# Patient Record
Sex: MALE | Race: WHITE | NOT HISPANIC OR LATINO | Employment: OTHER | URBAN - METROPOLITAN AREA
[De-identification: names, ages, dates, MRNs, and addresses within clinical notes are randomized per-mention and may not be internally consistent; named-entity substitution may affect disease eponyms.]

---

## 2017-03-07 ENCOUNTER — HOSPITAL ENCOUNTER (OUTPATIENT)
Facility: MEDICAL CENTER | Age: 62
End: 2017-03-07
Attending: INTERNAL MEDICINE
Payer: MEDICARE

## 2017-03-07 LAB
ALBUMIN SERPL BCP-MCNC: 4.3 G/DL (ref 3.2–4.9)
ALBUMIN/GLOB SERPL: 1.3 G/DL
ALP SERPL-CCNC: 96 U/L (ref 30–99)
ALT SERPL-CCNC: 63 U/L (ref 2–50)
ANION GAP SERPL CALC-SCNC: 10 MMOL/L (ref 0–11.9)
AST SERPL-CCNC: 61 U/L (ref 12–45)
BILIRUB SERPL-MCNC: 0.6 MG/DL (ref 0.1–1.5)
BNP SERPL-MCNC: 6 PG/ML (ref 0–100)
BUN SERPL-MCNC: 7 MG/DL (ref 8–22)
CALCIUM SERPL-MCNC: 9.8 MG/DL (ref 8.5–10.5)
CHLORIDE SERPL-SCNC: 100 MMOL/L (ref 96–112)
CO2 SERPL-SCNC: 25 MMOL/L (ref 20–33)
CREAT SERPL-MCNC: 1.02 MG/DL (ref 0.5–1.4)
DEPRECATED D DIMER PPP IA-ACNC: 227 NG/ML(D-DU)
GLOBULIN SER CALC-MCNC: 3.2 G/DL (ref 1.9–3.5)
GLUCOSE SERPL-MCNC: 112 MG/DL (ref 65–99)
POTASSIUM SERPL-SCNC: 4.2 MMOL/L (ref 3.6–5.5)
PROT SERPL-MCNC: 7.5 G/DL (ref 6–8.2)
PSA SERPL DL<=0.01 NG/ML-MCNC: 0.86 NG/ML (ref 0–4)
SODIUM SERPL-SCNC: 135 MMOL/L (ref 135–145)
TSH SERPL DL<=0.005 MIU/L-ACNC: 0.68 UIU/ML (ref 0.3–3.7)

## 2017-03-07 PROCEDURE — 80053 COMPREHEN METABOLIC PANEL: CPT

## 2017-03-07 PROCEDURE — 83880 ASSAY OF NATRIURETIC PEPTIDE: CPT

## 2017-03-07 PROCEDURE — 88184 FLOWCYTOMETRY/ TC 1 MARKER: CPT

## 2017-03-07 PROCEDURE — 84443 ASSAY THYROID STIM HORMONE: CPT

## 2017-03-07 PROCEDURE — 84153 ASSAY OF PSA TOTAL: CPT

## 2017-03-07 PROCEDURE — 87536 HIV-1 QUANT&REVRSE TRNSCRPJ: CPT

## 2017-03-07 PROCEDURE — 85379 FIBRIN DEGRADATION QUANT: CPT

## 2017-03-09 LAB
CD3+CD4+ CELLS # BLD: 107 CELLS/UL (ref 430–1800)
CD3+CD4+ CELLS NFR BLD: 30 % (ref 32–64)
IMMUNODEFICIENCY MARKERS SPEC-IMP: ABNORMAL

## 2017-03-10 LAB
HIV1 RNA # SERPL NAA+PROBE: <20 CPY/ML
HIV1 RNA SER-IMP: NOT DETECTED
HIV1 RNA SERPL NAA+PROBE-LOG#: <1.3 LOG

## 2017-04-06 ENCOUNTER — HOSPITAL ENCOUNTER (OUTPATIENT)
Facility: MEDICAL CENTER | Age: 62
End: 2017-04-06
Attending: PHYSICIAN ASSISTANT
Payer: MEDICARE

## 2017-04-06 LAB — GFR SERPL CREATININE-BSD FRML MDRD: >60 ML/MIN/1.73 M 2

## 2017-04-06 PROCEDURE — 85379 FIBRIN DEGRADATION QUANT: CPT

## 2017-04-06 PROCEDURE — 86780 TREPONEMA PALLIDUM: CPT

## 2017-04-06 PROCEDURE — 80307 DRUG TEST PRSMV CHEM ANLYZR: CPT

## 2017-04-06 PROCEDURE — G0480 DRUG TEST DEF 1-7 CLASSES: HCPCS

## 2017-04-06 PROCEDURE — 80053 COMPREHEN METABOLIC PANEL: CPT

## 2017-04-06 PROCEDURE — 84443 ASSAY THYROID STIM HORMONE: CPT

## 2017-04-06 PROCEDURE — 84153 ASSAY OF PSA TOTAL: CPT

## 2017-04-06 PROCEDURE — 84154 ASSAY OF PSA FREE: CPT

## 2017-04-06 PROCEDURE — 87536 HIV-1 QUANT&REVRSE TRNSCRPJ: CPT

## 2017-04-06 PROCEDURE — 83880 ASSAY OF NATRIURETIC PEPTIDE: CPT

## 2017-04-06 PROCEDURE — G0480 DRUG TEST DEF 1-7 CLASSES: HCPCS | Mod: 91

## 2017-04-06 PROCEDURE — 88184 FLOWCYTOMETRY/ TC 1 MARKER: CPT

## 2017-04-07 LAB
ALBUMIN SERPL BCP-MCNC: 4.9 G/DL (ref 3.2–4.9)
ALBUMIN/GLOB SERPL: 1.5 G/DL
ALP SERPL-CCNC: 151 U/L (ref 30–99)
ALT SERPL-CCNC: 173 U/L (ref 2–50)
ANION GAP SERPL CALC-SCNC: 19 MMOL/L (ref 0–11.9)
AST SERPL-CCNC: 210 U/L (ref 12–45)
BILIRUB SERPL-MCNC: 1.5 MG/DL (ref 0.1–1.5)
BNP SERPL-MCNC: 22 PG/ML (ref 0–100)
BUN SERPL-MCNC: 8 MG/DL (ref 8–22)
CALCIUM SERPL-MCNC: 10.7 MG/DL (ref 8.5–10.5)
CHLORIDE SERPL-SCNC: 94 MMOL/L (ref 96–112)
CO2 SERPL-SCNC: 24 MMOL/L (ref 20–33)
CREAT SERPL-MCNC: 1.15 MG/DL (ref 0.5–1.4)
DEPRECATED D DIMER PPP IA-ACNC: 1171 NG/ML(D-DU)
GLOBULIN SER CALC-MCNC: 3.2 G/DL (ref 1.9–3.5)
GLUCOSE SERPL-MCNC: 96 MG/DL (ref 65–99)
POTASSIUM SERPL-SCNC: 3.6 MMOL/L (ref 3.6–5.5)
PROT SERPL-MCNC: 8.1 G/DL (ref 6–8.2)
SODIUM SERPL-SCNC: 137 MMOL/L (ref 135–145)
TREPONEMA PALLIDUM IGG+IGM AB [PRESENCE] IN SERUM OR PLASMA BY IMMUNOASSAY: NON REACTIVE
TSH SERPL DL<=0.005 MIU/L-ACNC: 1.07 UIU/ML (ref 0.3–3.7)

## 2017-04-08 LAB
AMPHET CTO UR CFM-MCNC: NEGATIVE NG/ML
BARBITURATES CTO UR CFM-MCNC: NEGATIVE NG/ML
BENZODIAZ CTO UR CFM-MCNC: POSITIVE NG/ML
BUPRENORPHINE UR-MCNC: NEGATIVE NG/ML
CANNABINOIDS CTO UR CFM-MCNC: NEGATIVE NG/ML
CARISOPRODOL UR-MCNC: NEGATIVE NG/ML
CD3+CD4+ CELLS # BLD: 432 CELLS/UL (ref 430–1800)
CD3+CD4+ CELLS NFR BLD: 28 % (ref 32–64)
COCAINE CTO UR CFM-MCNC: NEGATIVE NG/ML
DRUG SCREEN COMMENT UR-IMP: NORMAL
ETHYL GLUCURONIDE UR QL SCN: POSITIVE NG/ML
FENTANYL UR-MCNC: NEGATIVE NG/ML
IMMUNODEFICIENCY MARKERS SPEC-IMP: ABNORMAL
MEPERIDINE CTO UR SCN-MCNC: NEGATIVE NG/ML
METHADONE CTO UR CFM-MCNC: NEGATIVE NG/ML
OPIATES UR QL SCN: NEGATIVE NG/ML
OXYCDOXYM URSCRN 2005102: NEGATIVE NG/ML
PCP CTO UR CFM-MCNC: NEGATIVE NG/ML
PROPOXYPH CTO UR CFM-MCNC: NEGATIVE NG/ML
PSA FREE MFR SERPL: 10 %
PSA FREE SERPL-MCNC: 0.2 NG/ML
PSA SERPL-MCNC: 2 NG/ML (ref 0–4)
TAPENTADOL UR-MCNC: NEGATIVE NG/ML
TRAMADOL CTO UR SCN-MCNC: NEGATIVE NG/ML
ZOLPIDEM UR-MCNC: NEGATIVE NG/ML

## 2017-04-09 LAB
ETHYL GLUCURONIDE UR CFM-MCNC: NORMAL NG/ML
ETHYL SULFATE UR CFM-MCNC: NORMAL NG/ML
HIV1 RNA # SERPL NAA+PROBE: <20 CPY/ML
HIV1 RNA SER-IMP: NOT DETECTED
HIV1 RNA SERPL NAA+PROBE-LOG#: <1.3 LOG

## 2017-04-10 LAB
1OH-MIDAZOLAM UR CFM-MCNC: <20 NG/ML
7AMINOCLONAZEPAM UR CFM-MCNC: <5 NG/ML
A-OH ALPRAZ UR CFM-MCNC: >1000 NG/ML
ALPRAZ UR CFM-MCNC: 746 NG/ML
CHLORDIAZEP UR CFM-MCNC: <20 NG/ML
CLONAZEPAM UR CFM-MCNC: <5 NG/ML
DIAZEPAM UR CFM-MCNC: <20 NG/ML
LORAZEPAM UR CFM-MCNC: <20 NG/ML
MIDAZOLAM UR CFM-MCNC: <20 NG/ML
NORDIAZEPAM UR CFM-MCNC: 348 NG/ML
OXAZEPAM UR CFM-MCNC: 1668 NG/ML
TEMAZEPAM UR CFM-MCNC: 508 NG/ML

## 2017-04-13 ENCOUNTER — HOSPITAL ENCOUNTER (OUTPATIENT)
Dept: RADIOLOGY | Facility: MEDICAL CENTER | Age: 62
End: 2017-04-13
Attending: FAMILY MEDICINE
Payer: MEDICARE

## 2017-04-13 DIAGNOSIS — E43 EDEMA DUE TO MALNUTRITION, DUE TO UNSPECIFIED MALNUTRITION TYPE (HCC): ICD-10-CM

## 2017-04-13 DIAGNOSIS — R79.89 ELEVATED TROPONIN LEVEL: ICD-10-CM

## 2017-04-13 PROCEDURE — 93971 EXTREMITY STUDY: CPT | Mod: RT

## 2017-08-07 ENCOUNTER — HOSPITAL ENCOUNTER (OUTPATIENT)
Facility: MEDICAL CENTER | Age: 62
End: 2017-08-07
Attending: PHYSICIAN ASSISTANT
Payer: MEDICARE

## 2017-08-07 LAB
ALBUMIN SERPL BCP-MCNC: 4.3 G/DL (ref 3.2–4.9)
ALBUMIN/GLOB SERPL: 1.5 G/DL
ALP SERPL-CCNC: 53 U/L (ref 30–99)
ALT SERPL-CCNC: 14 U/L (ref 2–50)
ANION GAP SERPL CALC-SCNC: 11 MMOL/L (ref 0–11.9)
AST SERPL-CCNC: 22 U/L (ref 12–45)
BILIRUB SERPL-MCNC: 0.5 MG/DL (ref 0.1–1.5)
BUN SERPL-MCNC: 11 MG/DL (ref 8–22)
CALCIUM SERPL-MCNC: 10 MG/DL (ref 8.5–10.5)
CHLORIDE SERPL-SCNC: 103 MMOL/L (ref 96–112)
CO2 SERPL-SCNC: 26 MMOL/L (ref 20–33)
CREAT SERPL-MCNC: 1.07 MG/DL (ref 0.5–1.4)
GFR SERPL CREATININE-BSD FRML MDRD: >60 ML/MIN/1.73 M 2
GLOBULIN SER CALC-MCNC: 2.9 G/DL (ref 1.9–3.5)
GLUCOSE SERPL-MCNC: 74 MG/DL (ref 65–99)
POTASSIUM SERPL-SCNC: 4.2 MMOL/L (ref 3.6–5.5)
PROT SERPL-MCNC: 7.2 G/DL (ref 6–8.2)
SODIUM SERPL-SCNC: 140 MMOL/L (ref 135–145)

## 2017-08-07 PROCEDURE — 80053 COMPREHEN METABOLIC PANEL: CPT

## 2017-09-05 ENCOUNTER — HOSPITAL ENCOUNTER (OUTPATIENT)
Facility: MEDICAL CENTER | Age: 62
End: 2017-09-05
Attending: PHYSICIAN ASSISTANT
Payer: MEDICARE

## 2017-09-05 LAB
ALBUMIN SERPL BCP-MCNC: 4.1 G/DL (ref 3.2–4.9)
ALBUMIN/GLOB SERPL: 1.4 G/DL
ALP SERPL-CCNC: 60 U/L (ref 30–99)
ALT SERPL-CCNC: 79 U/L (ref 2–50)
ANION GAP SERPL CALC-SCNC: 11 MMOL/L (ref 0–11.9)
AST SERPL-CCNC: 83 U/L (ref 12–45)
BASOPHILS # BLD AUTO: 0.6 % (ref 0–1.8)
BASOPHILS # BLD: 0.04 K/UL (ref 0–0.12)
BILIRUB SERPL-MCNC: 0.7 MG/DL (ref 0.1–1.5)
BUN SERPL-MCNC: 11 MG/DL (ref 8–22)
CALCIUM SERPL-MCNC: 9.4 MG/DL (ref 8.5–10.5)
CHLORIDE SERPL-SCNC: 104 MMOL/L (ref 96–112)
CO2 SERPL-SCNC: 22 MMOL/L (ref 20–33)
CREAT SERPL-MCNC: 1 MG/DL (ref 0.5–1.4)
EOSINOPHIL # BLD AUTO: 0 K/UL (ref 0–0.51)
EOSINOPHIL NFR BLD: 0 % (ref 0–6.9)
ERYTHROCYTE [DISTWIDTH] IN BLOOD BY AUTOMATED COUNT: 51.9 FL (ref 35.9–50)
GFR SERPL CREATININE-BSD FRML MDRD: >60 ML/MIN/1.73 M 2
GLOBULIN SER CALC-MCNC: 2.9 G/DL (ref 1.9–3.5)
GLUCOSE SERPL-MCNC: 148 MG/DL (ref 65–99)
HCT VFR BLD AUTO: 41.1 % (ref 42–52)
HGB BLD-MCNC: 13.8 G/DL (ref 14–18)
IMM GRANULOCYTES # BLD AUTO: 0.02 K/UL (ref 0–0.11)
IMM GRANULOCYTES NFR BLD AUTO: 0.3 % (ref 0–0.9)
LYMPHOCYTES # BLD AUTO: 0.65 K/UL (ref 1–4.8)
LYMPHOCYTES NFR BLD: 9.8 % (ref 22–41)
MCH RBC QN AUTO: 35.8 PG (ref 27–33)
MCHC RBC AUTO-ENTMCNC: 33.6 G/DL (ref 33.7–35.3)
MCV RBC AUTO: 106.8 FL (ref 81.4–97.8)
MONOCYTES # BLD AUTO: 0.57 K/UL (ref 0–0.85)
MONOCYTES NFR BLD AUTO: 8.6 % (ref 0–13.4)
NEUTROPHILS # BLD AUTO: 5.34 K/UL (ref 1.82–7.42)
NEUTROPHILS NFR BLD: 80.7 % (ref 44–72)
NRBC # BLD AUTO: 0 K/UL
NRBC BLD AUTO-RTO: 0 /100 WBC
PLATELET # BLD AUTO: 256 K/UL (ref 164–446)
PMV BLD AUTO: 9 FL (ref 9–12.9)
POTASSIUM SERPL-SCNC: 4.1 MMOL/L (ref 3.6–5.5)
PROT SERPL-MCNC: 7 G/DL (ref 6–8.2)
RBC # BLD AUTO: 3.85 M/UL (ref 4.7–6.1)
SODIUM SERPL-SCNC: 137 MMOL/L (ref 135–145)
WBC # BLD AUTO: 6.6 K/UL (ref 4.8–10.8)

## 2017-09-05 PROCEDURE — 86480 TB TEST CELL IMMUN MEASURE: CPT

## 2017-09-05 PROCEDURE — 87536 HIV-1 QUANT&REVRSE TRNSCRPJ: CPT

## 2017-09-05 PROCEDURE — 88184 FLOWCYTOMETRY/ TC 1 MARKER: CPT

## 2017-09-05 PROCEDURE — 84153 ASSAY OF PSA TOTAL: CPT

## 2017-09-05 PROCEDURE — 85025 COMPLETE CBC W/AUTO DIFF WBC: CPT

## 2017-09-05 PROCEDURE — 84154 ASSAY OF PSA FREE: CPT

## 2017-09-05 PROCEDURE — 80053 COMPREHEN METABOLIC PANEL: CPT

## 2017-09-07 LAB
CD3+CD4+ CELLS # BLD: 152 CELLS/UL (ref 430–1800)
CD3+CD4+ CELLS NFR BLD: 32 % (ref 32–64)
HIV1 RNA # SERPL NAA+PROBE: <20 CPY/ML
HIV1 RNA SER-IMP: NOT DETECTED
HIV1 RNA SERPL NAA+PROBE-LOG#: <1.3 LOG
IMMUNODEFICIENCY MARKERS SPEC-IMP: ABNORMAL
PSA FREE MFR SERPL: 20 %
PSA FREE SERPL-MCNC: 0.2 NG/ML
PSA SERPL-MCNC: 1 NG/ML (ref 0–4)

## 2017-09-13 LAB
M TB TUBERC IFN-G BLD QL: NEGATIVE
M TB TUBERC IFN-G/MITOGEN IGNF BLD: -0.01
M TB TUBERC IGNF/MITOGEN IGNF CONTROL: 2.26 [IU]/ML
MITOGEN IGNF BCKGRD COR BLD-ACNC: 0.02 [IU]/ML

## 2017-09-21 ENCOUNTER — HOSPITAL ENCOUNTER (OUTPATIENT)
Facility: MEDICAL CENTER | Age: 62
End: 2017-09-21
Attending: FAMILY MEDICINE
Payer: MEDICARE

## 2017-09-21 LAB — TREPONEMA PALLIDUM IGG+IGM AB [PRESENCE] IN SERUM OR PLASMA BY IMMUNOASSAY: NON REACTIVE

## 2017-09-21 PROCEDURE — 87536 HIV-1 QUANT&REVRSE TRNSCRPJ: CPT

## 2017-09-21 PROCEDURE — 86780 TREPONEMA PALLIDUM: CPT

## 2017-09-21 PROCEDURE — 88184 FLOWCYTOMETRY/ TC 1 MARKER: CPT

## 2017-09-24 LAB
CD3+CD4+ CELLS # BLD: 791 CELLS/UL (ref 430–1800)
CD3+CD4+ CELLS NFR BLD: 36 % (ref 32–64)
IMMUNODEFICIENCY MARKERS SPEC-IMP: NORMAL

## 2017-10-11 ENCOUNTER — HOSPITAL ENCOUNTER (OUTPATIENT)
Facility: MEDICAL CENTER | Age: 62
End: 2017-10-11
Attending: FAMILY MEDICINE
Payer: MEDICARE

## 2017-10-11 LAB — TESTOST SERPL-MCNC: 333 NG/DL (ref 175–781)

## 2017-10-11 PROCEDURE — 84403 ASSAY OF TOTAL TESTOSTERONE: CPT

## 2017-11-09 ENCOUNTER — APPOINTMENT (OUTPATIENT)
Dept: RADIOLOGY | Facility: MEDICAL CENTER | Age: 62
End: 2017-11-09
Attending: EMERGENCY MEDICINE
Payer: MEDICARE

## 2017-11-09 ENCOUNTER — OFFICE VISIT (OUTPATIENT)
Dept: URGENT CARE | Facility: PHYSICIAN GROUP | Age: 62
End: 2017-11-09
Payer: MEDICARE

## 2017-11-09 ENCOUNTER — HOSPITAL ENCOUNTER (EMERGENCY)
Facility: MEDICAL CENTER | Age: 62
End: 2017-11-09
Attending: EMERGENCY MEDICINE
Payer: MEDICARE

## 2017-11-09 VITALS
OXYGEN SATURATION: 96 % | HEART RATE: 98 BPM | DIASTOLIC BLOOD PRESSURE: 64 MMHG | SYSTOLIC BLOOD PRESSURE: 159 MMHG | HEIGHT: 72 IN | RESPIRATION RATE: 20 BRPM | BODY MASS INDEX: 18.28 KG/M2 | TEMPERATURE: 99.9 F | WEIGHT: 135 LBS

## 2017-11-09 VITALS
SYSTOLIC BLOOD PRESSURE: 140 MMHG | WEIGHT: 135 LBS | OXYGEN SATURATION: 96 % | DIASTOLIC BLOOD PRESSURE: 96 MMHG | HEART RATE: 124 BPM | BODY MASS INDEX: 18.28 KG/M2 | TEMPERATURE: 98.4 F | HEIGHT: 72 IN | RESPIRATION RATE: 16 BRPM

## 2017-11-09 DIAGNOSIS — R51.9 ACUTE NONINTRACTABLE HEADACHE, UNSPECIFIED HEADACHE TYPE: ICD-10-CM

## 2017-11-09 DIAGNOSIS — B20 HIV (HUMAN IMMUNODEFICIENCY VIRUS INFECTION) (HCC): ICD-10-CM

## 2017-11-09 DIAGNOSIS — G44.209 ACUTE NON INTRACTABLE TENSION-TYPE HEADACHE: ICD-10-CM

## 2017-11-09 DIAGNOSIS — R41.82 ALTERED MENTAL STATUS, UNSPECIFIED ALTERED MENTAL STATUS TYPE: ICD-10-CM

## 2017-11-09 DIAGNOSIS — R11.0 NAUSEA: ICD-10-CM

## 2017-11-09 LAB
ALBUMIN SERPL BCP-MCNC: 4.2 G/DL (ref 3.2–4.9)
ALBUMIN/GLOB SERPL: 1.4 G/DL
ALP SERPL-CCNC: 53 U/L (ref 30–99)
ALT SERPL-CCNC: 15 U/L (ref 2–50)
ANION GAP SERPL CALC-SCNC: 19 MMOL/L (ref 0–11.9)
APPEARANCE UR: CLEAR
APPEARANCE UR: CLEAR
AST SERPL-CCNC: 39 U/L (ref 12–45)
BACTERIA #/AREA URNS HPF: NEGATIVE /HPF
BASOPHILS # BLD AUTO: 0.6 % (ref 0–1.8)
BASOPHILS # BLD: 0.04 K/UL (ref 0–0.12)
BILIRUB SERPL-MCNC: 1 MG/DL (ref 0.1–1.5)
BILIRUB UR QL STRIP.AUTO: NEGATIVE
BUN SERPL-MCNC: 13 MG/DL (ref 8–22)
CALCIUM SERPL-MCNC: 9.5 MG/DL (ref 8.5–10.5)
CHLORIDE SERPL-SCNC: 99 MMOL/L (ref 96–112)
CO2 SERPL-SCNC: 20 MMOL/L (ref 20–33)
COLOR UR AUTO: YELLOW
COLOR UR: YELLOW
CREAT SERPL-MCNC: 0.97 MG/DL (ref 0.5–1.4)
CULTURE IF INDICATED INDCX: NO UA CULTURE
EOSINOPHIL # BLD AUTO: 0 K/UL (ref 0–0.51)
EOSINOPHIL NFR BLD: 0 % (ref 0–6.9)
EPI CELLS #/AREA URNS HPF: NEGATIVE /HPF
ERYTHROCYTE [DISTWIDTH] IN BLOOD BY AUTOMATED COUNT: 44.8 FL (ref 35.9–50)
GFR SERPL CREATININE-BSD FRML MDRD: >60 ML/MIN/1.73 M 2
GLOBULIN SER CALC-MCNC: 3.1 G/DL (ref 1.9–3.5)
GLUCOSE SERPL-MCNC: 73 MG/DL (ref 65–99)
GLUCOSE UR QL STRIP.AUTO: NEGATIVE MG/DL
GLUCOSE UR STRIP.AUTO-MCNC: NEGATIVE MG/DL
HCT VFR BLD AUTO: 42.9 % (ref 42–52)
HGB BLD-MCNC: 15.4 G/DL (ref 14–18)
HYALINE CASTS #/AREA URNS LPF: ABNORMAL /LPF
IMM GRANULOCYTES # BLD AUTO: 0.03 K/UL (ref 0–0.11)
IMM GRANULOCYTES NFR BLD AUTO: 0.5 % (ref 0–0.9)
KETONES UR QL STRIP.AUTO: 40 MG/DL
KETONES UR STRIP.AUTO-MCNC: 40 MG/DL
LEUKOCYTE ESTERASE UR QL STRIP.AUTO: NEGATIVE
LEUKOCYTE ESTERASE UR QL STRIP.AUTO: NEGATIVE
LIPASE SERPL-CCNC: 13 U/L (ref 11–82)
LYMPHOCYTES # BLD AUTO: 0.99 K/UL (ref 1–4.8)
LYMPHOCYTES NFR BLD: 16 % (ref 22–41)
MCH RBC QN AUTO: 36.4 PG (ref 27–33)
MCHC RBC AUTO-ENTMCNC: 35.9 G/DL (ref 33.7–35.3)
MCV RBC AUTO: 101.4 FL (ref 81.4–97.8)
MICRO URNS: ABNORMAL
MONOCYTES # BLD AUTO: 0.7 K/UL (ref 0–0.85)
MONOCYTES NFR BLD AUTO: 11.3 % (ref 0–13.4)
NEUTROPHILS # BLD AUTO: 4.42 K/UL (ref 1.82–7.42)
NEUTROPHILS NFR BLD: 71.6 % (ref 44–72)
NITRITE UR QL STRIP.AUTO: NEGATIVE
NITRITE UR QL STRIP.AUTO: NEGATIVE
NRBC # BLD AUTO: 0 K/UL
NRBC BLD AUTO-RTO: 0 /100 WBC
PH UR STRIP.AUTO: 5 [PH]
PH UR STRIP.AUTO: 5.5 [PH]
PLATELET # BLD AUTO: 188 K/UL (ref 164–446)
PMV BLD AUTO: 8.8 FL (ref 9–12.9)
POTASSIUM SERPL-SCNC: 4.4 MMOL/L (ref 3.6–5.5)
PROT SERPL-MCNC: 7.3 G/DL (ref 6–8.2)
PROT UR QL STRIP: 100 MG/DL
PROT UR QL STRIP: 30 MG/DL
RBC # BLD AUTO: 4.23 M/UL (ref 4.7–6.1)
RBC # URNS HPF: ABNORMAL /HPF
RBC UR QL AUTO: ABNORMAL
RBC UR QL AUTO: ABNORMAL
SODIUM SERPL-SCNC: 138 MMOL/L (ref 135–145)
SP GR UR STRIP.AUTO: 1.02
SP GR UR: 1.02
UROBILINOGEN UR STRIP.AUTO-MCNC: 0.2 MG/DL
WBC # BLD AUTO: 6.2 K/UL (ref 4.8–10.8)
WBC #/AREA URNS HPF: ABNORMAL /HPF

## 2017-11-09 PROCEDURE — 99203 OFFICE O/P NEW LOW 30 MIN: CPT | Performed by: NURSE PRACTITIONER

## 2017-11-09 PROCEDURE — 80053 COMPREHEN METABOLIC PANEL: CPT

## 2017-11-09 PROCEDURE — 70450 CT HEAD/BRAIN W/O DYE: CPT

## 2017-11-09 PROCEDURE — 85025 COMPLETE CBC W/AUTO DIFF WBC: CPT

## 2017-11-09 PROCEDURE — 99284 EMERGENCY DEPT VISIT MOD MDM: CPT

## 2017-11-09 PROCEDURE — 71010 DX-CHEST-LIMITED (1 VIEW): CPT

## 2017-11-09 PROCEDURE — 83690 ASSAY OF LIPASE: CPT

## 2017-11-09 PROCEDURE — 96374 THER/PROPH/DIAG INJ IV PUSH: CPT

## 2017-11-09 PROCEDURE — 700111 HCHG RX REV CODE 636 W/ 250 OVERRIDE (IP): Performed by: EMERGENCY MEDICINE

## 2017-11-09 PROCEDURE — 81002 URINALYSIS NONAUTO W/O SCOPE: CPT

## 2017-11-09 PROCEDURE — 81001 URINALYSIS AUTO W/SCOPE: CPT

## 2017-11-09 PROCEDURE — 36415 COLL VENOUS BLD VENIPUNCTURE: CPT

## 2017-11-09 RX ORDER — PROCHLORPERAZINE MALEATE 10 MG
10 TABLET ORAL EVERY 6 HOURS PRN
Qty: 20 TAB | Refills: 3 | Status: SHIPPED | OUTPATIENT
Start: 2017-11-09 | End: 2018-01-18

## 2017-11-09 RX ORDER — ONDANSETRON 2 MG/ML
4 INJECTION INTRAMUSCULAR; INTRAVENOUS ONCE
Status: COMPLETED | OUTPATIENT
Start: 2017-11-09 | End: 2017-11-09

## 2017-11-09 RX ORDER — ONDANSETRON 4 MG/1
4 TABLET, ORALLY DISINTEGRATING ORAL EVERY 8 HOURS PRN
Qty: 10 TAB | Refills: 0 | Status: SHIPPED | OUTPATIENT
Start: 2017-11-09 | End: 2018-01-18

## 2017-11-09 RX ADMIN — ONDANSETRON 4 MG: 2 INJECTION INTRAMUSCULAR; INTRAVENOUS at 14:01

## 2017-11-09 ASSESSMENT — LIFESTYLE VARIABLES
HEADACHE, FULLNESS IN HEAD: VERY MILD
TREMOR: TREMOR NOT VISIBLE BUT CAN BE FELT, FINGERTIP TO FINGERTIP
VISUAL DISTURBANCES: NOT PRESENT
AUDITORY DISTURBANCES: NOT PRESENT
TOTAL SCORE: 3
ORIENTATION AND CLOUDING OF SENSORIUM: ORIENTED AND CAN DO SERIAL ADDITIONS
AGITATION: NORMAL ACTIVITY
NAUSEA AND VOMITING: MILD NAUSEA WITH NO VOMITING
ANXIETY: NO ANXIETY (AT EASE)
PAROXYSMAL SWEATS: NO SWEAT VISIBLE

## 2017-11-09 ASSESSMENT — ENCOUNTER SYMPTOMS
COUGH: 0
VOMITING: 1
LOSS OF CONSCIOUSNESS: 0
SPEECH CHANGE: 1
TREMORS: 1
SHORTNESS OF BREATH: 0
MYALGIAS: 0
ABDOMINAL PAIN: 0
HEADACHES: 1
FEVER: 1
CHILLS: 1
NAUSEA: 0

## 2017-11-09 ASSESSMENT — PAIN SCALES - GENERAL
PAINLEVEL_OUTOF10: 0
PAINLEVEL_OUTOF10: 3

## 2017-11-09 NOTE — PROGRESS NOTES
Subjective:      Sukumar Bolton is a 62 y.o. male who presents with Nausea (headache, chills, shaking X 2 days )            Pt c/o headache as 6/10, onset 2 days ago along with fevers and chills and nausea. Patient states he is also shaky and having trouble talking and seeing things that are not there.         Review of Systems   Constitutional: Positive for chills, fever and malaise/fatigue.   HENT: Negative for congestion.    Respiratory: Negative for cough and shortness of breath.    Gastrointestinal: Positive for vomiting. Negative for abdominal pain and nausea.   Musculoskeletal: Negative for myalgias.   Skin: Negative for rash.   Neurological: Positive for tremors, speech change and headaches. Negative for loss of consciousness.          Objective:     /96   Pulse (!) 124   Temp 36.9 °C (98.4 °F)   Resp 16   Ht 1.829 m (6')   Wt 61.2 kg (135 lb)   SpO2 96%   BMI 18.31 kg/m²      Physical Exam   Constitutional: He appears well-developed and well-nourished. No distress.   HENT:   Head: Normocephalic and atraumatic.   Right Ear: Tympanic membrane and ear canal normal.   Left Ear: Tympanic membrane and ear canal normal.   Mouth/Throat: Posterior oropharyngeal erythema present. No oropharyngeal exudate or posterior oropharyngeal edema.   Eyes: Conjunctivae are normal. Pupils are equal, round, and reactive to light.   Neck: Neck supple.   Cardiovascular: Normal rate, regular rhythm and normal heart sounds.    Pulmonary/Chest: Effort normal and breath sounds normal. No respiratory distress.   Neurological: He is alert. He displays tremor. Gait abnormal.   Awake, alert, answering questions appropriately, moving all extremeties   Skin: Skin is warm and dry. Capillary refill takes less than 2 seconds. No rash noted.   Psychiatric: He has a normal mood and affect. His behavior is normal.               Assessment/Plan:       1. Acute nonintractable headache, unspecified headache type     2. Altered  mental status, unspecified altered mental status type           D/w pt/family recommendation to transfer to ED for evaluation and treatment not available at this facility, they verbalize agreement and request [facility]  D/w Dr Gary Gansert  who accepted patient   Recommended transport by ambulance pt/family agreed. Report to [facility] paramedics.

## 2017-11-09 NOTE — ED PROVIDER NOTES
ED Provider Note    Scribed for John Van M.D. by Sara Casillas. 11/9/2017  1:02 PM    Primary care provider: Butch Liao M.D.  Means of arrival: ambulance   History obtained from: patient   History limited by: none     CHIEF COMPLAINT  Chief Complaint   Patient presents with   • Chills   • Nausea       HPI  Sukumar Bolton is a 62 y.o. male who presents to the Emergency Department as a transfer from urgent care where the patient was initially seen for evaluation of nausea and a headache onset three days ago. He had one episode of vomiting this morning. Additionally, the patient complained of having generalized shaking, loss of appetite and speech change (this occurred today and has since resolved). He states his headache is a 3/10 in severity. He describes his speech change as difficulty thinking of certain words but reports this has since improved. He has no history of stroke or TIA. He confirms mild cough that is productive of yellow sputum and subjective fever. He denies diarrhea, constipation, abdominal pain, abnormal urination, sore throat and runny nose.     Patient has a history of HIV and is compliant with his antiviral medications. He denies history of Diabetes or AIDS. Patient smokes cigarettes and marijuana. He also admits to drinking two alcoholic beverages a day. He has no history of gastritis of ulcers.       REVIEW OF SYSTEMS  Pertinent positives include: nausea, vomiting, headache, shaking, loss of appetite, speech change, mild productive cough, subjective fever  Pertinent negatives include: diarrhea, constipation, abdominal pain, abnormal urination, sore throat and runny nose.   10+ systems reviewed and negative.  C.       PAST MEDICAL HISTORY  Past Medical History:   Diagnosis Date   • HIV (human immunodeficiency virus infection) (CMS-[facility])    • Hypertension    • Kidney disease        SOCIAL HISTORY  Social History   Substance Use Topics   • Smoking status: Current Every Day  Smoker     Types: Cigarettes   • Smokeless tobacco: Current User   • Alcohol use 0.0 oz/week     History   Drug Use   • Types: Marijuana         CURRENT MEDICATIONS  Home Medications     Reviewed by Germain Schrader R.N. (Registered Nurse) on 11/09/17 at 1308  Med List Status: Not Addressed   Medication Last Dose Status   Abacavir-Dolutegravir-Lamivud 600- MG Tab 11/9/2017 Active   finasteride (PROSCAR) 5 MG Tab 9/22/2016 Active   losartan (COZAAR) 25 MG Tab 11/9/2017 Active   MIRTAZAPINE PO 9/22/2016 Active   omeprazole (PRILOSEC) 20 MG delayed-release capsule 11/9/2017 Active   quetiapine (SEROQUEL) 25 MG Tab not taking Active                ALLERGIES  Allergies   Allergen Reactions   • Sulfa Drugs Itching       PHYSICAL EXAM  VITAL SIGNS: /64   Pulse (!) 106   Temp 37.7 °C (99.9 °F)   Resp 20   Ht 1.829 m (6')   Wt 61.2 kg (135 lb)   BMI 18.31 kg/m²   Reviewed and hypertensive.  Constitutional: Well developed, Well nourished, Disheveled.  HENT: Normocephalic, atraumatic, bilateral external ears normal, oropharynx moist, No exudates or erythema.   Eyes: PERRLA, conjunctiva pink, no scleral icterus.   Cardiovascular: Regular rate and rhythm. No murmurs, rubs or gallops.   Respiratory: Lungs clear to auscultation bilaterally. No wheezes, rales, or rhonchi.   Abdominal:  Abdomen soft, non-tender, non distended. No rebound, or guarding.    Skin: No erythema, no rash.   Genitourinary: No costovertebral angle tenderness.   Musculoskeletal: No edema.   Neurologic: Alert & oriented x 3, cranial nerves 2-12 intact by passive exam.  No focal deficit noted.  Psychiatric: Affect normal, Judgment normal, Mood normal.       RADIOLOGY/PROCEDURES  DX-CHEST-LIMITED (1 VIEW)   Final Result      1.  There is bibasilar linear scarring or atelectasis. There is no airspace process suspicious for pneumonia.      CT-HEAD W/O   Final Result      1.  Cerebral atrophy.      2.  Otherwise, Head CT without contrast within  normal limits. No evidence of acute cerebral infarction, hemorrhage or mass lesion.      Results and radiologist interpretation have been reviewed by me.         LABORATORY:  Results for orders placed or performed during the hospital encounter of 11/09/17   CBC WITH DIFFERENTIAL   Result Value Ref Range    WBC 6.2 4.8 - 10.8 K/uL    RBC 4.23 (L) 4.70 - 6.10 M/uL    Hemoglobin 15.4 14.0 - 18.0 g/dL    Hematocrit 42.9 42.0 - 52.0 %    .4 (H) 81.4 - 97.8 fL    MCH 36.4 (H) 27.0 - 33.0 pg    MCHC 35.9 (H) 33.7 - 35.3 g/dL    RDW 44.8 35.9 - 50.0 fL    Platelet Count 188 164 - 446 K/uL    MPV 8.8 (L) 9.0 - 12.9 fL    Neutrophils-Polys 71.60 44.00 - 72.00 %    Lymphocytes 16.00 (L) 22.00 - 41.00 %    Monocytes 11.30 0.00 - 13.40 %    Eosinophils 0.00 0.00 - 6.90 %    Basophils 0.60 0.00 - 1.80 %    Immature Granulocytes 0.50 0.00 - 0.90 %    Nucleated RBC 0.00 /100 WBC    Neutrophils (Absolute) 4.42 1.82 - 7.42 K/uL    Lymphs (Absolute) 0.99 (L) 1.00 - 4.80 K/uL    Monos (Absolute) 0.70 0.00 - 0.85 K/uL    Eos (Absolute) 0.00 0.00 - 0.51 K/uL    Baso (Absolute) 0.04 0.00 - 0.12 K/uL    Immature Granulocytes (abs) 0.03 0.00 - 0.11 K/uL    NRBC (Absolute) 0.00 K/uL   COMP METABOLIC PANEL   Result Value Ref Range    Sodium 138 135 - 145 mmol/L    Potassium 4.4 3.6 - 5.5 mmol/L    Chloride 99 96 - 112 mmol/L    Co2 20 20 - 33 mmol/L    Anion Gap 19.0 (H) 0.0 - 11.9    Glucose 73 65 - 99 mg/dL    Bun 13 8 - 22 mg/dL    Creatinine 0.97 0.50 - 1.40 mg/dL    Calcium 9.5 8.5 - 10.5 mg/dL    AST(SGOT) 39 12 - 45 U/L    ALT(SGPT) 15 2 - 50 U/L    Alkaline Phosphatase 53 30 - 99 U/L    Total Bilirubin 1.0 0.1 - 1.5 mg/dL    Albumin 4.2 3.2 - 4.9 g/dL    Total Protein 7.3 6.0 - 8.2 g/dL    Globulin 3.1 1.9 - 3.5 g/dL    A-G Ratio 1.4 g/dL   LIPASE   Result Value Ref Range    Lipase 13 11 - 82 U/L   POC UA   Result Value Ref Range    POC Color Yellow     POC Appearance Clear     POC Glucose Negative Negative mg/dL    POC Ketones  40 (A) Negative mg/dL    POC Specific Gravity 1.025 1.005 - 1.030    POC Blood Moderate (A) Negative    POC Urine PH 5.5 5.0 - 8.0    POC Protein 100 (A) Negative mg/dL    POC Nitrites Negative Negative    POC Leukocyte Esterase Negative Negative   Lab results reviewed by me.         INTERVENTIONS:  Medications   ondansetron (ZOFRAN) syringe/vial injection 4 mg (4 mg Intravenous Given 11/9/17 1401)   Response:Improved nausea.      COURSE & MEDICAL DECISION MAKING    1:02 PM - Patient seen and examined at bedside. Patient will be treated with Zofran 4 mg for his symptoms. Ordered DX chest, CT head, urinalysis, POC UA, CBC, CMP, lipase, POC urinalysis, estimated GFR to evaluate.     3:01 PM Patient reevaluated at bedside.     Well appearing patient with HIV presents with headache, nausea, vomiting and cough.  He has no neurologic deficits.  There is no evidence of brain mass or meningitis.  He does not meet criteria for AIDS based on CD4 count and viral load review from chart.  There is no evidence of stroke.  It is doubtful his speech issue today was a TIA.  Patient probably has a viral syndrome such as influenza.    PLAN:  Discharge Medication List as of 11/9/2017  4:00 PM      START taking these medications    Details   ondansetron (ZOFRAN ODT) 4 MG TABLET DISPERSIBLE Take 1 Tab by mouth every 8 hours as needed., Disp-10 Tab, R-0, Print Rx Paper      prochlorperazine (COMPAZINE) 10 MG Tab Take 1 Tab by mouth every 6 hours as needed., Disp-20 Tab, R-3, Print Rx Paper           Return for severe sudden headache, weakness, stroke symptoms, uncontrolled vomiting or dizziness.    F/u PMD if no improvement Monday.      CONDITION: stable, improved     FINAL IMPRESSION  1. Acute non intractable tension-type headache    2. Nausea    3. HIV (human immunodeficiency virus infection) (CMS-[facility])           ISara (Scribe), am scribing for, and in the presence of, John Van M.D..  Electronically signed by: Sara LOGAN  Vinny (Scribmeenakshi), 11/9/2017  IJohn M.D. personally performed the services described in this documentation, as scribed by Sara Casillas in my presence, and it is both accurate and complete.    The note accurately reflects work and decisions made by me.  John Van  11/10/2017  9:06 AM

## 2017-11-09 NOTE — DISCHARGE INSTRUCTIONS
Headache, General, Without Cause    Take ibuprofen 800 mg immediately on the onset of headache. Add Tylenol for persistent pain and take Zofran and/or Compazine for nausea. You may have a viral syndrome. Followup if not better in 4 days.  Return for sudden severe headache, headache and fever, uncontrolled vomiting, dizziness or headache and weakness.    You had a borderline or high normal blood pressure reading today.  This does not necessarily mean you have hypertension.  Please followup with your/a primary physician for comprehensive blood pressure evaluation and yearly fasting cholesterol assessment.  BP Readings from Last 3 Encounters:   11/09/17 159/64   11/09/17 140/96   09/22/16 150/90         You are having a headache. No specific cause was found today for your headache. Stress and depression are common triggers for these headaches. These headaches are not life threatening. It will not lead to other types of headaches. Headaches can be diagnosed by a patient history and a physical exam. Sometimes, further lab and x-ray studies are used to rule out more serious problems. Your caregiver can help you solve personal problems causing stress. Medication may be prescribed if depression is a problem.    HOME CARE INSTRUCTIONS  Ø If testing was done, call for your results. Remember, it is your responsibility to get the results of all testing. Do not assume everything is fine because you do not hear from your caregiver. It is also your responsibility to keep any follow-up appointments with your caregiver, or any specialist referral.  Ø You may use acetaminophen (Tylenol®), ibuprofen (Advil® or Motrin®), or aspirin as needed for pain and inflammation. Use these if your caregiver has not given medications which would interfere with this.  Ø Biofeedback, massage, or other relaxation techniques may be helpful.  Ø Ice packs or heat applied to the head and neck can be used. Do this three to four times per day, or as  needed.  Ø Physical therapy may be a useful addition to treatment.  Ø If headaches continue, even with therapy, you may need to think about lifestyle changes.  1   2 seek medical attention IF:  Ø You develop problems with medications prescribed.  Ø You do not respond to or obtain relief from medications.  Ø You have a change from the usual headache.  Ø You develop nausea or vomiting.    SEEK IMMEDIATE MEDICAL ATTENTION IF:   Ø If your headache becomes severe.  Ø You have an unexplained oral temperature above 102° F (38.9° C), or as your caregiver suggests.  Ø You have a stiff neck.  Ø You have loss of vision.  Ø You have muscular weakness.   Ø You have loss of muscular control.  Ø You develop severe symptoms different from your first symptoms.  Ø You start losing your balance or have trouble walking.  Ø You feel faint or pass out.    AGREEMENT BETWEEN PATIENT AND HEALTHCARE TEAM:  Your signature on this document represents an understanding between you and the healthcare team that took care of you today.  That means that you:  Ø Understand these discharge instructions.   Ø Will monitor your condition.  Ø Will seek immediate medical attention as instructed.    Document Released: 12/18/2006  Document Re-Released: 06/30/2008  [employer]® Patient Information ©2008 [employer].

## 2017-11-21 ENCOUNTER — HOSPITAL ENCOUNTER (OUTPATIENT)
Facility: MEDICAL CENTER | Age: 62
End: 2017-11-21
Attending: FAMILY MEDICINE
Payer: MEDICARE

## 2017-11-21 LAB
G LAMBLIA+C PARVUM AG STL QL RAPID: NORMAL
SIGNIFICANT IND 70042: NORMAL
SOURCE SOURCE: NORMAL

## 2017-11-21 PROCEDURE — 87329 GIARDIA AG IA: CPT

## 2017-11-21 PROCEDURE — 87045 FECES CULTURE AEROBIC BACT: CPT

## 2017-11-21 PROCEDURE — 87899 AGENT NOS ASSAY W/OPTIC: CPT

## 2017-11-21 PROCEDURE — 87046 STOOL CULTR AEROBIC BACT EA: CPT

## 2017-11-21 PROCEDURE — 87328 CRYPTOSPORIDIUM AG IA: CPT

## 2017-11-22 LAB
E COLI SXT1+2 STL IA: NORMAL
SIGNIFICANT IND 70042: NORMAL
SOURCE SOURCE: NORMAL

## 2017-11-24 LAB
BACTERIA STL CULT: NORMAL
E COLI SXT1+2 STL IA: NORMAL
SIGNIFICANT IND 70042: NORMAL
SOURCE SOURCE: NORMAL

## 2017-12-28 ENCOUNTER — APPOINTMENT (OUTPATIENT)
Dept: RADIOLOGY | Facility: MEDICAL CENTER | Age: 62
End: 2017-12-28
Attending: EMERGENCY MEDICINE
Payer: MEDICARE

## 2017-12-28 ENCOUNTER — HOSPITAL ENCOUNTER (EMERGENCY)
Facility: MEDICAL CENTER | Age: 62
End: 2017-12-28
Attending: EMERGENCY MEDICINE
Payer: MEDICARE

## 2017-12-28 VITALS
WEIGHT: 133.49 LBS | TEMPERATURE: 98.1 F | HEIGHT: 72 IN | DIASTOLIC BLOOD PRESSURE: 74 MMHG | BODY MASS INDEX: 18.08 KG/M2 | SYSTOLIC BLOOD PRESSURE: 118 MMHG | HEART RATE: 89 BPM | RESPIRATION RATE: 16 BRPM | OXYGEN SATURATION: 98 %

## 2017-12-28 DIAGNOSIS — R63.4 WEIGHT LOSS: ICD-10-CM

## 2017-12-28 DIAGNOSIS — B20 HIV (HUMAN IMMUNODEFICIENCY VIRUS INFECTION) (HCC): ICD-10-CM

## 2017-12-28 DIAGNOSIS — R53.1 WEAKNESS: ICD-10-CM

## 2017-12-28 LAB
ALBUMIN SERPL BCP-MCNC: 4.1 G/DL (ref 3.2–4.9)
ALBUMIN/GLOB SERPL: 1.5 G/DL
ALP SERPL-CCNC: 41 U/L (ref 30–99)
ALT SERPL-CCNC: 38 U/L (ref 2–50)
ANION GAP SERPL CALC-SCNC: 12 MMOL/L (ref 0–11.9)
AST SERPL-CCNC: 75 U/L (ref 12–45)
BASOPHILS # BLD AUTO: 0.9 % (ref 0–1.8)
BASOPHILS # BLD: 0.03 K/UL (ref 0–0.12)
BILIRUB SERPL-MCNC: 1.2 MG/DL (ref 0.1–1.5)
BUN SERPL-MCNC: 12 MG/DL (ref 8–22)
CALCIUM SERPL-MCNC: 9.5 MG/DL (ref 8.5–10.5)
CHLORIDE SERPL-SCNC: 103 MMOL/L (ref 96–112)
CO2 SERPL-SCNC: 26 MMOL/L (ref 20–33)
CREAT SERPL-MCNC: 0.87 MG/DL (ref 0.5–1.4)
EOSINOPHIL # BLD AUTO: 0.02 K/UL (ref 0–0.51)
EOSINOPHIL NFR BLD: 0.6 % (ref 0–6.9)
ERYTHROCYTE [DISTWIDTH] IN BLOOD BY AUTOMATED COUNT: 48.7 FL (ref 35.9–50)
GFR SERPL CREATININE-BSD FRML MDRD: >60 ML/MIN/1.73 M 2
GLOBULIN SER CALC-MCNC: 2.7 G/DL (ref 1.9–3.5)
GLUCOSE SERPL-MCNC: 83 MG/DL (ref 65–99)
HCT VFR BLD AUTO: 39.7 % (ref 42–52)
HGB BLD-MCNC: 14 G/DL (ref 14–18)
IMM GRANULOCYTES # BLD AUTO: 0 K/UL (ref 0–0.11)
IMM GRANULOCYTES NFR BLD AUTO: 0 % (ref 0–0.9)
LIPASE SERPL-CCNC: 22 U/L (ref 11–82)
LYMPHOCYTES # BLD AUTO: 1.03 K/UL (ref 1–4.8)
LYMPHOCYTES NFR BLD: 30.2 % (ref 22–41)
MCH RBC QN AUTO: 36.9 PG (ref 27–33)
MCHC RBC AUTO-ENTMCNC: 35.3 G/DL (ref 33.7–35.3)
MCV RBC AUTO: 104.7 FL (ref 81.4–97.8)
MONOCYTES # BLD AUTO: 0.62 K/UL (ref 0–0.85)
MONOCYTES NFR BLD AUTO: 18.2 % (ref 0–13.4)
NEUTROPHILS # BLD AUTO: 1.71 K/UL (ref 1.82–7.42)
NEUTROPHILS NFR BLD: 50.1 % (ref 44–72)
NRBC # BLD AUTO: 0 K/UL
NRBC BLD-RTO: 0 /100 WBC
PLATELET # BLD AUTO: 137 K/UL (ref 164–446)
PMV BLD AUTO: 8.5 FL (ref 9–12.9)
POTASSIUM SERPL-SCNC: 3.7 MMOL/L (ref 3.6–5.5)
PROT SERPL-MCNC: 6.8 G/DL (ref 6–8.2)
RBC # BLD AUTO: 3.79 M/UL (ref 4.7–6.1)
SODIUM SERPL-SCNC: 141 MMOL/L (ref 135–145)
WBC # BLD AUTO: 3.4 K/UL (ref 4.8–10.8)

## 2017-12-28 PROCEDURE — 80053 COMPREHEN METABOLIC PANEL: CPT

## 2017-12-28 PROCEDURE — 83690 ASSAY OF LIPASE: CPT

## 2017-12-28 PROCEDURE — 85025 COMPLETE CBC W/AUTO DIFF WBC: CPT

## 2017-12-28 PROCEDURE — 99284 EMERGENCY DEPT VISIT MOD MDM: CPT

## 2017-12-28 RX ORDER — SODIUM CHLORIDE 9 MG/ML
INJECTION, SOLUTION INTRAVENOUS CONTINUOUS
Status: DISCONTINUED | OUTPATIENT
Start: 2017-12-28 | End: 2017-12-28 | Stop reason: HOSPADM

## 2017-12-28 NOTE — DISCHARGE INSTRUCTIONS
Weakness  Weakness is a lack of strength. You may feel weak all over your body or just in one part of your body. Weakness can be serious. In some cases, you may need more medical tests.  HOME CARE  · Rest.  · Eat a well-balanced diet.  · Try to exercise every day.  · Only take medicines as told by your doctor.  GET HELP RIGHT AWAY IF:   · You cannot do your normal daily activities.  · You cannot walk up and down stairs, or you feel very tired when you do so.  · You have shortness of breath or chest pain.  · You have trouble moving parts of your body.  · You have weakness in only one body part or on only one side of the body.  · You have a fever.  · You have trouble speaking or swallowing.  · You cannot control when you pee (urinate) or poop (bowel movement).  · You have black or bloody throw up (vomit) or poop.  · Your weakness gets worse or spreads to other body parts.  · You have new aches or pains.  MAKE SURE YOU:   · Understand these instructions.  · Will watch your condition.  · Will get help right away if you are not doing well or get worse.     This information is not intended to replace advice given to you by your health care provider. Make sure you discuss any questions you have with your health care provider.     Document Released: 11/30/2009 Document Revised: 06/18/2013 Document Reviewed: 02/15/2013  [employer] Interactive Patient Education ©2016 [employer]

## 2017-12-28 NOTE — ED PROVIDER NOTES
ED Provider Note    Scribed for Steve Camacho M.D. by Lizeth Crouch. 12/28/2017  1:39 PM    Primary Care Provider: Butch Liao M.D.  Means of arrival: EMS    History limited by: None     CHIEF COMPLAINT  Chief Complaint   Patient presents with   • Weakness     HPI  Sukumar Bolton is a 62 y.o. male who presents to the ED complaining of weakness. Patient reports he has been chronically weak and shaky. He states associated ground level fall last night secondary to weakness. Patient reports chronicBlack stool for that past 7 weeks ago. Denies taking Pepto bismol. He states one episode of vomiting and loss 10 pounds recently. Patient was seen in the ED 2 weeks ago for these same symptoms with no acute abnormalities. He states stool culture was performed two weeks ago by primary care with no acute abnormalities. Denies blurred vision, headache, sore throat, chest pain, shortness of breath, abdominal pain, focal numbness, or new skin rashes. Patient reports history of HIV.     REVIEW OF SYSTEMS    C  CONSTITUTIONAL: Positive loss and weakness. Denies fever, chills,   EYES:  Denies blurred vision.   ENT:  Denies sore throat, nose, or ear pain.  CARDIOVASCULAR:  Denies chest pain, palpitations, or swelling.  RESPIRATORY:  Denies cough, shortness of breath, difficulty breathing.  GI:  Vomiting, melena, Denies abdominal pain, nausea  MUSCULOSKELETAL:  Weakness, Denies joint swelling, or back pain.  SKIN:  No rash or bruising.  NEUROLOGIC: Denies headache, focal weakness, or numbness.  PSYCHIATRIC:  Denies depression.    PAST MEDICAL HISTORY  Past Medical History:   Diagnosis Date   • HIV (human immunodeficiency virus infection) (CMS-[facility])    • Hypertension    • Kidney disease      FAMILY HISTORY  None     SOCIAL HISTORY   reports that he has been smoking Cigarettes.  He uses smokeless tobacco. He reports that he drinks alcohol. He reports that he uses drugs, including Marijuana.    SURGICAL HISTORY  None      CURRENT MEDICATIONS  No current facility-administered medications on file prior to encounter.      Current Outpatient Prescriptions on File Prior to Encounter   Medication Sig Dispense Refill   • ondansetron (ZOFRAN ODT) 4 MG TABLET DISPERSIBLE Take 1 Tab by mouth every 8 hours as needed. 10 Tab 0   • prochlorperazine (COMPAZINE) 10 MG Tab Take 1 Tab by mouth every 6 hours as needed. 20 Tab 3   • MIRTAZAPINE PO Take  by mouth.     • Abacavir-Dolutegravir-Lamivud 600- MG Tab Take  by mouth.     • losartan (COZAAR) 25 MG Tab Take 25 mg by mouth every day.     • quetiapine (SEROQUEL) 25 MG Tab Take 25 mg by mouth 2 times a day.     • finasteride (PROSCAR) 5 MG Tab Take 5 mg by mouth every day.     • omeprazole (PRILOSEC) 20 MG delayed-release capsule Take 20 mg by mouth every day.       ALLERGIES  Allergies   Allergen Reactions   • Sulfa Drugs Itching     PHYSICAL EXAM  VITAL SIGNS: /73   Pulse 90   Temp 36.7 °C (98.1 °F)   Resp 16   Ht 1.829 m (6')   Wt 60.6 kg (133 lb 7.8 oz)   SpO2 98%   BMI 18.10 kg/m²      Constitutional: Patient is awake and alert. No acute respiratory distress. Well developed, Well nourished, Tired appearing   HENT: Normocephalic, Atraumatic, Bilateral external ears normal, Oropharynx pink dry with no exudates, Nose patent.  Eyes: PERRLA, EOMI, Sclera and conjunctiva injected with discharge.   Neck:  Supple no nuchal rigidity, no thyromegaly or mass. Non-tender  Lymphatic: Submandibular and supraclavicular lymph nodes.   Cardiovascular: Heart is tachycardic and regular rhythm no murmur, rub or thrill.   Thorax & Lungs: Chest is symmetrical, with good breath sounds. No wheezing or crackles. No respiratory distress, No chest tenderness.   Abdomen: Soft, No tenderness no hepatosplenomegaly there is no guarding or rebound, No masses, No pulsatile masses.  Rectal: dark stool, heme trace positive  Skin: Warm, Dry, no petechia, purpura, or rash.   Back:  No CVA tenderness.    Extremities: No edema. Non tender.   Musculoskeletal: Good range of motion to wrists, elbows, shoulders, hips, knees, and ankles.  No gross deformities noted.   Neurologic: Alert & oriented to person, time, and place.  Strength is symmetric in bilateral upper and lower extremities.  Sensory is intact to light touch to face, arms, and legs.     Psychiatric: Normal affect    LABS  Results for orders placed or performed during the hospital encounter of 12/28/17   CBC WITH DIFFERENTIAL   Result Value Ref Range    WBC 3.4 (L) 4.8 - 10.8 K/uL    RBC 3.79 (L) 4.70 - 6.10 M/uL    Hemoglobin 14.0 14.0 - 18.0 g/dL    Hematocrit 39.7 (L) 42.0 - 52.0 %    .7 (H) 81.4 - 97.8 fL    MCH 36.9 (H) 27.0 - 33.0 pg    MCHC 35.3 33.7 - 35.3 g/dL    RDW 48.7 35.9 - 50.0 fL    Platelet Count 137 (L) 164 - 446 K/uL    MPV 8.5 (L) 9.0 - 12.9 fL    Neutrophils-Polys 50.10 44.00 - 72.00 %    Lymphocytes 30.20 22.00 - 41.00 %    Monocytes 18.20 (H) 0.00 - 13.40 %    Eosinophils 0.60 0.00 - 6.90 %    Basophils 0.90 0.00 - 1.80 %    Immature Granulocytes 0.00 0.00 - 0.90 %    Nucleated RBC 0.00 /100 WBC    Neutrophils (Absolute) 1.71 (L) 1.82 - 7.42 K/uL    Lymphs (Absolute) 1.03 1.00 - 4.80 K/uL    Monos (Absolute) 0.62 0.00 - 0.85 K/uL    Eos (Absolute) 0.02 0.00 - 0.51 K/uL    Baso (Absolute) 0.03 0.00 - 0.12 K/uL    Immature Granulocytes (abs) 0.00 0.00 - 0.11 K/uL    NRBC (Absolute) 0.00 K/uL   COMP METABOLIC PANEL   Result Value Ref Range    Sodium 141 135 - 145 mmol/L    Potassium 3.7 3.6 - 5.5 mmol/L    Chloride 103 96 - 112 mmol/L    Co2 26 20 - 33 mmol/L    Anion Gap 12.0 (H) 0.0 - 11.9    Glucose 83 65 - 99 mg/dL    Bun 12 8 - 22 mg/dL    Creatinine 0.87 0.50 - 1.40 mg/dL    Calcium 9.5 8.5 - 10.5 mg/dL    AST(SGOT) 75 (H) 12 - 45 U/L    ALT(SGPT) 38 2 - 50 U/L    Alkaline Phosphatase 41 30 - 99 U/L    Total Bilirubin 1.2 0.1 - 1.5 mg/dL    Albumin 4.1 3.2 - 4.9 g/dL    Total Protein 6.8 6.0 - 8.2 g/dL    Globulin 2.7 1.9 -  3.5 g/dL    A-G Ratio 1.5 g/dL   LIPASE   Result Value Ref Range    Lipase 22 11 - 82 U/L   ESTIMATED GFR   Result Value Ref Range    GFR If African American >60 >60 mL/min/1.73 m 2    GFR If Non African American >60 >60 mL/min/1.73 m 2     All labs reviewed by me.    RADIOLOGY/PROCEDURES  DX-CHEST-PORTABLE (1 VIEW)    (Results Pending)     The radiologist's interpretations of all radiological studies have been reviewed by me.     COURSE & MEDICAL DECISION MAKING  Pertinent Labs & Imaging studies reviewed. (See chart for details)    Differential diagnoses include but are not limited toGI bleed, sepsis, urinary tract infections, pneumonias, likely imbalances, medication side effects, HIV related illnesses    1:39 PM - Patient seen and examined at bedside. Ordered DX-Chest, Estimated GFR, CBC with differential, CMP, Lipase, POC Urinalysis.  Patient will be medicated with NS infusion fr dehydration.     2:48 PM- Patient is requesting to go home and refuses chest x-ray. I discussed with the patient the risks of their decision to leave without receiving the appropriate medical care. I discussed with the patient the risks of their decision to refuse or withhold consent to receive appropriate medical care. The patient has the capacity to understand the risks and benefits described above. The patient is not intoxicated clinically, the patient's is alert and oriented and able to make a good decision in my opinion. I discussed alternative treatments with the patient. The patient was given discharge instructions and a followup plan as documented in the medical record. I have asked the patient to return at any time to the emergency department for any reason.    Decision Making  The patient came in the emergency department today stating he is feeling weaker in general. He's had some dark stools. There is trace heme positive. He stated he was dehydrated if I ordered IV fluids on him because he looked clinically dehydrated. We  obtained blood work on him as well. However is time went on he did not want to stay here any longer. He refuses a chest x-ray and stated he wanted to leave and see his doctors tomorrow. This time I believe he can be discharged home. He understands as a risk of death or more complications he could have a GI bleed sepsis or other problems but at this time he still wants to leave. He seems to be competent show capacitance.  The patient was actually seen in the hallway doing pushups showing as how strong he was at this time.    The patient will return for new or worsening symptoms and is stable at the time of discharge.    The patient is referred to a primary physician for blood pressure management, diabetic screening, and for all other preventative health concerns.    DISPOSITION:  Patient will be discharged home in stable condition.    FOLLOW UP:  Butch Liao M.D.  [address] [place] [address]   [place] [id number]  [phone]    In 1 day      OUTPATIENT MEDICATIONS:  New Prescriptions    No medications on file     FINAL IMPRESSION  1. Weakness    2. Weight loss    3. HIV (human immunodeficiency virus infection) (CMS-[facility])    3. GI bleed    PLAN  1. Follow-up primary care doctor tomorrow  2. Return to the emergency department for change in mind about admission and further workup and evaluation  3 Return to the emergency department for increased pains, fevers, vomiting or change in condition.     ILizeth (Scribe), am scribing for, and in the presence of, Steve Camacho M.D..    Electronically signed by: Lizeth Crouch (Racquelibe), 12/28/2017    Steve VELEZ M.D. personally performed the services described in this documentation, as scribed by Lizeth Crouch in my presence, and it is both accurate and complete.    The note accurately reflects work and decisions made by me.  Steve Camacho  12/28/2017  7:20 PM

## 2017-12-28 NOTE — ED NOTES
PT on floor in the hallway doing pushups.  PT asked to get off the floor and return to his gurney

## 2018-01-05 ENCOUNTER — HOSPITAL ENCOUNTER (OUTPATIENT)
Dept: LAB | Facility: MEDICAL CENTER | Age: 63
End: 2018-01-05
Attending: INTERNAL MEDICINE
Payer: MEDICARE

## 2018-01-05 PROCEDURE — 87536 HIV-1 QUANT&REVRSE TRNSCRPJ: CPT

## 2018-01-05 PROCEDURE — 88184 FLOWCYTOMETRY/ TC 1 MARKER: CPT

## 2018-01-05 PROCEDURE — 86780 TREPONEMA PALLIDUM: CPT

## 2018-01-06 LAB — TREPONEMA PALLIDUM IGG+IGM AB [PRESENCE] IN SERUM OR PLASMA BY IMMUNOASSAY: NON REACTIVE

## 2018-01-07 LAB
CD3+CD4+ CELLS # BLD: 500 CELLS/UL (ref 490–1600)
CD3+CD4+ CELLS NFR BLD: 33 % (ref 35–68)
IMMUNODEFICIENCY MARKERS SPEC-IMP: ABNORMAL

## 2018-01-18 ENCOUNTER — HOSPITAL ENCOUNTER (EMERGENCY)
Facility: MEDICAL CENTER | Age: 63
End: 2018-01-18
Attending: EMERGENCY MEDICINE
Payer: MEDICARE

## 2018-01-18 ENCOUNTER — APPOINTMENT (OUTPATIENT)
Dept: RADIOLOGY | Facility: MEDICAL CENTER | Age: 63
End: 2018-01-18
Attending: EMERGENCY MEDICINE
Payer: MEDICARE

## 2018-01-18 VITALS
RESPIRATION RATE: 18 BRPM | OXYGEN SATURATION: 98 % | TEMPERATURE: 98 F | HEART RATE: 91 BPM | SYSTOLIC BLOOD PRESSURE: 141 MMHG | BODY MASS INDEX: 17.65 KG/M2 | WEIGHT: 130.29 LBS | DIASTOLIC BLOOD PRESSURE: 82 MMHG | HEIGHT: 72 IN

## 2018-01-18 DIAGNOSIS — H10.023 OTHER MUCOPURULENT CONJUNCTIVITIS OF BOTH EYES: ICD-10-CM

## 2018-01-18 DIAGNOSIS — R11.0 NAUSEA: ICD-10-CM

## 2018-01-18 DIAGNOSIS — R11.10 NON-INTRACTABLE VOMITING, PRESENCE OF NAUSEA NOT SPECIFIED, UNSPECIFIED VOMITING TYPE: ICD-10-CM

## 2018-01-18 LAB
ALBUMIN SERPL BCP-MCNC: 3.9 G/DL (ref 3.2–4.9)
ALBUMIN/GLOB SERPL: 1.6 G/DL
ALP SERPL-CCNC: 52 U/L (ref 30–99)
ALT SERPL-CCNC: 119 U/L (ref 2–50)
ANION GAP SERPL CALC-SCNC: 23 MMOL/L (ref 0–11.9)
APPEARANCE UR: CLEAR
AST SERPL-CCNC: 194 U/L (ref 12–45)
BACTERIA #/AREA URNS HPF: ABNORMAL /HPF
BASOPHILS # BLD AUTO: 1.2 % (ref 0–1.8)
BASOPHILS # BLD: 0.05 K/UL (ref 0–0.12)
BILIRUB SERPL-MCNC: 1.3 MG/DL (ref 0.1–1.5)
BILIRUB UR QL STRIP.AUTO: ABNORMAL
BUN SERPL-MCNC: 20 MG/DL (ref 8–22)
CALCIUM SERPL-MCNC: 8.3 MG/DL (ref 8.4–10.2)
CASTS 1761B: ABNORMAL /LPF
CASTS 1761C: ABNORMAL /LPF
CASTS URNS QL MICRO: ABNORMAL /LPF
CHLORIDE SERPL-SCNC: 100 MMOL/L (ref 96–112)
CO2 SERPL-SCNC: 17 MMOL/L (ref 20–33)
COLOR UR: YELLOW
CREAT SERPL-MCNC: 1.32 MG/DL (ref 0.5–1.4)
CULTURE IF INDICATED INDCX: YES UA CULTURE
EOSINOPHIL # BLD AUTO: 0 K/UL (ref 0–0.51)
EOSINOPHIL NFR BLD: 0 % (ref 0–6.9)
EPI CELLS #/AREA URNS HPF: ABNORMAL /HPF
ERYTHROCYTE [DISTWIDTH] IN BLOOD BY AUTOMATED COUNT: 49.5 FL (ref 35.9–50)
GLOBULIN SER CALC-MCNC: 2.5 G/DL (ref 1.9–3.5)
GLUCOSE SERPL-MCNC: 74 MG/DL (ref 65–99)
GLUCOSE UR STRIP.AUTO-MCNC: NEGATIVE MG/DL
HCT VFR BLD AUTO: 43.7 % (ref 42–52)
HGB BLD-MCNC: 15.1 G/DL (ref 14–18)
IMM GRANULOCYTES # BLD AUTO: 0.02 K/UL (ref 0–0.11)
IMM GRANULOCYTES NFR BLD AUTO: 0.5 % (ref 0–0.9)
KETONES UR STRIP.AUTO-MCNC: >=80 MG/DL
LEUKOCYTE ESTERASE UR QL STRIP.AUTO: NEGATIVE
LYMPHOCYTES # BLD AUTO: 0.63 K/UL (ref 1–4.8)
LYMPHOCYTES NFR BLD: 14.7 % (ref 22–41)
MCH RBC QN AUTO: 37.7 PG (ref 27–33)
MCHC RBC AUTO-ENTMCNC: 34.6 G/DL (ref 33.7–35.3)
MCV RBC AUTO: 109 FL (ref 81.4–97.8)
MICRO URNS: ABNORMAL
MONOCYTES # BLD AUTO: 0.67 K/UL (ref 0–0.85)
MONOCYTES NFR BLD AUTO: 15.6 % (ref 0–13.4)
MUCOUS THREADS #/AREA URNS HPF: ABNORMAL /HPF
NEUTROPHILS # BLD AUTO: 2.92 K/UL (ref 1.82–7.42)
NEUTROPHILS NFR BLD: 68 % (ref 44–72)
NITRITE UR QL STRIP.AUTO: NEGATIVE
NRBC # BLD AUTO: 0 K/UL
NRBC BLD-RTO: 0 /100 WBC
PH UR STRIP.AUTO: 5.5 [PH]
PLATELET # BLD AUTO: 117 K/UL (ref 164–446)
PMV BLD AUTO: 8.9 FL (ref 9–12.9)
POTASSIUM SERPL-SCNC: 4.4 MMOL/L (ref 3.6–5.5)
PROT SERPL-MCNC: 6.4 G/DL (ref 6–8.2)
PROT UR QL STRIP: 30 MG/DL
RBC # BLD AUTO: 4.01 M/UL (ref 4.7–6.1)
RBC # URNS HPF: ABNORMAL /HPF
RBC UR QL AUTO: ABNORMAL
SODIUM SERPL-SCNC: 140 MMOL/L (ref 135–145)
SP GR UR REFRACTOMETRY: 1.04
SP GR UR STRIP.AUTO: >=1.03
TROPONIN I SERPL-MCNC: <0.02 NG/ML (ref 0–0.04)
WBC # BLD AUTO: 4.3 K/UL (ref 4.8–10.8)
WBC #/AREA URNS HPF: ABNORMAL /HPF

## 2018-01-18 PROCEDURE — 85025 COMPLETE CBC W/AUTO DIFF WBC: CPT

## 2018-01-18 PROCEDURE — 84484 ASSAY OF TROPONIN QUANT: CPT

## 2018-01-18 PROCEDURE — 99284 EMERGENCY DEPT VISIT MOD MDM: CPT

## 2018-01-18 PROCEDURE — 87086 URINE CULTURE/COLONY COUNT: CPT

## 2018-01-18 PROCEDURE — 80053 COMPREHEN METABOLIC PANEL: CPT

## 2018-01-18 PROCEDURE — 71045 X-RAY EXAM CHEST 1 VIEW: CPT

## 2018-01-18 PROCEDURE — 700102 HCHG RX REV CODE 250 W/ 637 OVERRIDE(OP): Performed by: EMERGENCY MEDICINE

## 2018-01-18 PROCEDURE — 81001 URINALYSIS AUTO W/SCOPE: CPT

## 2018-01-18 PROCEDURE — A9270 NON-COVERED ITEM OR SERVICE: HCPCS | Performed by: EMERGENCY MEDICINE

## 2018-01-18 RX ORDER — TETRACAINE HYDROCHLORIDE 5 MG/ML
SOLUTION OPHTHALMIC
Status: DISCONTINUED
Start: 2018-01-18 | End: 2018-01-18 | Stop reason: HOSPADM

## 2018-01-18 RX ORDER — ONDANSETRON 4 MG/1
4 TABLET, ORALLY DISINTEGRATING ORAL EVERY 8 HOURS PRN
Qty: 10 TAB | Refills: 0 | Status: ON HOLD | OUTPATIENT
Start: 2018-01-18 | End: 2018-06-04

## 2018-01-18 RX ORDER — POLYMYXIN B SULFATE AND TRIMETHOPRIM 1; 10000 MG/ML; [USP'U]/ML
1 SOLUTION OPHTHALMIC
Qty: 1 BOTTLE | Refills: 1 | Status: SHIPPED | OUTPATIENT
Start: 2018-01-18 | End: 2018-01-28

## 2018-01-18 RX ORDER — ALPRAZOLAM 0.5 MG/1
1 TABLET ORAL ONCE
Status: COMPLETED | OUTPATIENT
Start: 2018-01-18 | End: 2018-01-18

## 2018-01-18 RX ORDER — GABAPENTIN 300 MG/1
300 CAPSULE ORAL 3 TIMES DAILY
Status: ON HOLD | COMMUNITY
End: 2018-10-28

## 2018-01-18 RX ORDER — DIPHENOXYLATE HYDROCHLORIDE AND ATROPINE SULFATE 2.5; .025 MG/1; MG/1
1 TABLET ORAL 4 TIMES DAILY PRN
Status: SHIPPED | COMMUNITY
End: 2018-04-03

## 2018-01-18 RX ORDER — TAMSULOSIN HYDROCHLORIDE 0.4 MG/1
0.4 CAPSULE ORAL
Status: SHIPPED | COMMUNITY
End: 2018-10-08

## 2018-01-18 RX ORDER — LOPERAMIDE HYDROCHLORIDE 2 MG/1
2 CAPSULE ORAL 4 TIMES DAILY PRN
Status: SHIPPED | COMMUNITY
End: 2018-04-03

## 2018-01-18 RX ADMIN — ALPRAZOLAM 1 MG: 0.5 TABLET ORAL at 03:00

## 2018-01-18 ASSESSMENT — PAIN SCALES - GENERAL: PAINLEVEL_OUTOF10: 0

## 2018-01-18 NOTE — ED NOTES
Chief Complaint   Patient presents with   • Fatigue     For past month   • Diarrhea     Intermittently for past month    • Eye Drainage     Bilateral, redness and drainage for past month      /87   Pulse (!) 110   Temp 36.8 °C (98.3 °F)   Resp 18   Ht 1.829 m (6')   Wt 59.1 kg (130 lb 4.7 oz)   SpO2 93%   BMI 17.67 kg/m²

## 2018-01-18 NOTE — ED PROVIDER NOTES
CHIEF COMPLAINT  Chief Complaint   Patient presents with   • Fatigue     For past month   • Diarrhea     Intermittently for past month    • Eye Drainage     Bilateral, redness and drainage for past month        HPI  Sukumar Bolton is a 63 y.o. male who presents for evaluation of bilateral eye drainage and redness as well as fatigue and diarrhea for the past month. Patient states all symptoms started same time and notes that he has been seen by at least one other provider for the same issues. Patient was seen earlier this month and noted to have a CD4 count of around 500. He has no shortness of breath, chest pain, headache, altered level of consciousness, or significant visual changes.    REVIEW OF SYSTEMS  Constitutional: No fevers, chronic generalized weakness, chronically underweight   Skin: No rashes  HEENT: No diplopia, mild intermittent blurred vision, mild itchiness to her eyes, consistent discharge bilaterally. No ear pain, ringing in ears, or decreased hearing. No sore throat, runny nose, sores, trouble swallowing, trouble speaking.  Neck: No neck pain, stiffness, or masses.  Chest: No pain or rashes  Pulm: No shortness of breath, cough, wheezing, stridor, or pain with inspiration/expiration  Gastrointestinal: No nausea, vomiting, diarrhea, constipation, bloating, melena, hematochezia or pain.  Genitourinary: No pain, urgency, frequency, dysuria, hematuria, or polyuria.   Musculoskeletal: No recent trauma, pain, swelling, weakness  Neurologic: No sensory or motor changes. No confusion or disorientation.  Heme: No bleeding or bruising problems.  Immuno: HIV      PAST MEDICAL HISTORY   has a past medical history of HIV (human immunodeficiency virus infection) (CMS-[facility]); Hypertension; and Kidney disease.    SOCIAL HISTORY  Social History     Social History Main Topics   • Smoking status: Current Every Day Smoker     Types: Cigarettes   • Smokeless tobacco: Current User   • Alcohol use 0.0 oz/week       "Comment: daily   • Drug use: No   • Sexual activity: Not on file       SURGICAL HISTORY  patient denies any surgical history    CURRENT MEDICATIONS  Home Medications     Reviewed by Wen Last R.N. (Registered Nurse) on 01/18/18 at 0106  Med List Status: Complete   Medication Last Dose Status   Abacavir-Dolutegravir-Lamivud 600- MG Tab 1/17/2018 Active   diphenoxylate-atropine (LOMOTIL) 2.5-0.025 MG Tab 1/17/2018 Active   gabapentin (NEURONTIN) 300 MG Cap 1/17/2018 Active   loperamide (IMODIUM) 2 MG Cap 1/17/2018 Active   losartan (COZAAR) 25 MG Tab  Active   tamsulosin (FLOMAX) 0.4 MG capsule 1/17/2018 Active                ALLERGIES  Allergies   Allergen Reactions   • Sulfa Drugs Itching       PHYSICAL EXAM  VITAL SIGNS: /63   Pulse (!) 102   Temp 36.8 °C (98.3 °F)   Resp 19   Ht 1.829 m (6')   Wt 59.1 kg (130 lb 4.7 oz)   SpO2 98%   BMI 17.67 kg/m²    Pulse ox interpretation: I interpret this pulse ox as normal.  Gen: Alert in no apparent distress. Appears underweight  HEENT: Moderate mucopurulent discharge bilateral eyes. Eyelid mattering bilaterally No signs of trauma, Bilateral external ears normal, Nose normal. Conjunctiva normal, Non-icteric.   OS    Visual acuity: 20/50 - patient notes his vision is \"normal\" and wears glasses normally  Periorbital: No trauma, ptosis, lagopthalmos, lesions, proptosis, or enopthalmos. No temporal tenderness  Extraocular eye movement:intact  Intraocular pressure:   Visual fields: Intact  Slit lamp examination: No lid swelling or erythema, conjunctiva/sclera mildly injected, anterior chamber clear without cell or flare, Iris round and without iritis, lens clear, no hyphema or hypopyon. No fluorescein uptake.  Funduscopic: Red reflex intact, no apparent hemorrhages or lesions to retina, optic nerve symmetric.     OD    Visual acuity: 20/50  Periorbital: No trauma, ptosis, lagopthalmos, lesions, proptosis, or enopthalmos. No temporal " tenderness  Extraocular eye movement:intact  Intraocular pressure:   Visual fields: Intact  Slit lamp examination: No lid swelling or erythema, conjunctiva/sclera mildly injected, anterior chamber clear without cell or flare, Iris round and without iritis, lens clear, no hyphema or hypopyon. No fluorescein uptake.  Funduscopic: Red reflex intact, no apparent hemorrhages or lesions to retina, optic nerve symmetric.     OU 20/40    Neck:  No tenderness, Supple, No masses  Lymphatic: No cervical lymphadenopathy noted.   Cardiovascular: Regular rate and rhythm, no murmurs.   Thorax & Lungs: Normal breath sounds, No respiratory distress, No wheezing bilateral chest rise  Abdomen: Bowel sounds normal, Soft, No tenderness, No masses, No pulsatile masses. No Guarding or rebound  Skin: Warm, Dry, No erythema, No rash.   Extremities: Intact distal pulses, No edema, No tenderness, range of motion grossly normal all ext. No tenderness to palpation or major deformities noted.   Neurologic: Alert , no facial droop, grossly normal coordination and strength  Psychiatric: Affect normal, Judgment normal, Mood normal.       DIAGNOSTIC STUDIES / PROCEDURES    EKG      LABS  Results for orders placed or performed during the hospital encounter of 01/18/18   CBC WITH DIFFERENTIAL   Result Value Ref Range    WBC 4.3 (L) 4.8 - 10.8 K/uL    RBC 4.01 (L) 4.70 - 6.10 M/uL    Hemoglobin 15.1 14.0 - 18.0 g/dL    Hematocrit 43.7 42.0 - 52.0 %    .0 (H) 81.4 - 97.8 fL    MCH 37.7 (H) 27.0 - 33.0 pg    MCHC 34.6 33.7 - 35.3 g/dL    RDW 49.5 35.9 - 50.0 fL    Platelet Count 117 (L) 164 - 446 K/uL    MPV 8.9 (L) 9.0 - 12.9 fL    Neutrophils-Polys 68.00 44.00 - 72.00 %    Lymphocytes 14.70 (L) 22.00 - 41.00 %    Monocytes 15.60 (H) 0.00 - 13.40 %    Eosinophils 0.00 0.00 - 6.90 %    Basophils 1.20 0.00 - 1.80 %    Immature Granulocytes 0.50 0.00 - 0.90 %    Nucleated RBC 0.00 /100 WBC    Neutrophils (Absolute) 2.92 1.82 - 7.42 K/uL    Lymphs  (Absolute) 0.63 (L) 1.00 - 4.80 K/uL    Monos (Absolute) 0.67 0.00 - 0.85 K/uL    Eos (Absolute) 0.00 0.00 - 0.51 K/uL    Baso (Absolute) 0.05 0.00 - 0.12 K/uL    Immature Granulocytes (abs) 0.02 0.00 - 0.11 K/uL    NRBC (Absolute) 0.00 K/uL   COMP METABOLIC PANEL   Result Value Ref Range    Sodium 140 135 - 145 mmol/L    Potassium 4.4 3.6 - 5.5 mmol/L    Chloride 100 96 - 112 mmol/L    Co2 17 (L) 20 - 33 mmol/L    Anion Gap 23.0 (H) 0.0 - 11.9    Glucose 74 65 - 99 mg/dL    Bun 20 8 - 22 mg/dL    Creatinine 1.32 0.50 - 1.40 mg/dL    Calcium 8.3 (L) 8.4 - 10.2 mg/dL    AST(SGOT) 194 (H) 12 - 45 U/L    ALT(SGPT) 119 (H) 2 - 50 U/L    Alkaline Phosphatase 52 30 - 99 U/L    Total Bilirubin 1.3 0.1 - 1.5 mg/dL    Albumin 3.9 3.2 - 4.9 g/dL    Total Protein 6.4 6.0 - 8.2 g/dL    Globulin 2.5 1.9 - 3.5 g/dL    A-G Ratio 1.6 g/dL   TROPONIN   Result Value Ref Range    Troponin I <0.02 0.00 - 0.04 ng/mL   ESTIMATED GFR   Result Value Ref Range    GFR If African American >60 >60 mL/min/1.73 m 2    GFR If Non African American 54 (A) >60 mL/min/1.73 m 2       RADIOLOGY  DX-CHEST-PORTABLE (1 VIEW)   Final Result      Negative single view of the chest.          COURSE & MEDICAL DECISION MAKING  Pertinent Labs & Imaging studies reviewed. (See chart for details)  Patient arrives for evaluation of vomiting and eye drainage for one month. Patient appears to have mucopurulent conjunctivitis bilaterally which is likely either bacterial or possibly fungal. Patient is noted to have HIV but had a CD4 count of around 500 earlier this month. He does not appear septic or toxic. He does not have any abdominal pain. He had no episodes of vomiting and tolerating by mouth fluids without difficulty. It was notable the patient had a slight elevation in his liver enzymes but no evidence for biliary obstruction or cholecystitis. He also had an elevated anion gap and a mildly decreased serum bicarb. This is likely mild dehydration and it is notable  that the patient's BUNs also mildly elevated. The patient was able to tolerate oral fluids and did not require IV fluids. I suspect his symptoms are related to a self resolving, benign process however he was encouraged to follow-up with both his primary care physician as well as his ophthalmologist next week to ensure resolution. The patient had no corneal findings to suggest HCV or corneal ulceration and had grossly normal visual acuity. I do not feel emergent ophthalmology consultation was necessary and I did not suspect a gonorrhea infection.     FINAL IMPRESSION  1. Bilateral mucopurulent conjunctivitis  2. Dehydration  3.         Electronically signed by: Santiago Gomez, 1/18/2018 1:23 AM

## 2018-01-18 NOTE — DISCHARGE INSTRUCTIONS
Bacterial/Fungal Conjunctivitis  Bacterial conjunctivitis, commonly called pink eye, is an inflammation of the clear membrane that covers the white part of the eye (conjunctiva). The inflammation can also happen on the underside of the eyelids. The blood vessels in the conjunctiva become inflamed, causing the eye to become red or pink. Bacterial conjunctivitis may spread easily from one eye to another and from person to person (contagious).   CAUSES   Bacterial conjunctivitis is caused by bacteria. The bacteria may come from your own skin, your upper respiratory tract, or from someone else with bacterial conjunctivitis.  SYMPTOMS   The normally white color of the eye or the underside of the eyelid is usually pink or red. The pink eye is usually associated with irritation, tearing, and some sensitivity to light. Bacterial conjunctivitis is often associated with a thick, yellowish discharge from the eye. The discharge may turn into a crust on the eyelids overnight, which causes your eyelids to stick together. If a discharge is present, there may also be some blurred vision in the affected eye.  DIAGNOSIS   Bacterial conjunctivitis is diagnosed by your caregiver through an eye exam and the symptoms that you report. Your caregiver looks for changes in the surface tissues of your eyes, which may point to the specific type of conjunctivitis. A sample of any discharge may be collected on a cotton-tip swab if you have a severe case of conjunctivitis, if your cornea is affected, or if you keep getting repeat infections that do not respond to treatment. The sample will be sent to a lab to see if the inflammation is caused by a bacterial infection and to see if the infection will respond to antibiotic medicines.  TREATMENT   · Bacterial conjunctivitis is treated with antibiotics. Antibiotic eyedrops are most often used. However, antibiotic ointments are also available. Antibiotics pills are sometimes used. Artificial tears or  eye washes may ease discomfort.  HOME CARE INSTRUCTIONS   · To ease discomfort, apply a cool, clean washcloth to your eye for 10-20 minutes, 3-4 times a day.  · Gently wipe away any drainage from your eye with a warm, wet washcloth or a cotton ball.  · Wash your hands often with soap and water. Use paper towels to dry your hands.  · Do not share towels or washcloths. This may spread the infection.  · Change or wash your pillowcase every day.  · You should not use eye makeup until the infection is gone.  · Do not operate machinery or drive if your vision is blurred.  · Stop using contact lenses. Ask your caregiver how to sterilize or replace your contacts before using them again. This depends on the type of contact lenses that you use.  · When applying medicine to the infected eye, do not touch the edge of your eyelid with the eyedrop bottle or ointment tube.  SEEK IMMEDIATE MEDICAL CARE IF:   · Your infection has not improved within 3 days after beginning treatment.  · You had yellow discharge from your eye and it returns.  · You have increased eye pain.  · Your eye redness is spreading.  · Your vision becomes blurred.  · You have a fever or persistent symptoms for more than 2-3 days.  · You have a fever and your symptoms suddenly get worse.  · You have facial pain, redness, or swelling.  MAKE SURE YOU:   · Understand these instructions.  · Will watch your condition.  · Will get help right away if you are not doing well or get worse.     This information is not intended to replace advice given to you by your health care provider. Make sure you discuss any questions you have with your health care provider.     Document Released: 12/18/2006 Document Revised: 01/08/2016 Document Reviewed: 05/20/2013  [employer] Interactive Patient Education ©2016 [employer]

## 2018-01-20 LAB
BACTERIA UR CULT: NORMAL
SIGNIFICANT IND 70042: NORMAL
SITE SITE: NORMAL
SOURCE SOURCE: NORMAL

## 2018-01-24 ENCOUNTER — HOSPITAL ENCOUNTER (OUTPATIENT)
Dept: LAB | Facility: MEDICAL CENTER | Age: 63
End: 2018-01-24
Attending: INTERNAL MEDICINE
Payer: MEDICARE

## 2018-01-24 ENCOUNTER — HOSPITAL ENCOUNTER (OUTPATIENT)
Dept: RADIOLOGY | Facility: MEDICAL CENTER | Age: 63
End: 2018-01-24
Attending: INTERNAL MEDICINE
Payer: MEDICARE

## 2018-01-24 DIAGNOSIS — B20 AIDS (HCC): ICD-10-CM

## 2018-01-24 DIAGNOSIS — F10.10 ALCOHOL ABUSE, CONTINUOUS: ICD-10-CM

## 2018-01-24 DIAGNOSIS — F41.9 ANXIETY HYPERVENTILATION: ICD-10-CM

## 2018-01-24 DIAGNOSIS — F45.8 ANXIETY HYPERVENTILATION: ICD-10-CM

## 2018-01-24 DIAGNOSIS — R94.5 NONSPECIFIC ABNORMAL RESULTS OF LIVER FUNCTION STUDY: ICD-10-CM

## 2018-01-24 DIAGNOSIS — R11.0 NAUSEA: ICD-10-CM

## 2018-01-24 DIAGNOSIS — K52.9 INFLAMMATORY BOWEL DISEASE: ICD-10-CM

## 2018-01-24 DIAGNOSIS — R63.4 LOSS OF WEIGHT: ICD-10-CM

## 2018-01-24 LAB
INR PPP: 0.99 (ref 0.87–1.13)
PROTHROMBIN TIME: 12.8 SEC (ref 12–14.6)

## 2018-01-24 PROCEDURE — 85610 PROTHROMBIN TIME: CPT

## 2018-01-24 PROCEDURE — 36415 COLL VENOUS BLD VENIPUNCTURE: CPT

## 2018-01-24 PROCEDURE — 74018 RADEX ABDOMEN 1 VIEW: CPT

## 2018-01-25 ENCOUNTER — HOSPITAL ENCOUNTER (OUTPATIENT)
Facility: MEDICAL CENTER | Age: 63
End: 2018-01-25
Attending: INTERNAL MEDICINE
Payer: MEDICARE

## 2018-01-25 PROCEDURE — 83520 IMMUNOASSAY QUANT NOS NONAB: CPT

## 2018-01-28 LAB — MISCELLANEOUS LAB RESULT MISCLAB: NORMAL

## 2018-03-20 ENCOUNTER — HOSPITAL ENCOUNTER (OUTPATIENT)
Facility: MEDICAL CENTER | Age: 63
End: 2018-03-20
Attending: INTERNAL MEDICINE
Payer: MEDICARE

## 2018-03-20 LAB
ALBUMIN SERPL BCP-MCNC: 4.1 G/DL (ref 3.2–4.9)
ALBUMIN/GLOB SERPL: 1.6 G/DL
ALP SERPL-CCNC: 60 U/L (ref 30–99)
ALT SERPL-CCNC: 13 U/L (ref 2–50)
ANION GAP SERPL CALC-SCNC: 7 MMOL/L (ref 0–11.9)
AST SERPL-CCNC: 39 U/L (ref 12–45)
BASOPHILS # BLD AUTO: 0.2 % (ref 0–1.8)
BASOPHILS # BLD: 0.01 K/UL (ref 0–0.12)
BILIRUB SERPL-MCNC: 0.8 MG/DL (ref 0.1–1.5)
BUN SERPL-MCNC: 20 MG/DL (ref 8–22)
CALCIUM SERPL-MCNC: 9.9 MG/DL (ref 8.5–10.5)
CHLORIDE SERPL-SCNC: 100 MMOL/L (ref 96–112)
CO2 SERPL-SCNC: 29 MMOL/L (ref 20–33)
CREAT SERPL-MCNC: 1.63 MG/DL (ref 0.5–1.4)
EOSINOPHIL # BLD AUTO: 0.07 K/UL (ref 0–0.51)
EOSINOPHIL NFR BLD: 1.1 % (ref 0–6.9)
ERYTHROCYTE [DISTWIDTH] IN BLOOD BY AUTOMATED COUNT: 47.1 FL (ref 35.9–50)
GLOBULIN SER CALC-MCNC: 2.6 G/DL (ref 1.9–3.5)
GLUCOSE SERPL-MCNC: 90 MG/DL (ref 65–99)
HCT VFR BLD AUTO: 39.7 % (ref 42–52)
HGB BLD-MCNC: 13.2 G/DL (ref 14–18)
IMM GRANULOCYTES # BLD AUTO: 0.01 K/UL (ref 0–0.11)
IMM GRANULOCYTES NFR BLD AUTO: 0.2 % (ref 0–0.9)
LYMPHOCYTES # BLD AUTO: 1.87 K/UL (ref 1–4.8)
LYMPHOCYTES NFR BLD: 29.1 % (ref 22–41)
MCH RBC QN AUTO: 35.4 PG (ref 27–33)
MCHC RBC AUTO-ENTMCNC: 33.2 G/DL (ref 33.7–35.3)
MCV RBC AUTO: 106.4 FL (ref 81.4–97.8)
MONOCYTES # BLD AUTO: 1.28 K/UL (ref 0–0.85)
MONOCYTES NFR BLD AUTO: 19.9 % (ref 0–13.4)
NEUTROPHILS # BLD AUTO: 3.19 K/UL (ref 1.82–7.42)
NEUTROPHILS NFR BLD: 49.5 % (ref 44–72)
NRBC # BLD AUTO: 0 K/UL
NRBC BLD-RTO: 0 /100 WBC
PLATELET # BLD AUTO: 217 K/UL (ref 164–446)
PMV BLD AUTO: 9.4 FL (ref 9–12.9)
POTASSIUM SERPL-SCNC: 4.4 MMOL/L (ref 3.6–5.5)
PROT SERPL-MCNC: 6.7 G/DL (ref 6–8.2)
RBC # BLD AUTO: 3.73 M/UL (ref 4.7–6.1)
SODIUM SERPL-SCNC: 136 MMOL/L (ref 135–145)
WBC # BLD AUTO: 6.4 K/UL (ref 4.8–10.8)

## 2018-03-20 PROCEDURE — 80053 COMPREHEN METABOLIC PANEL: CPT

## 2018-03-20 PROCEDURE — 85025 COMPLETE CBC W/AUTO DIFF WBC: CPT

## 2018-04-03 ENCOUNTER — HOSPITAL ENCOUNTER (INPATIENT)
Facility: MEDICAL CENTER | Age: 63
LOS: 3 days | DRG: 308 | End: 2018-04-06
Attending: EMERGENCY MEDICINE | Admitting: INTERNAL MEDICINE
Payer: MEDICARE

## 2018-04-03 ENCOUNTER — RESOLUTE PROFESSIONAL BILLING HOSPITAL PROF FEE (OUTPATIENT)
Dept: HOSPITALIST | Facility: MEDICAL CENTER | Age: 63
End: 2018-04-03
Payer: MEDICARE

## 2018-04-03 ENCOUNTER — APPOINTMENT (OUTPATIENT)
Dept: RADIOLOGY | Facility: MEDICAL CENTER | Age: 63
DRG: 308 | End: 2018-04-03
Attending: EMERGENCY MEDICINE
Payer: MEDICARE

## 2018-04-03 DIAGNOSIS — I48.91 ATRIAL FIBRILLATION WITH RAPID VENTRICULAR RESPONSE (HCC): ICD-10-CM

## 2018-04-03 DIAGNOSIS — I95.89 OTHER SPECIFIED HYPOTENSION: ICD-10-CM

## 2018-04-03 PROBLEM — B20 HIV (HUMAN IMMUNODEFICIENCY VIRUS INFECTION) (HCC): Status: ACTIVE | Noted: 2018-04-03

## 2018-04-03 PROBLEM — I95.9 HYPOTENSION: Status: ACTIVE | Noted: 2018-04-03

## 2018-04-03 PROBLEM — N17.9 AKI (ACUTE KIDNEY INJURY) (HCC): Status: ACTIVE | Noted: 2018-04-03

## 2018-04-03 LAB
ALBUMIN SERPL BCP-MCNC: 3.7 G/DL (ref 3.2–4.9)
ALBUMIN/GLOB SERPL: 1.2 G/DL
ALP SERPL-CCNC: 65 U/L (ref 30–99)
ALT SERPL-CCNC: 8 U/L (ref 2–50)
ANION GAP SERPL CALC-SCNC: 10 MMOL/L (ref 0–11.9)
APTT PPP: 29 SEC (ref 24.7–36)
AST SERPL-CCNC: 23 U/L (ref 12–45)
BASOPHILS # BLD AUTO: 0.3 % (ref 0–1.8)
BASOPHILS # BLD: 0.02 K/UL (ref 0–0.12)
BILIRUB SERPL-MCNC: 0.5 MG/DL (ref 0.1–1.5)
BNP SERPL-MCNC: 27 PG/ML (ref 0–100)
BUN SERPL-MCNC: 14 MG/DL (ref 8–22)
CALCIUM SERPL-MCNC: 9.2 MG/DL (ref 8.5–10.5)
CHLORIDE SERPL-SCNC: 102 MMOL/L (ref 96–112)
CO2 SERPL-SCNC: 25 MMOL/L (ref 20–33)
CREAT SERPL-MCNC: 1.31 MG/DL (ref 0.5–1.4)
DEPRECATED D DIMER PPP IA-ACNC: 913 NG/ML(D-DU)
EKG IMPRESSION: NORMAL
EOSINOPHIL # BLD AUTO: 0.01 K/UL (ref 0–0.51)
EOSINOPHIL NFR BLD: 0.2 % (ref 0–6.9)
ERYTHROCYTE [DISTWIDTH] IN BLOOD BY AUTOMATED COUNT: 49.1 FL (ref 35.9–50)
GLOBULIN SER CALC-MCNC: 3.2 G/DL (ref 1.9–3.5)
GLUCOSE SERPL-MCNC: 114 MG/DL (ref 65–99)
HCT VFR BLD AUTO: 37.7 % (ref 42–52)
HGB BLD-MCNC: 13.1 G/DL (ref 14–18)
IMM GRANULOCYTES # BLD AUTO: 0.01 K/UL (ref 0–0.11)
IMM GRANULOCYTES NFR BLD AUTO: 0.2 % (ref 0–0.9)
INR PPP: 0.99 (ref 0.87–1.13)
LYMPHOCYTES # BLD AUTO: 1.13 K/UL (ref 1–4.8)
LYMPHOCYTES NFR BLD: 19.4 % (ref 22–41)
MCH RBC QN AUTO: 36.2 PG (ref 27–33)
MCHC RBC AUTO-ENTMCNC: 34.7 G/DL (ref 33.7–35.3)
MCV RBC AUTO: 104.1 FL (ref 81.4–97.8)
MONOCYTES # BLD AUTO: 0.89 K/UL (ref 0–0.85)
MONOCYTES NFR BLD AUTO: 15.3 % (ref 0–13.4)
NEUTROPHILS # BLD AUTO: 3.75 K/UL (ref 1.82–7.42)
NEUTROPHILS NFR BLD: 64.6 % (ref 44–72)
NRBC # BLD AUTO: 0 K/UL
NRBC BLD-RTO: 0 /100 WBC
PLATELET # BLD AUTO: 282 K/UL (ref 164–446)
PMV BLD AUTO: 8.7 FL (ref 9–12.9)
POTASSIUM SERPL-SCNC: 3.9 MMOL/L (ref 3.6–5.5)
PROT SERPL-MCNC: 6.9 G/DL (ref 6–8.2)
PROTHROMBIN TIME: 12.8 SEC (ref 12–14.6)
RBC # BLD AUTO: 3.62 M/UL (ref 4.7–6.1)
SODIUM SERPL-SCNC: 137 MMOL/L (ref 135–145)
TROPONIN I SERPL-MCNC: <0.01 NG/ML (ref 0–0.04)
TSH SERPL DL<=0.005 MIU/L-ACNC: 2.49 UIU/ML (ref 0.38–5.33)
WBC # BLD AUTO: 5.8 K/UL (ref 4.8–10.8)

## 2018-04-03 PROCEDURE — 82607 VITAMIN B-12: CPT

## 2018-04-03 PROCEDURE — 96366 THER/PROPH/DIAG IV INF ADDON: CPT

## 2018-04-03 PROCEDURE — 700111 HCHG RX REV CODE 636 W/ 250 OVERRIDE (IP): Performed by: INTERNAL MEDICINE

## 2018-04-03 PROCEDURE — A9270 NON-COVERED ITEM OR SERVICE: HCPCS | Performed by: INTERNAL MEDICINE

## 2018-04-03 PROCEDURE — 99291 CRITICAL CARE FIRST HOUR: CPT

## 2018-04-03 PROCEDURE — 85379 FIBRIN DEGRADATION QUANT: CPT

## 2018-04-03 PROCEDURE — 700101 HCHG RX REV CODE 250: Performed by: INTERNAL MEDICINE

## 2018-04-03 PROCEDURE — 85610 PROTHROMBIN TIME: CPT

## 2018-04-03 PROCEDURE — 93005 ELECTROCARDIOGRAM TRACING: CPT

## 2018-04-03 PROCEDURE — 770022 HCHG ROOM/CARE - ICU (200)

## 2018-04-03 PROCEDURE — 83880 ASSAY OF NATRIURETIC PEPTIDE: CPT

## 2018-04-03 PROCEDURE — 700105 HCHG RX REV CODE 258: Performed by: EMERGENCY MEDICINE

## 2018-04-03 PROCEDURE — 700105 HCHG RX REV CODE 258: Performed by: INTERNAL MEDICINE

## 2018-04-03 PROCEDURE — 84443 ASSAY THYROID STIM HORMONE: CPT

## 2018-04-03 PROCEDURE — 700102 HCHG RX REV CODE 250 W/ 637 OVERRIDE(OP): Performed by: INTERNAL MEDICINE

## 2018-04-03 PROCEDURE — 700111 HCHG RX REV CODE 636 W/ 250 OVERRIDE (IP): Performed by: EMERGENCY MEDICINE

## 2018-04-03 PROCEDURE — 82746 ASSAY OF FOLIC ACID SERUM: CPT

## 2018-04-03 PROCEDURE — 99291 CRITICAL CARE FIRST HOUR: CPT | Performed by: INTERNAL MEDICINE

## 2018-04-03 PROCEDURE — 85730 THROMBOPLASTIN TIME PARTIAL: CPT

## 2018-04-03 PROCEDURE — 96375 TX/PRO/DX INJ NEW DRUG ADDON: CPT

## 2018-04-03 PROCEDURE — 80053 COMPREHEN METABOLIC PANEL: CPT

## 2018-04-03 PROCEDURE — 84484 ASSAY OF TROPONIN QUANT: CPT

## 2018-04-03 PROCEDURE — 96365 THER/PROPH/DIAG IV INF INIT: CPT

## 2018-04-03 PROCEDURE — 85025 COMPLETE CBC W/AUTO DIFF WBC: CPT

## 2018-04-03 PROCEDURE — 71045 X-RAY EXAM CHEST 1 VIEW: CPT

## 2018-04-03 PROCEDURE — 83735 ASSAY OF MAGNESIUM: CPT

## 2018-04-03 PROCEDURE — 93005 ELECTROCARDIOGRAM TRACING: CPT | Performed by: EMERGENCY MEDICINE

## 2018-04-03 PROCEDURE — 96368 THER/DIAG CONCURRENT INF: CPT

## 2018-04-03 RX ORDER — VITAMIN E 268 MG
400 CAPSULE ORAL DAILY
COMMUNITY
End: 2018-07-31

## 2018-04-03 RX ORDER — QUETIAPINE FUMARATE 200 MG/1
400 TABLET, FILM COATED ORAL NIGHTLY
Status: DISCONTINUED | OUTPATIENT
Start: 2018-04-03 | End: 2018-04-06 | Stop reason: HOSPADM

## 2018-04-03 RX ORDER — SODIUM CHLORIDE 9 MG/ML
INJECTION, SOLUTION INTRAVENOUS CONTINUOUS
Status: DISCONTINUED | OUTPATIENT
Start: 2018-04-03 | End: 2018-04-05

## 2018-04-03 RX ORDER — QUETIAPINE FUMARATE 400 MG/1
400 TABLET, FILM COATED ORAL
COMMUNITY
End: 2018-09-22 | Stop reason: CLARIF

## 2018-04-03 RX ORDER — TAMSULOSIN HYDROCHLORIDE 0.4 MG/1
0.4 CAPSULE ORAL
Status: DISCONTINUED | OUTPATIENT
Start: 2018-04-04 | End: 2018-04-06 | Stop reason: HOSPADM

## 2018-04-03 RX ORDER — ONDANSETRON 4 MG/1
4 TABLET, ORALLY DISINTEGRATING ORAL EVERY 4 HOURS PRN
Status: DISCONTINUED | OUTPATIENT
Start: 2018-04-03 | End: 2018-04-06 | Stop reason: HOSPADM

## 2018-04-03 RX ORDER — HEPARIN SODIUM 1000 [USP'U]/ML
2600 INJECTION, SOLUTION INTRAVENOUS; SUBCUTANEOUS PRN
Status: DISCONTINUED | OUTPATIENT
Start: 2018-04-03 | End: 2018-04-03

## 2018-04-03 RX ORDER — FERROUS SULFATE 325(65) MG
325 TABLET ORAL DAILY
COMMUNITY
End: 2018-09-22 | Stop reason: CLARIF

## 2018-04-03 RX ORDER — ESMOLOL HYDROCHLORIDE 10 MG/ML
0-200 INJECTION, SOLUTION INTRAVENOUS CONTINUOUS
Status: DISCONTINUED | OUTPATIENT
Start: 2018-04-03 | End: 2018-04-04

## 2018-04-03 RX ORDER — LOPERAMIDE HYDROCHLORIDE 2 MG/1
2 CAPSULE ORAL 4 TIMES DAILY PRN
Status: DISCONTINUED | OUTPATIENT
Start: 2018-04-03 | End: 2018-04-03

## 2018-04-03 RX ORDER — HEPARIN SODIUM 1000 [USP'U]/ML
5000 INJECTION, SOLUTION INTRAVENOUS; SUBCUTANEOUS ONCE
Status: COMPLETED | OUTPATIENT
Start: 2018-04-03 | End: 2018-04-03

## 2018-04-03 RX ORDER — ABACAVIR 300 MG/1
600 TABLET ORAL DAILY
Status: DISCONTINUED | OUTPATIENT
Start: 2018-04-04 | End: 2018-04-06 | Stop reason: HOSPADM

## 2018-04-03 RX ORDER — SODIUM CHLORIDE 9 MG/ML
1000 INJECTION, SOLUTION INTRAVENOUS ONCE
Status: COMPLETED | OUTPATIENT
Start: 2018-04-03 | End: 2018-04-03

## 2018-04-03 RX ORDER — POLYETHYLENE GLYCOL 3350 17 G/17G
1 POWDER, FOR SOLUTION ORAL
Status: DISCONTINUED | OUTPATIENT
Start: 2018-04-03 | End: 2018-04-06 | Stop reason: HOSPADM

## 2018-04-03 RX ORDER — LAMIVUDINE 100 MG/1
300 TABLET, FILM COATED ORAL DAILY
Status: DISCONTINUED | OUTPATIENT
Start: 2018-04-04 | End: 2018-04-06 | Stop reason: HOSPADM

## 2018-04-03 RX ORDER — HEPARIN SODIUM 1000 [USP'U]/ML
2600 INJECTION, SOLUTION INTRAVENOUS; SUBCUTANEOUS PRN
Status: DISCONTINUED | OUTPATIENT
Start: 2018-04-03 | End: 2018-04-05

## 2018-04-03 RX ORDER — OMEPRAZOLE 20 MG/1
20 CAPSULE, DELAYED RELEASE ORAL DAILY
COMMUNITY

## 2018-04-03 RX ORDER — LOSARTAN POTASSIUM 100 MG/1
50 TABLET ORAL DAILY
Status: ON HOLD | COMMUNITY
End: 2018-04-06

## 2018-04-03 RX ORDER — ONDANSETRON 2 MG/ML
4 INJECTION INTRAMUSCULAR; INTRAVENOUS EVERY 4 HOURS PRN
Status: DISCONTINUED | OUTPATIENT
Start: 2018-04-03 | End: 2018-04-06 | Stop reason: HOSPADM

## 2018-04-03 RX ORDER — AMOXICILLIN 250 MG
2 CAPSULE ORAL 2 TIMES DAILY
Status: DISCONTINUED | OUTPATIENT
Start: 2018-04-03 | End: 2018-04-06 | Stop reason: HOSPADM

## 2018-04-03 RX ORDER — IPRATROPIUM BROMIDE 42 UG/1
2 SPRAY, METERED NASAL EVERY 4 HOURS PRN
Status: DISCONTINUED | OUTPATIENT
Start: 2018-04-03 | End: 2018-04-06 | Stop reason: HOSPADM

## 2018-04-03 RX ORDER — BISACODYL 10 MG
10 SUPPOSITORY, RECTAL RECTAL
Status: DISCONTINUED | OUTPATIENT
Start: 2018-04-03 | End: 2018-04-06 | Stop reason: HOSPADM

## 2018-04-03 RX ORDER — GABAPENTIN 300 MG/1
300 CAPSULE ORAL 3 TIMES DAILY
Status: DISCONTINUED | OUTPATIENT
Start: 2018-04-03 | End: 2018-04-06 | Stop reason: HOSPADM

## 2018-04-03 RX ORDER — DIGOXIN 0.25 MG/ML
250 INJECTION INTRAMUSCULAR; INTRAVENOUS ONCE
Status: COMPLETED | OUTPATIENT
Start: 2018-04-03 | End: 2018-04-03

## 2018-04-03 RX ADMIN — HEPARIN SODIUM 1050 UNITS/HR: 5000 INJECTION, SOLUTION INTRAVENOUS at 16:00

## 2018-04-03 RX ADMIN — ESMOLOL HYDROCHLORIDE 150 MCG/KG/MIN: 10 INJECTION INTRAVENOUS at 22:23

## 2018-04-03 RX ADMIN — QUETIAPINE FUMARATE 400 MG: 100 TABLET ORAL at 23:43

## 2018-04-03 RX ADMIN — ESMOLOL HYDROCHLORIDE 150 MCG/KG/MIN: 10 INJECTION INTRAVENOUS at 17:58

## 2018-04-03 RX ADMIN — METOPROLOL TARTRATE 25 MG: 25 TABLET, FILM COATED ORAL at 21:46

## 2018-04-03 RX ADMIN — ESMOLOL HYDROCHLORIDE 50 MCG/KG/MIN: 10 INJECTION INTRAVENOUS at 13:48

## 2018-04-03 RX ADMIN — GABAPENTIN 300 MG: 300 CAPSULE ORAL at 21:46

## 2018-04-03 RX ADMIN — SODIUM CHLORIDE: 9 INJECTION, SOLUTION INTRAVENOUS at 21:37

## 2018-04-03 RX ADMIN — SODIUM CHLORIDE 1000 ML: 9 INJECTION, SOLUTION INTRAVENOUS at 13:28

## 2018-04-03 RX ADMIN — HEPARIN SODIUM 5000 UNITS: 1000 INJECTION, SOLUTION INTRAVENOUS; SUBCUTANEOUS at 16:00

## 2018-04-03 RX ADMIN — HEPARIN SODIUM 1050 UNITS: 5000 INJECTION, SOLUTION INTRAVENOUS at 15:48

## 2018-04-03 RX ADMIN — IPRATROPIUM BROMIDE 2 SPRAY: 42 SPRAY NASAL at 23:43

## 2018-04-03 RX ADMIN — DIGOXIN 250 MCG: 0.25 INJECTION INTRAMUSCULAR; INTRAVENOUS at 13:45

## 2018-04-03 ASSESSMENT — ENCOUNTER SYMPTOMS
BACK PAIN: 0
DIZZINESS: 1
WEAKNESS: 0
VOMITING: 0
NAUSEA: 0
HEARTBURN: 0
FOCAL WEAKNESS: 0
HEADACHES: 0
MYALGIAS: 0
HALLUCINATIONS: 0
ABDOMINAL PAIN: 0
FEVER: 0
CHILLS: 0
PALPITATIONS: 0
SHORTNESS OF BREATH: 1
COUGH: 0
BLOOD IN STOOL: 0
DEPRESSION: 0
SORE THROAT: 0
DIARRHEA: 0

## 2018-04-03 ASSESSMENT — LIFESTYLE VARIABLES
ON A TYPICAL DAY WHEN YOU DRINK ALCOHOL HOW MANY DRINKS DO YOU HAVE: 1
ALCOHOL_USE: YES
AVERAGE NUMBER OF DAYS PER WEEK YOU HAVE A DRINK CONTAINING ALCOHOL: 7
EVER FELT BAD OR GUILTY ABOUT YOUR DRINKING: NO
HAVE YOU EVER FELT YOU SHOULD CUT DOWN ON YOUR DRINKING: YES
EVER HAD A DRINK FIRST THING IN THE MORNING TO STEADY YOUR NERVES TO GET RID OF A HANGOVER: YES
HOW MANY TIMES IN THE PAST YEAR HAVE YOU HAD 5 OR MORE DRINKS IN A DAY: 20
TOTAL SCORE: 2
EVER_SMOKED: YES
TOTAL SCORE: 2
DOES PATIENT WANT TO STOP DRINKING: NO
PACK_YEARS: 1
CONSUMPTION TOTAL: POSITIVE
HAVE PEOPLE ANNOYED YOU BY CRITICIZING YOUR DRINKING: NO
TOTAL SCORE: 2

## 2018-04-03 ASSESSMENT — PATIENT HEALTH QUESTIONNAIRE - PHQ9
2. FEELING DOWN, DEPRESSED, IRRITABLE, OR HOPELESS: NOT AT ALL
SUM OF ALL RESPONSES TO PHQ9 QUESTIONS 1 AND 2: 0
1. LITTLE INTEREST OR PLEASURE IN DOING THINGS: NOT AT ALL

## 2018-04-03 ASSESSMENT — PAIN SCALES - GENERAL: PAINLEVEL_OUTOF10: 0

## 2018-04-03 NOTE — ED NOTES
Per MD Merlos titrate esmolol infusion down of bp continues to decrease.     BP 96/75, decreased drip per MAR.

## 2018-04-03 NOTE — ED NOTES
Pt brought back from triage. States he was at [facility] for c/o lightheadedness when standing x several weeks. Pt was sent here for new dx of afib with RVR (HR in the 160's) and low bp. On arrival to room pt denies any cp, sob.     MD Merlos at bedside. A/o x4, speaking in full sentences.

## 2018-04-03 NOTE — ED PROVIDER NOTES
ED Provider Note    CHIEF COMPLAINT  Chief Complaint   Patient presents with   • Dizziness     for weeks. Worse when standing or with exertion   • Sent by MD     seen at [facility] and sent for hypotension.       HPI  Sukumar Bolton is a 63 y.o. male who is sent from the primary care physician's office with new onset of atrial fibrillation with a rapid ventricular response and hypotension. For a couple weeks, he's been reporting malaise and fatigue with generalized weakness and multiple episodes of syncope. He has not had any chest pain. He does have some leg swelling he reports however. No coughing, no back pain. No abdominal pain. At his primary care's office he was found to have a blood pressure of 70/40 but refused EMS transport and came by private vehicle. She has hypertension but has been cutting back on his losartan tablets secondary to his low blood pressure.    REVIEW OF SYSTEMS  Negative for fever, rash, chest pain, abdominal pain, nausea, vomiting, diarrhea, headache, focal weakness, focal numbness, focal tingling, back pain. All other systems are negative.     PAST MEDICAL HISTORY  Past Medical History:   Diagnosis Date   • HIV (human immunodeficiency virus infection) (CMS-[facility])    • Hypertension    • Kidney disease        FAMILY HISTORY  No family history on file.    SOCIAL HISTORY  Social History   Substance Use Topics   • Smoking status: Current Every Day Smoker     Types: Cigarettes   • Smokeless tobacco: Current User   • Alcohol use 0.0 oz/week      Comment: daily       SURGICAL HISTORY  No past surgical history on file.    CURRENT MEDICATIONS  I personally reviewed the medication list in the charting documentation.     ALLERGIES  Allergies   Allergen Reactions   • Sulfa Drugs Itching       MEDICAL RECORD  I have reviewed patient's medical record and pertinent results are listed above.      PHYSICAL EXAM  VITAL SIGNS: BP (!) 96/71   Pulse 90 Comment: irregular  Temp 36.5 °C (97.7 °F) (Temporal)    "Resp 16   Ht 1.816 m (5' 11.5\")   Wt 64 kg (141 lb)   SpO2 97%   BMI 19.39 kg/m²    Constitutional: Ill-appearing but in no acute distress  HENT: Mucus membranes moist.    Eyes: No scleral icterus. Normal conjunctiva   Neck: Supple, comfortable, nonpainful range of motion.   Cardiovascular: Irregularly irregular and very tachycardic  Thorax & Lungs: Chest is nontender.  Lungs are clear to auscultation with good air movement bilaterally.  No wheeze, rhonchi, nor rales.   Abdomen: Soft, with no tenderness, rebound nor guarding.  No mass or pulsatile mass appreciated.  Skin: Warm, dry. No rash appreciated  Extremities/Musculoskeletal: No asymmetric calf tenderness, erythema or edema. 1+ symmetric lower extremity pitting edema.  Neurologic: Alert & oriented. No focal deficits observed.   Psychiatric: Normal affect appropriate for the clinical situation.    DIAGNOSTIC STUDIES / PROCEDURES    EKG  12 Lead EKG interpreted by me to show:    Rate 159  Rhythm: Atrial fibrillation  Axis: Normal  QRS Intervals: Normal  T waves: No acute changes  ST segments: No acute changes  Ectopy: None.    My impression of this EKG: Rapid atrial fibrillation without obvious evidence of ischemia      LABS  Results for orders placed or performed during the hospital encounter of 04/03/18   CBC w/ Differential   Result Value Ref Range    WBC 5.8 4.8 - 10.8 K/uL    RBC 3.62 (L) 4.70 - 6.10 M/uL    Hemoglobin 13.1 (L) 14.0 - 18.0 g/dL    Hematocrit 37.7 (L) 42.0 - 52.0 %    .1 (H) 81.4 - 97.8 fL    MCH 36.2 (H) 27.0 - 33.0 pg    MCHC 34.7 33.7 - 35.3 g/dL    RDW 49.1 35.9 - 50.0 fL    Platelet Count 282 164 - 446 K/uL    MPV 8.7 (L) 9.0 - 12.9 fL    Neutrophils-Polys 64.60 44.00 - 72.00 %    Lymphocytes 19.40 (L) 22.00 - 41.00 %    Monocytes 15.30 (H) 0.00 - 13.40 %    Eosinophils 0.20 0.00 - 6.90 %    Basophils 0.30 0.00 - 1.80 %    Immature Granulocytes 0.20 0.00 - 0.90 %    Nucleated RBC 0.00 /100 WBC    Neutrophils (Absolute) 3.75 " 1.82 - 7.42 K/uL    Lymphs (Absolute) 1.13 1.00 - 4.80 K/uL    Monos (Absolute) 0.89 (H) 0.00 - 0.85 K/uL    Eos (Absolute) 0.01 0.00 - 0.51 K/uL    Baso (Absolute) 0.02 0.00 - 0.12 K/uL    Immature Granulocytes (abs) 0.01 0.00 - 0.11 K/uL    NRBC (Absolute) 0.00 K/uL   Complete Metabolic Panel (CMP)   Result Value Ref Range    Sodium 137 135 - 145 mmol/L    Potassium 3.9 3.6 - 5.5 mmol/L    Chloride 102 96 - 112 mmol/L    Co2 25 20 - 33 mmol/L    Anion Gap 10.0 0.0 - 11.9    Glucose 114 (H) 65 - 99 mg/dL    Bun 14 8 - 22 mg/dL    Creatinine 1.31 0.50 - 1.40 mg/dL    Calcium 9.2 8.5 - 10.5 mg/dL    AST(SGOT) 23 12 - 45 U/L    ALT(SGPT) 8 2 - 50 U/L    Alkaline Phosphatase 65 30 - 99 U/L    Total Bilirubin 0.5 0.1 - 1.5 mg/dL    Albumin 3.7 3.2 - 4.9 g/dL    Total Protein 6.9 6.0 - 8.2 g/dL    Globulin 3.2 1.9 - 3.5 g/dL    A-G Ratio 1.2 g/dL   Btype Natriuretic Peptide   Result Value Ref Range    B Natriuretic Peptide 27 0 - 100 pg/mL   Troponin STAT   Result Value Ref Range    Troponin I <0.01 0.00 - 0.04 ng/mL   PT/INR   Result Value Ref Range    PT 12.8 12.0 - 14.6 sec    INR 0.99 0.87 - 1.13   APTT   Result Value Ref Range    APTT 29.0 24.7 - 36.0 sec   TSH (for screening thyroid dysfunction)   Result Value Ref Range    TSH 2.490 0.380 - 5.330 uIU/mL   D-Dimer (only helpful in low pre-test probability wells critieria. Do not order if patient ruled out by PERC criteria. See Weblinks at top of Labs section)   Result Value Ref Range    D-Dimer Screen 913 (H) <250 ng/mL(D-DU)   ESTIMATED GFR   Result Value Ref Range    GFR If African American >60 >60 mL/min/1.73 m 2    GFR If Non African American 55 (A) >60 mL/min/1.73 m 2   EKG (NOW)   Result Value Ref Range    Report       [facility]    Test Date:  2018  Pt Name:    ETTA LUZ                Department: ER  MRN:        [id number]                      Room:  Gender:     Male                         Technician: [id number]  :         1955                   Requested By:ER TRIAGE PROTOCOL  Order #:    [id number]                    Reading MD:    Measurements  Intervals                                Axis  Rate:       159                          P:          0  IL:         98                           QRS:        61  QRSD:       82                           T:          45  QT:         304  QTc:        495    Interpretive Statements  SUPRAVENTRICULAR TACHYCARDIA  LOW VOLTAGE IN FRONTAL LEADS  RSR' IN V1 OR V2, PROBABLY NORMAL VARIANT  BASELINE WANDER IN LEAD(S) II,III,aVF  No previous ECG available for comparison           RADIOLOGY  DX-CHEST-PORTABLE (1 VIEW)   Final Result      No acute cardiopulmonary findings.      Echocardiogram Comp W/O Cont    (Results Pending)         COURSE & MEDICAL DECISION MAKING  I have reviewed any medical record information, laboratory studies and radiographic results as noted above.  Differential diagnoses includes: New-onset A. fib, RVR, ACS, dehydration, electrolyte abnormalities    Encounter Summary: This is a 63 y.o. male with new onset A. fib with RVR and hypotension, he is sent here by the PCPs office, he states some associated syncope but her onset of his symptoms over the past few weeks. His initial blood pressure here is 90/70, he is able to sit up without passing out. Unfortunately have a diltiazem shortness, I don't think as mall or other beta blockade is a good choice given his hypotension and lateral cardioversion I think should be saved as a last resort in the case of worsening condition and acute instability. For now I think digoxin will be a good choice, I will consult the cardiologist on call, administer some fluids and ultimately reevaluate him. Given his hypotension and his tachycardia I do think this patient is critically ill at a rate of acute decompensation and required the highest level of prepardeness for emergent interventions ------ some improvement in the heart rate noted with  the digoxin and esmolol combination, patient's blood pressure remained soft but improved to around 100 systolic. He evaluated the bedside by the cardiologist and admitted to the hospital in guarded condition     CRITICAL CARE  The very real possibilty of a deterioration of this patient's condition required the highest level of my preparedness for sudden, emergent intervention.  I provided critical care services, which included medication orders, frequent reevaluations of the patient's condition and response to treatment, ordering and reviewing test results, and discussing the case with various consultants.  The critical care time associated with the care of the patient was 41 minutes. Review chart for interventions. This time is exclusive of any other billable procedures.       DISPOSITION: Admitted in guarded condition      FINAL IMPRESSION  1. Atrial fibrillation with rapid ventricular response (CMS-HCC)    2. Other specified hypotension           This dictation was created using voice recognition software. The accuracy of the dictation is limited to the abilities of the software. I expect there may be some errors of grammar and possibly content. The nursing notes were reviewed and certain aspects of this information were incorporated into this note.    Electronically signed by: Jimy Merlos, 4/3/2018 1:24 PM

## 2018-04-03 NOTE — CONSULTS
Reason of Consult: AFR    Consulting Physician: Dr. Andres    HPI:  63M with HIV and HTN presents for fatigue and orthostasis. Noted his HR has been elevated to 160bpm for at least 3 weeks. Did not seek medical attention. Dizzy with standing for 2 weeks. No chest pain or LE edema but ESPINOZA present. ER noted to be in AFRVR 160bpm with marginal BP. No smoking, but notes vodka drinking every morning at 6AM.    Past Medical History:   Diagnosis Date   • HIV (human immunodeficiency virus infection) (CMS-[facility])    • Hypertension    • Kidney disease        Social History     Social History   • Marital status:      Spouse name: N/A   • Number of children: N/A   • Years of education: N/A     Occupational History   • Not on file.     Social History Main Topics   • Smoking status: Current Every Day Smoker     Types: Cigarettes   • Smokeless tobacco: Current User   • Alcohol use 0.0 oz/week      Comment: daily   • Drug use: No   • Sexual activity: Not on file     Other Topics Concern   • Not on file     Social History Narrative   • No narrative on file       No current facility-administered medications on file prior to encounter.      Current Outpatient Prescriptions on File Prior to Encounter   Medication Sig Dispense Refill   • tamsulosin (FLOMAX) 0.4 MG capsule Take 0.4 mg by mouth ONE-HALF HOUR AFTER BREAKFAST.     • gabapentin (NEURONTIN) 300 MG Cap Take 300 mg by mouth 3 times a day.     • ondansetron (ZOFRAN ODT) 4 MG TABLET DISPERSIBLE Take 1 Tab by mouth every 8 hours as needed for Nausea. 10 Tab 0   • losartan (COZAAR) 25 MG Tab Take 12.5 mg by mouth every day.         Current Facility-Administered Medications   Medication Dose Frequency Provider Last Rate Last Dose   • esmolol (BREVIBLOC) 2.5 g/250 mL NS infusion (PREMIX)  0-200 mcg/kg/min Continuous Jimy Merlos M.D. 58 mL/hr at 04/03/18 1446 150 mcg/kg/min at 04/03/18 1446   • Abacavir-Dolutegravir-Lamivud 600- MG TABS 1 Tab  1 Tab DAILY Jacquelyn HITCHCOCK  "YASIR Kang.       • gabapentin (NEURONTIN) capsule 300 mg  300 mg TID Jacquelyn Kang D.O.       • loperamide (IMODIUM) capsule 2 mg  2 mg 4X/DAY PRN Jacquelyn Kang D.O.       • tamsulosin (FLOMAX) capsule 0.4 mg  0.4 mg AFTER BREAKFAST Jacquelyn Kang D.O.       • senna-docusate (PERICOLACE or SENOKOT S) 8.6-50 MG per tablet 2 Tab  2 Tab BID Jacquelyn Kang D.O.        And   • polyethylene glycol/lytes (MIRALAX) PACKET 1 Packet  1 Packet QDAY PRN Jacquelyn Kang D.O.        And   • magnesium hydroxide (MILK OF MAGNESIA) suspension 30 mL  30 mL QDAY PRN Jacquelyn Kang D.O.        And   • bisacodyl (DULCOLAX) suppository 10 mg  10 mg QDAY PRN Jacquelyn Kang D.O.       • NS infusion   Continuous Jacquelyn Kang D.O.       • ondansetron (ZOFRAN) syringe/vial injection 4 mg  4 mg Q4HRS PRN Jacquelyn Kang D.O.       • ondansetron (ZOFRAN ODT) dispertab 4 mg  4 mg Q4HRS PRN Jacquelyn Kang D.O.       • heparin injection 2,600 Units  2,600 Units PRN Jacquelyn Kang D.O.        And   • heparin infusion 25,000 units in 500 ml 0.45% nacl   Continuous Jacquelyn Kang D.O.         Last reviewed on 4/3/2018  3:18 PM by Sharron Quevedo    Sulfa drugs    History reviewed. No pertinent family history.    ROS: As per HPI all other systems reviewed and negative     Physical Exam   Blood pressure (!) 96/71, pulse 72, temperature 36.5 °C (97.7 °F), temperature source Temporal, resp. rate (!) 21, height 1.816 m (5' 11.5\"), weight 64 kg (141 lb), SpO2 96 %.    Constitutional: Appears well-developed.   HENT: Normocephalic and atraumatic. No scleral icterus.   Neck: No JVD present.   Cardiovascular: IRRR and tachy. Exam reveals no gallop and no friction rub. No murmur heard.   Pulmonary/Chest: CTAB    Abdominal: S/NT/ND BS+   Musculoskeletal:  Pulses present. No atrophy. Strength normal.  Extremities: Exhibits no edema. No clubbing or cyanosis.   Skin: Skin is warm and dry.   Neuro: Non-focal, CN 2-12 " intact grossly    No intake or output data in the 24 hours ending 04/03/18 1527    Recent Labs      04/03/18   1324   WBC  5.8   RBC  3.62*   HEMOGLOBIN  13.1*   HEMATOCRIT  37.7*   MCV  104.1*   MCH  36.2*   MCHC  34.7   RDW  49.1   PLATELETCT  282   MPV  8.7*     Recent Labs      04/03/18   1324   SODIUM  137   POTASSIUM  3.9   CHLORIDE  102   CO2  25   GLUCOSE  114*   BUN  14   CREATININE  1.31   CALCIUM  9.2     Recent Labs      04/03/18   1324   APTT  29.0   INR  0.99     Recent Labs      04/03/18   1324   BNPBTYPENAT  27     Recent Labs      04/03/18   1324   TROPONINI  <0.01   BNPBTYPENAT  27           EKG (4/3/2018):  I have personally reviewed the EKG this visit and discussed with the patient. It shows AFRVR 160bpm.    Imaging reviewed    Impressions:  1. Atrial fibrillation with RVR, new diagnosis  2. Hypertension  3. HIV positive  4. Alcohol abuse    Recommendations:  Agree with esmolol as there is a shortage of diltiazem. Agree with IV digoxin loading.  Lopressor 25mg po BID and up-titrate as tolerated. May repeat Dgoxin IV 250mcg x1 if HR remains above 110 despite above.  Weight based Heparin or Lovenox  Echo  If not controlled by tomorrow will plan for ELKE guided DCCV  DC lovenox for now and optimize rate controlling agents.    Thank you for this interesting consultation. It was my pleasure to see Sukumar Bolton today.    Leonel Whiting MD, FACC, [facility]  Division of Interventional Cardiology  [facility]

## 2018-04-03 NOTE — ED NOTES
Xray at bedside.     MD Merlos notified that pt is at the max rate allowed for esmolol with HR maintaining above 110. No new orders received.

## 2018-04-03 NOTE — ED TRIAGE NOTES
Chief Complaint   Patient presents with   • Dizziness     for weeks. Worse when standing or with exertion   • Sent by MD     seen at [facility] and sent for hypotension.     Triaged in EKG room. A-fib with RVR at 159/min with mild hypotension. Denies Hx of A-fib, denies CP. Admits to subjective BLE swelling. To room by marti.

## 2018-04-03 NOTE — ED NOTES
The Medication Reconciliation process has been completed by interviewing the patient who can have his Triumeq brought in if necessary. I also called [facility] for his losartan dose.     Allergies have been reviewed  Antibiotic use in 30 days - none    Home Pharmacy:  [facility]

## 2018-04-04 LAB
ALBUMIN SERPL BCP-MCNC: 2.9 G/DL (ref 3.2–4.9)
ALBUMIN/GLOB SERPL: 1.2 G/DL
ALP SERPL-CCNC: 45 U/L (ref 30–99)
ALT SERPL-CCNC: 6 U/L (ref 2–50)
ANION GAP SERPL CALC-SCNC: 7 MMOL/L (ref 0–11.9)
APTT PPP: 103.2 SEC (ref 24.7–36)
APTT PPP: 57.7 SEC (ref 24.7–36)
APTT PPP: 73 SEC (ref 24.7–36)
AST SERPL-CCNC: 11 U/L (ref 12–45)
BASOPHILS # BLD AUTO: 0.4 % (ref 0–1.8)
BASOPHILS # BLD: 0.02 K/UL (ref 0–0.12)
BILIRUB SERPL-MCNC: 0.4 MG/DL (ref 0.1–1.5)
BUN SERPL-MCNC: 14 MG/DL (ref 8–22)
CALCIUM SERPL-MCNC: 8.1 MG/DL (ref 8.5–10.5)
CHLORIDE SERPL-SCNC: 110 MMOL/L (ref 96–112)
CHOLEST SERPL-MCNC: 136 MG/DL (ref 100–199)
CO2 SERPL-SCNC: 23 MMOL/L (ref 20–33)
CREAT SERPL-MCNC: 1.26 MG/DL (ref 0.5–1.4)
EOSINOPHIL # BLD AUTO: 0.04 K/UL (ref 0–0.51)
EOSINOPHIL NFR BLD: 0.8 % (ref 0–6.9)
ERYTHROCYTE [DISTWIDTH] IN BLOOD BY AUTOMATED COUNT: 49.1 FL (ref 35.9–50)
FOLATE SERPL-MCNC: >23.5 NG/ML
GLOBULIN SER CALC-MCNC: 2.4 G/DL (ref 1.9–3.5)
GLUCOSE SERPL-MCNC: 91 MG/DL (ref 65–99)
HCT VFR BLD AUTO: 32.8 % (ref 42–52)
HDLC SERPL-MCNC: 40 MG/DL
HGB BLD-MCNC: 11.3 G/DL (ref 14–18)
IMM GRANULOCYTES # BLD AUTO: 0.03 K/UL (ref 0–0.11)
IMM GRANULOCYTES NFR BLD AUTO: 0.6 % (ref 0–0.9)
LDLC SERPL CALC-MCNC: 75 MG/DL
LV EJECT FRACT  99904: 55
LYMPHOCYTES # BLD AUTO: 2.2 K/UL (ref 1–4.8)
LYMPHOCYTES NFR BLD: 43.9 % (ref 22–41)
MAGNESIUM SERPL-MCNC: 1.9 MG/DL (ref 1.5–2.5)
MAGNESIUM SERPL-MCNC: 2 MG/DL (ref 1.5–2.5)
MCH RBC QN AUTO: 35.9 PG (ref 27–33)
MCHC RBC AUTO-ENTMCNC: 34.5 G/DL (ref 33.7–35.3)
MCV RBC AUTO: 104.1 FL (ref 81.4–97.8)
MONOCYTES # BLD AUTO: 0.8 K/UL (ref 0–0.85)
MONOCYTES NFR BLD AUTO: 16 % (ref 0–13.4)
NEUTROPHILS # BLD AUTO: 1.92 K/UL (ref 1.82–7.42)
NEUTROPHILS NFR BLD: 38.3 % (ref 44–72)
NRBC # BLD AUTO: 0 K/UL
NRBC BLD-RTO: 0 /100 WBC
PLATELET # BLD AUTO: 239 K/UL (ref 164–446)
PMV BLD AUTO: 8.7 FL (ref 9–12.9)
POTASSIUM SERPL-SCNC: 3.9 MMOL/L (ref 3.6–5.5)
PROT SERPL-MCNC: 5.3 G/DL (ref 6–8.2)
RBC # BLD AUTO: 3.15 M/UL (ref 4.7–6.1)
SODIUM SERPL-SCNC: 140 MMOL/L (ref 135–145)
TRIGL SERPL-MCNC: 104 MG/DL (ref 0–149)
VIT B12 SERPL-MCNC: 651 PG/ML (ref 211–911)
WBC # BLD AUTO: 5 K/UL (ref 4.8–10.8)

## 2018-04-04 PROCEDURE — 770022 HCHG ROOM/CARE - ICU (200)

## 2018-04-04 PROCEDURE — 700105 HCHG RX REV CODE 258: Performed by: INTERNAL MEDICINE

## 2018-04-04 PROCEDURE — 92960 CARDIOVERSION ELECTRIC EXT: CPT

## 2018-04-04 PROCEDURE — 80061 LIPID PANEL: CPT

## 2018-04-04 PROCEDURE — 99233 SBSQ HOSP IP/OBS HIGH 50: CPT | Performed by: INTERNAL MEDICINE

## 2018-04-04 PROCEDURE — B24BZZ4 ULTRASONOGRAPHY OF HEART WITH AORTA, TRANSESOPHAGEAL: ICD-10-PCS | Performed by: INTERNAL MEDICINE

## 2018-04-04 PROCEDURE — A9270 NON-COVERED ITEM OR SERVICE: HCPCS | Performed by: INTERNAL MEDICINE

## 2018-04-04 PROCEDURE — 85025 COMPLETE CBC W/AUTO DIFF WBC: CPT

## 2018-04-04 PROCEDURE — 93320 DOPPLER ECHO COMPLETE: CPT

## 2018-04-04 PROCEDURE — 80053 COMPREHEN METABOLIC PANEL: CPT

## 2018-04-04 PROCEDURE — 5A2204Z RESTORATION OF CARDIAC RHYTHM, SINGLE: ICD-10-PCS | Performed by: INTERNAL MEDICINE

## 2018-04-04 PROCEDURE — 700102 HCHG RX REV CODE 250 W/ 637 OVERRIDE(OP): Performed by: INTERNAL MEDICINE

## 2018-04-04 PROCEDURE — 700111 HCHG RX REV CODE 636 W/ 250 OVERRIDE (IP): Performed by: INTERNAL MEDICINE

## 2018-04-04 PROCEDURE — 93325 DOPPLER ECHO COLOR FLOW MAPG: CPT

## 2018-04-04 PROCEDURE — 93312 ECHO TRANSESOPHAGEAL: CPT

## 2018-04-04 PROCEDURE — 85730 THROMBOPLASTIN TIME PARTIAL: CPT | Mod: 91

## 2018-04-04 PROCEDURE — 83735 ASSAY OF MAGNESIUM: CPT

## 2018-04-04 RX ORDER — MIDAZOLAM HYDROCHLORIDE 1 MG/ML
4 INJECTION INTRAMUSCULAR; INTRAVENOUS ONCE
Status: COMPLETED | OUTPATIENT
Start: 2018-04-04 | End: 2018-04-04

## 2018-04-04 RX ORDER — MIDAZOLAM HYDROCHLORIDE 1 MG/ML
INJECTION INTRAMUSCULAR; INTRAVENOUS
Status: ACTIVE
Start: 2018-04-04 | End: 2018-04-05

## 2018-04-04 RX ORDER — OMEPRAZOLE 20 MG/1
20 CAPSULE, DELAYED RELEASE ORAL DAILY
Status: DISCONTINUED | OUTPATIENT
Start: 2018-04-04 | End: 2018-04-06 | Stop reason: HOSPADM

## 2018-04-04 RX ORDER — MIDAZOLAM HYDROCHLORIDE 1 MG/ML
1 INJECTION INTRAMUSCULAR; INTRAVENOUS ONCE
Status: DISCONTINUED | OUTPATIENT
Start: 2018-04-04 | End: 2018-04-04

## 2018-04-04 RX ADMIN — ABACAVIR 600 MG: 300 TABLET, FILM COATED ORAL at 08:30

## 2018-04-04 RX ADMIN — LAMIVUDINE 300 MG: 100 TABLET, FILM COATED ORAL at 08:30

## 2018-04-04 RX ADMIN — HEPARIN SODIUM 1050 UNITS/HR: 5000 INJECTION, SOLUTION INTRAVENOUS at 15:20

## 2018-04-04 RX ADMIN — FENTANYL CITRATE 100 MCG: 50 INJECTION, SOLUTION INTRAMUSCULAR; INTRAVENOUS at 16:19

## 2018-04-04 RX ADMIN — SODIUM CHLORIDE: 9 INJECTION, SOLUTION INTRAVENOUS at 21:55

## 2018-04-04 RX ADMIN — TAMSULOSIN HYDROCHLORIDE 0.4 MG: 0.4 CAPSULE ORAL at 08:30

## 2018-04-04 RX ADMIN — OMEPRAZOLE 20 MG: 20 CAPSULE, DELAYED RELEASE ORAL at 16:22

## 2018-04-04 RX ADMIN — GABAPENTIN 300 MG: 300 CAPSULE ORAL at 22:00

## 2018-04-04 RX ADMIN — IPRATROPIUM BROMIDE 2 SPRAY: 42 SPRAY NASAL at 03:59

## 2018-04-04 RX ADMIN — ESMOLOL HYDROCHLORIDE 150 MCG/KG/MIN: 10 INJECTION INTRAVENOUS at 07:37

## 2018-04-04 RX ADMIN — GABAPENTIN 300 MG: 300 CAPSULE ORAL at 16:22

## 2018-04-04 RX ADMIN — SODIUM CHLORIDE: 9 INJECTION, SOLUTION INTRAVENOUS at 12:40

## 2018-04-04 RX ADMIN — METOPROLOL TARTRATE 25 MG: 25 TABLET, FILM COATED ORAL at 21:53

## 2018-04-04 RX ADMIN — METOPROLOL TARTRATE 25 MG: 25 TABLET, FILM COATED ORAL at 08:31

## 2018-04-04 RX ADMIN — SODIUM CHLORIDE: 9 INJECTION, SOLUTION INTRAVENOUS at 05:30

## 2018-04-04 RX ADMIN — IPRATROPIUM BROMIDE 2 SPRAY: 42 SPRAY NASAL at 21:58

## 2018-04-04 RX ADMIN — GABAPENTIN 300 MG: 300 CAPSULE ORAL at 08:30

## 2018-04-04 RX ADMIN — DOLUTEGRAVIR SODIUM 50 MG: 50 TABLET, FILM COATED ORAL at 08:30

## 2018-04-04 RX ADMIN — QUETIAPINE FUMARATE 400 MG: 100 TABLET ORAL at 21:53

## 2018-04-04 RX ADMIN — MIDAZOLAM HYDROCHLORIDE 4 MG: 1 INJECTION, SOLUTION INTRAMUSCULAR; INTRAVENOUS at 16:21

## 2018-04-04 RX ADMIN — ESMOLOL HYDROCHLORIDE 150 MCG/KG/MIN: 10 INJECTION INTRAVENOUS at 02:31

## 2018-04-04 ASSESSMENT — PAIN SCALES - GENERAL
PAINLEVEL_OUTOF10: 0

## 2018-04-04 ASSESSMENT — ENCOUNTER SYMPTOMS
FALLS: 0
DEPRESSION: 0
HEADACHES: 0
NAUSEA: 0
WEAKNESS: 0
DIZZINESS: 0
ABDOMINAL PAIN: 0
CONSTIPATION: 0
CHILLS: 0
MYALGIAS: 0
COUGH: 0
FEVER: 0
SHORTNESS OF BREATH: 0
VOMITING: 0
SPUTUM PRODUCTION: 0
PALPITATIONS: 0
TINGLING: 0
LOSS OF CONSCIOUSNESS: 1
DIARRHEA: 0
STRIDOR: 0

## 2018-04-04 NOTE — CONSULTS
DATE OF SERVICE:  04/03/2018    PULMONARY AND CRITICAL CARE CONSULTATION    REFERRING PHYSICIAN:  Jacquelyn Kang DO    REASON FOR CONSULTATION:  Critical care management with esmolol drip for   atrial fibrillation with RVR.    HISTORY OF PRESENT ILLNESS:  The patient is a very pleasant 63-year-old male   with a history of HIV, compliant with medications as well as hypertension who   has been complaining of dizziness with standing along with syncopal episodes   for the last 3 weeks.  He has been seen at the [facility] for his HIV as   well as his PCP and has been noted to have a high heart rate; however, did not   mention it to his primary care doctor.  When he was seen today, they noted   that his heart rate was in the 160s and he was recommended to come to the   hospital for further evaluation.  The patient has denied any shortness of   breath or chest pain associated with it, but he has noted that he has had   syncopal episodes with passing out and injuring his head.  Otherwise, no   nausea, vomiting, headaches, visual changes, leg pain or swelling, chest pain,   abdominal pain, coughing or wheezing.  We arrived to the emergency department   and they noted that his heart rate was in the 150s and the EKG showed atrial   fibrillation with RVR.  The patient had systolic blood pressures in the 70s.    The patient was given IV digoxin and eventually started on esmolol drip.    Cardiology was consulted and agreed with the loading dose of digoxin as well   as the esmolol drip to rate control him.  He has also been started on heparin   drip as well in the meantime.  Currently, the patient is in the ICU, fully   conversant with and asymptomatic.  His only request is ChapStick as well as   his Seroquel to help him sleep tonight.  Otherwise, he is without any   symptoms.    REVIEW OF SYSTEMS:  As noted, the patient has been having dizziness and   lightheadedness with standing and has had several syncopal episodes.  He  has   noted also palpitations in the last 3 weeks.  Denies any chest pain,   orthopnea, dyspnea with exertion, dyspnea at rest, leg swelling, unilateral   numbness, tingling or weakness to his extremities.  No headaches, no visual   changes.  No recent cough, cold, or congestion.  He has had no fevers or   chills.  No weight loss or night sweats, no unusual rashes.  No dysuria,   hematuria, urgency, or frequency and no changes to his bowel habits.  No   nausea or vomiting.  No headaches.  No changes to his mood.    PAST MEDICAL HISTORY:  HIV, fully compliant with medications and seen at the   [facility], and hypertension.    PAST SURGICAL HISTORY:  No surgical interventions in the past.    FAMILY HISTORY:  No history of heart disease.    SOCIAL HISTORY:  The patient smokes daily half a pack or more for over 20   years.  He also drinks a small amount of alcohol on a daily basis.  Denies any   illegal drugs or marijuana use.  He does live here in [place].    OUTPATIENT MEDICATIONS:  Include:  1.  Triumeq 600- mg 1 tablet daily.  2.  Vitamin B complex 1 daily.  3.  Ferrous sulfate 325 mg daily.  4.  Neurontin 300 mg 3 times daily.  5.  Cozaar 50 mg daily.  6.  Omeprazole 20 mg daily.  7.  Zofran 4 mg every 8 hours as needed for nausea.  8.  Quetiapine 400 mg every night for bedtime.  9.  Flomax 0.4 mg daily.  10.  Vitamin E 400 units daily.    ALLERGIES:  INCLUDE SULFA MEDICATIONS, WHICH CAUSES ITCHING.    PHYSICAL EXAMINATION:  VITAL SIGNS:  Blood pressures ranged from 90s-110s/60s-70s, heart rate in the   110s-120s in atrial fibrillation, respiratory rate of 15-25, temperature of   99.3, and pulse ox of 93-98% on room air.  GENERAL:  The patient is quite talkative with a bit of pressured speech,   otherwise cooperative and pleasant.  He is thin, but otherwise well developed,   well nourished, no respiratory distress noted and no tachypnea.  HEENT:  He is PERRLA, EOMI.  Conjunctivae are pink.  Sclerae are  anicteric.    Oropharynx has moist mucous membranes, good dentition and clear posterior   pharynx.  Lids and lashes are normal as well as nares.  His hearing is intact.  NECK:  Supple with full range of motion.  No cervical adenopathy or   thyromegaly.  CARDIOVASCULAR:  Irregularly irregular with distant heart tones and no   murmurs.  LUNGS:  Clear to auscultation throughout with good effort and no tachypnea.  ABDOMEN:  Soft, nontender, and nondistended.  No masses appreciated.  BACK:  No CVA tenderness, no spinal tenderness.  EXTREMITIES:  Moving all extremities with full range of motion to his left   upper, left lower, right upper, and right lower.  He has no inguinal   adenopathy.  2+ radial pulses, 2+ dorsal pedal pulses.  No clubbing, cyanosis   or edema.  He has no joint effusions or erythema.  SKIN:  Warm and dry.  No rashes.  NEUROLOGIC:  He has clear speech.  A and O x4.  Cranial nerves II-XII are   grossly intact.  Motor and sensory is grossly intact as well as normal gait   and DTRs are intact.    LABORATORY DATA:  White count of 5.8, hemoglobin of 13.1, hematocrit of 37.7,   and platelets of 282.  Sodium of 137, potassium 3.9, chloride 102, serum   bicarbonate 25, glucose 114, BUN 14, creatinine 1.3, calcium 9.2, AST 23, ALT   8, alkaline phosphatase of 65, total bilirubin of 0.5, and albumin of 3.7.    His INR is 0.99.  Troponin is less than 0.01.  BNP is 27.  His TSH is 2.49.    IMAGING:  Portable chest shows no effusions or infiltrates.    IMPRESSION:  1.  New onset atrial fibrillation with rapid ventricular response, on esmolol   trip.  2.  Macrocytic anemia.  3.  Mild hyperglycemia.  4.  History of human immunodeficiency virus complaint with medications.  5.  History of hypertension.  6.  Tobacco dependence.    PLAN:  This patient is currently requiring an esmolol drip to control his   rapid ventricular response associated with atrial fibrillation.  He has become   more hemodynamically stable with  improved systolic blood pressures after his   rate became more stable.  We will continue the esmolol drip throughout the   night as we load him with digoxin.  He is also on full dose heparin therapy   for further anticoagulation.  Cardiology has been consulted and if the patient   does not convert with the use of digoxin as well as esmolol he likely will   undergo a ELKE with cardioversion.  For now, we will continue to monitor his   blood sugars as well as his electrolytes and correct them as needed.  He does   not need peptic ulcer prophylaxis and he is on full dose heparin drip.    Thank you for allowing the pulmonary critical care team to participate in this   patient's care.  We will continue to follow along.    This case was discussed at length with the ICU nursing staff as well as   cardiology and the admitting physician Dr. Kang.  The patient is critically   ill.  Critical care time is approximately 34 minutes in direct critical care   as well as extensive data review.  There was no overlap with other physicians   and this excludes any procedures.       ____________________________________     MD CHRISTY PIEDRA / TAYLOR    DD:  04/04/2018 00:03:16  DT:  04/04/2018 02:15:40    D#:  [id number]  Job#:  [id number]

## 2018-04-04 NOTE — PROGRESS NOTES
Pulmonary Critical Care Progress Note        Date of Admission:  4/3/2018    Chief Complaint: Dizziness, A. fib/RVR    History of Present Illness: 63 y.o. Male, h/o HIV, Htn; admitted 4/3 with c/o tachycardia, orthostatic dizziness and syncope x 3 weeks.  Found to have AF with RVR    ROS:  Respiratory: negative, Cardiac: negative, GI: negative.  All other systems negative.    Interval Events:  24 hour interval history reviewed    - a/o x 4   - rate 110's, AF   - room air   - esmolol 150   - heparin gtt - titrated   - on HAART   - s/p dig load   - d-dimer up, ignore unless RV strain, on room air    PFSH:  No change.    Respiratory:     Pulse Oximetry: 94 %  Chest Tube Drains:          Exam: rhonchi bibasilar and diminished breath sounds mild  ImagingAvailable data reviewed         Invalid input(s): [id number]    HemoDynamics:  Pulse: 71, Heart Rate (Monitored): (!) 114  Blood Pressure : (!) 96/71, NIBP: 112/68       Exam: atrial fibrillation, rate 110, esmolol drip  Imaging: Available data reviewed  Recent Labs      04/03/18   1324   TROPONINI  <0.01   BNPBTYPENAT  27       Neuro:  GCS Total [place] Coma Score: 15       Exam: no focal deficits noted  Imaging: Available data reviewed    Fluids:  Intake/Output       04/02/18 0700 - 04/03/18 0659 (Not Admitted) 04/03/18 0700 - 04/04/18 0659 04/04/18 0700 - 04/05/18 0659      [id number] 5614-0314 Total 0838-4433 8039-2704 Total 7912-4138 4622-0742 Total       Intake    P.O.  --  -- --  --  460 460  --  -- --    P.O. -- -- -- -- 460 460 -- -- --    I.V.  --  -- --  --  2382 2382  --  -- --    Esmolol Volume -- -- -- -- 937 937 -- -- --    Heparin Volume -- -- -- -- 320 320 -- -- --    IV Volume (IV MF) -- -- -- -- 1125 1125 -- -- --    Total Intake -- -- -- -- 4872 7172 -- -- --       Output    Urine  --  -- --  --  450 450  --  -- --    Void (ml) -- -- -- -- 450 450 -- -- --    Total Output -- -- -- -- 450 450 -- -- --       Net I/O     -- -- -- -- 5043 0162 -- -- --         Weight: 65 kg (143 lb 4.8 oz)  Recent Labs      18   1324  18   0525   SODIUM  137  140   POTASSIUM  3.9  3.9   CHLORIDE  102  110   CO2  25  23   BUN  14  14   CREATININE  1.31  1.26   MAGNESIUM   --   1.9   CALCIUM  9.2  8.1*       GI/Nutrition:  Exam: abdomen is soft and non-tender  Imaging: Available data reviewed  NPO  Liver Function  Recent Labs      18   1324  18   0525   ALTSGPT  8  6   ASTSGOT  23  11*   ALKPHOSPHAT  65  45   TBILIRUBIN  0.5  0.4   GLUCOSE  114*  91       Heme:  Recent Labs      18   1324  18   0525   RBC  3.62*   --   3.15*   HEMOGLOBIN  13.1*   --   11.3*   HEMATOCRIT  37.7*   --   32.8*   PLATELETCT  282   --   239   PROTHROMBTM  12.8   --    --    APTT  29.0  103.2*  73.0*   INR  0.99   --    --        Infectious Disease:  Temp  Av.9 °C (98.4 °F)  Min: 36.5 °C (97.7 °F)  Max: 37.4 °C (99.3 °F)  Micro: reviewed  Recent Labs      18   1324  18   0525   WBC  5.8  5.0   NEUTSPOLYS  64.60  38.30*   LYMPHOCYTES  19.40*  43.90*   MONOCYTES  15.30*  16.00*   EOSINOPHILS  0.20  0.80   BASOPHILS  0.30  0.40   ASTSGOT  23  11*   ALTSGPT  8  6   ALKPHOSPHAT  65  45   TBILIRUBIN  0.5  0.4     Current Facility-Administered Medications   Medication Dose Frequency Provider Last Rate Last Dose   • esmolol (BREVIBLOC) 2.5 g/250 mL NS infusion (PREMIX)  0-200 mcg/kg/min Continuous Leonel Whiting M.D. 58 mL/hr at 18 0737 150 mcg/kg/min at 18 07   • gabapentin (NEURONTIN) capsule 300 mg  300 mg TID Jacquelyn Kang, D.OLaila   300 mg at 18   • tamsulosin (FLOMAX) capsule 0.4 mg  0.4 mg AFTER BREAKFAST Jacquelyn Kang D.O.       • senna-docusate (PERICOLACE or SENOKOT S) 8.6-50 MG per tablet 2 Tab  2 Tab BID Jacquelyn Kang D.O.        And   • polyethylene glycol/lytes (MIRALAX) PACKET 1 Packet  1 Packet QDAY PRN Jacquelyn Kang D.O.        And   • magnesium hydroxide (MILK OF MAGNESIA) suspension 30  mL  30 mL QDAY PRN Jacquelyn Kang D.O.        And   • bisacodyl (DULCOLAX) suppository 10 mg  10 mg QDAY PRN Jacquelyn Kang D.O.       • NS infusion   Continuous Jacquelyn Kang D.O. 125 mL/hr at 04/04/18 0530     • ondansetron (ZOFRAN) syringe/vial injection 4 mg  4 mg Q4HRS PRN Jacquelyn Kang D.O.       • ondansetron (ZOFRAN ODT) dispertab 4 mg  4 mg Q4HRS PRN Jacquelyn Kang D.O.       • metoprolol (LOPRESSOR) tablet 25 mg  25 mg TWICE DAILY Leonel Whiting M.D.   25 mg at 04/03/18 2146   • heparin injection 2,600 Units  2,600 Units PRN Leonel Whiting M.D.        And   • heparin infusion 25,000 units in 500 ml 0.45% nacl   Continuous Leonel Whiting M.D. 21 mL/hr at 04/04/18 0623 1,050 Units/hr at 04/04/18 0623   • abacavir (ZIAGEN) tablet 600 mg  600 mg DAILY Jacquelyn Kang D.O.        And   • dolutegravir (TIVICAY) tablet 50 mg  50 mg DAILY Jacquelyn Kang D.O.        And   • lamivudine (EPIVIR) tablet 300 mg  300 mg DAILY Jacquelyn Kang D.O.       • QUEtiapine (SEROQUEL) tablet 400 mg  400 mg Nightly Ivonne Venegas M.D.   400 mg at 04/03/18 2343   • ipratropium (ATROVENT) 0.06 % nasal spray 2 Spray  2 [place] Q4HRS PRN Ivonne Venegas M.D.   2 Spray at 04/04/18 5019     Last reviewed on 4/3/2018  3:33 PM by Sharron Quevedo    Quality  Measures:  Medications reviewed, Labs reviewed and Radiology images reviewed                      Problems:  New onset AF with RVR   - rate control - esmolol gtt; titrate up PO BB, Dig loaded   - f/u dig level   - full dose anticoagulation, heparin gtt titrated    - Echo    - buff lytes, mg++   - Plan for ELKE and cardioversion today  HIV   - followed at [facility]   - fully compliant   - continue meds  Mild hypomagnesemia  TENZIN   - follow, avoid nephrotoxins  The patient remains critically ill.  I have titrated heparin infusion and esmolol drip. I discussed case with cardiology. He'll be monitored very closely in the intensive care  unit.  Discussed patient condition and risk of morbidity and/or mortality with RN, RT and QA team.    The patient remains critically ill.  Critical care time = 33 minutes in directly providing and coordinating critical care and extensive data review.  No time overlap and excludes procedures.

## 2018-04-04 NOTE — H&P
Hospital Medicine History and Physical    Date of Service  4/3/2018    Chief Complaint  Chief Complaint   Patient presents with   • Dizziness     for weeks. Worse when standing or with exertion   • Sent by MD     seen at \[facility]\"" and sent for hypotension.       History of Presenting Illness  63 y.o. male w History of HIV and hypertension who presented 4/3/2018 with dizziness with standing and fast heart rate noted at his primary care physician's office.    The patient has a history of HIV, he has been compliant with his medications and on the same regimen for the last 2 years. He has a history of hypertension and has previously been taking Cozaar 50 mg daily. A few months ago he noted that when he would stand up he would become extremely dizzy. He saw his primary care physician for this and they noted his blood pressure to be in the low to normal in the 120s and also his heart rate to be mildly elevated in the 1 teens. They told him to cut his Cozaar in half and therefore the patient has been taking 25 mg daily. He has continued to have dizziness with standing. He has seen his primary care physician again since then however he has been instructed to continue the Cozaar. His heart rate was again noted to be elevated in the 1 teens.     Today he went to his PCPs office for routine appointment. He has been still having dizziness however has not been having any chest pain or palpitations. He occasionally feels shortness of breath with exertion however this is minimal. He occasionally has intermittent swelling of both of his feet however this waxes and wanes and currently he has none. No changes in urinary habits, no nausea, vomiting or diarrhea. His appetite has been normal, he feels he may have lost a few pounds lately. His PCP noted his heart rate to be very elevated in the 150s. They sent her via her evaluation.     ER course: In the emergency department he is noted to have heart rate in the 150s and EKG showed atrial  fibrillation. His blood pressure was in the 70s. He was given IV digoxin and eventually started on esmolol drip. Cardiology was consulted and agreed with loading dose of digoxin. His heart rate improved with digoxin and esmolol into the 120s to 130s. His blood pressure remains in the 80s to 90s. He is asymptomatic and currently having no dizziness. He'll be admitted for further rate control, possible cardioversion if he becomes hemodynamically unstable, he will require IV heparin infusion as well. He will require ICU admission due to hemodynamic instability.     Primary Care Physician  Butch Liao M.D.    Consultants  Dr. Silva - Cardiology    Code Status  Full    Review of Systems  Review of Systems   Constitutional: Negative for chills, fever and malaise/fatigue.   HENT: Negative for sore throat.    Respiratory: Positive for shortness of breath. Negative for cough.    Cardiovascular: Negative for chest pain and palpitations.   Gastrointestinal: Negative for abdominal pain, blood in stool, diarrhea, heartburn, nausea and vomiting.   Genitourinary: Negative for dysuria and frequency.   Musculoskeletal: Negative for back pain and myalgias.   Neurological: Positive for dizziness. Negative for focal weakness, weakness and headaches.   Psychiatric/Behavioral: Negative for depression and hallucinations.   All other systems reviewed and are negative.       Past Medical History  Past Medical History:   Diagnosis Date   • HIV (human immunodeficiency virus infection) (CMS-[facility])    • Hypertension    • Kidney disease        Surgical History  No past surgical history on file.    Medications  No current facility-administered medications on file prior to encounter.      Current Outpatient Prescriptions on File Prior to Encounter   Medication Sig Dispense Refill   • tamsulosin (FLOMAX) 0.4 MG capsule Take 0.4 mg by mouth ONE-HALF HOUR AFTER BREAKFAST.     • gabapentin (NEURONTIN) 300 MG Cap Take 300 mg by mouth 3  times a day.     • ondansetron (ZOFRAN ODT) 4 MG TABLET DISPERSIBLE Take 1 Tab by mouth every 8 hours as needed for Nausea. 10 Tab 0       Family History  History reviewed. No pertinent family history.    Social History  Social History   Substance Use Topics   • Smoking status: Current Every Day Smoker     Types: Cigarettes   • Smokeless tobacco: Current User   • Alcohol use 0.0 oz/week      Comment: daily       Allergies  Allergies   Allergen Reactions   • Sulfa Drugs Itching     Rxn - 1970's          Physical Exam  Laboratory   Hemodynamics  Temp (24hrs), Av.5 °C (97.7 °F), Min:36.5 °C (97.7 °F), Max:36.5 °C (97.7 °F)   Temperature: 36.5 °C (97.7 °F)  Pulse  Av  Min: 63  Max: 115 Heart Rate (Monitored): (!) 130  Blood Pressure : (!) 96/71, NIBP: (!) 93/64      Respiratory      Respiration: (!) 21, Pulse Oximetry: 97 %             Physical Exam   Constitutional: He is oriented to person, place, and time. He appears well-developed and well-nourished. No distress.   HENT:   Head: Normocephalic.   Mouth/Throat: No oropharyngeal exudate.   Eyes: Conjunctivae are normal. Pupils are equal, round, and reactive to light.   Neck: Normal range of motion. No tracheal deviation present.   Cardiovascular: An irregularly irregular rhythm present. Tachycardia present.    No murmur heard.  Pulmonary/Chest: Effort normal. No respiratory distress. He has no wheezes. He exhibits no tenderness.   Abdominal: Soft. He exhibits no distension. There is no tenderness. There is no guarding.   Musculoskeletal: Normal range of motion. He exhibits no edema or tenderness.   Lymphadenopathy:     He has no cervical adenopathy.   Neurological: He is alert and oriented to person, place, and time. No cranial nerve deficit.   Skin: Skin is warm and dry. No rash noted.   Psychiatric: He has a normal mood and affect. His behavior is normal.   Nursing note and vitals reviewed.      Recent Labs      18   1324   WBC  5.8   RBC  3.62*    HEMOGLOBIN  13.1*   HEMATOCRIT  37.7*   MCV  104.1*   MCH  36.2*   MCHC  34.7   RDW  49.1   PLATELETCT  282   MPV  8.7*     Recent Labs      04/03/18   1324   SODIUM  137   POTASSIUM  3.9   CHLORIDE  102   CO2  25   GLUCOSE  114*   BUN  14   CREATININE  1.31   CALCIUM  9.2     Recent Labs      04/03/18   1324   ALTSGPT  8   ASTSGOT  23   ALKPHOSPHAT  65   TBILIRUBIN  0.5   GLUCOSE  114*     Recent Labs      04/03/18   1324   APTT  29.0   INR  0.99     Recent Labs      04/03/18   1324   BNPBTYPENAT  27         Lab Results   Component Value Date    TROPONINI <0.01 04/03/2018     Urinalysis:    Lab Results  Component Value Date/Time   SPECGRAVITY >=1.030 01/18/2018 0203   SPECGRAVITY 1.039 01/18/2018 0203   GLUCOSEUR Negative 01/18/2018 0203   KETONES >=80 (A) 01/18/2018 0203   NITRITE Negative 01/18/2018 0203   WBCURINE 0-2 (A) 01/18/2018 0203   RBCURINE 2-5 (A) 01/18/2018 0203   BACTERIA Rare (A) 01/18/2018 0203   EPITHELCELL Few 01/18/2018 0203        Imaging  Dx-chest-portable (1 View)  Result Date: 4/3/2018  No acute cardiopulmonary findings.     Assessment/Plan     I anticipate this patient will require at least two midnights for appropriate medical management, necessitating inpatient admission.    * Atrial fibrillation with rapid ventricular response (CMS-HCC)   Assessment & Plan    Atrial fibrillation is a new diagnosis for him however, he says that his PCP has noted him to be tachycardic on his last few appointments. He is having RVR with heart rates in the 150s on arrival which improved down to the 130s after IV digoxin was given. Cardiology was consulted and initiated as well drip in the emergency department. His heart rate is ranging now in the 1 teens to low 130s. Blood pressure is limiting some rate controlling meds as it is also running in the 80s to 90s  -Heparin drip  -Digoxin 250 IV again if heart rate rises to greater than 130 and is sustained  -Metoprolol 25 mg by mouth twice a day to be  uptitrated as blood pressure tolerates.  -Continue esmolol drip.  -Echo  -Trend troponin  -Monitor and titrate infusions in the ICU  -I discussed the case with the ERP  -Appreciate cariology Recs  -If he remains hypotensive and heart rate greater than 130 in the morning, he will clearly require cardioversion.        HIV (human immunodeficiency virus infection) (CMS-HCC)   Assessment & Plan    Continue Epivir, Tivicay, Ziagen        Hypotension   Assessment & Plan    He has been running in the low end of normal as an outpatient on Cozaar. Also suspect that RVR is lowering his blood pressure is well  -Hold Cozaar  -Fluid bolus as needed  -Monitor blood pressure while using necessary rate control medications        TENZIN (acute kidney injury) (CMS-HCC)   Assessment & Plan    Renal function is actually improved from his last hospital stay. I suspect he is slightly dry and also hypoperfusing due to blood pressure running in the 90s, even at home for the last few weeks  -Hold Cozaar  -Provide IV fluids, bolus as needed for hypotension  -Monitor Cr Daily  -Watch UOP  -Hold nephrotoxins          Macrocytic anemia- (present on admission)   Assessment & Plan    Suspect related to history of HIV. He denies any recent blood loss.  -Monitor hemoglobin with heparin drip  -Check B12/folate  -Hgb in AM        Essential hypertension- (present on admission)   Assessment & Plan    History of hypertension, blood pressures are low to normal.  He has been having dizziness with standing and blood pressures measuring in the 90s at home 1st several weeks.   -DC Cozaar  -Prioritize rate controlling meds due to A. fib with RVR            VTE prophylaxis: Heparin.     35 minutes of critical care time were spent, including examination and interview of the patient, explanation of prognosis/diagnosis/plan of care, direction of nursing staff and discussion of the patient with other physicians involved.  This time was independent of procedures  performed.    Greater than 50% of which was spent at the bedside.

## 2018-04-04 NOTE — ASSESSMENT & PLAN NOTE
- Improved, only mild elevation in his creatinine  - Good urinary output  - Avoid nephrotoxins and repeat BMP in the morning

## 2018-04-04 NOTE — ASSESSMENT & PLAN NOTE
- Significant initially, was on esmolol drip, now stopped by cardiology  -Now status post ELKE cardioversion  - Continue heparin drip  - Additional recommendations per cardiology

## 2018-04-04 NOTE — PROGRESS NOTES
"Interval History:  63M with HIV and HTN presents for fatigue and orthostasis. Noted his HR has been elevated to 160bpm for at least 3 weeks. Did not seek medical attention. Dizzy with standing for 2 weeks. No chest pain or LE edema but ESPINOZA present. ER noted to be in AFRVR 160bpm with marginal BP. No smoking, but notes vodka drinking every morning at 6AM.  4/4: HR better on esmolol gtt and PO lopressor. Echo pending.    Physical Exam   Blood pressure (!) 96/71, pulse (!) 104, temperature 37 °C (98.6 °F), resp. rate 12, height 1.816 m (5' 11.5\"), weight 65 kg (143 lb 4.8 oz), SpO2 97 %.    Constitutional: Appears well-developed.   HENT: Normocephalic and atraumatic. No scleral icterus.   Neck: No JVD present.   Cardiovascular: TIRRR. Exam reveals no gallop and no friction rub. No murmur heard.   Pulmonary/Chest: CTAB    Abdominal: S/NT/ND BS+   Musculoskeletal: Exhibits no edema. Pulses present.  Skin: Skin is warm and dry.     ROS: As HPI other reviewed and negative       Intake/Output Summary (Last 24 hours) at 04/04/18 1156  Last data filed at 04/04/18 1000   Gross per 24 hour   Intake             3958 ml   Output              700 ml   Net             3258 ml       Recent Labs      04/03/18   1324  04/04/18   0525   WBC  5.8  5.0   RBC  3.62*  3.15*   HEMOGLOBIN  13.1*  11.3*   HEMATOCRIT  37.7*  32.8*   MCV  104.1*  104.1*   MCH  36.2*  35.9*   MCHC  34.7  34.5   RDW  49.1  49.1   PLATELETCT  282  239   MPV  8.7*  8.7*     Recent Labs      04/03/18   1324  04/04/18   0525   SODIUM  137  140   POTASSIUM  3.9  3.9   CHLORIDE  102  110   CO2  25  23   GLUCOSE  114*  91   BUN  14  14   CREATININE  1.31  1.26   CALCIUM  9.2  8.1*     Recent Labs      04/03/18   1324  04/03/18   2146  04/04/18   0525   APTT  29.0  103.2*  73.0*   INR  0.99   --    --      Recent Labs      04/03/18   1324   BNPBTYPENAT  27     Recent Labs      04/03/18   1324   TROPONINI  <0.01   BNPBTYPENAT  27     Recent Labs      04/04/18   0525 "   TRIGLYCERIDE  104   HDL  40   LDL  75       No current facility-administered medications on file prior to encounter.      Current Outpatient Prescriptions on File Prior to Encounter   Medication Sig Dispense Refill   • tamsulosin (FLOMAX) 0.4 MG capsule Take 0.4 mg by mouth ONE-HALF HOUR AFTER BREAKFAST.     • gabapentin (NEURONTIN) 300 MG Cap Take 300 mg by mouth 3 times a day.     • ondansetron (ZOFRAN ODT) 4 MG TABLET DISPERSIBLE Take 1 Tab by mouth every 8 hours as needed for Nausea. 10 Tab 0       Current Facility-Administered Medications   Medication Dose Frequency Provider Last Rate Last Dose   • esmolol (BREVIBLOC) 2.5 g/250 mL NS infusion (PREMIX)  0-200 mcg/kg/min Continuous Leonel Whiting M.D.   Stopped at 04/04/18 1146   • gabapentin (NEURONTIN) capsule 300 mg  300 mg TID ADRIAN Barry.O.   300 mg at 04/04/18 0830   • tamsulosin (FLOMAX) capsule 0.4 mg  0.4 mg AFTER BREAKFAST ADRIAN Barry.O.   0.4 mg at 04/04/18 0830   • senna-docusate (PERICOLACE or SENOKOT S) 8.6-50 MG per tablet 2 Tab  2 Tab BID ADRIAN Barry.NICOLE.        And   • polyethylene glycol/lytes (MIRALAX) PACKET 1 Packet  1 Packet QDAY PRN ADRIAN Barry.O.        And   • magnesium hydroxide (MILK OF MAGNESIA) suspension 30 mL  30 mL QDAY PRN ADRIAN Barry.O.        And   • bisacodyl (DULCOLAX) suppository 10 mg  10 mg QDAY PRN ADRIAN Barry.O.       • NS infusion   Continuous JIMMEI BarryO. 125 mL/hr at 04/04/18 0530     • ondansetron (ZOFRAN) syringe/vial injection 4 mg  4 mg Q4HRS PRN ADRIAN Barry.O.       • ondansetron (ZOFRAN ODT) dispertab 4 mg  4 mg Q4HRS PRN ADRIAN Barry.O.       • metoprolol (LOPRESSOR) tablet 25 mg  25 mg TWICE DAILY Leonel Whiting M.D.   25 mg at 04/04/18 0831   • heparin injection 2,600 Units  2,600 Units PRN Leonel Whiting M.D.        And   • heparin infusion 25,000 units in 500 ml 0.45% nacl   Continuous Leonel Whiting M.D. 21 mL/hr at  04/04/18 0623 1,050 Units/hr at 04/04/18 0623   • abacavir (ZIAGEN) tablet 600 mg  600 mg DAILY ADRIAN Barry.O.   600 mg at 04/04/18 0830    And   • dolutegravir (TIVICAY) tablet 50 mg  50 mg DAILY Jacquelyn Kang D.O.   50 mg at 04/04/18 0830    And   • lamivudine (EPIVIR) tablet 300 mg  300 mg DAILY Jacquelyn Kang D.O.   300 mg at 04/04/18 0830   • QUEtiapine (SEROQUEL) tablet 400 mg  400 mg Nightly Ivonne Venegas M.D.   400 mg at 04/03/18 6292   • ipratropium (ATROVENT) 0.06 % nasal spray 2 Spray  2 [place] Q4HRS PRN Ivonne Venegas M.D.   2 Spray at 04/04/18 0359     Last reviewed on 4/3/2018  3:33 PM by Francia Vallecillo PhT  Medications reviewed    Imaging reviewed    Impressions:  1. Atrial fibrillation with RVR, new diagnosis  2. Hypertension  3. HIV positive  4. Alcohol abuse     Recommendations:  Continue therapy, transitioning from gtt to PO  ELKE DCCV today   Continue systemic anticoagulation      Thank you for this interesting consultation. It was my pleasure to see Sukumar Bolton today.    Leonel Whiting MD, FACC, [facility]  Division of Interventional Cardiology  [facility]

## 2018-04-04 NOTE — PROGRESS NOTES
[facility]    Date of Service: 2018    Chief Complaint  63 y.o. male admitted 4/3/2018 with dizziness, hypotension.    Interval Problem Update  Patient had had a syncopal episode in the past, has been having dizziness, noted hypotension as well as tachycardia at primary care's office, recommended patient come to the emergency department.  Upon arrival was noted to be in atrial fibrillation with rapid ventricular rate, started on a beta blocker drip.  Patient is to have a ELKE today.  Patient seen and examined in the ICU, IC care given.  Discussed patient condition and plan with Intensivist, RN, RT and charge nurse / hot rounds.    Moves all  afib 100-120  -130  No bm yet  NPO for ELKE  Was tolerating regular diet  Esmolol 150  Heparin gtt  Adequate uop  RA  2100 on IS  Received dig overnight     Consultants/Specialty  Cardiology  Intensivist    Disposition  Patient requires additional treatment in the hospital, should be able to hold at the time of discharge        Review of Systems   Constitutional: Negative for chills, fever and malaise/fatigue.   HENT: Negative for congestion.    Respiratory: Negative for cough, sputum production, shortness of breath and stridor.    Cardiovascular: Negative for chest pain, palpitations and leg swelling.   Gastrointestinal: Negative for abdominal pain, constipation, diarrhea, nausea and vomiting.   Genitourinary: Negative for dysuria and urgency.   Musculoskeletal: Negative for falls and myalgias.   Neurological: Positive for loss of consciousness. Negative for dizziness, tingling, weakness and headaches.   Psychiatric/Behavioral: Negative for depression and suicidal ideas.   All other systems reviewed and are negative.     Physical Exam  Laboratory/Imaging   Hemodynamics  Temp (24hrs), Av.9 °C (98.4 °F), Min:36.5 °C (97.7 °F), Max:37.4 °C (99.3 °F)   Temperature: 36.9 °C (98.4 °F)  Pulse  Av.2  Min: 63  Max: 121 Heart Rate (Monitored): (!)  114  Blood Pressure : (!) 96/71, NIBP: 104/80      Respiratory      Respiration: 15, Pulse Oximetry: 96 %             Fluids    Intake/Output Summary (Last 24 hours) at 04/04/18 0740  Last data filed at 04/04/18 0600   Gross per 24 hour   Intake             2842 ml   Output              450 ml   Net             2392 ml       Nutrition  Orders Placed This Encounter   Procedures   • DIET ORDER     Standing Status:   Standing     Number of Occurrences:   1     Order Specific Question:   Diet:     Answer:   Regular [1]     Physical Exam   Constitutional: He is oriented to person, place, and time. He appears well-developed.  Non-toxic appearance. No distress.   HENT:   Head: Normocephalic and atraumatic.   Mouth/Throat: Oropharynx is clear and moist. No oropharyngeal exudate.   Eyes: Right eye exhibits no discharge. Left eye exhibits no discharge.   Neck: Neck supple. No tracheal deviation, no edema and no erythema present.   Cardiovascular: An irregularly irregular rhythm present. Tachycardia present.  Exam reveals no gallop and no friction rub.    No murmur heard.  Pulmonary/Chest: Effort normal and breath sounds normal. No stridor. No respiratory distress. He has no wheezes. He has no rales. He exhibits no tenderness.   Abdominal: Soft. Bowel sounds are normal. He exhibits no distension. There is no tenderness.   Musculoskeletal: Normal range of motion. He exhibits no edema or tenderness.   Lymphadenopathy:     He has no cervical adenopathy.   Neurological: He is alert and oriented to person, place, and time. No cranial nerve deficit.   Skin: Skin is warm and dry. No rash noted. He is not diaphoretic. No erythema.   Psychiatric: He has a normal mood and affect. His behavior is normal. Judgment and thought content normal.   Nursing note and vitals reviewed.      Recent Labs      04/03/18   1324  04/04/18   0525   WBC  5.8  5.0   RBC  3.62*  3.15*   HEMOGLOBIN  13.1*  11.3*   HEMATOCRIT  37.7*  32.8*   MCV  104.1*   104.1*   MCH  36.2*  35.9*   MCHC  34.7  34.5   RDW  49.1  49.1   PLATELETCT  282  239   MPV  8.7*  8.7*     Recent Labs      04/03/18   1324  04/04/18   0525   SODIUM  137  140   POTASSIUM  3.9  3.9   CHLORIDE  102  110   CO2  25  23   GLUCOSE  114*  91   BUN  14  14   CREATININE  1.31  1.26   CALCIUM  9.2  8.1*     Recent Labs      04/03/18   1324  04/03/18   2146  04/04/18   0525   APTT  29.0  103.2*  73.0*   INR  0.99   --    --      Recent Labs      04/03/18   1324   BNPBTYPENAT  27     Recent Labs      04/04/18   0525   TRIGLYCERIDE  104   HDL  40   LDL  75          Assessment/Plan     * Atrial fibrillation with rapid ventricular response (CMS-HCC)   Assessment & Plan    - Significant, was on a small drip, now stopped by cardiology  - Heart rate around 122  - For ELKE today  - Continue heparin drip  - Additional recommendations per cardiology        HIV (human immunodeficiency virus infection) (CMS-HCC)   Assessment & Plan    - Continue home meds        TENZIN (acute kidney injury) (CMS-HCC)   Assessment & Plan    - Improved, only mild elevation in his creatinine  - Good urinary output  - Avoid nephrotoxins and repeat BMP in the morning          Macrocytic anemia- (present on admission)   Assessment & Plan    - No sign of gross bleeding  - Repeat CBC in the morning        Essential hypertension- (present on admission)   Assessment & Plan    - Controlled on esmolol drip, will likely transition to oral beta blocker, await cardiology recommendations          Quality-Core Measures   Reviewed items::  Labs reviewed, Medications reviewed and Radiology images reviewed

## 2018-04-04 NOTE — PROGRESS NOTES
Pt transferred to R111 on monitor with ICU RN and CCT. All orders and drips reviewed. MD notified of arrival to the unit. All belongings at bedside. Patient has no questions or concerns at this time.

## 2018-04-04 NOTE — CARE PLAN
Problem: Safety  Goal: Will remain free from falls  Outcome: PROGRESSING AS EXPECTED  Patient educated on call light and to remain in bed unless assisted by staff.     Problem: Venous Thromboembolism (VTW)/Deep Vein Thrombosis (DVT) Prevention:  Goal: Patient will participate in Venous Thrombosis (VTE)/Deep Vein Thrombosis (DVT)Prevention Measures  Outcome: PROGRESSING AS EXPECTED  Patient has continuous Heparin drip and SCDs in place.

## 2018-04-04 NOTE — ASSESSMENT & PLAN NOTE
He has been running in the low end of normal as an outpatient on Cozaar. Also suspect that RVR is lowering his blood pressure is well  -Hold Cozaar  -Fluid bolus as needed  -Monitor blood pressure while using necessary rate control medications

## 2018-04-04 NOTE — CARE PLAN
Problem: Communication  Goal: The ability to communicate needs accurately and effectively will improve  Outcome: PROGRESSING AS EXPECTED   Patient uses call light appropriately. Calls to mobilize and for questions.     Problem: Knowledge Deficit  Goal: Knowledge of disease process/condition, treatment plan, diagnostic tests, and medications will improve  Patient educated on atrial fibrillation and risks; pathophysiology dicussed and patient educated about cardioversion and new medications (esmolol, metoprolol, and heparin)

## 2018-04-04 NOTE — PROCEDURES
PROCEDURE IN DETAIL: The procedure was explained to the patient with risks and benefits including risk of stroke. The patient understands. The patient had already a transesophageal echocardiogram showing no left atrial appendage thrombus or thrombus in the left atrium. There was no spontaneous echocardiogram contrast noticed. The patient was already adequately sedated from the ELKE. The pads applied in the anterior and posterior approach. With synchronized biphasic waveform at 200J, one shock was successful in restoring sinus rhythm.  The patient had no immediate post-procedure complications. The rhythm was maintained and 12-lead EKG was requested.    IMPRESSION: Successful direct current cardioversion with restoration of sinus rhythm from atrial fibrillation with no immediate complication.

## 2018-04-04 NOTE — ED NOTES
Pt changed into gown, clothing, phone, wallet placed into bag. Pt given blankets. Pt denies any further needs at this time.

## 2018-04-05 LAB
ANION GAP SERPL CALC-SCNC: 9 MMOL/L (ref 0–11.9)
APTT PPP: 54.5 SEC (ref 24.7–36)
BUN SERPL-MCNC: 13 MG/DL (ref 8–22)
CALCIUM SERPL-MCNC: 8.2 MG/DL (ref 8.5–10.5)
CHLORIDE SERPL-SCNC: 112 MMOL/L (ref 96–112)
CO2 SERPL-SCNC: 19 MMOL/L (ref 20–33)
CREAT SERPL-MCNC: 1.25 MG/DL (ref 0.5–1.4)
GLUCOSE SERPL-MCNC: 124 MG/DL (ref 65–99)
MAGNESIUM SERPL-MCNC: 1.8 MG/DL (ref 1.5–2.5)
POTASSIUM SERPL-SCNC: 3.8 MMOL/L (ref 3.6–5.5)
SODIUM SERPL-SCNC: 140 MMOL/L (ref 135–145)
TROPONIN I SERPL-MCNC: <0.01 NG/ML (ref 0–0.04)

## 2018-04-05 PROCEDURE — 83036 HEMOGLOBIN GLYCOSYLATED A1C: CPT

## 2018-04-05 PROCEDURE — 84484 ASSAY OF TROPONIN QUANT: CPT

## 2018-04-05 PROCEDURE — 700102 HCHG RX REV CODE 250 W/ 637 OVERRIDE(OP): Performed by: INTERNAL MEDICINE

## 2018-04-05 PROCEDURE — 83735 ASSAY OF MAGNESIUM: CPT

## 2018-04-05 PROCEDURE — A9270 NON-COVERED ITEM OR SERVICE: HCPCS | Performed by: INTERNAL MEDICINE

## 2018-04-05 PROCEDURE — 700111 HCHG RX REV CODE 636 W/ 250 OVERRIDE (IP): Performed by: NURSE PRACTITIONER

## 2018-04-05 PROCEDURE — A9270 NON-COVERED ITEM OR SERVICE: HCPCS | Performed by: NURSE PRACTITIONER

## 2018-04-05 PROCEDURE — 700111 HCHG RX REV CODE 636 W/ 250 OVERRIDE (IP): Performed by: INTERNAL MEDICINE

## 2018-04-05 PROCEDURE — 80048 BASIC METABOLIC PNL TOTAL CA: CPT

## 2018-04-05 PROCEDURE — 36415 COLL VENOUS BLD VENIPUNCTURE: CPT

## 2018-04-05 PROCEDURE — 700105 HCHG RX REV CODE 258: Performed by: INTERNAL MEDICINE

## 2018-04-05 PROCEDURE — 85730 THROMBOPLASTIN TIME PARTIAL: CPT

## 2018-04-05 PROCEDURE — 99233 SBSQ HOSP IP/OBS HIGH 50: CPT | Performed by: INTERNAL MEDICINE

## 2018-04-05 PROCEDURE — 770020 HCHG ROOM/CARE - TELE (206)

## 2018-04-05 PROCEDURE — 700102 HCHG RX REV CODE 250 W/ 637 OVERRIDE(OP): Performed by: NURSE PRACTITIONER

## 2018-04-05 RX ORDER — HEPARIN SODIUM 1000 [USP'U]/ML
2600 INJECTION, SOLUTION INTRAVENOUS; SUBCUTANEOUS PRN
Status: ACTIVE | OUTPATIENT
Start: 2018-04-05 | End: 2018-04-05

## 2018-04-05 RX ORDER — MAGNESIUM SULFATE HEPTAHYDRATE 40 MG/ML
2 INJECTION, SOLUTION INTRAVENOUS ONCE
Status: COMPLETED | OUTPATIENT
Start: 2018-04-05 | End: 2018-04-05

## 2018-04-05 RX ADMIN — SODIUM CHLORIDE: 9 INJECTION, SOLUTION INTRAVENOUS at 05:08

## 2018-04-05 RX ADMIN — ABACAVIR 600 MG: 300 TABLET, FILM COATED ORAL at 08:43

## 2018-04-05 RX ADMIN — QUETIAPINE FUMARATE 400 MG: 100 TABLET ORAL at 21:29

## 2018-04-05 RX ADMIN — DOLUTEGRAVIR SODIUM 50 MG: 50 TABLET, FILM COATED ORAL at 08:43

## 2018-04-05 RX ADMIN — GABAPENTIN 300 MG: 300 CAPSULE ORAL at 08:43

## 2018-04-05 RX ADMIN — HEPARIN SODIUM 1050 UNITS/HR: 5000 INJECTION, SOLUTION INTRAVENOUS at 12:00

## 2018-04-05 RX ADMIN — GABAPENTIN 300 MG: 300 CAPSULE ORAL at 21:32

## 2018-04-05 RX ADMIN — METOPROLOL TARTRATE 25 MG: 25 TABLET, FILM COATED ORAL at 21:29

## 2018-04-05 RX ADMIN — OMEPRAZOLE 20 MG: 20 CAPSULE, DELAYED RELEASE ORAL at 08:43

## 2018-04-05 RX ADMIN — MAGNESIUM SULFATE HEPTAHYDRATE 2 G: 40 INJECTION, SOLUTION INTRAVENOUS at 10:21

## 2018-04-05 RX ADMIN — METOPROLOL TARTRATE 25 MG: 25 TABLET, FILM COATED ORAL at 08:43

## 2018-04-05 RX ADMIN — GABAPENTIN 300 MG: 300 CAPSULE ORAL at 16:38

## 2018-04-05 RX ADMIN — LAMIVUDINE 300 MG: 100 TABLET, FILM COATED ORAL at 08:42

## 2018-04-05 RX ADMIN — IPRATROPIUM BROMIDE 2 SPRAY: 42 SPRAY NASAL at 05:08

## 2018-04-05 RX ADMIN — TAMSULOSIN HYDROCHLORIDE 0.4 MG: 0.4 CAPSULE ORAL at 08:42

## 2018-04-05 RX ADMIN — RIVAROXABAN 20 MG: 20 TABLET, FILM COATED ORAL at 17:19

## 2018-04-05 ASSESSMENT — ENCOUNTER SYMPTOMS
NECK PAIN: 0
VOMITING: 0
NAUSEA: 0
HEADACHES: 0
ABDOMINAL PAIN: 0
MYALGIAS: 0
CLAUDICATION: 0
DEPRESSION: 0
LOSS OF CONSCIOUSNESS: 1
PND: 0
DIZZINESS: 0
COUGH: 0
PALPITATIONS: 0
ORTHOPNEA: 0
FEVER: 0
SHORTNESS OF BREATH: 0
SPUTUM PRODUCTION: 0
STRIDOR: 0
FALLS: 0
CHILLS: 0

## 2018-04-05 ASSESSMENT — PAIN SCALES - GENERAL
PAINLEVEL_OUTOF10: 0

## 2018-04-05 ASSESSMENT — LIFESTYLE VARIABLES: SUBSTANCE_ABUSE: 0

## 2018-04-05 NOTE — PROGRESS NOTES
Pt arrived from ICU, aox4, RA, SR, no complaints of pain. Heparin drip rate verified. Urinal, regular diet. Will verify orders. 2 RN skin check performed

## 2018-04-05 NOTE — CARE PLAN
Problem: Knowledge Deficit  Goal: Knowledge of disease process/condition, treatment plan, diagnostic tests, and medications will improve  Outcome: PROGRESSING AS EXPECTED  Patient educated on why he cannot go home at this moment. Education about A. Fib with RVR and the complications associated with it. Educated about heparin drip. Educated about plan of care for the day. Patient very concerned about going home to get his car in the garage to protect it from his ex-wife. Patient educated about waiting for the doctors to discharge him.     Problem: Hemodynamic Status  Goal: Vital Signs and Fluid Balance Management  Outcome: PROGRESSING AS EXPECTED  Patietn cardioverted yesterday. Patient still in sinus rhythm post intervention.

## 2018-04-05 NOTE — PROGRESS NOTES
Cardiology Progress Note               Author: You Lawson Date & Time created: 2018  10:51 AM     Consultation for symptomatic A. fib RVR, rate 160s, new diagnosis      Admitted with dizziness with standing along with syncopal episodes for the past 3 weeks, he usually follows with the [facility] clinic for his HIV, he found to be in A. fib RVR, rate 160s and systolic blood pressure of 70s, started on esmolol drip and IV digoxin      History of HIV, compliant with medications, hypertension    S/p ELKE/cardioversion on 18, successful cardioversion to normal sinus rhythm    TJ=731/90  HR= 70's NSR      Labs reviewed   sodium, potassium, creatinine stable  Magnesium 1.8, being supplemented  Troponin negative  BNP 27  TSH normal        Review of Systems   Respiratory: Negative for cough and shortness of breath.    Cardiovascular: Negative for chest pain, palpitations, orthopnea, claudication, leg swelling and PND.       Physical Exam   Constitutional: He is oriented to person, place, and time.   Eyes: Conjunctivae are normal.   Neck: No JVD present. No thyromegaly present.   Cardiovascular: Normal rate and regular rhythm.    Pulses:       Carotid pulses are 2+ on the right side, and 2+ on the left side.       Radial pulses are 2+ on the right side, and 2+ on the left side.   Pulmonary/Chest: He has no wheezes.   Abdominal: Soft.   Musculoskeletal: He exhibits no edema.   Neurological: He is alert and oriented to person, place, and time.   Skin: Skin is warm and dry.       Hemodynamics:  Temp (24hrs), Av °C (98.6 °F), Min:36.8 °C (98.3 °F), Max:37.2 °C (98.9 °F)  Temperature: 36.8 °C (98.3 °F)  Pulse  Av.5  Min: 63  Max: 121Heart Rate (Monitored): 70  NIBP: 156/89     Respiratory:    Respiration: 16, Pulse Oximetry: 97 %           Fluids:  Date 18 0700 - 18 0659   Shift 7616-6688 3574-4263 4028-6340 24 Hour Total   I  N  T  A  K  E   P.O. 200   200    I.V. 292   292    Shift Total 492   805    O  U  T  P  U  T   Urine 275   275    Shift Total 275   275   Weight (kg) 65 65 65 65          GI/Nutrition:  Orders Placed This Encounter   Procedures   • DIET ORDER     Standing Status:   Standing     Number of Occurrences:   1     Order Specific Question:   Diet:     Answer:   Regular [1]     Lab Results:  Recent Labs      04/03/18   1324  04/04/18   0525   WBC  5.8  5.0   RBC  3.62*  3.15*   HEMOGLOBIN  13.1*  11.3*   HEMATOCRIT  37.7*  32.8*   MCV  104.1*  104.1*   MCH  36.2*  35.9*   MCHC  34.7  34.5   RDW  49.1  49.1   PLATELETCT  282  239   MPV  8.7*  8.7*     Recent Labs      04/03/18   1324  04/04/18   0525  04/05/18   0420   SODIUM  137  140  140   POTASSIUM  3.9  3.9  3.8   CHLORIDE  102  110  112   CO2  25  23  19*   GLUCOSE  114*  91  124*   BUN  14  14  13   CREATININE  1.31  1.26  1.25   CALCIUM  9.2  8.1*  8.2*     Recent Labs      04/03/18   1324  04/03/18   2146  04/04/18   0525  04/04/18   1210   APTT  29.0  103.2*  73.0*  57.7*   INR  0.99   --    --    --      Recent Labs      04/03/18   1324   BNPBTYPENAT  27     Recent Labs      04/03/18   1324   TROPONINI  <0.01   BNPBTYPENAT  27     Recent Labs      04/04/18   0525   TRIGLYCERIDE  104   HDL  40   LDL  75         Medical Decision Making, by Problem:  Active Hospital Problems    Diagnosis   • *Atrial fibrillation with rapid ventricular response (CMS-[facility]) [I48.91]   • TENZIN (acute kidney injury) (CMS-[facility]) [N17.9]   • HIV (human immunodeficiency virus infection) (CMS-[facility]) [B20]   • Essential hypertension [I10]   • Macrocytic anemia [D53.9]       Assessment/ Plan:      Consultation for symptomatic A. fib RVR, rate 160s, new diagnosis s/p ELKE/CV on 4/4/18, successful cardioversion to normal sinus rhythm    On metoprolol 25 mg PO BID      Maintaining normal sinus rhythm      Currently on IV heparin, switch to NOACs ( Xarelto )  needs to evaluate by SW for affordability     Chads vasc score ( age, hypertension )=2    Echo peding      Quality-Core  Measures   Reviewed items::  Medications reviewed and Labs reviewed

## 2018-04-05 NOTE — PROGRESS NOTES
[facility] Progress Note    Date of Service: 2018    Chief Complaint  63 y.o. male admitted 4/3/2018 with dizziness, hypotension.    Interval Problem Update  Patient had had a syncopal episode in the past, has been having dizziness, noted hypotension as well as tachycardia at primary care's office, recommended patient come to the emergency department.  Upon arrival was noted to be in atrial fibrillation with rapid ventricular rate, started on a beta blocker drip.  Patient now status post ELKE cardioversion.  Patient seen and examined in the ICU, IC care given.  Discussed patient condition and plan with Intensivist, RN, RT and charge nurse / hot rounds.    Cardioversion yesterday, no NSR  Alert and oriented  Weak, no pain  -150  RA  Tolerating regular diet  Adequate uop  Heparin gtt  Ns at 125, stop  Give mg    Consultants/Specialty  Cardiology  Intensivist    Disposition  Patient requires additional treatment in the hospital, should be able to hold at the time of discharge        Review of Systems   Constitutional: Negative for chills, fever and malaise/fatigue.   HENT: Negative for congestion and hearing loss.    Respiratory: Negative for sputum production, shortness of breath and stridor.    Cardiovascular: Negative for chest pain and palpitations.   Gastrointestinal: Negative for abdominal pain, nausea and vomiting.   Genitourinary: Negative for dysuria, frequency and urgency.   Musculoskeletal: Negative for falls, myalgias and neck pain.   Neurological: Positive for loss of consciousness. Negative for dizziness and headaches.   Psychiatric/Behavioral: Negative for depression, substance abuse and suicidal ideas.   All other systems reviewed and are negative.     Physical Exam  Laboratory/Imaging   Hemodynamics  Temp (24hrs), Av °C (98.6 °F), Min:36.8 °C (98.3 °F), Max:37.2 °C (98.9 °F)   Temperature: 36.8 °C (98.3 °F)  Pulse  Av.1  Min: 63  Max: 121 Heart Rate (Monitored): 75  NIBP:  147/97      Respiratory      Respiration: 20, Pulse Oximetry: 94 %             Fluids    Intake/Output Summary (Last 24 hours) at 04/05/18 0743  Last data filed at 04/05/18 0600   Gross per 24 hour   Intake          5138.47 ml   Output             2170 ml   Net          2968.47 ml       Nutrition  Orders Placed This Encounter   Procedures   • DIET ORDER     Standing Status:   Standing     Number of Occurrences:   1     Order Specific Question:   Diet:     Answer:   Regular [1]     Order Specific Question:   Diet:     Answer:   Renal [8]     Physical Exam   Constitutional: He is oriented to person, place, and time.  Non-toxic appearance. No distress.   HENT:   Head: Normocephalic and atraumatic.   Mouth/Throat: No oropharyngeal exudate.   Eyes: Right eye exhibits no discharge. Left eye exhibits no discharge. No scleral icterus.   Neck: Neck supple. No edema and no erythema present.   Cardiovascular: Normal rate and regular rhythm.  Exam reveals no friction rub.    No murmur heard.  Pulmonary/Chest: Effort normal and breath sounds normal. No stridor. No respiratory distress. He has no wheezes. He exhibits no tenderness.   Abdominal: Soft. Bowel sounds are normal. He exhibits no distension.   Musculoskeletal: He exhibits no edema or tenderness.   Lymphadenopathy:     He has no cervical adenopathy.   Neurological: He is alert and oriented to person, place, and time.   Skin: Skin is warm and dry. He is not diaphoretic. No erythema.   Psychiatric: He has a normal mood and affect. His behavior is normal. Judgment normal.   Nursing note and vitals reviewed.      Recent Labs      04/03/18   1324  04/04/18   0525   WBC  5.8  5.0   RBC  3.62*  3.15*   HEMOGLOBIN  13.1*  11.3*   HEMATOCRIT  37.7*  32.8*   MCV  104.1*  104.1*   MCH  36.2*  35.9*   MCHC  34.7  34.5   RDW  49.1  49.1   PLATELETCT  282  239   MPV  8.7*  8.7*     Recent Labs      04/03/18   1324  04/04/18   0525   SODIUM  137  140   POTASSIUM  3.9  3.9   CHLORIDE   102  110   CO2  25  23   GLUCOSE  114*  91   BUN  14  14   CREATININE  1.31  1.26   CALCIUM  9.2  8.1*     Recent Labs      04/03/18   1324  04/03/18   2146  04/04/18   0525  04/04/18   1210   APTT  29.0  103.2*  73.0*  57.7*   INR  0.99   --    --    --      Recent Labs      04/03/18   1324   BNPBTYPENAT  27     Recent Labs      04/04/18   0525   TRIGLYCERIDE  104   HDL  40   LDL  75          Assessment/Plan     * Atrial fibrillation with rapid ventricular response (CMS-HCC)   Assessment & Plan    - Significant initially, was on esmolol drip, now stopped by cardiology  -Now status post ELKE cardioversion  - Continue heparin drip  - Additional recommendations per cardiology        HIV (human immunodeficiency virus infection) (CMS-HCC)   Assessment & Plan    - Continue home meds        TENZIN (acute kidney injury) (CMS-HCC)   Assessment & Plan    - Improved, only mild elevation in his creatinine  - Good urinary output  - Avoid nephrotoxins and repeat BMP in the morning          Macrocytic anemia- (present on admission)   Assessment & Plan    - No sign of gross bleeding  - Repeat CBC in the morning        Essential hypertension- (present on admission)   Assessment & Plan    - Controlled metoprolol  - Takes losartan at home, if additional medication needed will restart          Quality-Core Measures   Reviewed items::  Labs reviewed, Medications reviewed and Radiology images reviewed

## 2018-04-05 NOTE — PROGRESS NOTES
Pulmonary Critical Care Progress Note        Date of Admission:  4/3/2018    Chief Complaint: Tachycardia, dizziness    History of Present Illness: 63 y.o. Male, h/o HIV, Htn; admitted 4/3 with c/o tachycardia, orthostatic dizziness and syncope x 3 weeks.  Found to have AF with RVR    ROS:  Respiratory: negative, Cardiac: negative, GI: negative.  All other systems negative.    Interval Events:  24 hour interval history reviewed    - ELKE and cardioverted yesteday   - room air   - heparin gtt   - , anival PO   - OK to move to tele   - replace Mg     Yesterday   - a/o x 4   - rate 110's, AF   - room air   - esmolol 150   - heparin gtt - titrated   - on HAART   - s/p dig load   - d-dimer up, ignore unless RV strain, on room air    PFSH:  No change.    Respiratory:     Pulse Oximetry: 97 %  Chest Tube Drains:          Exam: diminished breath sounds mild  ImagingAvailable data reviewed         Invalid input(s): [id number]    HemoDynamics:  Pulse: 73, Heart Rate (Monitored): 70  NIBP: 156/89       Exam: regular rate and rhythm  Imaging: Available data reviewed  Recent Labs      04/03/18   1324   TROPONINI  <0.01   BNPBTYPENAT  27       Neuro:  GCS Total [place] Coma Score: 15       Exam: no focal deficits noted  Imaging: Available data reviewed    Fluids:  Intake/Output       04/03/18 0700 - 04/04/18 0659 04/04/18 0700 - 04/05/18 0659 04/05/18 0700 - 04/06/18 0659      0454-4212 6930-6821 Total 1424-3839 9515-8610 Total 5764-2123 5147-3068 Total       Intake    P.O.  --  460 460  600  700 1300  200  -- 200    P.O. -- 460 460  200 -- 200    I.V.  --  2382 2382  2086.5  1752 3838.5  292  -- 292    Esmolol Volume -- 937 937 334.5 -- 334.5 -- -- --    Heparin Volume -- 320 320 252 252 504 42 -- 42    IV Volume (IV MF) -- 1125 1125 1500 1500 3000 250 -- 250    Total Intake -- 2842 2842 2686.5 2452 5138.5 492 -- 492       Output    Urine  --  450 450  1050  1120 2170  275  -- 275    Number of Times Voided --  -- -- 4 x 4 x 8 x 1 x -- 1 x    Void (ml) --  1120 2170 275 -- 275    Stool  --  -- --  --  -- --  --  -- --    Number of Times Stooled -- -- -- 2 x -- 2 x -- -- --    Total Output --  1120 2170 275 -- 275       Net I/O     -- 2392 2392 1636.5 1332 2968.5 217 -- 217           Recent Labs      18   1324  18   0525  18   0420   SODIUM  137  140   --    POTASSIUM  3.9  3.9   --    CHLORIDE  102  110   --    CO2  25  23   --    BUN  14  14   --    CREATININE  1.31  1.26   --    MAGNESIUM  2.0  1.9  1.8   CALCIUM  9.2  8.1*   --        GI/Nutrition:  Exam: abdomen is soft and non-tender  Imaging: Available data reviewed  taking PO  Liver Function  Recent Labs      18   1324  18   0525   ALTSGPT  8  6   ASTSGOT  23  11*   ALKPHOSPHAT  65  45   TBILIRUBIN  0.5  0.4   GLUCOSE  114*  91       Heme:  Recent Labs      18   1324  18   2146  18   1210   RBC  3.62*   --   3.15*   --    HEMOGLOBIN  13.1*   --   11.3*   --    HEMATOCRIT  37.7*   --   32.8*   --    PLATELETCT  282   --   239   --    PROTHROMBTM  12.8   --    --    --    APTT  29.0  103.2*  73.0*  57.7*   INR  0.99   --    --    --        Infectious Disease:  Temp  Av °C (98.6 °F)  Min: 36.8 °C (98.3 °F)  Max: 37.2 °C (98.9 °F)  Micro: reviewed  Recent Labs      18   1324  18   0525   WBC  5.8  5.0   NEUTSPOLYS  64.60  38.30*   LYMPHOCYTES  19.40*  43.90*   MONOCYTES  15.30*  16.00*   EOSINOPHILS  0.20  0.80   BASOPHILS  0.30  0.40   ASTSGOT  23  11*   ALTSGPT  8  6   ALKPHOSPHAT  65  45   TBILIRUBIN  0.5  0.4     Current Facility-Administered Medications   Medication Dose Frequency Provider Last Rate Last Dose   • omeprazole (PRILOSEC) capsule 20 mg  20 mg DAILY Magdiel Lu M.D.   20 mg at 1843   • gabapentin (NEURONTIN) capsule 300 mg  300 mg TID Jacquelyn Kang D.O.   300 mg at 18 0843   • tamsulosin (FLOMAX) capsule 0.4 mg  0.4 mg AFTER  BREAKFAST Jacquelyn Kang D.O.   0.4 mg at 04/05/18 0842   • senna-docusate (PERICOLACE or SENOKOT S) 8.6-50 MG per tablet 2 Tab  2 Tab BID Jacquelyn Kang D.O.        And   • polyethylene glycol/lytes (MIRALAX) PACKET 1 Packet  1 Packet QDAY PRN Jacquelyn Kang D.O.        And   • magnesium hydroxide (MILK OF MAGNESIA) suspension 30 mL  30 mL QDAY PRN Jacquelyn Kang D.O.        And   • bisacodyl (DULCOLAX) suppository 10 mg  10 mg QDAY PRN Jacquelyn Kang D.O.       • NS infusion   Continuous Jacquelyn Kang D.O. 125 mL/hr at 04/05/18 0508     • ondansetron (ZOFRAN) syringe/vial injection 4 mg  4 mg Q4HRS PRN Jacquelyn Kang D.O.       • ondansetron (ZOFRAN ODT) dispertab 4 mg  4 mg Q4HRS PRN Jacquelyn Kang D.O.       • metoprolol (LOPRESSOR) tablet 25 mg  25 mg TWICE DAILY Leonel Whiting M.D.   25 mg at 04/05/18 0843   • heparin injection 2,600 Units  2,600 Units PRN Leonel Whiting M.D.        And   • heparin infusion 25,000 units in 500 ml 0.45% nacl   Continuous Leonel Whiting M.D. 21 mL/hr at 04/04/18 1520 1,050 Units/hr at 04/04/18 1520   • abacavir (ZIAGEN) tablet 600 mg  600 mg DAILY Jacquelyn Kang D.O.   600 mg at 04/05/18 0843    And   • dolutegravir (TIVICAY) tablet 50 mg  50 mg DAILY JIMMIE BarryOLaila   50 mg at 04/05/18 0843    And   • lamivudine (EPIVIR) tablet 300 mg  300 mg DAILY JIMMIE BarryOLaila   300 mg at 04/05/18 0842   • QUEtiapine (SEROQUEL) tablet 400 mg  400 mg Nightly Ivonne Venegas M.D.   400 mg at 04/04/18 2153   • ipratropium (ATROVENT) 0.06 % nasal spray 2 Spray  2 [place] Q4HRS PRN Ivonne Venegas M.D.   2 [place] at 04/05/18 0508     Last reviewed on 4/3/2018  3:33 PM by Sharron Quevedo    Quality  Measures:   Medications reviewed, Labs reviewed and Radiology images reviewed                      Problems:  New onset AF with RVR   -Status post successful cardioversion after ELKE   - Full dose anticoagulation, cardiology  following  HIV   - followed at [facility]   - fully compliant   - continue meds  Mild hypomagnesemia  TENZIN   - follow, avoid nephrotoxins  Okay to move out of ICU to telemetry.  [facility] will sign off on transfer out of ICU.  Please call if needed.      Discussed patient condition and risk of morbidity and/or mortality with RN, RT and QA team.

## 2018-04-05 NOTE — PROGRESS NOTES
2 RN skin check. - No pressure ulcers noted. Bilateral feet are dry, sacrum pink but blanching. Brown spot on the lower back- blanches.

## 2018-04-05 NOTE — PROGRESS NOTES
Patient transported with monitor to [facility]2 with transport and ACLS RN. All belongings with patient, patient wearing personal jewelry. Medications with RN.

## 2018-04-05 NOTE — CARE PLAN
Problem: Safety  Goal: Will remain free from injury  Outcome: PROGRESSING AS EXPECTED  Patient calls appropriately.  Intervention: Provide assistance with mobility  In place.       Problem: Hemodynamic Status  Goal: Vital Signs and Fluid Balance Management  Outcome: PROGRESSING AS EXPECTED  Patient's cardiac rhythm has remained in sinus after cardioversion.   Intervention: Cardiac Monitoring, Pulse Oximetry  In place.

## 2018-04-06 ENCOUNTER — PATIENT OUTREACH (OUTPATIENT)
Dept: HEALTH INFORMATION MANAGEMENT | Facility: OTHER | Age: 63
End: 2018-04-06

## 2018-04-06 VITALS
OXYGEN SATURATION: 95 % | TEMPERATURE: 98.1 F | HEIGHT: 71 IN | SYSTOLIC BLOOD PRESSURE: 142 MMHG | RESPIRATION RATE: 18 BRPM | WEIGHT: 158.73 LBS | BODY MASS INDEX: 22.22 KG/M2 | DIASTOLIC BLOOD PRESSURE: 88 MMHG | HEART RATE: 67 BPM

## 2018-04-06 LAB
EST. AVERAGE GLUCOSE BLD GHB EST-MCNC: 103 MG/DL
HBA1C MFR BLD: 5.2 % (ref 0–5.6)
LV EJECT FRACT  99904: 60
LV EJECT FRACT MOD 2C 99903: 70.51
LV EJECT FRACT MOD 4C 99902: 68.49
LV EJECT FRACT MOD BP 99901: 69.11
MAGNESIUM SERPL-MCNC: 2.1 MG/DL (ref 1.5–2.5)

## 2018-04-06 PROCEDURE — A9270 NON-COVERED ITEM OR SERVICE: HCPCS | Performed by: INTERNAL MEDICINE

## 2018-04-06 PROCEDURE — 700102 HCHG RX REV CODE 250 W/ 637 OVERRIDE(OP): Performed by: INTERNAL MEDICINE

## 2018-04-06 PROCEDURE — 83735 ASSAY OF MAGNESIUM: CPT

## 2018-04-06 PROCEDURE — 99239 HOSP IP/OBS DSCHRG MGMT >30: CPT | Performed by: INTERNAL MEDICINE

## 2018-04-06 PROCEDURE — 36415 COLL VENOUS BLD VENIPUNCTURE: CPT

## 2018-04-06 PROCEDURE — 93306 TTE W/DOPPLER COMPLETE: CPT | Mod: 26 | Performed by: INTERNAL MEDICINE

## 2018-04-06 PROCEDURE — 700102 HCHG RX REV CODE 250 W/ 637 OVERRIDE(OP): Performed by: NURSE PRACTITIONER

## 2018-04-06 PROCEDURE — 93306 TTE W/DOPPLER COMPLETE: CPT

## 2018-04-06 PROCEDURE — 3E0234Z INTRODUCTION OF SERUM, TOXOID AND VACCINE INTO MUSCLE, PERCUTANEOUS APPROACH: ICD-10-PCS | Performed by: INTERNAL MEDICINE

## 2018-04-06 PROCEDURE — 90732 PPSV23 VACC 2 YRS+ SUBQ/IM: CPT | Performed by: INTERNAL MEDICINE

## 2018-04-06 PROCEDURE — A9270 NON-COVERED ITEM OR SERVICE: HCPCS | Performed by: NURSE PRACTITIONER

## 2018-04-06 PROCEDURE — 90471 IMMUNIZATION ADMIN: CPT

## 2018-04-06 PROCEDURE — 700111 HCHG RX REV CODE 636 W/ 250 OVERRIDE (IP): Performed by: INTERNAL MEDICINE

## 2018-04-06 RX ADMIN — GABAPENTIN 300 MG: 300 CAPSULE ORAL at 14:28

## 2018-04-06 RX ADMIN — OMEPRAZOLE 20 MG: 20 CAPSULE, DELAYED RELEASE ORAL at 07:58

## 2018-04-06 RX ADMIN — TAMSULOSIN HYDROCHLORIDE 0.4 MG: 0.4 CAPSULE ORAL at 07:58

## 2018-04-06 RX ADMIN — METOPROLOL TARTRATE 25 MG: 25 TABLET, FILM COATED ORAL at 07:58

## 2018-04-06 RX ADMIN — GABAPENTIN 300 MG: 300 CAPSULE ORAL at 06:25

## 2018-04-06 RX ADMIN — PNEUMOCOCCAL VACCINE POLYVALENT 25 MCG
25; 25; 25; 25; 25; 25; 25; 25; 25; 25; 25; 25; 25; 25; 25; 25; 25; 25; 25; 25; 25; 25; 25 INJECTION, SOLUTION INTRAMUSCULAR; SUBCUTANEOUS at 12:00

## 2018-04-06 RX ADMIN — ABACAVIR 600 MG: 300 TABLET, FILM COATED ORAL at 07:59

## 2018-04-06 RX ADMIN — DOLUTEGRAVIR SODIUM 50 MG: 50 TABLET, FILM COATED ORAL at 07:59

## 2018-04-06 RX ADMIN — LAMIVUDINE 300 MG: 100 TABLET, FILM COATED ORAL at 07:58

## 2018-04-06 RX ADMIN — RIVAROXABAN 20 MG: 20 TABLET, FILM COATED ORAL at 16:46

## 2018-04-06 ASSESSMENT — PAIN SCALES - GENERAL: PAINLEVEL_OUTOF10: 0

## 2018-04-06 NOTE — DISCHARGE PLANNING
Anticipated Discharge Disposition: Home     Action: LSW checked Xarleto price which is $20.38    Barriers to Discharge: None. Pt reports he can afford this. Pt has ride home from friend.    Plan: Pt to dc to home and  Xarelto RX at [facility] in [facility].

## 2018-04-06 NOTE — CARE PLAN
Problem: Communication  Goal: The ability to communicate needs accurately and effectively will improve  Outcome: PROGRESSING AS EXPECTED  Discussed Plan of Care, daily medications with patient, reviewed diet, and activity.  Patient verbalized understanding    Problem: Safety  Goal: Will remain free from injury  Outcome: PROGRESSING AS EXPECTED  Bed locked and in lowest position. Bed alarm on. Treaded socks. Call light and belongings with reach. Patient educated to call for assistance. Pt verbalized understanding. Hourly rounding in place.

## 2018-04-06 NOTE — PROGRESS NOTES
Assumed pt care at 0715.  Received report from sheeba RN.  Assessment completed.  Pt AAOx4.  Pt has no comlaints of pain at this time.  No other s/s of discomfort or distress. Pt ambulates up self.  Bed in lowest position and locked.  Pt calls appropriately.  Treaded socks in place, call light within reach and staff numbers provided.  Pt needs met at this time.

## 2018-04-06 NOTE — CARE PLAN
Problem: Safety  Goal: Will remain free from injury    Intervention: Provide assistance with mobility  Call light within reach, treaded socks in place, bed in lowest position and locked.  Hourly rounding in progress.       Problem: Psychosocial Needs:  Goal: Level of anxiety will decrease  Provided pt with ear plugs to reduce noise

## 2018-04-06 NOTE — PROGRESS NOTES
Report received at bedside, patient care assumed, tele box on. Pt AAO x 4, no signs of distress noted at this time. POC discussed with pt and all questions answered. No complaint of pain at this time. Pt denies any additional needs at this time. Bed in lowest position, call light and personal possessions within reach. Will continue to monitor.

## 2018-04-06 NOTE — DISCHARGE SUMMARY
C O N F I D E N T I A L I N F O R M A T I O N   -------------------------------------------------------------------------------------------------------------------   DISCHARGE SUMMARY    Patient ID:  Sukumar Bolton  [id number]  63 y.o.male  1955    Admit date: 4/3/2018    Discharge date and time: 04/06/18    Admitting Physician: Jacquelyn Kang D.O.    Discharge Physician: Kourtney Thomson    CODE STATUS: FULL CODE    Admission Diagnoses: Atrial fibrillation with rapid ventricular response (CMS-[facility])    Discharge Diagnoses:     Atrial fibrillation with rapid ventricular response (CMS-[facility])    Essential hypertension    Macrocytic anemia    TENZIN (acute kidney injury) (CMS-[facility])    HIV (human immunodeficiency virus infection) (CMS-[facility])      Admission Condition: poor    Discharged Condition: good    Indication for Admission: A-Fib RVR with hypotension    Hospital Course: This is a 63 years old male with underlying history of HIV who presented to the hospital on April 3, 2018 with atrial fibrillation with rapid ventricular response and hypotension. Patient was initially admitted to the intensive care unit. Cardiology team was consulted. He was maintained on anticoagulation. Transesophageal echocardiogram was obtained on April 4, 2018 and this did not reveal any left atrial appendage thrombus. Subsequent cardioversion was performed by cardiology team on April 4, 2018. Patient subsequently transitioned to normal sinus rhythm. Echocardiogram was obtained during the hospital stay which does not reveal any evidence of congestive heart failure. Mild elevation in pulmonary artery pressures are noted. Patient was transferred out of the ICU to telemetry floor. Patient evaluated by me on April 6, 2018 and he is really eager to be discharged home at this point in time. Patient on Losartan baseline for hypertension control. Metoprolol initiated for rhythm and rate control during the hospital stay. Patient reports that he usually  gets hypotensive even with losartan. At this time I have advised the patient to stop his losartan, continue metoprolol and maintain outpatient follow-up with cardiology team and primary care physician for further recommendations. Otherwise anticoagulation with oral Xarelto initiated and co-pay information checked acceptable to the patient. All prescriptions have been e prescribed. Hospital schedulers notified by me for arrangement of outpatient follow-up with cardiology team and primary care physician. Discharge planning discussed in detail with the patient. Patient is discharged home in stable condition.    Consults: cardiology    Significant Studies:   ECHOCARDIOGRAM COMP W/O CONT   Final Result      TRANSESOPHAGEAL ECHO W/O CONT   Final Result      DX-CHEST-PORTABLE (1 VIEW)   Final Result      No acute cardiopulmonary findings.          Procedures Performed While Hospitalized: Cardioversion    Operations During Hospitalization: None    Disposition: Home    Patient Instructions: Giv[place]  Activity: activity as tolerated  Wound Care: none needed  Diet:   Orders Placed This Encounter   Procedures   • DIET ORDER     Standing Status:   Standing     Number of Occurrences:   1     Order Specific Question:   Diet:     Answer:   Regular [1]         Medications at discharge:   uSkumar Bolton   Home Medication Instructions KARLY:[id number]    Printed on:04/06/18 7868   Medication Information                      Abacavir-Dolutegravir-Lamivud (TRIUMEQ) 600- MG Tab  Take 1 Tab by mouth every day.             B Complex Vitamins (B COMPLEX 1 PO)  Take 1 Tab by mouth every day.             ferrous sulfate 325 (65 Fe) MG tablet  Take 325 mg by mouth every day.             gabapentin (NEURONTIN) 300 MG Cap  Take 300 mg by mouth 3 times a day.             metoprolol (LOPRESSOR) 25 MG Tab  Take 1 Tab by mouth 2 Times a Day.             NON SPECIFIED  Take 1 Dose by mouth every day. OTC - TMG supplement             omeprazole  (PRILOSEC) 20 MG delayed-release capsule  Take 20 mg by mouth every day.             ondansetron (ZOFRAN ODT) 4 MG TABLET DISPERSIBLE  Take 1 Tab by mouth every 8 hours as needed for Nausea.             quetiapine (SEROQUEL) 400 MG tablet  Take 400 mg by mouth every bedtime.             rivaroxaban (XARELTO) 20 MG Tab tablet  Take 1 Tab by mouth with dinner.             tamsulosin (FLOMAX) 0.4 MG capsule  Take 0.4 mg by mouth ONE-HALF HOUR AFTER BREAKFAST.             vitamin e (VITAMIN E) 400 UNIT Cap  Take 400 Units by mouth every day.                 Opioid prescription history checked: N/A. None prescribed.     Time spend preparing discharge: 36 minutes. This included face to face with the patient, medication reconciliation, care co ordination with RN involved in patient care and discussion and co ordination with case management.     Signed:  Kourtney Thomson  4/6/2018  4:07 PM

## 2018-04-06 NOTE — DISCHARGE PLANNING
Anticipated Discharge Disposition: Home     Action: LSW faxed Xarelto Rx to [facility] at [phone].    Barriers to Discharge: None    Plan: LSW to check for prior auth or copay amount.

## 2018-04-06 NOTE — DISCHARGE INSTRUCTIONS
Discharge Instructions    Discharged to home by car with relative. Discharged via walking, hospital escort: Refused.  Special equipment needed: Not Applicable    Be sure to schedule a follow-up appointment with your primary care doctor or any specialists as instructed.     Discharge Plan:   Diet Plan: Discussed  Activity Level: Discussed  Smoking Cessation Offered: Patient Refused  Confirmed Symptoms Management: Discussed  Medication Reconciliation Updated: Yes  Influenza Vaccine Indication: Not indicated: Previously immunized this influenza season and > 8 years of age    I understand that a diet low in cholesterol, fat, and sodium is recommended for good health. Unless I have been given specific instructions below for another diet, I accept this instruction as my diet prescription.   Other diet: n/a    Special Instructions: None    · Is patient discharged on Warfarin / Coumadin?   No     Depression / Suicide Risk    As you are discharged from this [facility], it is important to learn how to keep safe from harming yourself.    Recognize the warning signs:  · Abrupt changes in personality, positive or negative- including increase in energy   · Giving away possessions  · Change in eating patterns- significant weight changes-  positive or negative  · Change in sleeping patterns- unable to sleep or sleeping all the time   · Unwillingness or inability to communicate  · Depression  · Unusual sadness, discouragement and loneliness  · Talk of wanting to die  · Neglect of personal appearance   · Rebelliousness- reckless behavior  · Withdrawal from people/activities they love  · Confusion- inability to concentrate     If you or a loved one observes any of these behaviors or has concerns about self-harm, here's what you can do:  · Talk about it- your feelings and reasons for harming yourself  · Remove any means that you might use to hurt yourself (examples: pills, rope, extension cords, firearm)  · Get professional  help from the community (Mental Health, Substance Abuse, psychological counseling)  · Do not be alone:Call your Safe Contact- someone whom you trust who will be there for you.  · Call your local CRISIS HOTLINE 374-6776 or 562-656-4862  · Call your local Children's Mobile Crisis Response Team [place] (101) 827-8977 or www.[employer]  · Call the toll free National Suicide Prevention Hotlines   · National Suicide Prevention Lifeline 196-351-PPMO (0097)  · [employer] Line Network 800-SUICIDE (427-4614)      Atrial Fibrillation  Introduction  Atrial fibrillation is a type of heartbeat that is irregular or fast (rapid). If you have this condition, your heart keeps quivering in a weird (chaotic) way. This condition can make it so your heart cannot pump blood normally. Having this condition gives a person more risk for stroke, heart failure, and other heart problems. There are different types of atrial fibrillation. Talk with your doctor to learn about the type that you have.  Follow these instructions at home:  · Take over-the-counter and prescription medicines only as told by your doctor.  · If your doctor prescribed a blood-thinning medicine, take it exactly as told. Taking too much of it can cause bleeding. If you do not take enough of it, you will not have the protection that you need against stroke and other problems.  · Do not use any tobacco products. These include cigarettes, chewing tobacco, and e-cigarettes. If you need help quitting, ask your doctor.  · If you have apnea (obstructive sleep apnea), manage it as told by your doctor.  · Do not drink alcohol.  · Do not drink beverages that have caffeine. These include coffee, soda, and tea.  · Maintain a healthy weight. Do not use diet pills unless your doctor says they are safe for you. Diet pills may make heart problems worse.  · Follow diet instructions as told by your doctor.  · Exercise regularly as told by your doctor.  · Keep all follow-up  visits as told by your doctor. This is important.  Contact a doctor if:  · You notice a change in the speed, rhythm, or strength of your heartbeat.  · You are taking a blood-thinning medicine and you notice more bruising.  · You get tired more easily when you move or exercise.  Get help right away if:  · You have pain in your chest or your belly (abdomen).  · You have sweating or weakness.  · You feel sick to your stomach (nauseous).  · You notice blood in your throw up (vomit), poop (stool), or pee (urine).  · You are short of breath.  · You suddenly have swollen feet and ankles.  · You feel dizzy.  · Your suddenly get weak or numb in your face, arms, or legs, especially if it happens on one side of your body.  · You have trouble talking, trouble understanding, or both.  · Your face or your eyelid droops on one side.  These symptoms may be an emergency. Do not wait to see if the symptoms will go away. Get medical help right away. Call your local emergency services (911 in the [place] Do not drive yourself to the hospital.   This information is not intended to replace advice given to you by your health care provider. Make sure you discuss any questions you have with your health care provider.  Document Released: 09/26/2009 Document Revised: 05/25/2017 Document Reviewed: 04/13/2016  © 2017 [employer]

## 2018-04-06 NOTE — PROGRESS NOTES
SB-SR 48-78  Down to 32 down to 34  2.0 sec pause, 2.3 sec pause, 1.9 sec pause  1 instance of 2nd degree type 2  0.18/0.08/0.40

## 2018-04-09 ENCOUNTER — PATIENT OUTREACH (OUTPATIENT)
Dept: HEALTH INFORMATION MANAGEMENT | Facility: OTHER | Age: 63
End: 2018-04-09

## 2018-04-10 ENCOUNTER — PATIENT OUTREACH (OUTPATIENT)
Dept: HEALTH INFORMATION MANAGEMENT | Facility: OTHER | Age: 63
End: 2018-04-10

## 2018-04-11 ENCOUNTER — HOSPITAL ENCOUNTER (OUTPATIENT)
Dept: LAB | Facility: MEDICAL CENTER | Age: 63
End: 2018-04-11
Attending: INTERNAL MEDICINE
Payer: MEDICARE

## 2018-04-11 LAB
ANION GAP SERPL CALC-SCNC: 9 MMOL/L (ref 0–11.9)
BUN SERPL-MCNC: 8 MG/DL (ref 8–22)
CALCIUM SERPL-MCNC: 10 MG/DL (ref 8.5–10.5)
CHLORIDE SERPL-SCNC: 107 MMOL/L (ref 96–112)
CO2 SERPL-SCNC: 25 MMOL/L (ref 20–33)
CREAT SERPL-MCNC: 1.07 MG/DL (ref 0.5–1.4)
GLUCOSE SERPL-MCNC: 103 MG/DL (ref 65–99)
POTASSIUM SERPL-SCNC: 4 MMOL/L (ref 3.6–5.5)
SODIUM SERPL-SCNC: 141 MMOL/L (ref 135–145)

## 2018-04-11 PROCEDURE — 80048 BASIC METABOLIC PNL TOTAL CA: CPT

## 2018-04-19 ENCOUNTER — HOSPITAL ENCOUNTER (OUTPATIENT)
Dept: LAB | Facility: MEDICAL CENTER | Age: 63
End: 2018-04-19
Attending: FAMILY MEDICINE
Payer: MEDICARE

## 2018-04-19 LAB — SPECIMEN SOURCE: NORMAL

## 2018-04-19 PROCEDURE — 80053 COMPREHEN METABOLIC PANEL: CPT

## 2018-04-19 PROCEDURE — 87491 CHLMYD TRACH DNA AMP PROBE: CPT

## 2018-04-19 PROCEDURE — 87536 HIV-1 QUANT&REVRSE TRNSCRPJ: CPT

## 2018-04-19 PROCEDURE — 86780 TREPONEMA PALLIDUM: CPT

## 2018-04-19 PROCEDURE — 87591 N.GONORRHOEAE DNA AMP PROB: CPT

## 2018-04-19 PROCEDURE — 88184 FLOWCYTOMETRY/ TC 1 MARKER: CPT

## 2018-04-20 LAB
ALBUMIN SERPL BCP-MCNC: 4.5 G/DL (ref 3.2–4.9)
ALBUMIN/GLOB SERPL: 1.4 G/DL
ALP SERPL-CCNC: 69 U/L (ref 30–99)
ALT SERPL-CCNC: 10 U/L (ref 2–50)
ANION GAP SERPL CALC-SCNC: 13 MMOL/L (ref 0–11.9)
AST SERPL-CCNC: 15 U/L (ref 12–45)
BILIRUB SERPL-MCNC: 0.7 MG/DL (ref 0.1–1.5)
BUN SERPL-MCNC: 13 MG/DL (ref 8–22)
CALCIUM SERPL-MCNC: 9.9 MG/DL (ref 8.5–10.5)
CHLORIDE SERPL-SCNC: 104 MMOL/L (ref 96–112)
CO2 SERPL-SCNC: 23 MMOL/L (ref 20–33)
CREAT SERPL-MCNC: 0.97 MG/DL (ref 0.5–1.4)
GLOBULIN SER CALC-MCNC: 3.3 G/DL (ref 1.9–3.5)
GLUCOSE SERPL-MCNC: 91 MG/DL (ref 65–99)
POTASSIUM SERPL-SCNC: 4.1 MMOL/L (ref 3.6–5.5)
PROT SERPL-MCNC: 7.8 G/DL (ref 6–8.2)
SODIUM SERPL-SCNC: 140 MMOL/L (ref 135–145)
TREPONEMA PALLIDUM IGG+IGM AB [PRESENCE] IN SERUM OR PLASMA BY IMMUNOASSAY: NON REACTIVE

## 2018-04-21 LAB
CD3+CD4+ CELLS # BLD: 454 CELLS/UL (ref 490–1600)
CD3+CD4+ CELLS NFR BLD: 32 % (ref 35–68)
IMMUNODEFICIENCY MARKERS SPEC-IMP: ABNORMAL

## 2018-04-22 LAB
C TRACH DNA SPEC QL NAA+PROBE: NEGATIVE
N GONORRHOEA DNA SPEC QL NAA+PROBE: NEGATIVE
SPEC CONTAINER SPEC: NORMAL
SPECIMEN SOURCE: NORMAL

## 2018-04-23 ENCOUNTER — OFFICE VISIT (OUTPATIENT)
Dept: CARDIOLOGY | Facility: MEDICAL CENTER | Age: 63
End: 2018-04-23
Payer: MEDICARE

## 2018-04-23 VITALS
SYSTOLIC BLOOD PRESSURE: 116 MMHG | HEIGHT: 72 IN | BODY MASS INDEX: 19.23 KG/M2 | HEART RATE: 104 BPM | WEIGHT: 142 LBS | DIASTOLIC BLOOD PRESSURE: 68 MMHG | OXYGEN SATURATION: 94 %

## 2018-04-23 DIAGNOSIS — D53.9 MACROCYTIC ANEMIA: ICD-10-CM

## 2018-04-23 DIAGNOSIS — N17.9 AKI (ACUTE KIDNEY INJURY) (HCC): ICD-10-CM

## 2018-04-23 DIAGNOSIS — I48.91 ATRIAL FIBRILLATION WITH RAPID VENTRICULAR RESPONSE (HCC): ICD-10-CM

## 2018-04-23 DIAGNOSIS — I10 ESSENTIAL HYPERTENSION: ICD-10-CM

## 2018-04-23 DIAGNOSIS — B20 HIV (HUMAN IMMUNODEFICIENCY VIRUS INFECTION) (HCC): ICD-10-CM

## 2018-04-23 PROCEDURE — 99214 OFFICE O/P EST MOD 30 MIN: CPT | Performed by: INTERNAL MEDICINE

## 2018-04-23 ASSESSMENT — ENCOUNTER SYMPTOMS
ORTHOPNEA: 0
LOSS OF CONSCIOUSNESS: 0
HEMOPTYSIS: 0
STRIDOR: 0
CLAUDICATION: 0
SORE THROAT: 0
PND: 0
SPUTUM PRODUCTION: 0
WEAKNESS: 0
COUGH: 0
RESPIRATORY NEGATIVE: 1
EYES NEGATIVE: 1
CARDIOVASCULAR NEGATIVE: 1
NEUROLOGICAL NEGATIVE: 1
CONSTITUTIONAL NEGATIVE: 1
MUSCULOSKELETAL NEGATIVE: 1
CHILLS: 0
BRUISES/BLEEDS EASILY: 0
FEVER: 0
PALPITATIONS: 0
SHORTNESS OF BREATH: 0
DIZZINESS: 0
WHEEZING: 0
GASTROINTESTINAL NEGATIVE: 1

## 2018-04-23 NOTE — PROGRESS NOTES
Chief Complaint   Patient presents with   • HTN (Uncontrolled)       Subjective:   Sukumar Bolton is a 63 y.o. male who presents today as a hospital follow-up for his A. fib status post cardioversion. He's been a metoprolol 25 twice per day but developing dizziness and hypotension. His reduces 25 once per day. He is tachycardic but regular on exam. He's having no chest pain palpitations PND or orthopnea.    Past Medical History:   Diagnosis Date   • HIV (human immunodeficiency virus infection) (CMS-[facility])    • Hypertension    • Kidney disease      History reviewed. No pertinent surgical history.  History reviewed. No pertinent family history.  Social History     Social History   • Marital status:      Spouse name: N/A   • Number of children: N/A   • Years of education: N/A     Occupational History   • Not on file.     Social History Main Topics   • Smoking status: Current Every Day Smoker     Types: Cigarettes   • Smokeless tobacco: Current User   • Alcohol use 0.0 oz/week      Comment: daily   • Drug use: No   • Sexual activity: Not on file     Other Topics Concern   • Not on file     Social History Narrative   • No narrative on file     Allergies   Allergen Reactions   • Sulfa Drugs Itching     Rxn - 1970's       Outpatient Encounter Prescriptions as of 4/23/2018   Medication Sig Dispense Refill   • metoprolol (LOPRESSOR) 25 MG Tab Take 0.5 Tabs by mouth 2 Times a Day. 60 Tab 11   • rivaroxaban (XARELTO) 20 MG Tab tablet Take 1 Tab by mouth with dinner. 30 Tab 0   • Abacavir-Dolutegravir-Lamivud (TRIUMEQ) 600- MG Tab Take 1 Tab by mouth every day.     • vitamin e (VITAMIN E) 400 UNIT Cap Take 400 Units by mouth every day.     • NON SPECIFIED Take 1 Dose by mouth every day. OTC - TMG supplement     • ferrous sulfate 325 (65 Fe) MG tablet Take 325 mg by mouth every day.     • B Complex Vitamins (B COMPLEX 1 PO) Take 1 Tab by mouth every day.     • omeprazole (PRILOSEC) 20 MG delayed-release capsule  "Take 20 mg by mouth every day.     • quetiapine (SEROQUEL) 400 MG tablet Take 400 mg by mouth every bedtime.     • tamsulosin (FLOMAX) 0.4 MG capsule Take 0.4 mg by mouth ONE-HALF HOUR AFTER BREAKFAST.     • gabapentin (NEURONTIN) 300 MG Cap Take 300 mg by mouth 3 times a day.     • ondansetron (ZOFRAN ODT) 4 MG TABLET DISPERSIBLE Take 1 Tab by mouth every 8 hours as needed for Nausea. 10 Tab 0   • [DISCONTINUED] metoprolol (LOPRESSOR) 25 MG Tab Take 1 Tab by mouth 2 Times a Day. 60 Tab 0     No facility-administered encounter medications on file as of 4/23/2018.      Review of Systems   Constitutional: Negative.  Negative for chills, fever and malaise/fatigue.   HENT: Negative.  Negative for sore throat.    Eyes: Negative.    Respiratory: Negative.  Negative for cough, hemoptysis, sputum production, shortness of breath, wheezing and stridor.    Cardiovascular: Negative.  Negative for chest pain, palpitations, orthopnea, claudication, leg swelling and PND.   Gastrointestinal: Negative.    Genitourinary: Negative.    Musculoskeletal: Negative.    Skin: Negative.    Neurological: Negative.  Negative for dizziness, loss of consciousness and weakness.   Endo/Heme/Allergies: Negative.  Does not bruise/bleed easily.   All other systems reviewed and are negative.       Objective:   /68   Pulse (!) 104   Ht 1.816 m (5' 11.5\")   Wt 64.4 kg (142 lb)   SpO2 94%   BMI 19.53 kg/m²     Physical Exam   Constitutional: He appears well-developed and well-nourished. No distress.   HENT:   Head: Normocephalic and atraumatic.   Right Ear: External ear normal.   Left Ear: External ear normal.   Nose: Nose normal.   Mouth/Throat: No oropharyngeal exudate.   Eyes: Conjunctivae and EOM are normal. Pupils are equal, round, and reactive to light. Right eye exhibits no discharge. Left eye exhibits no discharge. No scleral icterus.   Neck: Neck supple. No JVD present.   Cardiovascular: Regular rhythm and intact distal pulses.  " Tachycardia present.  Exam reveals no gallop and no friction rub.    No murmur heard.  Pulmonary/Chest: Effort normal. No stridor. No respiratory distress. He has no wheezes. He has no rales. He exhibits no tenderness.   Abdominal: Soft. He exhibits no distension. There is no guarding.   Musculoskeletal: Normal range of motion. He exhibits no edema, tenderness or deformity.   Neurological: He is alert. He has normal reflexes. He displays normal reflexes. No cranial nerve deficit. He exhibits normal muscle tone. Coordination normal.   Skin: Skin is warm and dry. No rash noted. He is not diaphoretic. No erythema. No pallor.   Psychiatric: He has a normal mood and affect. His behavior is normal. Judgment and thought content normal.   Nursing note and vitals reviewed.      Assessment:     1. TENZIN (acute kidney injury) (CMS-[facility])     2. Atrial fibrillation with rapid ventricular response (CMS-[facility])  metoprolol (LOPRESSOR) 25 MG Tab   3. Essential hypertension  metoprolol (LOPRESSOR) 25 MG Tab   4. HIV (human immunodeficiency virus infection) (CMS-[facility])  metoprolol (LOPRESSOR) 25 MG Tab   5. Macrocytic anemia         Medical Decision Making:  Today's Assessment / Status / Plan:     63-year-old male with A. fib with rapid ventricular response status post cardioversion doing well. I've advised him to reduce his metoprolol  to 12 twice per day. He will call if his blood pressure doesn't respond accordingly. We will see him back in 6 months.    Thank for you allowing me to take part in your patient's care, please call should you have any questions or would like to discuss this patient.

## 2018-04-23 NOTE — LETTER
[facility]   [address], [facility] [address]  [place] [address]  Phone: [phone]  Fax: [phone]              Sukumar Bolton  1955    Encounter Date: 4/23/2018    Butch Martínez M.D.          PROGRESS NOTE:  Chief Complaint   Patient presents with   • HTN (Uncontrolled)       Subjective:   Sukumar Bolton is a 63 y.o. male who presents today as a hospital follow-up for his A. fib status post cardioversion. He's been a metoprolol 25 twice per day but developing dizziness and hypotension. His reduces 25 once per day. He is tachycardic but regular on exam. He's having no chest pain palpitations PND or orthopnea.    Past Medical History:   Diagnosis Date   • HIV (human immunodeficiency virus infection) (CMS-[facility])    • Hypertension    • Kidney disease      History reviewed. No pertinent surgical history.  History reviewed. No pertinent family history.  Social History     Social History   • Marital status:      Spouse name: N/A   • Number of children: N/A   • Years of education: N/A     Occupational History   • Not on file.     Social History Main Topics   • Smoking status: Current Every Day Smoker     Types: Cigarettes   • Smokeless tobacco: Current User   • Alcohol use 0.0 oz/week      Comment: daily   • Drug use: No   • Sexual activity: Not on file     Other Topics Concern   • Not on file     Social History Narrative   • No narrative on file     Allergies   Allergen Reactions   • Sulfa Drugs Itching     Rxn - 1970's       Outpatient Encounter Prescriptions as of 4/23/2018   Medication Sig Dispense Refill   • metoprolol (LOPRESSOR) 25 MG Tab Take 0.5 Tabs by mouth 2 Times a Day. 60 Tab 11   • rivaroxaban (XARELTO) 20 MG Tab tablet Take 1 Tab by mouth with dinner. 30 Tab 0   • Abacavir-Dolutegravir-Lamivud (TRIUMEQ) 600- MG Tab Take 1 Tab by mouth every day.     • vitamin e (VITAMIN E) 400 UNIT Cap Take 400 Units by mouth every day.     • NON  "SPECIFIED Take 1 Dose by mouth every day. OTC - TMG supplement     • ferrous sulfate 325 (65 Fe) MG tablet Take 325 mg by mouth every day.     • B Complex Vitamins (B COMPLEX 1 PO) Take 1 Tab by mouth every day.     • omeprazole (PRILOSEC) 20 MG delayed-release capsule Take 20 mg by mouth every day.     • quetiapine (SEROQUEL) 400 MG tablet Take 400 mg by mouth every bedtime.     • tamsulosin (FLOMAX) 0.4 MG capsule Take 0.4 mg by mouth ONE-HALF HOUR AFTER BREAKFAST.     • gabapentin (NEURONTIN) 300 MG Cap Take 300 mg by mouth 3 times a day.     • ondansetron (ZOFRAN ODT) 4 MG TABLET DISPERSIBLE Take 1 Tab by mouth every 8 hours as needed for Nausea. 10 Tab 0   • [DISCONTINUED] metoprolol (LOPRESSOR) 25 MG Tab Take 1 Tab by mouth 2 Times a Day. 60 Tab 0     No facility-administered encounter medications on file as of 4/23/2018.      Review of Systems   Constitutional: Negative.  Negative for chills, fever and malaise/fatigue.   HENT: Negative.  Negative for sore throat.    Eyes: Negative.    Respiratory: Negative.  Negative for cough, hemoptysis, sputum production, shortness of breath, wheezing and stridor.    Cardiovascular: Negative.  Negative for chest pain, palpitations, orthopnea, claudication, leg swelling and PND.   Gastrointestinal: Negative.    Genitourinary: Negative.    Musculoskeletal: Negative.    Skin: Negative.    Neurological: Negative.  Negative for dizziness, loss of consciousness and weakness.   Endo/Heme/Allergies: Negative.  Does not bruise/bleed easily.   All other systems reviewed and are negative.       Objective:   /68   Pulse (!) 104   Ht 1.816 m (5' 11.5\")   Wt 64.4 kg (142 lb)   SpO2 94%   BMI 19.53 kg/m²      Physical Exam   Constitutional: He appears well-developed and well-nourished. No distress.   HENT:   Head: Normocephalic and atraumatic.   Right Ear: External ear normal.   Left Ear: External ear normal.   Nose: Nose normal.   Mouth/Throat: No oropharyngeal exudate.   "   Eyes: Conjunctivae and EOM are normal. Pupils are equal, round, and reactive to light. Right eye exhibits no discharge. Left eye exhibits no discharge. No scleral icterus.   Neck: Neck supple. No JVD present.   Cardiovascular: Regular rhythm and intact distal pulses.  Tachycardia present.  Exam reveals no gallop and no friction rub.    No murmur heard.  Pulmonary/Chest: Effort normal. No stridor. No respiratory distress. He has no wheezes. He has no rales. He exhibits no tenderness.   Abdominal: Soft. He exhibits no distension. There is no guarding.   Musculoskeletal: Normal range of motion. He exhibits no edema, tenderness or deformity.   Neurological: He is alert. He has normal reflexes. He displays normal reflexes. No cranial nerve deficit. He exhibits normal muscle tone. Coordination normal.   Skin: Skin is warm and dry. No rash noted. He is not diaphoretic. No erythema. No pallor.   Psychiatric: He has a normal mood and affect. His behavior is normal. Judgment and thought content normal.   Nursing note and vitals reviewed.      Assessment:     1. TENZIN (acute kidney injury) (CMS-[facility])     2. Atrial fibrillation with rapid ventricular response (CMS-[facility])  metoprolol (LOPRESSOR) 25 MG Tab   3. Essential hypertension  metoprolol (LOPRESSOR) 25 MG Tab   4. HIV (human immunodeficiency virus infection) (CMS-[facility])  metoprolol (LOPRESSOR) 25 MG Tab   5. Macrocytic anemia         Medical Decision Making:  Today's Assessment / Status / Plan:     63-year-old male with A. fib with rapid ventricular response status post cardioversion doing well. I've advised him to reduce his metoprolol  to 12 twice per day. He will call if his blood pressure doesn't respond accordingly. We will see him back in 6 months.    Thank for you allowing me to take part in your patient's care, please call should you have any questions or would like to discuss this patient.      Butch Liao M.D.  [address] [place] [address] [place] [address]  VIA  Facsimile: [phone]

## 2018-05-02 ENCOUNTER — APPOINTMENT (OUTPATIENT)
Dept: RADIOLOGY | Facility: MEDICAL CENTER | Age: 63
End: 2018-05-02
Attending: ORTHOPAEDIC SURGERY
Payer: MEDICARE

## 2018-05-05 ENCOUNTER — HOSPITAL ENCOUNTER (OUTPATIENT)
Dept: RADIOLOGY | Facility: MEDICAL CENTER | Age: 63
End: 2018-05-05
Attending: ORTHOPAEDIC SURGERY
Payer: MEDICARE

## 2018-05-05 DIAGNOSIS — M25.571 RIGHT ANKLE PAIN, UNSPECIFIED CHRONICITY: ICD-10-CM

## 2018-05-05 DIAGNOSIS — S82.61XA CLOSED DISPLACED FRACTURE OF LATERAL MALLEOLUS OF RIGHT FIBULA, INITIAL ENCOUNTER: ICD-10-CM

## 2018-05-05 PROCEDURE — 73700 CT LOWER EXTREMITY W/O DYE: CPT | Mod: RT

## 2018-05-16 ENCOUNTER — PATIENT OUTREACH (OUTPATIENT)
Dept: HEALTH INFORMATION MANAGEMENT | Facility: OTHER | Age: 63
End: 2018-05-16

## 2018-05-16 NOTE — PROGRESS NOTES
Sukumar Bolton was admitted to [facility] for atrial fibrillation and rapid ventricular response on 4/3/18. Patient was discharged on 4/6/18. Per discharge orders, patient had a follow-up appointment with Dr. Liao (PCP) on 4/19/18 and with Dr. Martínez (cardiology) on 4/23/18. [facility] Patient Advocate was able to successfully engage with the patient post-discharge and many times throughout the life cycle of the case. Patient has a future follow-up appointment with Dr. Martínez on 10/22/18.

## 2018-05-21 ENCOUNTER — APPOINTMENT (OUTPATIENT)
Dept: RADIOLOGY | Facility: MEDICAL CENTER | Age: 63
End: 2018-05-21
Attending: ORTHOPAEDIC SURGERY
Payer: MEDICARE

## 2018-05-21 ENCOUNTER — HOSPITAL ENCOUNTER (OUTPATIENT)
Facility: MEDICAL CENTER | Age: 63
End: 2018-05-21
Attending: ORTHOPAEDIC SURGERY | Admitting: ORTHOPAEDIC SURGERY
Payer: MEDICARE

## 2018-05-21 VITALS
OXYGEN SATURATION: 90 % | HEART RATE: 80 BPM | RESPIRATION RATE: 11 BRPM | HEIGHT: 72 IN | TEMPERATURE: 97.4 F | BODY MASS INDEX: 19.74 KG/M2 | WEIGHT: 145.72 LBS | SYSTOLIC BLOOD PRESSURE: 134 MMHG | DIASTOLIC BLOOD PRESSURE: 97 MMHG

## 2018-05-21 LAB
ANION GAP SERPL CALC-SCNC: 9 MMOL/L (ref 0–11.9)
APTT PPP: 33 SEC (ref 24.7–36)
BUN SERPL-MCNC: 10 MG/DL (ref 8–22)
CALCIUM SERPL-MCNC: 9.7 MG/DL (ref 8.5–10.5)
CHLORIDE SERPL-SCNC: 105 MMOL/L (ref 96–112)
CO2 SERPL-SCNC: 26 MMOL/L (ref 20–33)
CREAT SERPL-MCNC: 0.96 MG/DL (ref 0.5–1.4)
EKG IMPRESSION: NORMAL
ERYTHROCYTE [DISTWIDTH] IN BLOOD BY AUTOMATED COUNT: 45.6 FL (ref 35.9–50)
GLUCOSE SERPL-MCNC: 93 MG/DL (ref 65–99)
HCT VFR BLD AUTO: 39.8 % (ref 42–52)
HGB BLD-MCNC: 14 G/DL (ref 14–18)
INR PPP: 1.34 (ref 0.87–1.13)
MCH RBC QN AUTO: 35.3 PG (ref 27–33)
MCHC RBC AUTO-ENTMCNC: 35.2 G/DL (ref 33.7–35.3)
MCV RBC AUTO: 100.3 FL (ref 81.4–97.8)
PLATELET # BLD AUTO: 171 K/UL (ref 164–446)
PMV BLD AUTO: 9.1 FL (ref 9–12.9)
POTASSIUM SERPL-SCNC: 3.9 MMOL/L (ref 3.6–5.5)
PROTHROMBIN TIME: 16.3 SEC (ref 12–14.6)
RBC # BLD AUTO: 3.97 M/UL (ref 4.7–6.1)
SODIUM SERPL-SCNC: 140 MMOL/L (ref 135–145)
WBC # BLD AUTO: 3.5 K/UL (ref 4.8–10.8)

## 2018-05-21 PROCEDURE — 85730 THROMBOPLASTIN TIME PARTIAL: CPT

## 2018-05-21 PROCEDURE — 160009 HCHG ANES TIME/MIN: Performed by: ORTHOPAEDIC SURGERY

## 2018-05-21 PROCEDURE — 93010 ELECTROCARDIOGRAM REPORT: CPT | Performed by: INTERNAL MEDICINE

## 2018-05-21 PROCEDURE — 160002 HCHG RECOVERY MINUTES (STAT): Performed by: ORTHOPAEDIC SURGERY

## 2018-05-21 PROCEDURE — 160046 HCHG PACU - 1ST 60 MINS PHASE II: Performed by: ORTHOPAEDIC SURGERY

## 2018-05-21 PROCEDURE — 501838 HCHG SUTURE GENERAL: Performed by: ORTHOPAEDIC SURGERY

## 2018-05-21 PROCEDURE — A6223 GAUZE >16<=48 NO W/SAL W/O B: HCPCS | Performed by: ORTHOPAEDIC SURGERY

## 2018-05-21 PROCEDURE — 160048 HCHG OR STATISTICAL LEVEL 1-5: Performed by: ORTHOPAEDIC SURGERY

## 2018-05-21 PROCEDURE — 85027 COMPLETE CBC AUTOMATED: CPT

## 2018-05-21 PROCEDURE — 160029 HCHG SURGERY MINUTES - 1ST 30 MINS LEVEL 4: Performed by: ORTHOPAEDIC SURGERY

## 2018-05-21 PROCEDURE — 502000 HCHG MISC OR IMPLANTS RC 0278: Performed by: ORTHOPAEDIC SURGERY

## 2018-05-21 PROCEDURE — A9270 NON-COVERED ITEM OR SERVICE: HCPCS

## 2018-05-21 PROCEDURE — 85610 PROTHROMBIN TIME: CPT

## 2018-05-21 PROCEDURE — 700101 HCHG RX REV CODE 250

## 2018-05-21 PROCEDURE — 700111 HCHG RX REV CODE 636 W/ 250 OVERRIDE (IP)

## 2018-05-21 PROCEDURE — 80048 BASIC METABOLIC PNL TOTAL CA: CPT

## 2018-05-21 PROCEDURE — A6454 SELF-ADHER BAND W>=3" <5"/YD: HCPCS | Performed by: ORTHOPAEDIC SURGERY

## 2018-05-21 PROCEDURE — 73600 X-RAY EXAM OF ANKLE: CPT | Mod: RT

## 2018-05-21 PROCEDURE — 160025 RECOVERY II MINUTES (STATS): Performed by: ORTHOPAEDIC SURGERY

## 2018-05-21 PROCEDURE — 160036 HCHG PACU - EA ADDL 30 MINS PHASE I: Performed by: ORTHOPAEDIC SURGERY

## 2018-05-21 PROCEDURE — 500423 HCHG DRESSING, ABD COMBINE: Performed by: ORTHOPAEDIC SURGERY

## 2018-05-21 PROCEDURE — 93005 ELECTROCARDIOGRAM TRACING: CPT | Performed by: ORTHOPAEDIC SURGERY

## 2018-05-21 PROCEDURE — E0114 CRUTCH UNDERARM PAIR NO WOOD: HCPCS | Performed by: ORTHOPAEDIC SURGERY

## 2018-05-21 PROCEDURE — 160047 HCHG PACU  - EA ADDL 30 MINS PHASE II: Performed by: ORTHOPAEDIC SURGERY

## 2018-05-21 PROCEDURE — 160035 HCHG PACU - 1ST 60 MINS PHASE I: Performed by: ORTHOPAEDIC SURGERY

## 2018-05-21 PROCEDURE — 700102 HCHG RX REV CODE 250 W/ 637 OVERRIDE(OP)

## 2018-05-21 PROCEDURE — 500881 HCHG PACK, EXTREMITY: Performed by: ORTHOPAEDIC SURGERY

## 2018-05-21 PROCEDURE — 160041 HCHG SURGERY MINUTES - EA ADDL 1 MIN LEVEL 4: Performed by: ORTHOPAEDIC SURGERY

## 2018-05-21 DEVICE — IMPLANTABLE DEVICE: Type: IMPLANTABLE DEVICE | Site: LEG | Status: FUNCTIONAL

## 2018-05-21 RX ORDER — OXYCODONE HCL 5 MG/5 ML
SOLUTION, ORAL ORAL
Status: COMPLETED
Start: 2018-05-21 | End: 2018-05-21

## 2018-05-21 RX ORDER — LIDOCAINE HYDROCHLORIDE 10 MG/ML
0.5 INJECTION, SOLUTION INFILTRATION; PERINEURAL
Status: DISCONTINUED | OUTPATIENT
Start: 2018-05-21 | End: 2018-05-21 | Stop reason: HOSPADM

## 2018-05-21 RX ORDER — GABAPENTIN 300 MG/1
CAPSULE ORAL
Status: COMPLETED
Start: 2018-05-21 | End: 2018-05-21

## 2018-05-21 RX ORDER — ACETAMINOPHEN 500 MG
TABLET ORAL
Status: COMPLETED
Start: 2018-05-21 | End: 2018-05-21

## 2018-05-21 RX ORDER — SODIUM CHLORIDE, SODIUM LACTATE, POTASSIUM CHLORIDE, CALCIUM CHLORIDE 600; 310; 30; 20 MG/100ML; MG/100ML; MG/100ML; MG/100ML
INJECTION, SOLUTION INTRAVENOUS CONTINUOUS
Status: DISCONTINUED | OUTPATIENT
Start: 2018-05-21 | End: 2018-05-21 | Stop reason: HOSPADM

## 2018-05-21 RX ORDER — CELECOXIB 200 MG/1
CAPSULE ORAL
Status: COMPLETED
Start: 2018-05-21 | End: 2018-05-21

## 2018-05-21 RX ADMIN — ACETAMINOPHEN 1000 MG: 500 TABLET, FILM COATED ORAL at 12:09

## 2018-05-21 RX ADMIN — SODIUM CHLORIDE, SODIUM LACTATE, POTASSIUM CHLORIDE, CALCIUM CHLORIDE: 600; 310; 30; 20 INJECTION, SOLUTION INTRAVENOUS at 11:46

## 2018-05-21 RX ADMIN — CELECOXIB 400 MG: 200 CAPSULE ORAL at 12:11

## 2018-05-21 RX ADMIN — GABAPENTIN 300 MG: 300 CAPSULE ORAL at 12:11

## 2018-05-21 RX ADMIN — FENTANYL CITRATE 50 MCG: 50 INJECTION, SOLUTION INTRAMUSCULAR; INTRAVENOUS at 15:00

## 2018-05-21 RX ADMIN — OXYCODONE HYDROCHLORIDE 10 MG: 5 SOLUTION ORAL at 15:00

## 2018-05-21 ASSESSMENT — PAIN SCALES - GENERAL
PAINLEVEL_OUTOF10: 0
PAINLEVEL_OUTOF10: 0
PAINLEVEL_OUTOF10: 5
PAINLEVEL_OUTOF10: 0
PAINLEVEL_OUTOF10: 0
PAINLEVEL_OUTOF10: 5
PAINLEVEL_OUTOF10: 0
PAINLEVEL_OUTOF10: 5

## 2018-05-21 NOTE — OR NURSING
"Pt medicated for pain; tolerating orals; pt has multiple questions re: discharge; he is anxious to \"go home\".  "

## 2018-05-21 NOTE — DISCHARGE INSTRUCTIONS
ACTIVITY: Rest and take it easy for the first 24 hours.  A responsible adult is recommended to remain with you during that time.  It is normal to feel sleepy.  We encourage you to not do anything that requires balance, judgment or coordination.    MILD FLU-LIKE SYMPTOMS ARE NORMAL. YOU MAY EXPERIENCE GENERALIZED MUSCLE ACHES, THROAT IRRITATION, HEADACHE AND/OR SOME NAUSEA.    FOR 24 HOURS DO NOT:  Drive, operate machinery or run household appliances.  Drink beer or alcoholic beverages.   Make important decisions or sign legal documents.    SPECIAL INSTRUCTIONS:     Elevate/ice   Take pain medications scheduled until block wears off.  Hold for sedation.   Keep dressing on at all times, do not remove, do not get wet   You must be non-weight bearing to the operative extremity    DIET: To avoid nausea, slowly advance diet as tolerated, avoiding spicy or greasy foods for the first day.  Add more substantial food to your diet according to your physician's instructions.  Babies can be fed formula or breast milk as soon as they are hungry.  INCREASE FLUIDS AND FIBER TO AVOID CONSTIPATION.    SURGICAL DRESSING/BATHING: See above. Follow instructions given to you by MD. Call with any questions.     FOLLOW-UP APPOINTMENT:  A follow-up appointment should be arranged with your doctor in 1-2 weeks; call to schedule.    You should CALL YOUR PHYSICIAN if you develop:  Fever greater than 101 degrees F.  Pain not relieved by medication, or persistent nausea or vomiting.  Excessive bleeding (blood soaking through dressing) or unexpected drainage from the wound.  Extreme redness or swelling around the incision site, drainage of pus or foul smelling drainage.  Inability to urinate or empty your bladder within 8 hours.  Problems with breathing or chest pain.    You should call 911 if you develop problems with breathing or chest pain.  If you are unable to contact your doctor or surgical center, you should go to the nearest emergency  room or urgent care center.  Physician's telephone #: [phone]    If any questions arise, call your doctor.  If your doctor is not available, please feel free to call the Surgical Center at [phone].  The Center is open Monday through Friday from 7AM to 7PM.  You can also call the HEALTH HOTLINE open 24 hours/day, 7 days/week and speak to a nurse at [phone], or toll free at [phone].    A registered nurse may call you a few days after your surgery to see how you are doing after your procedure.    MEDICATIONS: Resume taking daily medication.  Take prescribed pain medication with food.  If no medication is prescribed, you may take non-aspirin pain medication if needed.  PAIN MEDICATION CAN BE VERY CONSTIPATING.  Take a stool softener or laxative such as senokot, pericolace, or milk of magnesia if needed.    Prescription given in [facility] folder.  Last pain medication given at 3:00pm.    If your physician has prescribed pain medication that includes Acetaminophen (Tylenol), do not take additional Acetaminophen (Tylenol) while taking the prescribed medication.    Depression / Suicide Risk    As you are discharged from this [facility] facility, it is important to learn how to keep safe from harming yourself.    Recognize the warning signs:  · Abrupt changes in personality, positive or negative- including increase in energy   · Giving away possessions  · Change in eating patterns- significant weight changes-  positive or negative  · Change in sleeping patterns- unable to sleep or sleeping all the time   · Unwillingness or inability to communicate  · Depression  · Unusual sadness, discouragement and loneliness  · Talk of wanting to die  · Neglect of personal appearance   · Rebelliousness- reckless behavior  · Withdrawal from people/activities they love  · Confusion- inability to concentrate     If you or a loved one observes any of these behaviors or has concerns about self-harm, here's what you can  do:  · Talk about it- your feelings and reasons for harming yourself  · Remove any means that you might use to hurt yourself (examples: pills, rope, extension cords, firearm)  · Get professional help from the community (Mental Health, Substance Abuse, psychological counseling)  · Do not be alone:Call your Safe Contact- someone whom you trust who will be there for you.  · Call your local CRISIS HOTLINE 359-9922 or 279-544-9926  · Call your local Children's Mobile Crisis Response Team [place] (571) 618-8439 or www.[employer]  · Call the toll free National Suicide Prevention Hotlines   · National Suicide Prevention Lifeline 530-903-BHWB (5034)  · National Hope Line Network 800-SUICIDE (464-9256)

## 2018-05-22 NOTE — OR NURSING
Pt states he's not able to get a ride home and wants to get an uber. Dr. Berry notified and states that it is okay if pt takes an uber home and discharge to self.

## 2018-05-22 NOTE — OR NURSING
Pt got up to get dressed, did not bear any weight on RLE but dressing became saturated with sanguinous output. Dr. Berry notified and received orders to reinforce with ABD pads and a bias. Pt okay for discharge.

## 2018-05-22 NOTE — OP REPORT
DATE OF SERVICE:  05/21/2018    PREOPERATIVE DIAGNOSES:  1.  Right ankle arthrofibrosis and synovitis.  2.  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    POSTOPERATIVE DIAGNOSES:  1.  Right ankle arthrofibrosis and synovitis.  2.  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    PROCEDURE PERFORMED:  1.  Right ankle arthroscopic extensive debridement.  2.  Right fibular nonunion repair with bone grafting and internal fixation.    SURGEON:  Ulices Berry MD    FIRST ASSISTANT:  Elfego Rdz MD    SECOND ASSISTANT:  Kelly Dhillon.    ANESTHESIA:  General endotracheal with popliteal block per my request for   postoperative pain management.    ESTIMATED BLOOD LOSS:  None.    COMPLICATIONS:  None.    POSTOPERATIVE PLAN:  1.  Nonweightbearing.  2.  Preoperative antibiotics.  3.  Followup in 2 weeks.    INDICATIONS:  This is a pleasant 63-year-old male who has had problems with   his right ankle following an accident.  The above diagnoses made and options   were discussed with him including operative and nonoperative.  He elected to   undergo operative intervention.  The above procedure was discussed and all   questions were answered.  Risks of surgery explained which include but not   limited to wound problems, infection, nerve injury, vascular injury, need for   surgery.  He understands and accepts these risks and agrees to proceed.  His   site was marked by myself prior to receiving psychotropic medicines.    PROCEDURE IN DETAIL:  The patient was given a popliteal block per my request   for postoperative pain management.  He was brought into the operating room and   underwent general endotracheal anesthesia without complications.  His right   lower extremity was prepped and draped in standard fashion in supine position.    All appropriate padding.  Positive site verification confirmed his right   upper extremity as well as the above procedure and confirmation.  He received   preoperative antibiotics.   Esmarch was used to exsanguinate his foot and ankle   and leg tourniquet was inflated to 250 mmHg.  An 18-gauge needle was placed   medial to the preoperatively marked tibialis anterior at the level of the   joint line.  Ankle was insufflated with 10 mL of normal saline.  A 15 blade   was used to make full thickness arthrotomy and 4.0 scope was introduced.  An   18-gauge needle was then placed lateral to the preoperatively marked peroneus   tertius at the level of joint line.  Skin incision was made, blunt dissection   down into the joint.  Shaver was introduced.  Extensive arthrofibrosis and   synovitis within and part of his ankle, this was debrided.  Anterolateral   corner which was tracked well back into his syndesmosis area and some   hypertrophic tissue from his fracture.  Probe palpation over the lateral talar   dome demonstrated normal cartilage.  Once this was made, scope was passed   through the lateral portal.  Shaver was then introduced to anteromedial side   completing some extensive synovitis, which was all debrided.  Probe palpation   over the medial talar dome demonstrated no loose bodies, no cartilage defects,   no hypertrophic ligamentous tissue.  All instruments were removed.  Scope   portals were closed with interrupted nylon suture.    Lateral incision was made over the fibula.  It was dissected down directly to   the fibula.  Fibular periosteum was elevated up.  A #15 blade was used to   identify a nonunion of his fibula.  This was curetted out of all fibrinous   tissue.  The wound was irrigated out.  A stab incision was made over the   lateral aspect of the calcaneus.  It was bluntly dissected down the level of   the lateral calcaneus.  A 4.5 drill sleeve was used to make multiple cores of   bone.  This was then morselized.  The nonunion site in the fibula was then   drilled out with multiple drill holes and then packed with the bone graft.  A   [employer] EVOS lateral fibular plate was  placed.  It was transfixed   with nonlocking screws proximal ____ as well as distal.  He had good overall   fixation.  C-arm imaging demonstrates satisfactory position for internal   fixation and anatomic alignment of the fibula.  Wounds were then irrigated   with copious irrigation, was closed in layered fashion using 3-0 Vicryl and   3-0 nylon.  Sterile dressings applied.  Tourniquet released.  All toes were   pink.  He was transferred to the recovery room in good condition.    Utilization of Dr. Rdz was necessary for patient positioning, holding,   retracting, wound closure, dressing and splint placement.  He was present   throughout the entire procedure.       ____________________________________     MD BARBARA STOCK / TAYLOR    DD:  05/21/2018 15:12:06  DT:  05/21/2018 16:22:23    D#:  [id number]  Job#:  [id number]    cc: [facility]

## 2018-06-04 ENCOUNTER — HOSPITAL ENCOUNTER (OUTPATIENT)
Facility: MEDICAL CENTER | Age: 63
End: 2018-06-04
Attending: ORTHOPAEDIC SURGERY | Admitting: ORTHOPAEDIC SURGERY
Payer: MEDICARE

## 2018-06-04 VITALS
HEIGHT: 72 IN | BODY MASS INDEX: 19.71 KG/M2 | HEART RATE: 87 BPM | RESPIRATION RATE: 16 BRPM | OXYGEN SATURATION: 95 % | WEIGHT: 145.5 LBS | TEMPERATURE: 97.5 F

## 2018-06-04 PROCEDURE — 160035 HCHG PACU - 1ST 60 MINS PHASE I: Performed by: ORTHOPAEDIC SURGERY

## 2018-06-04 PROCEDURE — 160009 HCHG ANES TIME/MIN: Performed by: ORTHOPAEDIC SURGERY

## 2018-06-04 PROCEDURE — 160038 HCHG SURGERY MINUTES - EA ADDL 1 MIN LEVEL 2: Performed by: ORTHOPAEDIC SURGERY

## 2018-06-04 PROCEDURE — 160025 RECOVERY II MINUTES (STATS): Performed by: ORTHOPAEDIC SURGERY

## 2018-06-04 PROCEDURE — 700101 HCHG RX REV CODE 250

## 2018-06-04 PROCEDURE — 501838 HCHG SUTURE GENERAL: Performed by: ORTHOPAEDIC SURGERY

## 2018-06-04 PROCEDURE — 500423 HCHG DRESSING, ABD COMBINE: Performed by: ORTHOPAEDIC SURGERY

## 2018-06-04 PROCEDURE — 501330 HCHG SET, CYSTO IRRIG TUBING: Performed by: ORTHOPAEDIC SURGERY

## 2018-06-04 PROCEDURE — A6222 GAUZE <=16 IN NO W/SAL W/O B: HCPCS | Performed by: ORTHOPAEDIC SURGERY

## 2018-06-04 PROCEDURE — A9270 NON-COVERED ITEM OR SERVICE: HCPCS

## 2018-06-04 PROCEDURE — 700102 HCHG RX REV CODE 250 W/ 637 OVERRIDE(OP)

## 2018-06-04 PROCEDURE — A6454 SELF-ADHER BAND W>=3" <5"/YD: HCPCS | Performed by: ORTHOPAEDIC SURGERY

## 2018-06-04 PROCEDURE — 700111 HCHG RX REV CODE 636 W/ 250 OVERRIDE (IP)

## 2018-06-04 PROCEDURE — 160048 HCHG OR STATISTICAL LEVEL 1-5: Performed by: ORTHOPAEDIC SURGERY

## 2018-06-04 PROCEDURE — 160027 HCHG SURGERY MINUTES - 1ST 30 MINS LEVEL 2: Performed by: ORTHOPAEDIC SURGERY

## 2018-06-04 PROCEDURE — 500881 HCHG PACK, EXTREMITY: Performed by: ORTHOPAEDIC SURGERY

## 2018-06-04 PROCEDURE — 503339 HCHG DRESSSING PICO: Performed by: ORTHOPAEDIC SURGERY

## 2018-06-04 PROCEDURE — 160002 HCHG RECOVERY MINUTES (STAT): Performed by: ORTHOPAEDIC SURGERY

## 2018-06-04 PROCEDURE — A6223 GAUZE >16<=48 NO W/SAL W/O B: HCPCS | Performed by: ORTHOPAEDIC SURGERY

## 2018-06-04 PROCEDURE — 160046 HCHG PACU - 1ST 60 MINS PHASE II: Performed by: ORTHOPAEDIC SURGERY

## 2018-06-04 RX ORDER — SODIUM CHLORIDE, SODIUM LACTATE, POTASSIUM CHLORIDE, CALCIUM CHLORIDE 600; 310; 30; 20 MG/100ML; MG/100ML; MG/100ML; MG/100ML
INJECTION, SOLUTION INTRAVENOUS CONTINUOUS
Status: DISCONTINUED | OUTPATIENT
Start: 2018-06-04 | End: 2018-06-04 | Stop reason: HOSPADM

## 2018-06-04 RX ORDER — OXYCODONE HCL 5 MG/5 ML
SOLUTION, ORAL ORAL
Status: COMPLETED
Start: 2018-06-04 | End: 2018-06-04

## 2018-06-04 RX ORDER — LIDOCAINE HYDROCHLORIDE 10 MG/ML
0.5 INJECTION, SOLUTION INFILTRATION; PERINEURAL
Status: DISCONTINUED | OUTPATIENT
Start: 2018-06-04 | End: 2018-06-04 | Stop reason: HOSPADM

## 2018-06-04 RX ORDER — ONDANSETRON 4 MG/1
4 TABLET, ORALLY DISINTEGRATING ORAL EVERY 6 HOURS PRN
COMMUNITY
End: 2018-07-31

## 2018-06-04 RX ADMIN — FENTANYL CITRATE 50 MCG: 50 INJECTION, SOLUTION INTRAMUSCULAR; INTRAVENOUS at 14:05

## 2018-06-04 RX ADMIN — FENTANYL CITRATE 50 MCG: 50 INJECTION, SOLUTION INTRAMUSCULAR; INTRAVENOUS at 13:54

## 2018-06-04 RX ADMIN — HYDROMORPHONE HYDROCHLORIDE 0.5 MG: 10 INJECTION, SOLUTION INTRAMUSCULAR; INTRAVENOUS; SUBCUTANEOUS at 14:30

## 2018-06-04 RX ADMIN — SODIUM CHLORIDE, SODIUM LACTATE, POTASSIUM CHLORIDE, CALCIUM CHLORIDE: 600; 310; 30; 20 INJECTION, SOLUTION INTRAVENOUS at 12:18

## 2018-06-04 RX ADMIN — OXYCODONE HYDROCHLORIDE 10 MG: 5 SOLUTION ORAL at 14:15

## 2018-06-04 ASSESSMENT — PAIN SCALES - GENERAL
PAINLEVEL_OUTOF10: 0
PAINLEVEL_OUTOF10: 3
PAINLEVEL_OUTOF10: 3
PAINLEVEL_OUTOF10: 8
PAINLEVEL_OUTOF10: 0
PAINLEVEL_OUTOF10: 8

## 2018-06-04 NOTE — DISCHARGE INSTRUCTIONS
ACTIVITY: Rest and take it easy for the first 24 hours.  A responsible adult is recommended to remain with you during that time.  It is normal to feel sleepy.  We encourage you to not do anything that requires balance, judgment or coordination.    MILD FLU-LIKE SYMPTOMS ARE NORMAL. YOU MAY EXPERIENCE GENERALIZED MUSCLE ACHES, THROAT IRRITATION, HEADACHE AND/OR SOME NAUSEA.    FOR 24 HOURS DO NOT:  Drive, operate machinery or run household appliances.  Drink beer or alcoholic beverages.   Make important decisions or sign legal documents.    SPECIAL INSTRUCTIONS: *Follow Dr. Berry's instructions.* No weight on right lower extremity*    DIET: To avoid nausea, slowly advance diet as tolerated, avoiding spicy or greasy foods for the first day.  Add more substantial food to your diet according to your physician's instructions.  Babies can be fed formula or breast milk as soon as they are hungry.  INCREASE FLUIDS AND FIBER TO AVOID CONSTIPATION.    FOLLOW-UP APPOINTMENT:  A follow-up appointment  JUNE 19 at 3:30p.m.**; call to schedule.    You should CALL YOUR PHYSICIAN if you develop:  Fever greater than 101 degrees F.  Pain not relieved by medication, or persistent nausea or vomiting.  Excessive bleeding (blood soaking through dressing) or unexpected drainage from the wound.  Extreme redness or swelling around the incision site, drainage of pus or foul smelling drainage.  Inability to urinate or empty your bladder within 8 hours.  Problems with breathing or chest pain.    You should call 911 if you develop problems with breathing or chest pain.  If you are unable to contact your doctor or surgical center, you should go to the nearest emergency room or urgent care center.  Physician's telephone #: *Dr. Berry [phone]**    If any questions arise, call your doctor.  If your doctor is not available, please feel free to call the Surgical Center at [phone].  The Center is open Monday through Friday from 7AM to 7PM.   You can also call the HEALTH HOTLINE open 24 hours/day, 7 days/week and speak to a nurse at [phone], or toll free at [phone].    A registered nurse may call you a few days after your surgery to see how you are doing after your procedure.    MEDICATIONS: Resume taking daily medication.  Take prescribed pain medication with food.  If no medication is prescribed, you may take non-aspirin pain medication if needed.  PAIN MEDICATION CAN BE VERY CONSTIPATING.  Take a stool softener or laxative such as senokot, pericolace, or milk of magnesia if needed.    Prescription given for Vistaril and Keflex  *.  Last pain medication given at *2:15 p.m.**.    If your physician has prescribed pain medication that includes Acetaminophen (Tylenol), do not take additional Acetaminophen (Tylenol) while taking the prescribed medication.    Depression / Suicide Risk    As you are discharged from this [facility] facility, it is important to learn how to keep safe from harming yourself.    Recognize the warning signs:  · Abrupt changes in personality, positive or negative- including increase in energy   · Giving away possessions  · Change in eating patterns- significant weight changes-  positive or negative  · Change in sleeping patterns- unable to sleep or sleeping all the time   · Unwillingness or inability to communicate  · Depression  · Unusual sadness, discouragement and loneliness  · Talk of wanting to die  · Neglect of personal appearance   · Rebelliousness- reckless behavior  · Withdrawal from people/activities they love  · Confusion- inability to concentrate     If you or a loved one observes any of these behaviors or has concerns about self-harm, here's what you can do:  · Talk about it- your feelings and reasons for harming yourself  · Remove any means that you might use to hurt yourself (examples: pills, rope, extension cords, firearm)  · Get professional help from the community (Mental Health, Substance Abuse,  psychological counseling)  · Do not be alone:Call your Safe Contact- someone whom you trust who will be there for you.  · Call your local CRISIS HOTLINE 380-6786 or 706-062-0041  · Call your local Children's Mobile Crisis Response Team [place] (549) 293-2653 or www.Monogram  · Call the toll free National Suicide Prevention Hotlines   · National Suicide Prevention Lifeline 943-348-EFVP (7085)  · National Hope Line Network 800-SUICIDE (068-5991)

## 2018-06-04 NOTE — PROGRESS NOTES
Prescriptions dropped off at [facility] here in the hospital, when ready for discharge, volunteer will take patient over to  prescriptions

## 2018-06-04 NOTE — PROGRESS NOTES
Patient meets pacu discharge criteria, AAOx4, taking PO, pain controlled with medication. Awaiting clarification of discharge orders and prescriptions. Right foot with 2+ pulses, pink, warm.

## 2018-06-12 ENCOUNTER — HOSPITAL ENCOUNTER (OUTPATIENT)
Dept: LAB | Facility: MEDICAL CENTER | Age: 63
End: 2018-06-12
Attending: FAMILY MEDICINE
Payer: MEDICARE

## 2018-06-12 PROCEDURE — 87086 URINE CULTURE/COLONY COUNT: CPT

## 2018-06-16 LAB
BACTERIA UR CULT: NORMAL
SIGNIFICANT IND 70042: NORMAL
SITE SITE: NORMAL
SOURCE SOURCE: NORMAL

## 2018-07-11 ENCOUNTER — HOSPITAL ENCOUNTER (OUTPATIENT)
Dept: RADIOLOGY | Facility: MEDICAL CENTER | Age: 63
End: 2018-07-11
Attending: FAMILY MEDICINE
Payer: MEDICARE

## 2018-07-11 DIAGNOSIS — R39.15 URGENCY OF URINATION: ICD-10-CM

## 2018-07-11 PROCEDURE — 76775 US EXAM ABDO BACK WALL LIM: CPT

## 2018-07-31 ENCOUNTER — HOSPITAL ENCOUNTER (OUTPATIENT)
Facility: MEDICAL CENTER | Age: 63
End: 2018-08-01
Attending: EMERGENCY MEDICINE | Admitting: HOSPITALIST
Payer: MEDICARE

## 2018-07-31 ENCOUNTER — APPOINTMENT (OUTPATIENT)
Dept: RADIOLOGY | Facility: MEDICAL CENTER | Age: 63
End: 2018-07-31
Attending: EMERGENCY MEDICINE
Payer: MEDICARE

## 2018-07-31 PROBLEM — R55 SYNCOPE: Status: ACTIVE | Noted: 2018-07-31

## 2018-07-31 PROBLEM — I95.9 HYPOTENSION: Status: ACTIVE | Noted: 2018-07-31

## 2018-07-31 PROBLEM — I48.20 CHRONIC ATRIAL FIBRILLATION (HCC): Status: ACTIVE | Noted: 2018-07-31

## 2018-07-31 PROBLEM — F10.10 ALCOHOL ABUSE: Status: ACTIVE | Noted: 2018-07-31

## 2018-07-31 LAB
ALBUMIN SERPL BCP-MCNC: 4.1 G/DL (ref 3.2–4.9)
ALBUMIN/GLOB SERPL: 1.4 G/DL
ALP SERPL-CCNC: 77 U/L (ref 30–99)
ALT SERPL-CCNC: 77 U/L (ref 2–50)
ANION GAP SERPL CALC-SCNC: 12 MMOL/L (ref 0–11.9)
APTT PPP: 42.2 SEC (ref 24.7–36)
AST SERPL-CCNC: 23 U/L (ref 12–45)
BASOPHILS # BLD AUTO: 1 % (ref 0–1.8)
BASOPHILS # BLD: 0.03 K/UL (ref 0–0.12)
BILIRUB SERPL-MCNC: 0.4 MG/DL (ref 0.1–1.5)
BNP SERPL-MCNC: 26 PG/ML (ref 0–100)
BUN SERPL-MCNC: 13 MG/DL (ref 8–22)
CALCIUM SERPL-MCNC: 9.3 MG/DL (ref 8.5–10.5)
CHLORIDE SERPL-SCNC: 100 MMOL/L (ref 96–112)
CO2 SERPL-SCNC: 23 MMOL/L (ref 20–33)
CREAT SERPL-MCNC: 1.39 MG/DL (ref 0.5–1.4)
EKG IMPRESSION: NORMAL
EOSINOPHIL # BLD AUTO: 0.07 K/UL (ref 0–0.51)
EOSINOPHIL NFR BLD: 2.2 % (ref 0–6.9)
ERYTHROCYTE [DISTWIDTH] IN BLOOD BY AUTOMATED COUNT: 48.1 FL (ref 35.9–50)
ETHANOL BLD-MCNC: 0.13 G/DL
FOLATE SERPL-MCNC: >24 NG/ML
GLOBULIN SER CALC-MCNC: 3 G/DL (ref 1.9–3.5)
GLUCOSE SERPL-MCNC: 114 MG/DL (ref 65–99)
HCT VFR BLD AUTO: 36.7 % (ref 42–52)
HGB BLD-MCNC: 12.6 G/DL (ref 14–18)
IMM GRANULOCYTES # BLD AUTO: 0.01 K/UL (ref 0–0.11)
IMM GRANULOCYTES NFR BLD AUTO: 0.3 % (ref 0–0.9)
INR PPP: 1.64 (ref 0.87–1.13)
LIPASE SERPL-CCNC: 13 U/L (ref 11–82)
LYMPHOCYTES # BLD AUTO: 1.24 K/UL (ref 1–4.8)
LYMPHOCYTES NFR BLD: 39.5 % (ref 22–41)
MAGNESIUM SERPL-MCNC: 2.1 MG/DL (ref 1.5–2.5)
MCH RBC QN AUTO: 34.5 PG (ref 27–33)
MCHC RBC AUTO-ENTMCNC: 34.3 G/DL (ref 33.7–35.3)
MCV RBC AUTO: 100.5 FL (ref 81.4–97.8)
MONOCYTES # BLD AUTO: 0.59 K/UL (ref 0–0.85)
MONOCYTES NFR BLD AUTO: 18.8 % (ref 0–13.4)
NEUTROPHILS # BLD AUTO: 1.2 K/UL (ref 1.82–7.42)
NEUTROPHILS NFR BLD: 38.2 % (ref 44–72)
NRBC # BLD AUTO: 0 K/UL
NRBC BLD-RTO: 0 /100 WBC
PHOSPHATE SERPL-MCNC: 3.3 MG/DL (ref 2.5–4.5)
PLATELET # BLD AUTO: 256 K/UL (ref 164–446)
PMV BLD AUTO: 9.1 FL (ref 9–12.9)
POTASSIUM SERPL-SCNC: 4.4 MMOL/L (ref 3.6–5.5)
PROT SERPL-MCNC: 7.1 G/DL (ref 6–8.2)
PROTHROMBIN TIME: 19.1 SEC (ref 12–14.6)
RBC # BLD AUTO: 3.65 M/UL (ref 4.7–6.1)
SODIUM SERPL-SCNC: 135 MMOL/L (ref 135–145)
TROPONIN I SERPL-MCNC: <0.01 NG/ML (ref 0–0.04)
VIT B12 SERPL-MCNC: 915 PG/ML (ref 211–911)
WBC # BLD AUTO: 3.1 K/UL (ref 4.8–10.8)

## 2018-07-31 PROCEDURE — 82607 VITAMIN B-12: CPT

## 2018-07-31 PROCEDURE — 36415 COLL VENOUS BLD VENIPUNCTURE: CPT

## 2018-07-31 PROCEDURE — 84100 ASSAY OF PHOSPHORUS: CPT

## 2018-07-31 PROCEDURE — 700102 HCHG RX REV CODE 250 W/ 637 OVERRIDE(OP): Performed by: HOSPITALIST

## 2018-07-31 PROCEDURE — 99218 PR INITIAL OBSERVATION CARE,LEVL I: CPT | Performed by: HOSPITALIST

## 2018-07-31 PROCEDURE — G0378 HOSPITAL OBSERVATION PER HR: HCPCS

## 2018-07-31 PROCEDURE — 84484 ASSAY OF TROPONIN QUANT: CPT

## 2018-07-31 PROCEDURE — 80053 COMPREHEN METABOLIC PANEL: CPT

## 2018-07-31 PROCEDURE — 85610 PROTHROMBIN TIME: CPT

## 2018-07-31 PROCEDURE — 93005 ELECTROCARDIOGRAM TRACING: CPT | Performed by: EMERGENCY MEDICINE

## 2018-07-31 PROCEDURE — 80307 DRUG TEST PRSMV CHEM ANLYZR: CPT

## 2018-07-31 PROCEDURE — 700105 HCHG RX REV CODE 258: Performed by: EMERGENCY MEDICINE

## 2018-07-31 PROCEDURE — 83690 ASSAY OF LIPASE: CPT

## 2018-07-31 PROCEDURE — 71045 X-RAY EXAM CHEST 1 VIEW: CPT

## 2018-07-31 PROCEDURE — 83880 ASSAY OF NATRIURETIC PEPTIDE: CPT

## 2018-07-31 PROCEDURE — 93005 ELECTROCARDIOGRAM TRACING: CPT

## 2018-07-31 PROCEDURE — A9270 NON-COVERED ITEM OR SERVICE: HCPCS | Performed by: HOSPITALIST

## 2018-07-31 PROCEDURE — 85730 THROMBOPLASTIN TIME PARTIAL: CPT

## 2018-07-31 PROCEDURE — 700105 HCHG RX REV CODE 258: Performed by: HOSPITALIST

## 2018-07-31 PROCEDURE — 99285 EMERGENCY DEPT VISIT HI MDM: CPT

## 2018-07-31 PROCEDURE — 70450 CT HEAD/BRAIN W/O DYE: CPT

## 2018-07-31 PROCEDURE — 83735 ASSAY OF MAGNESIUM: CPT

## 2018-07-31 PROCEDURE — 85025 COMPLETE CBC W/AUTO DIFF WBC: CPT

## 2018-07-31 PROCEDURE — 304561 HCHG STAT O2

## 2018-07-31 PROCEDURE — 82746 ASSAY OF FOLIC ACID SERUM: CPT

## 2018-07-31 RX ORDER — FERROUS SULFATE 325(65) MG
325 TABLET ORAL DAILY
Status: DISCONTINUED | OUTPATIENT
Start: 2018-08-01 | End: 2018-08-01 | Stop reason: HOSPADM

## 2018-07-31 RX ORDER — ONDANSETRON 2 MG/ML
4 INJECTION INTRAMUSCULAR; INTRAVENOUS EVERY 4 HOURS PRN
Status: DISCONTINUED | OUTPATIENT
Start: 2018-07-31 | End: 2018-08-01 | Stop reason: HOSPADM

## 2018-07-31 RX ORDER — GABAPENTIN 300 MG/1
300 CAPSULE ORAL 3 TIMES DAILY
Status: DISCONTINUED | OUTPATIENT
Start: 2018-07-31 | End: 2018-08-01 | Stop reason: HOSPADM

## 2018-07-31 RX ORDER — ACETAMINOPHEN 325 MG/1
650 TABLET ORAL EVERY 6 HOURS PRN
Status: DISCONTINUED | OUTPATIENT
Start: 2018-07-31 | End: 2018-08-01 | Stop reason: HOSPADM

## 2018-07-31 RX ORDER — TAMSULOSIN HYDROCHLORIDE 0.4 MG/1
0.4 CAPSULE ORAL
Status: DISCONTINUED | OUTPATIENT
Start: 2018-07-31 | End: 2018-08-01 | Stop reason: HOSPADM

## 2018-07-31 RX ORDER — ABACAVIR 300 MG/1
600 TABLET ORAL DAILY
Status: DISCONTINUED | OUTPATIENT
Start: 2018-08-01 | End: 2018-08-01 | Stop reason: HOSPADM

## 2018-07-31 RX ORDER — AMOXICILLIN 250 MG
2 CAPSULE ORAL 2 TIMES DAILY
Status: DISCONTINUED | OUTPATIENT
Start: 2018-07-31 | End: 2018-08-01 | Stop reason: HOSPADM

## 2018-07-31 RX ORDER — SODIUM CHLORIDE 9 MG/ML
INJECTION, SOLUTION INTRAVENOUS CONTINUOUS
Status: DISCONTINUED | OUTPATIENT
Start: 2018-07-31 | End: 2018-08-01 | Stop reason: HOSPADM

## 2018-07-31 RX ORDER — LAMIVUDINE 100 MG/1
300 TABLET, FILM COATED ORAL DAILY
Status: DISCONTINUED | OUTPATIENT
Start: 2018-08-01 | End: 2018-08-01 | Stop reason: HOSPADM

## 2018-07-31 RX ORDER — ONDANSETRON 4 MG/1
4 TABLET, ORALLY DISINTEGRATING ORAL EVERY 4 HOURS PRN
Status: DISCONTINUED | OUTPATIENT
Start: 2018-07-31 | End: 2018-08-01 | Stop reason: HOSPADM

## 2018-07-31 RX ORDER — POLYETHYLENE GLYCOL 3350 17 G/17G
1 POWDER, FOR SOLUTION ORAL
Status: DISCONTINUED | OUTPATIENT
Start: 2018-07-31 | End: 2018-08-01 | Stop reason: HOSPADM

## 2018-07-31 RX ORDER — SODIUM CHLORIDE 9 MG/ML
1000 INJECTION, SOLUTION INTRAVENOUS ONCE
Status: COMPLETED | OUTPATIENT
Start: 2018-07-31 | End: 2018-07-31

## 2018-07-31 RX ORDER — OMEPRAZOLE 20 MG/1
20 CAPSULE, DELAYED RELEASE ORAL DAILY
Status: DISCONTINUED | OUTPATIENT
Start: 2018-08-01 | End: 2018-08-01 | Stop reason: HOSPADM

## 2018-07-31 RX ORDER — BISACODYL 10 MG
10 SUPPOSITORY, RECTAL RECTAL
Status: DISCONTINUED | OUTPATIENT
Start: 2018-07-31 | End: 2018-08-01 | Stop reason: HOSPADM

## 2018-07-31 RX ORDER — QUETIAPINE FUMARATE 200 MG/1
400 TABLET, FILM COATED ORAL
Status: DISCONTINUED | OUTPATIENT
Start: 2018-07-31 | End: 2018-08-01 | Stop reason: HOSPADM

## 2018-07-31 RX ADMIN — QUETIAPINE FUMARATE 400 MG: 200 TABLET ORAL at 22:36

## 2018-07-31 RX ADMIN — GABAPENTIN 300 MG: 300 CAPSULE ORAL at 21:08

## 2018-07-31 RX ADMIN — METOPROLOL TARTRATE 12.5 MG: 25 TABLET, FILM COATED ORAL at 21:08

## 2018-07-31 RX ADMIN — SODIUM CHLORIDE: 9 INJECTION, SOLUTION INTRAVENOUS at 21:08

## 2018-07-31 RX ADMIN — TAMSULOSIN HYDROCHLORIDE 0.4 MG: 0.4 CAPSULE ORAL at 21:08

## 2018-07-31 RX ADMIN — STANDARDIZED SENNA CONCENTRATE AND DOCUSATE SODIUM 2 TABLET: 8.6; 5 TABLET, FILM COATED ORAL at 21:08

## 2018-07-31 RX ADMIN — RIVAROXABAN 20 MG: 20 TABLET, FILM COATED ORAL at 21:08

## 2018-07-31 RX ADMIN — SODIUM CHLORIDE 1000 ML: 9 INJECTION, SOLUTION INTRAVENOUS at 17:45

## 2018-07-31 ASSESSMENT — COGNITIVE AND FUNCTIONAL STATUS - GENERAL
SUGGESTED CMS G CODE MODIFIER MOBILITY: CJ
DAILY ACTIVITIY SCORE: 22
SUGGESTED CMS G CODE MODIFIER DAILY ACTIVITY: CJ
MOVING FROM LYING ON BACK TO SITTING ON SIDE OF FLAT BED: A LITTLE
STANDING UP FROM CHAIR USING ARMS: A LITTLE
WALKING IN HOSPITAL ROOM: A LITTLE
MOBILITY SCORE: 20
HELP NEEDED FOR BATHING: A LITTLE
CLIMB 3 TO 5 STEPS WITH RAILING: A LITTLE
DRESSING REGULAR LOWER BODY CLOTHING: A LITTLE

## 2018-07-31 ASSESSMENT — ENCOUNTER SYMPTOMS
LOSS OF CONSCIOUSNESS: 1
FLANK PAIN: 0
CHILLS: 0
COUGH: 0
DIZZINESS: 1
DIAPHORESIS: 0
HALLUCINATIONS: 0
NECK PAIN: 0
EYE DISCHARGE: 0
VOMITING: 0
TREMORS: 0
SHORTNESS OF BREATH: 0
FOCAL WEAKNESS: 0
PALPITATIONS: 0
EYE REDNESS: 0
FEVER: 0
STRIDOR: 0
SPEECH CHANGE: 0
BACK PAIN: 0
DIARRHEA: 0
ABDOMINAL PAIN: 0
WHEEZING: 0
SENSORY CHANGE: 0

## 2018-07-31 ASSESSMENT — LIFESTYLE VARIABLES
TOTAL SCORE: 4
EVER HAD A DRINK FIRST THING IN THE MORNING TO STEADY YOUR NERVES TO GET RID OF A HANGOVER: YES
TOTAL SCORE: 4
HOW MANY TIMES IN THE PAST YEAR HAVE YOU HAD 5 OR MORE DRINKS IN A DAY: 240
CONSUMPTION TOTAL: POSITIVE
TOTAL SCORE: 4
EVER_SMOKED: YES
ON A TYPICAL DAY WHEN YOU DRINK ALCOHOL HOW MANY DRINKS DO YOU HAVE: 5
ALCOHOL_USE: YES
SUBSTANCE_ABUSE: 0
HAVE PEOPLE ANNOYED YOU BY CRITICIZING YOUR DRINKING: YES
AVERAGE NUMBER OF DAYS PER WEEK YOU HAVE A DRINK CONTAINING ALCOHOL: 5
HAVE YOU EVER FELT YOU SHOULD CUT DOWN ON YOUR DRINKING: YES
DOES PATIENT WANT TO STOP DRINKING: NO
EVER FELT BAD OR GUILTY ABOUT YOUR DRINKING: YES

## 2018-07-31 ASSESSMENT — PATIENT HEALTH QUESTIONNAIRE - PHQ9
SUM OF ALL RESPONSES TO PHQ9 QUESTIONS 1 AND 2: 0
1. LITTLE INTEREST OR PLEASURE IN DOING THINGS: NOT AT ALL
2. FEELING DOWN, DEPRESSED, IRRITABLE, OR HOPELESS: NOT AT ALL

## 2018-07-31 ASSESSMENT — PAIN SCALES - GENERAL
PAINLEVEL_OUTOF10: 0
PAINLEVEL_OUTOF10: 0

## 2018-07-31 ASSESSMENT — COPD QUESTIONNAIRES
DO YOU EVER COUGH UP ANY MUCUS OR PHLEGM?: NO/ONLY WITH OCCASIONAL COLDS OR INFECTIONS
IN THE PAST 12 MONTHS DO YOU DO LESS THAN YOU USED TO BECAUSE OF YOUR BREATHING PROBLEMS: DISAGREE/UNSURE
COPD SCREENING SCORE: 5
DURING THE PAST 4 WEEKS HOW MUCH DID YOU FEEL SHORT OF BREATH: SOME OF THE TIME
HAVE YOU SMOKED AT LEAST 100 CIGARETTES IN YOUR ENTIRE LIFE: YES

## 2018-07-31 NOTE — ED TRIAGE NOTES
Pt to rm 20 per ems, c/o dizziness/weakness x 2 days, pt seen at ENT clinic and sent here per ems d/t hypotension, iv fluids admin pta, denies pain, resp even/unlabored, aox4

## 2018-07-31 NOTE — ED PROVIDER NOTES
"ED Provider Note    Scribed for Abe Koroma D.O. by Antohny Talamantes. 7/31/2018  3:38 PM    Primary care provider: Butch Liao M.D.  Means of arrival: Ambulance  History obtained from: Patient  History limited by: None    CHIEF COMPLAINT  Chief Complaint   Patient presents with   • Weakness   • Hypotension       HPI  Sukumar Bolton is a 63 y.o. male who presents to the Emergency Department as a transfer from an ENT office for evaluation of intermittent syncopal episodes onset 2-3 months ago. The patient reports that about a minute after standing up, he feels associated weakness and dizziness with no warning signs, resulting in syncopal episodes and falls. The patient denies hitting his head with any one of his many falls but when asked if he remembers the falls he reports \"I don't remember\". He confirms that he is taking Xarelto for atrial fibrillation. The patient reports that when these syncopal episodes occur, he is unconscious for a short amount of time. Per patient, his last fall was last night which resulted in an abrasion on his right knee. He states that he drinks about 2-3 alcoholic beverages every day to aid in sleep but denies being drunk when he falls. No exacerbating or alleviating factors reported at this time. He denies experiencing numbness, tingling, headaches, fevers, chest pain, palpitations, abdominal pain, constipation, tarry stools, blurry vision, dysuria, chills, weight gain/loss, and drug use. He has a history of HIV and receives treatment at [facility].     REVIEW OF SYSTEMS  Pertinent positives include syncopal episodes, weakness, dizziness, falls. Pertinent negatives include no numbness, tingling, headaches, fevers, chest pain, palpitations, abdominal pain, constipation, melena, blurry vision, dysuria, chills, weight gain/loss, and drug use .  All other systems reviewed and negative.  C.     PAST MEDICAL HISTORY  Past Medical History:   Diagnosis Date   • " Atrial fibrillation (HCC) 2018   • Cataract     sx 1999   • HIV (human immunodeficiency virus infection) ([facility])    • Hypertension    • Kidney disease        SURGICAL HISTORY  Past Surgical History:   Procedure Laterality Date   • IRRIGATION & DEBRIDEMENT ORTHO Right 6/4/2018    Procedure: IRRIGATION & DEBRIDEMENT ORTHO- ANKLE;  Surgeon: Ulices Berry M.D.;  [facility];  Service: Orthopedics   • FIBULA ORIF Right 5/21/2018    Procedure: FIBULA ORIF-FOR NON UNION REPAIR WITH BONE GRAFT;  Surgeon: Ulices Berry M.D.;  Location[facility];  Service: Orthopedics   • ANKLE ARTHROSCOPY Right 5/21/2018    Procedure: ANKLE ARTHROSCOPY;  Surgeon: Ulices Berry M.D.;  Location: [facility];  Service: Orthopedics   • OTHER ORTHOPEDIC SURGERY  2008    bilateral shoulder sx   • OTHER ORTHOPEDIC SURGERY  2003    left ankle sx        SOCIAL HISTORY  Social History   Substance Use Topics   • Smoking status: Former Smoker     Types: Cigarettes     Quit date: 2/14/2018   • Smokeless tobacco: Current User   • Alcohol use 0.0 oz/week      Comment: 2 drinks a day      History   Drug Use   • Types: Marijuana, Inhaled     Comment: marijuana, last  use 5/2018       FAMILY HISTORY  None reported.     CURRENT MEDICATIONS  Home Medications     Reviewed by Chris Mccollum R.N. (Registered Nurse) on 07/31/18 at 1449  Med List Status: Partial   Medication Last Dose Status   Abacavir-Dolutegravir-Lamivud (TRIUMEQ) 600- MG Tab 6/3/2018 Active   B Complex Vitamins (B COMPLEX 1 PO) 6/3/2018 Active   ferrous sulfate 325 (65 Fe) MG tablet 6/3/2018 Active   gabapentin (NEURONTIN) 300 MG Cap 6/3/2018 Active   metoprolol (LOPRESSOR) 25 MG Tab 6/3/2018 Active   omeprazole (PRILOSEC) 20 MG delayed-release capsule 6/3/2018 Active   ondansetron (ZOFRAN ODT) 4 MG TABLET DISPERSIBLE 6/3/2018 Active   quetiapine (SEROQUEL) 400 MG tablet 6/3/2018 Active   rivaroxaban (XARELTO) 20 MG Tab tablet >5  days Active   tamsulosin (FLOMAX) 0.4 MG capsule 6/3/2018 Active   vitamin e (VITAMIN E) 400 UNIT Cap >5 days Active                ALLERGIES  Allergies   Allergen Reactions   • Sulfa Drugs Itching     Rxn - 1970's         PHYSICAL EXAM  VITAL SIGNS: BP (!) 80/56   Pulse 71   Temp 36.2 °C (97.1 °F)   Resp (!) 1   Ht 1.829 m (6')   Wt 63.5 kg (140 lb)   SpO2 95%   BMI 18.99 kg/m²     Nursing notes and vitals reviewed.  Constitutional: Well developed, Well nourished, No acute distress, Non-toxic appearance.   Eyes: PERRLA, EOMI, Conjunctiva normal, No discharge.   Cardiovascular: Normal heart rate, Normal rhythm, No murmurs, No rubs, No gallops.   Thorax & Lungs: No respiratory distress, No rales, No rhonchi, No wheezing, No chest tenderness.   Abdomen: Bowel sounds normal, Soft, No tenderness, No guarding, No rebound, No masses, No pulsatile masses.   Skin: Warm, Dry, No erythema, No rash.   Musculoskeletal: Intact distal pulses, No edema, No cyanosis, No clubbing. Good range of motion in all major joints. No tenderness to palpation or major deformities noted, no CVA tenderness, no midline back tenderness.   Neurologic:  Alert & oriented to month and age, Normal cognition, Cranial nerves II-XII are intact, No slurred speech, Negative finger to nose bilaterally, No pronator drift bilaterally,   strength 5/5 bilaterally, Leg raise strength 5/5 bilaterally, Plantarflexion strength 5/5 bilaterally, Dorsiflexion strength 5/5 bilaterally, Deep tendon reflexes 2/4 upper and lower extremities bilaterally, Sensation intact throughout, No Nystagmus.  Psychiatric: Affect normal for clinical presentation.    DIAGNOSTIC STUDIES/PROCEDURES    LABS  Results for orders placed or performed during the hospital encounter of 07/31/18   Troponin   Result Value Ref Range    Troponin I <0.01 0.00 - 0.04 ng/mL   Btype Natriuretic Peptide   Result Value Ref Range    B Natriuretic Peptide 26 0 - 100 pg/mL   CBC with Differential    Result Value Ref Range    WBC 3.1 (L) 4.8 - 10.8 K/uL    RBC 3.65 (L) 4.70 - 6.10 M/uL    Hemoglobin 12.6 (L) 14.0 - 18.0 g/dL    Hematocrit 36.7 (L) 42.0 - 52.0 %    .5 (H) 81.4 - 97.8 fL    MCH 34.5 (H) 27.0 - 33.0 pg    MCHC 34.3 33.7 - 35.3 g/dL    RDW 48.1 35.9 - 50.0 fL    Platelet Count 256 164 - 446 K/uL    MPV 9.1 9.0 - 12.9 fL    Neutrophils-Polys 38.20 (L) 44.00 - 72.00 %    Lymphocytes 39.50 22.00 - 41.00 %    Monocytes 18.80 (H) 0.00 - 13.40 %    Eosinophils 2.20 0.00 - 6.90 %    Basophils 1.00 0.00 - 1.80 %    Immature Granulocytes 0.30 0.00 - 0.90 %    Nucleated RBC 0.00 /100 WBC    Neutrophils (Absolute) 1.20 (L) 1.82 - 7.42 K/uL    Lymphs (Absolute) 1.24 1.00 - 4.80 K/uL    Monos (Absolute) 0.59 0.00 - 0.85 K/uL    Eos (Absolute) 0.07 0.00 - 0.51 K/uL    Baso (Absolute) 0.03 0.00 - 0.12 K/uL    Immature Granulocytes (abs) 0.01 0.00 - 0.11 K/uL    NRBC (Absolute) 0.00 K/uL   Complete Metabolic Panel (CMP)   Result Value Ref Range    Sodium 135 135 - 145 mmol/L    Potassium 4.4 3.6 - 5.5 mmol/L    Chloride 100 96 - 112 mmol/L    Co2 23 20 - 33 mmol/L    Anion Gap 12.0 (H) 0.0 - 11.9    Glucose 114 (H) 65 - 99 mg/dL    Bun 13 8 - 22 mg/dL    Creatinine 1.39 0.50 - 1.40 mg/dL    Calcium 9.3 8.5 - 10.5 mg/dL    AST(SGOT) 23 12 - 45 U/L    ALT(SGPT) 77 (H) 2 - 50 U/L    Alkaline Phosphatase 77 30 - 99 U/L    Total Bilirubin 0.4 0.1 - 1.5 mg/dL    Albumin 4.1 3.2 - 4.9 g/dL    Total Protein 7.1 6.0 - 8.2 g/dL    Globulin 3.0 1.9 - 3.5 g/dL    A-G Ratio 1.4 g/dL   Prothrombin Time   Result Value Ref Range    PT 19.1 (H) 12.0 - 14.6 sec    INR 1.64 (H) 0.87 - 1.13   APTT   Result Value Ref Range    APTT 42.2 (H) 24.7 - 36.0 sec   Lipase   Result Value Ref Range    Lipase 13 11 - 82 U/L   ESTIMATED GFR   Result Value Ref Range    GFR If African American >60 >60 mL/min/1.73 m 2    GFR If Non  51 (A) >60 mL/min/1.73 m 2   MAGNESIUM   Result Value Ref Range    Magnesium 2.1 1.5 - 2.5  mg/dL   PHOSPHORUS   Result Value Ref Range    Phosphorus 3.3 2.5 - 4.5 mg/dL   DIAGNOSTIC ALCOHOL   Result Value Ref Range    Diagnostic Alcohol 0.13 (H) 0.00 g/dL   FOLATE   Result Value Ref Range    Folate -Folic Acid >24.0 >4.0 ng/mL   VITAMIN B12   Result Value Ref Range    Vitamin B12 -True Cobalamin 915 (H) 211 - 911 pg/mL   EKG (ER)   Result Value Ref Range    Report       [facility] Emergency Dept.    Test Date:  2018  Pt Name:    ETTA LUZ                Department: ER  MRN:        [id number]                      Room:        20  Gender:     Male                         Technician: [id number]  :        1955                   Requested By:ER TRIAGE PROTOCOL  Order #:    [id number]                    Reading MD: CANDICE BILLY DO    Measurements  Intervals                                Axis  Rate:       68                           P:          34  ND:         160                          QRS:        16  QRSD:       88                           T:          33  QT:         432  QTc:        460    Interpretive Statements  SINUS RHYTHM  LOW VOLTAGE THROUGHOUT  EARLY PRECORDIAL R/S TRANSITION  Compared to ECG 2018 11:00:33  No significant changes    Electronically Signed On 2018 17:27:11 PDT by CANDICE BILLY DO         All labs reviewed by me.        RADIOLOGY  CT-HEAD W/O   Final Result      No acute intracranial abnormality is identified.      Atrophy      There are periventricular and subcortical white matter changes present.  This finding is nonspecific and could be from previous small vessel ischemia, demyelination, or gliosis.         DX-CHEST-LIMITED (1 VIEW)   Final Result      No pulmonary consolidation.   Left rib fractures acuity uncertain.        The radiologist's interpretation of all radiological studies have been reviewed by me.    COURSE & MEDICAL DECISION MAKING  Pertinent Labs & Imaging studies reviewed. (See chart for details)    3:38  PM - Patient seen and examined at bedside. Ordered Dx-chest, Estimated GFR, Troponin, BNP, CBC, CMP, Prothrombin time, APTT, Lipase, and EKG to evaluate his symptoms. I informed the patient of the plan of care including the tests we will run and admission to the hospital for further observation secondary due to his regular use of anticoagulants. The patient understands and agrees to plan of care.     This is a 63 y.o. male that presents with multiple syncopal episodes. The patient a CT scan of the brain secondary to his profound syncope. The patient is a negative CT scan for internal hemorrhage. In addition he does not have ectopy as EKGs as as prolonged QTC, Brugada syndrome, WPW, A. fib with RVR. The patient does have evidence of elevated alcohol and he believes is probably continuing to his falls. In addition is macrocytic anemia. The patient will be admitted to Dr. Medrano for further evaluation and management a single episodes. I do not please had a CVA, TIA or any other significant intracranial abnormality requiring emergent neurological consultation.    5:03 PM I reviewed the patient's diagnostic results. His MAR is .13. No other significant abnormalities noted.     5:28 PM Consult with Dr. Scott (Hospitalist) who agrees to admit the patient.     5:34 PM Patient reevaluated at bedside. I updated the patient on his diagnostic results and updated him on the plan of admission. The patient understands and agrees.       DISPOSITION:  Patient will be admitted to Dr. Scott (Hospitalist) in guarded.  condition.      FINAL IMPRESSION  Syncope  Alcohol intoxication     Anthony VELEZ (Ana), am scribing for, and in the presence of, Abe Koroma D.O    Electronically signed by: Anthony Talamantes (Ana), 7/31/2018    Abe VELEZ D.O. personally performed the services described in this documentation, as scribed by Anthony Talamantes in my presence, and it is both accurate and  complete.    The note accurately reflects work and decisions made by me.  Abe Koroma  7/31/2018  10:16 PM

## 2018-08-01 VITALS
BODY MASS INDEX: 19.02 KG/M2 | WEIGHT: 140.43 LBS | TEMPERATURE: 98.3 F | RESPIRATION RATE: 17 BRPM | SYSTOLIC BLOOD PRESSURE: 136 MMHG | HEART RATE: 64 BPM | OXYGEN SATURATION: 92 % | DIASTOLIC BLOOD PRESSURE: 87 MMHG | HEIGHT: 72 IN

## 2018-08-01 PROBLEM — S93.401A SPRAIN OF RIGHT ANKLE: Status: ACTIVE | Noted: 2018-08-01

## 2018-08-01 PROBLEM — R55 SYNCOPE: Status: RESOLVED | Noted: 2018-07-31 | Resolved: 2018-08-01

## 2018-08-01 PROBLEM — I95.9 HYPOTENSION: Status: RESOLVED | Noted: 2018-07-31 | Resolved: 2018-08-01

## 2018-08-01 PROBLEM — N17.9 AKI (ACUTE KIDNEY INJURY) (HCC): Status: RESOLVED | Noted: 2018-04-03 | Resolved: 2018-08-01

## 2018-08-01 LAB
ANION GAP SERPL CALC-SCNC: 5 MMOL/L (ref 0–11.9)
BUN SERPL-MCNC: 16 MG/DL (ref 8–22)
CALCIUM SERPL-MCNC: 8.6 MG/DL (ref 8.5–10.5)
CHLORIDE SERPL-SCNC: 108 MMOL/L (ref 96–112)
CO2 SERPL-SCNC: 26 MMOL/L (ref 20–33)
CORTIS SERPL-MCNC: 7.2 UG/DL (ref 0–23)
CREAT SERPL-MCNC: 1.18 MG/DL (ref 0.5–1.4)
ERYTHROCYTE [DISTWIDTH] IN BLOOD BY AUTOMATED COUNT: 48.3 FL (ref 35.9–50)
GLUCOSE SERPL-MCNC: 84 MG/DL (ref 65–99)
HCT VFR BLD AUTO: 32 % (ref 42–52)
HGB BLD-MCNC: 10.6 G/DL (ref 14–18)
MCH RBC QN AUTO: 33.7 PG (ref 27–33)
MCHC RBC AUTO-ENTMCNC: 33.1 G/DL (ref 33.7–35.3)
MCV RBC AUTO: 101.6 FL (ref 81.4–97.8)
PLATELET # BLD AUTO: 229 K/UL (ref 164–446)
PMV BLD AUTO: 9 FL (ref 9–12.9)
POTASSIUM SERPL-SCNC: 4.2 MMOL/L (ref 3.6–5.5)
RBC # BLD AUTO: 3.15 M/UL (ref 4.7–6.1)
SODIUM SERPL-SCNC: 139 MMOL/L (ref 135–145)
WBC # BLD AUTO: 4.5 K/UL (ref 4.8–10.8)

## 2018-08-01 PROCEDURE — 80048 BASIC METABOLIC PNL TOTAL CA: CPT

## 2018-08-01 PROCEDURE — 85027 COMPLETE CBC AUTOMATED: CPT

## 2018-08-01 PROCEDURE — G8979 MOBILITY GOAL STATUS: HCPCS | Mod: CI

## 2018-08-01 PROCEDURE — G0378 HOSPITAL OBSERVATION PER HR: HCPCS

## 2018-08-01 PROCEDURE — 700102 HCHG RX REV CODE 250 W/ 637 OVERRIDE(OP): Performed by: HOSPITALIST

## 2018-08-01 PROCEDURE — 82533 TOTAL CORTISOL: CPT

## 2018-08-01 PROCEDURE — A9270 NON-COVERED ITEM OR SERVICE: HCPCS | Performed by: HOSPITALIST

## 2018-08-01 PROCEDURE — G8980 MOBILITY D/C STATUS: HCPCS | Mod: CI

## 2018-08-01 PROCEDURE — 99217 PR OBSERVATION CARE DISCHARGE: CPT | Performed by: HOSPITALIST

## 2018-08-01 PROCEDURE — 97161 PT EVAL LOW COMPLEX 20 MIN: CPT

## 2018-08-01 PROCEDURE — G8978 MOBILITY CURRENT STATUS: HCPCS | Mod: CI

## 2018-08-01 PROCEDURE — 36415 COLL VENOUS BLD VENIPUNCTURE: CPT

## 2018-08-01 PROCEDURE — 700105 HCHG RX REV CODE 258: Performed by: HOSPITALIST

## 2018-08-01 RX ADMIN — GABAPENTIN 300 MG: 300 CAPSULE ORAL at 13:53

## 2018-08-01 RX ADMIN — DOLUTEGRAVIR SODIUM 50 MG: 50 TABLET, FILM COATED ORAL at 05:34

## 2018-08-01 RX ADMIN — OMEPRAZOLE 20 MG: 20 CAPSULE, DELAYED RELEASE ORAL at 05:34

## 2018-08-01 RX ADMIN — ABACAVIR 600 MG: 300 TABLET, FILM COATED ORAL at 05:34

## 2018-08-01 RX ADMIN — TAMSULOSIN HYDROCHLORIDE 0.4 MG: 0.4 CAPSULE ORAL at 09:41

## 2018-08-01 RX ADMIN — SODIUM CHLORIDE: 9 INJECTION, SOLUTION INTRAVENOUS at 05:36

## 2018-08-01 RX ADMIN — LAMIVUDINE 300 MG: 100 TABLET, FILM COATED ORAL at 05:33

## 2018-08-01 RX ADMIN — METOPROLOL TARTRATE 12.5 MG: 25 TABLET, FILM COATED ORAL at 05:35

## 2018-08-01 RX ADMIN — Medication 325 MG: at 05:34

## 2018-08-01 RX ADMIN — GABAPENTIN 300 MG: 300 CAPSULE ORAL at 05:35

## 2018-08-01 ASSESSMENT — COGNITIVE AND FUNCTIONAL STATUS - GENERAL
SUGGESTED CMS G CODE MODIFIER MOBILITY: CH
MOBILITY SCORE: 24

## 2018-08-01 ASSESSMENT — GAIT ASSESSMENTS
DEVIATION: ANTALGIC;STEP TO;DECREASED HEEL STRIKE
GAIT LEVEL OF ASSIST: MODIFIED INDEPENDENT
DISTANCE (FEET): 400

## 2018-08-01 ASSESSMENT — PAIN SCALES - GENERAL
PAINLEVEL_OUTOF10: 0

## 2018-08-01 NOTE — THERAPY
"Physical Therapy Evaluation completed.   Bed Mobility:  Supine to Sit: Independent  Transfers: Sit to Stand: Independent  Gait: Level Of Assist: Modified Independent with No Equipment Needed       Plan of Care: Patient with no further skilled PT needs in the acute care setting at this time  Discharge Recommendations: Equipment: No Equipment Needed.     See \"Rehab Therapy-Acute\" Patient Summary Report for complete documentation.     Pt presented to PT for primary risk reduction for LOB/falling due to dizziness/syncope and recent R ankle sprain. Pt was able to demonstrate SPV to Mod I for all functional mobility at this time with no AD use. Pt is currenlty wearing a R boot for the R ankle sprain and was reportedly told to wear it for 1 week per his orthopedic physcian. No kayla LOB, dizziness, or lightheadedness was noted during functional mobility and was able to demonstrate an appropriate BP during functional activites. Pt was educated on resting, icing, compression, and elevation for R ankle sprain and benefits of outpatient PT. Pt appears to be near functional baseline with balance and ambulation at this time and is in no need for acute skilled PT needs at this time. Anticipate pt to d/c home with recommendation for outpatient physical therapy to address R ankle sprain.   "

## 2018-08-01 NOTE — ED NOTES
Med rec updated and complete.  Allergies reviewed.  Pt denies antibiotic use in last 30 days.  Pt did not take any medications today.

## 2018-08-01 NOTE — DISCHARGE SUMMARY
Discharge Summary    CHIEF COMPLAINT ON ADMISSION  Chief Complaint   Patient presents with   • Weakness   • Hypotension       Reason for Admission  EMS     Admission Date  7/31/2018    CODE STATUS  Full Code    HPI & HOSPITAL COURSE  This is a 63 y.o. male here with dizziness, hypotension, and intermittent syncope over the last several months. He was being evaluated by ENT today for an outpatient appt when he was found to have a SBP in the 70's. More recently he had a fall that resulted in a sprain of his right ankle which is being followed at [facility]. He was found to be hypotensive on initial evaluation in the ED but responded well to IVF boluses. He denied any other associated symptoms but does endorse poor nutritional intake and 2-3 vodka drinks daily. Cr was also noted to be elevated above his baseline but this improved with IVF. Orthostatic VS negative but was obtained s/p fluid boluses. He appeared hypovolemic on examination. He was monitored on tele overnight and metop was decreased to 12.5mg BID, although there was no bradycardia noted on tele. His symptoms improved and he worked well with PT who recommended outpatient PT. A script was provided for this and he was eager for d/c home.       Therefore, he is discharged in good and stable condition to home with close outpatient follow-up.      Discharge Date  8/1/2018    FOLLOW UP ITEMS POST DISCHARGE  Follow up with cardiology and titrate mediations as needed. Metoprolol was decreased to half the dose.    DISCHARGE DIAGNOSES  Principal Problem (Resolved):    Hypotension POA: Yes  Active Problems:    HIV (human immunodeficiency virus infection) (HCC) POA: Yes    Macrocytic anemia POA: Yes    Chronic atrial fibrillation (HCC) POA: Yes    Alcohol abuse POA: Yes  Resolved Problems:    Syncope POA: Yes    TENZIN (acute kidney injury) (HCC) POA: Yes      FOLLOW UP  Future Appointments  Date Time Provider Department Center   10/22/2018 11:30 AM Butch Martínez M.D.  [facility] None     Butch Liao M.D.  [address]  [place] [address]  [phone]    In 1 week  Discuss hopsital stay. Follow up with outpt Physical therapy 3x a week.      MEDICATIONS ON DISCHARGE     Medication List      CHANGE how you take these medications      Instructions   metoprolol 25 MG Tabs  What changed:  how much to take  Commonly known as:  LOPRESSOR   Take 0.5 Tabs by mouth 2 times a day.  Dose:  12.5 mg        CONTINUE taking these medications      Instructions   B COMPLEX 1 PO   Take 1 Tab by mouth every day.  Dose:  1 Tab     ferrous sulfate 325 (65 Fe) MG tablet   Take 325 mg by mouth every day.  Dose:  325 mg     gabapentin 300 MG Caps  Commonly known as:  NEURONTIN   Take 300 mg by mouth 3 times a day.  Dose:  300 mg     omeprazole 20 MG delayed-release capsule  Commonly known as:  PRILOSEC   Take 20 mg by mouth every day.  Dose:  20 mg     rivaroxaban 20 MG Tabs tablet  Commonly known as:  XARELTO   Take 1 Tab by mouth with dinner.  Dose:  20 mg     SEROQUEL 400 MG tablet  Generic drug:  quetiapine   Take 400 mg by mouth every bedtime.  Dose:  400 mg     tamsulosin 0.4 MG capsule  Commonly known as:  FLOMAX   Take 0.4 mg by mouth ONE-HALF HOUR AFTER BREAKFAST.  Dose:  0.4 mg     TRIUMEQ 600- MG Tabs  Generic drug:  Abacavir-Dolutegravir-Lamivud   Take 1 Tab by mouth every day.  Dose:  1 Tab            Allergies  Allergies   Allergen Reactions   • Sulfa Drugs Itching     Rxn - 1970's         DIET  Orders Placed This Encounter   Procedures   • Diet Order Cardiac     Standing Status:   Standing     Number of Occurrences:   1     Order Specific Question:   Diet:     Answer:   Cardiac [6]       ACTIVITY  As tolerated.  Weight bearing as tolerated    CONSULTATIONS  None    PROCEDURES  CT-HEAD W/O   Final Result      No acute intracranial abnormality is identified.      Atrophy      There are periventricular and subcortical white matter changes present.  This finding is nonspecific  and could be from previous small vessel ischemia, demyelination, or gliosis.         DX-CHEST-LIMITED (1 VIEW)   Final Result      No pulmonary consolidation.   Left rib fractures acuity uncertain.            LABORATORY  Lab Results   Component Value Date    SODIUM 139 08/01/2018    POTASSIUM 4.2 08/01/2018    CHLORIDE 108 08/01/2018    CO2 26 08/01/2018    GLUCOSE 84 08/01/2018    BUN 16 08/01/2018    CREATININE 1.18 08/01/2018        Lab Results   Component Value Date    WBC 4.5 (L) 08/01/2018    HEMOGLOBIN 10.6 (L) 08/01/2018    HEMATOCRIT 32.0 (L) 08/01/2018    PLATELETCT 229 08/01/2018        Total time of the discharge process exceeds 39 minutes.

## 2018-08-01 NOTE — DISCHARGE INSTRUCTIONS
Discharge Instructions    Discharged to home by taxi with escort. Discharged via wheelchair, hospital escort: Yes.  Special equipment needed: Boot    Be sure to schedule a follow-up appointment with your primary care doctor or any specialists as instructed.     Discharge Plan:   Pneumococcal Vaccine Administered/Refused: Not given - Patient refused pneumococcal vaccine  Influenza Vaccine Indication: Patient Refuses, Not indicated: Previously immunized this influenza season and > 8 years of age    I understand that a diet low in cholesterol, fat, and sodium is recommended for good health. Unless I have been given specific instructions below for another diet, I accept this instruction as my diet prescription.   Other diet: Cardiac    Special Instructions: N/A    · Is patient discharged on Warfarin / Coumadin?   No     Depression / Suicide Risk    As you are discharged from this [facility] facility, it is important to learn how to keep safe from harming yourself.    Recognize the warning signs:  · Abrupt changes in personality, positive or negative- including increase in energy   · Giving away possessions  · Change in eating patterns- significant weight changes-  positive or negative  · Change in sleeping patterns- unable to sleep or sleeping all the time   · Unwillingness or inability to communicate  · Depression  · Unusual sadness, discouragement and loneliness  · Talk of wanting to die  · Neglect of personal appearance   · Rebelliousness- reckless behavior  · Withdrawal from people/activities they love  · Confusion- inability to concentrate     If you or a loved one observes any of these behaviors or has concerns about self-harm, here's what you can do:  · Talk about it- your feelings and reasons for harming yourself  · Remove any means that you might use to hurt yourself (examples: pills, rope, extension cords, firearm)  · Get professional help from the community (Mental Health, Substance Abuse, psychological  counseling)  · Do not be alone:Call your Safe Contact- someone whom you trust who will be there for you.  · Call your local CRISIS HOTLINE 975-6033 or 506-601-1437  · Call your local Children's Mobile Crisis Response Team [place] (718) 411-9344 or www.[employer]  · Call the toll free National Suicide Prevention Hotlines   · National Suicide Prevention Lifeline 293-535-ZASB (8718)  · [facility] [facility] Network 800-SUICIDE (244-6628)      Hypotension  As your heart beats, it forces blood through your body. This force is called blood pressure. If you have hypotension, you have low blood pressure. When your blood pressure is too low, you may not get enough blood to your brain. You may feel weak, feel light-headed, have a fast heartbeat, or even pass out (faint).  Follow these instructions at home:  Eating and drinking  · Drink enough fluids to keep your pee (urine) clear or pale yellow.  · Eat a healthy diet, and follow instructions from your doctor about eating or drinking restrictions. A healthy diet includes:  ¨ Fresh fruits and vegetables.  ¨ Whole grains.  ¨ Low-fat (lean) meats.  ¨ Low-fat dairy products.  · Eat extra salt only as told. Do not add extra salt to your diet unless your doctor tells you to.  · Eat small meals often.  · Avoid standing up quickly after you eat.  Medicines  · Take over-the-counter and prescription medicines only as told by your doctor.  ¨ Follow instructions from your doctor about changing how much you take (the dosage) of your medicines, if this applies.  ¨ Do not stop or change your medicine on your own.  General instructions  · Wear compression stockings as told by your doctor.  · Get up slowly from lying down or sitting.  · Avoid hot showers and a lot of heat as told by your doctor.  · Return to your normal activities as told by your doctor. Ask what activities are safe for you.  · Do not use any products that contain nicotine or tobacco, such as cigarettes and  e-cigarettes. If you need help quitting, ask your doctor.  · Keep all follow-up visits as told by your doctor. This is important.  Contact a doctor if:  · You throw up (vomit).  · You have watery poop (diarrhea).  · You have a fever for more than 2-3 days.  · You feel more thirsty than normal.  · You feel weak and tired.  Get help right away if:  · You have chest pain.  · You have a fast or irregular heartbeat.  · You lose feeling (get numbness) in any part of your body.  · You cannot move your arms or your legs.  · You have trouble talking.  · You get sweaty or feel light-headed.  · You faint.  · You have trouble breathing.  · You have trouble staying awake.  · You feel confused.  This information is not intended to replace advice given to you by your health care provider. Make sure you discuss any questions you have with your health care provider.  Document Released: 03/14/2011 Document Revised: 09/05/2017 Document Reviewed: 09/05/2017  [employer] Interactive Patient Education © 2017 Elsevier Inc.    Metoprolol tablets  What is this medicine?  METOPROLOL (me TOE proe lole) is a beta-blocker. Beta-blockers reduce the workload on the heart and help it to beat more regularly. This medicine is used to treat high blood pressure and to prevent chest pain. It is also used to after a heart attack and to prevent an additional heart attack from occurring.  This medicine may be used for other purposes; ask your health care provider or pharmacist if you have questions.  COMMON BRAND NAME(S): Lopressor  What should I tell my health care provider before I take this medicine?  They need to know if you have any of these conditions:  -diabetes  -heart or vessel disease like slow heart rate, worsening heart failure, heart block, sick sinus syndrome or Raynaud's disease  -kidney disease  -liver disease  -lung or breathing disease, like asthma or emphysema  -pheochromocytoma  -thyroid disease  -an unusual or allergic reaction to  metoprolol, other beta-blockers, medicines, foods, dyes, or preservatives  -pregnant or trying to get pregnant  -breast-feeding  How should I use this medicine?  Take this medicine by mouth with a drink of water. Follow the directions on the prescription label. Take this medicine immediately after meals. Take your doses at regular intervals. Do not take more medicine than directed. Do not stop taking this medicine suddenly. This could lead to serious heart-related effects.  Talk to your pediatrician regarding the use of this medicine in children. Special care may be needed.  Overdosage: If you think you have taken too much of this medicine contact a poison control center or emergency room at once.  NOTE: This medicine is only for you. Do not share this medicine with others.  What if I miss a dose?  If you miss a dose, take it as soon as you can. If it is almost time for your next dose, take only that dose. Do not take double or extra doses.  What may interact with this medicine?  This medicine may interact with the following medications:  -certain medicines for blood pressure, heart disease, irregular heart beat  -certain medicines for depression like monoamine oxidase (MAO) inhibitors, fluoxetine, or paroxetine  -clonidine  -dobutamine  -epinephrine  -isoproterenol  -reserpine  This list may not describe all possible interactions. Give your health care provider a list of all the medicines, herbs, non-prescription drugs, or dietary supplements you use. Also tell them if you smoke, drink alcohol, or use illegal drugs. Some items may interact with your medicine.  What should I watch for while using this medicine?  Visit your doctor or health care professional for regular check ups. Contact your doctor right away if your symptoms worsen. Check your blood pressure and pulse rate regularly. Ask your health care professional what your blood pressure and pulse rate should be, and when you should contact them.  You may get  drowsy or dizzy. Do not drive, use machinery, or do anything that needs mental alertness until you know how this medicine affects you. Do not sit or stand up quickly, especially if you are an older patient. This reduces the risk of dizzy or fainting spells. Contact your doctor if these symptoms continue. Alcohol may interfere with the effect of this medicine. Avoid alcoholic drinks.  What side effects may I notice from receiving this medicine?  Side effects that you should report to your doctor or health care professional as soon as possible:  -allergic reactions like skin rash, itching or hives  -cold or numb hands or feet  -depression  -difficulty breathing  -faint  -fever with sore throat  -irregular heartbeat, chest pain  -rapid weight gain  -swollen legs or ankles  Side effects that usually do not require medical attention (report to your doctor or health care professional if they continue or are bothersome):  -anxiety or nervousness  -change in sex drive or performance  -dry skin  -headache  -nightmares or trouble sleeping  -short term memory loss  -stomach upset or diarrhea  -unusually tired  This list may not describe all possible side effects. Call your doctor for medical advice about side effects. You may report side effects to FDA at 6-667-FDA-3518.  Where should I keep my medicine?  Keep out of the reach of children.  Store at room temperature between 15 and 30 degrees C (59 and 86 degrees F). Throw away any unused medicine after the expiration date.  NOTE: This sheet is a summary. It may not cover all possible information. If you have questions about this medicine, talk to your doctor, pharmacist, or health care provider.  © 2018 Elsevier/Gold Standard (2014-08-22 14:40:36)

## 2018-08-01 NOTE — CARE PLAN
Problem: Communication  Goal: The ability to communicate needs accurately and effectively will improve  Outcome: PROGRESSING AS EXPECTED  Discussed plan of care, reviewed meds with pt, encouraged pt to voice any questions and/or concerns regarding care.       Problem: Safety  Goal: Will remain free from falls  Outcome: PROGRESSING AS EXPECTED  Assessed fall risk, fall precautions initiated. Educated patient on use of call light, no slip socks on, bed lowest position. All needs attended to. Patient verbalized understanding. Pt calls appropriately.      Problem: Knowledge Deficit  Goal: Knowledge of disease process/condition, treatment plan, diagnostic tests, and medications will improve  Outcome: PROGRESSING AS EXPECTED  Patient is educated of disease process and condition. Treatment team has included patient in plan of care. All medications indications and side effects are explained. Patient is encouraged to ask questions. Patient indicates understanding.

## 2018-08-01 NOTE — PROGRESS NOTES
Dc orders received. Copies of discharge instructions and education provided to pt. All questions and needs met. Tele box and IV removed. Pt escorted down to entrance.

## 2018-08-01 NOTE — CARE PLAN
Problem: Safety  Goal: Will remain free from falls    Intervention: Implement fall precautions  Will do hourly purposeful rounding, bed alarm and strip alarm will be engaged.

## 2018-08-01 NOTE — ED NOTES
Pt in nad. resp equal and unlabored. Pt denies any current cc. Pt reports symptoms improved at this time. Pt provided box lunch per request. Pt updated with plan of care, awaiting admit md to see. Pt denies any other needs. Denies pain. Will continue to monitor.

## 2018-08-01 NOTE — PROGRESS NOTES
Received report from night shift RNNaomi. Discussed plan of care, updated white board. Pt is resting in bed, alert and oriented x4. Pt denies any pain or distress at this time. Fall precautions in place. All needs met. Bed in lowest position. Call light within reach. Will continue to monitor.

## 2018-08-01 NOTE — PROGRESS NOTES
2 RN SKIN CHECK    BILATERAL UPPER ARM SCANT INTACT SCABS  COCCYX SMALL AREA BLANCHABLE REDNESS NOTED  RLE EDEMA, BRUISING NOTED. SKIN INTACT

## 2018-08-01 NOTE — H&P
Hospital Medicine History & Physical Note    Date of Service  7/31/2018    Primary Care Physician  Butch Liao M.D.    Consultants  None     Code Status  Full     Chief Complaint    Dizziness with low blood pressure    History of Presenting Illness  63 y.o. Male HIV, alcohol abuse who presented 7/31/2018 with dizziness.  Patient has had intermittent dizziness with syncope over the past couple months.  Who is evaluated by ENT today referred to ER due to stop blood pressure in the 70s.  Reports loss of consciousness with his syncopal episodes.  Had resulted in right ankle sprain and foot fracture which she wears a boot and is to follow-up with [facility].  In ER patient had initial blood pressure 80/56 was given IV fluid boluses with curtain recovery systolic blood pressure 152.  Reports no chest pain shortness breath no palpitations no nausea vomiting or diarrhea.  On average drinks 2-3 vodkas daily.      Review of Systems  Review of Systems   Constitutional: Negative for chills, diaphoresis, fever and malaise/fatigue.   HENT: Negative for congestion.    Eyes: Negative for discharge and redness.   Respiratory: Negative for cough, shortness of breath, wheezing and stridor.    Cardiovascular: Negative for chest pain, palpitations and leg swelling.   Gastrointestinal: Negative for abdominal pain, diarrhea and vomiting.   Genitourinary: Negative for flank pain and hematuria.   Musculoskeletal: Negative for back pain, joint pain and neck pain.   Neurological: Positive for dizziness and loss of consciousness. Negative for tremors, sensory change, speech change and focal weakness.   Psychiatric/Behavioral: Negative for hallucinations and substance abuse.       Past Medical History   has a past medical history of Atrial fibrillation ([facility]) (2018); Cataract; HIV (human immunodeficiency virus infection) ([facility]); Hypertension; and Kidney disease.    Surgical History   has a past surgical history that  includes other orthopedic surgery (2008); other orthopedic surgery (2003); fibula orif (Right, 5/21/2018); ankle arthroscopy (Right, 5/21/2018); and irrigation & debridement ortho (Right, 6/4/2018).     Family History  family history is not on file.     Social History   reports that he quit smoking about 5 months ago. His smoking use included Cigarettes. He uses smokeless tobacco. He reports that he drinks alcohol. He reports that he uses drugs, including Marijuana and Inhaled.    Allergies  Allergies   Allergen Reactions   • Sulfa Drugs Itching     Rxn - 1970's         Medications  Prior to Admission Medications   Prescriptions Last Dose Informant Patient Reported? Taking?   Abacavir-Dolutegravir-Lamivud (TRIUMEQ) 600- MG Tab 7/30/2018 at 1000 Patient Yes No   Sig: Take 1 Tab by mouth every day.   B Complex Vitamins (B COMPLEX 1 PO) 7/30/2018 at 1000 Patient Yes No   Sig: Take 1 Tab by mouth every day.   ferrous sulfate 325 (65 Fe) MG tablet 7/30/2018 at 1000 Patient Yes No   Sig: Take 325 mg by mouth every day.   gabapentin (NEURONTIN) 300 MG Cap 7/30/2018 at 2200 Patient Yes No   Sig: Take 300 mg by mouth 3 times a day.   metoprolol (LOPRESSOR) 25 MG Tab 7/31/2018 at 2200 Patient Yes Yes   Sig: Take 25 mg by mouth 2 times a day.   omeprazole (PRILOSEC) 20 MG delayed-release capsule 7/31/2018 at 1000 Patient Yes No   Sig: Take 20 mg by mouth every day.   quetiapine (SEROQUEL) 400 MG tablet 7/30/2018 at 2200 Patient Yes No   Sig: Take 400 mg by mouth every bedtime.   rivaroxaban (XARELTO) 20 MG Tab tablet 7/30/2018 at 1700 Patient No No   Sig: Take 1 Tab by mouth with dinner.   tamsulosin (FLOMAX) 0.4 MG capsule 7/30/2018 at 1000 Patient Yes No   Sig: Take 0.4 mg by mouth ONE-HALF HOUR AFTER BREAKFAST.      Facility-Administered Medications: None       Physical Exam  Blood Pressure : 152/105 (RN notified)   Temperature: 37.2 °C (98.9 °F)   Pulse: 83   Respiration: 16   Pulse Oximetry: 97 %     Physical Exam    Constitutional: He is oriented to person, place, and time. No distress.   HENT:   Head: Normocephalic and atraumatic.   Nose: Nose normal.   Eyes: Conjunctivae and EOM are normal. No scleral icterus.   Neck: Normal range of motion. No JVD present.   Cardiovascular: Normal rate and regular rhythm.    No murmur heard.  2+ symmetric radial pulses   Pulmonary/Chest: Effort normal. No stridor. He has no wheezes. He has no rales.   Abdominal: Soft. Bowel sounds are normal. He exhibits no distension. There is no tenderness.   Musculoskeletal: He exhibits no edema or tenderness.   Right foot in boot   Neurological: He is alert and oriented to person, place, and time. No cranial nerve deficit.   Skin: Skin is warm and dry. He is not diaphoretic. No pallor.   Psychiatric: He has a normal mood and affect. His behavior is normal.   Vitals reviewed.      Laboratory:  Recent Labs      07/31/18   1450   WBC  3.1*   RBC  3.65*   HEMOGLOBIN  12.6*   HEMATOCRIT  36.7*   MCV  100.5*   MCH  34.5*   MCHC  34.3   RDW  48.1   PLATELETCT  256   MPV  9.1     Recent Labs      07/31/18   1450   SODIUM  135   POTASSIUM  4.4   CHLORIDE  100   CO2  23   GLUCOSE  114*   BUN  13   CREATININE  1.39   CALCIUM  9.3     Recent Labs      07/31/18   1450   ALTSGPT  77*   ASTSGOT  23   ALKPHOSPHAT  77   TBILIRUBIN  0.4   LIPASE  13   GLUCOSE  114*     Recent Labs      07/31/18   1450   APTT  42.2*   INR  1.64*     Recent Labs      07/31/18   1450   BNPBTYPENAT  26         Lab Results   Component Value Date    TROPONINI <0.01 07/31/2018       Urinalysis:    No results found     Imaging:  CT-HEAD W/O   Final Result      No acute intracranial abnormality is identified.      Atrophy      There are periventricular and subcortical white matter changes present.  This finding is nonspecific and could be from previous small vessel ischemia, demyelination, or gliosis.         DX-CHEST-LIMITED (1 VIEW)   Final Result      No pulmonary consolidation.   Left rib  fractures acuity uncertain.        EKG my interpretation reveals sinus rhythm rate 68 small Q waves inferiorly lead III with nonspecific ST-T findings    Assessment/Plan:  I anticipate this patient is appropriate for observation status at this time.    * Hypotension   Assessment & Plan    Decrease metoprolol  IV fluids  Check cortisol level.   Repeat orthostatics.         Syncope   Assessment & Plan    History of recurrent events.  Possibly due to recurrent hypotension.  Previous echocardiogram revealed preserved EF without aortic stenosis.   Decrease metoprolol, monitor telemetry  IV fluid support  Check orthostatics  May need referral to cardiology for event monitoring          HIV (human immunodeficiency virus infection) ([facility])- (present on admission)   Assessment & Plan    Resume outpatient medications        TENZIN (acute kidney injury) ([facility])- (present on admission)   Assessment & Plan    Appears prerenal - provide IV fluids follow renal function and electrolytes urine output        Alcohol abuse   Assessment & Plan    Currently without signs of withdrawal, monitor        Chronic atrial fibrillation ([facility])   Assessment & Plan    Continue with Xarelto  Plan decrease metoprolol due to low blood pressure        Macrocytic anemia- (present on admission)   Assessment & Plan    Chronic, B12, RBC folate, thyroid function normal              VTE prophylaxis: xarelto

## 2018-08-01 NOTE — DIETARY
Nutrition services: Day 0 of admit.  Sukumar Bolton is a 63 y.o. male with admitting DX of syncope, orthostasis.  Consult received for poor PO intake pta and recent unplanned wt loss per nutrition admit screen.    Visited pt at bedside, pt does appear thin with pronounced bony prominences noted in hands and wrists, with mild sunken appearance in cheeks and eyes. Pt denies poor appetite, however does state that he does not always cook at home, and therefore has decreased intake. He has a boot on a right ankle sprain and foot fracture, which he reports is an obstacle to preparing himself meals. Pt admits to a small degree of weight loss d/t the above, reports a UBW = 142#. Current wt is 140#. Weight loss is not significant, however is noteworthy given pt's borderline low BMI. Encouraged adequate PO intake to prevent further weight loss and offered 3 snacks per day during admit, which pt is agreeable to.    Evaluation:   1. Pt reports adequate appetite and PO intake during admit, denied further nutritional needs at time of visit  2. Cardiac diet, with % consumed of dinner tray last night - pt also reported 100% consumed of breakfast tray this morning     Recommendations/Plan:  1. Snacks TID to encourage intake and prevent further weight loss during admit   2. Encourage intake of meals and snacks  3. Document intake of all meals and snacks as % taken in ADL's to provide interdisciplinary communication across all shifts.   4. Monitor weight.  5. Nutrition rep will continue to see patient for ongoing meal and snack preferences.     RD to monitor per dept policy

## 2018-08-01 NOTE — ED NOTES
Pt lying in bed, no c/o pain, resp even/unlabored, vss, iv fluids infusing without diff, awaiting bed assignment

## 2018-08-01 NOTE — ASSESSMENT & PLAN NOTE
History of recurrent events.  Possibly due to recurrent hypotension.  Previous echocardiogram revealed preserved EF without aortic stenosis.   Decrease metoprolol, monitor telemetry  IV fluid support  Check orthostatics  May need referral to cardiology for event monitoring

## 2018-08-01 NOTE — PROGRESS NOTES
PT WAS OFFERED SAFEKEEPING, INITIALLY PT ACCEPTED, BUT INTO ADMISSION PT DECIDED HE WANTED TO KEEP HIS WALLET BEDSIDE ON HIS PERSON.   AT 0015 CCT MIC STATED PT WAS LOOKING FOR HIS CELL PHONE, PT 'S BELONGINGS WAS LOOKED THROUGH AGAIN, NO PHONE.   ED CALLED [facility] STATED THERE WAS NOTHING LEFT IN THE ROOM.

## 2018-08-16 ENCOUNTER — APPOINTMENT (OUTPATIENT)
Dept: RADIOLOGY | Facility: MEDICAL CENTER | Age: 63
End: 2018-08-16
Attending: EMERGENCY MEDICINE
Payer: MEDICARE

## 2018-08-16 ENCOUNTER — HOSPITAL ENCOUNTER (OUTPATIENT)
Facility: MEDICAL CENTER | Age: 63
End: 2018-08-17
Attending: EMERGENCY MEDICINE | Admitting: HOSPITALIST
Payer: MEDICARE

## 2018-08-16 DIAGNOSIS — E86.0 DEHYDRATION: ICD-10-CM

## 2018-08-16 DIAGNOSIS — R55 SYNCOPE, UNSPECIFIED SYNCOPE TYPE: ICD-10-CM

## 2018-08-16 DIAGNOSIS — F10.29 ALCOHOL DEPENDENCE WITH UNSPECIFIED ALCOHOL-INDUCED DISORDER (HCC): ICD-10-CM

## 2018-08-16 LAB
ALBUMIN SERPL BCP-MCNC: 3.6 G/DL (ref 3.2–4.9)
ALBUMIN/GLOB SERPL: 1.1 G/DL
ALP SERPL-CCNC: 70 U/L (ref 30–99)
ALT SERPL-CCNC: 10 U/L (ref 2–50)
ANION GAP SERPL CALC-SCNC: 14 MMOL/L (ref 0–11.9)
APTT PPP: 28.1 SEC (ref 24.7–36)
AST SERPL-CCNC: 24 U/L (ref 12–45)
BASOPHILS # BLD AUTO: 1 % (ref 0–1.8)
BASOPHILS # BLD: 0.03 K/UL (ref 0–0.12)
BILIRUB SERPL-MCNC: 0.4 MG/DL (ref 0.1–1.5)
BNP SERPL-MCNC: 14 PG/ML (ref 0–100)
BUN SERPL-MCNC: 10 MG/DL (ref 8–22)
CALCIUM SERPL-MCNC: 8.6 MG/DL (ref 8.5–10.5)
CHLORIDE SERPL-SCNC: 105 MMOL/L (ref 96–112)
CO2 SERPL-SCNC: 22 MMOL/L (ref 20–33)
CREAT SERPL-MCNC: 1.3 MG/DL (ref 0.5–1.4)
DEPRECATED D DIMER PPP IA-ACNC: 489 NG/ML(D-DU)
EKG IMPRESSION: NORMAL
EOSINOPHIL # BLD AUTO: 0.02 K/UL (ref 0–0.51)
EOSINOPHIL NFR BLD: 0.7 % (ref 0–6.9)
ERYTHROCYTE [DISTWIDTH] IN BLOOD BY AUTOMATED COUNT: 50.7 FL (ref 35.9–50)
ETHANOL BLD-MCNC: 0.16 G/DL
GLOBULIN SER CALC-MCNC: 3.2 G/DL (ref 1.9–3.5)
GLUCOSE SERPL-MCNC: 126 MG/DL (ref 65–99)
HCT VFR BLD AUTO: 37.1 % (ref 42–52)
HGB BLD-MCNC: 12.7 G/DL (ref 14–18)
IMM GRANULOCYTES # BLD AUTO: 0.01 K/UL (ref 0–0.11)
IMM GRANULOCYTES NFR BLD AUTO: 0.3 % (ref 0–0.9)
INR PPP: 1.12 (ref 0.87–1.13)
LACTATE BLD-SCNC: 3 MMOL/L (ref 0.5–2)
LACTATE BLD-SCNC: 3.7 MMOL/L (ref 0.5–2)
LIPASE SERPL-CCNC: 10 U/L (ref 11–82)
LYMPHOCYTES # BLD AUTO: 1.66 K/UL (ref 1–4.8)
LYMPHOCYTES NFR BLD: 55 % (ref 22–41)
MCH RBC QN AUTO: 34 PG (ref 27–33)
MCHC RBC AUTO-ENTMCNC: 34.2 G/DL (ref 33.7–35.3)
MCV RBC AUTO: 99.2 FL (ref 81.4–97.8)
MONOCYTES # BLD AUTO: 0.4 K/UL (ref 0–0.85)
MONOCYTES NFR BLD AUTO: 13.2 % (ref 0–13.4)
NEUTROPHILS # BLD AUTO: 0.9 K/UL (ref 1.82–7.42)
NEUTROPHILS NFR BLD: 29.8 % (ref 44–72)
NRBC # BLD AUTO: 0 K/UL
NRBC BLD-RTO: 0 /100 WBC
PLATELET # BLD AUTO: 185 K/UL (ref 164–446)
PMV BLD AUTO: 9 FL (ref 9–12.9)
POTASSIUM SERPL-SCNC: 4.1 MMOL/L (ref 3.6–5.5)
PROT SERPL-MCNC: 6.8 G/DL (ref 6–8.2)
PROTHROMBIN TIME: 14.1 SEC (ref 12–14.6)
RBC # BLD AUTO: 3.74 M/UL (ref 4.7–6.1)
SODIUM SERPL-SCNC: 141 MMOL/L (ref 135–145)
TROPONIN I SERPL-MCNC: <0.01 NG/ML (ref 0–0.04)
WBC # BLD AUTO: 3 K/UL (ref 4.8–10.8)

## 2018-08-16 PROCEDURE — 85610 PROTHROMBIN TIME: CPT

## 2018-08-16 PROCEDURE — 700105 HCHG RX REV CODE 258: Performed by: EMERGENCY MEDICINE

## 2018-08-16 PROCEDURE — A9270 NON-COVERED ITEM OR SERVICE: HCPCS | Performed by: HOSPITALIST

## 2018-08-16 PROCEDURE — 71275 CT ANGIOGRAPHY CHEST: CPT

## 2018-08-16 PROCEDURE — 700111 HCHG RX REV CODE 636 W/ 250 OVERRIDE (IP): Performed by: EMERGENCY MEDICINE

## 2018-08-16 PROCEDURE — 700117 HCHG RX CONTRAST REV CODE 255: Performed by: EMERGENCY MEDICINE

## 2018-08-16 PROCEDURE — 99285 EMERGENCY DEPT VISIT HI MDM: CPT

## 2018-08-16 PROCEDURE — 83605 ASSAY OF LACTIC ACID: CPT

## 2018-08-16 PROCEDURE — 96375 TX/PRO/DX INJ NEW DRUG ADDON: CPT | Mod: XU

## 2018-08-16 PROCEDURE — G0378 HOSPITAL OBSERVATION PER HR: HCPCS

## 2018-08-16 PROCEDURE — 96361 HYDRATE IV INFUSION ADD-ON: CPT

## 2018-08-16 PROCEDURE — 84484 ASSAY OF TROPONIN QUANT: CPT

## 2018-08-16 PROCEDURE — 96374 THER/PROPH/DIAG INJ IV PUSH: CPT | Mod: XU

## 2018-08-16 PROCEDURE — 99220 PR INITIAL OBSERVATION CARE,LEVL III: CPT | Performed by: HOSPITALIST

## 2018-08-16 PROCEDURE — 93005 ELECTROCARDIOGRAM TRACING: CPT | Performed by: EMERGENCY MEDICINE

## 2018-08-16 PROCEDURE — 80053 COMPREHEN METABOLIC PANEL: CPT

## 2018-08-16 PROCEDURE — 700102 HCHG RX REV CODE 250 W/ 637 OVERRIDE(OP): Performed by: HOSPITALIST

## 2018-08-16 PROCEDURE — 87040 BLOOD CULTURE FOR BACTERIA: CPT

## 2018-08-16 PROCEDURE — 85730 THROMBOPLASTIN TIME PARTIAL: CPT

## 2018-08-16 PROCEDURE — 96376 TX/PRO/DX INJ SAME DRUG ADON: CPT | Mod: XU

## 2018-08-16 PROCEDURE — 83690 ASSAY OF LIPASE: CPT

## 2018-08-16 PROCEDURE — A6206 CONTACT LAYER <= 16 SQ IN: HCPCS

## 2018-08-16 PROCEDURE — 93005 ELECTROCARDIOGRAM TRACING: CPT

## 2018-08-16 PROCEDURE — 85379 FIBRIN DEGRADATION QUANT: CPT

## 2018-08-16 PROCEDURE — 700101 HCHG RX REV CODE 250: Performed by: HOSPITALIST

## 2018-08-16 PROCEDURE — 85025 COMPLETE CBC W/AUTO DIFF WBC: CPT

## 2018-08-16 PROCEDURE — 36415 COLL VENOUS BLD VENIPUNCTURE: CPT

## 2018-08-16 PROCEDURE — 71045 X-RAY EXAM CHEST 1 VIEW: CPT

## 2018-08-16 PROCEDURE — 83880 ASSAY OF NATRIURETIC PEPTIDE: CPT

## 2018-08-16 PROCEDURE — 80307 DRUG TEST PRSMV CHEM ANLYZR: CPT

## 2018-08-16 RX ORDER — LORAZEPAM 1 MG/1
2 TABLET ORAL
Status: DISCONTINUED | OUTPATIENT
Start: 2018-08-16 | End: 2018-08-17 | Stop reason: HOSPADM

## 2018-08-16 RX ORDER — LORAZEPAM 2 MG/ML
0.5 INJECTION INTRAMUSCULAR EVERY 4 HOURS PRN
Status: DISCONTINUED | OUTPATIENT
Start: 2018-08-16 | End: 2018-08-17 | Stop reason: HOSPADM

## 2018-08-16 RX ORDER — LORAZEPAM 2 MG/ML
1 INJECTION INTRAMUSCULAR
Status: DISCONTINUED | OUTPATIENT
Start: 2018-08-16 | End: 2018-08-17 | Stop reason: HOSPADM

## 2018-08-16 RX ORDER — LORAZEPAM 1 MG/1
3 TABLET ORAL
Status: DISCONTINUED | OUTPATIENT
Start: 2018-08-16 | End: 2018-08-17 | Stop reason: HOSPADM

## 2018-08-16 RX ORDER — LORAZEPAM 2 MG/ML
2 INJECTION INTRAMUSCULAR
Status: DISCONTINUED | OUTPATIENT
Start: 2018-08-16 | End: 2018-08-17 | Stop reason: HOSPADM

## 2018-08-16 RX ORDER — TAMSULOSIN HYDROCHLORIDE 0.4 MG/1
0.4 CAPSULE ORAL
Status: DISCONTINUED | OUTPATIENT
Start: 2018-08-17 | End: 2018-08-17 | Stop reason: HOSPADM

## 2018-08-16 RX ORDER — GABAPENTIN 300 MG/1
300 CAPSULE ORAL 3 TIMES DAILY
Status: DISCONTINUED | OUTPATIENT
Start: 2018-08-16 | End: 2018-08-17 | Stop reason: HOSPADM

## 2018-08-16 RX ORDER — LORAZEPAM 2 MG/ML
1.5 INJECTION INTRAMUSCULAR
Status: DISCONTINUED | OUTPATIENT
Start: 2018-08-16 | End: 2018-08-17 | Stop reason: HOSPADM

## 2018-08-16 RX ORDER — ONDANSETRON 4 MG/1
4 TABLET, ORALLY DISINTEGRATING ORAL EVERY 4 HOURS PRN
Status: DISCONTINUED | OUTPATIENT
Start: 2018-08-16 | End: 2018-08-17 | Stop reason: HOSPADM

## 2018-08-16 RX ORDER — HEPARIN SODIUM 5000 [USP'U]/ML
5000 INJECTION, SOLUTION INTRAVENOUS; SUBCUTANEOUS EVERY 8 HOURS
Status: DISCONTINUED | OUTPATIENT
Start: 2018-08-16 | End: 2018-08-16

## 2018-08-16 RX ORDER — BISACODYL 10 MG
10 SUPPOSITORY, RECTAL RECTAL
Status: DISCONTINUED | OUTPATIENT
Start: 2018-08-16 | End: 2018-08-17 | Stop reason: HOSPADM

## 2018-08-16 RX ORDER — LABETALOL HYDROCHLORIDE 5 MG/ML
10 INJECTION, SOLUTION INTRAVENOUS EVERY 6 HOURS PRN
Status: DISCONTINUED | OUTPATIENT
Start: 2018-08-16 | End: 2018-08-17 | Stop reason: HOSPADM

## 2018-08-16 RX ORDER — LORAZEPAM 2 MG/ML
0.5 INJECTION INTRAMUSCULAR ONCE
Status: DISCONTINUED | OUTPATIENT
Start: 2018-08-16 | End: 2018-08-16

## 2018-08-16 RX ORDER — AMOXICILLIN 250 MG
2 CAPSULE ORAL 2 TIMES DAILY
Status: DISCONTINUED | OUTPATIENT
Start: 2018-08-16 | End: 2018-08-17 | Stop reason: HOSPADM

## 2018-08-16 RX ORDER — OMEPRAZOLE 20 MG/1
20 CAPSULE, DELAYED RELEASE ORAL DAILY
Status: DISCONTINUED | OUTPATIENT
Start: 2018-08-17 | End: 2018-08-17 | Stop reason: HOSPADM

## 2018-08-16 RX ORDER — FERROUS SULFATE 325(65) MG
325 TABLET ORAL DAILY
Status: DISCONTINUED | OUTPATIENT
Start: 2018-08-17 | End: 2018-08-17 | Stop reason: HOSPADM

## 2018-08-16 RX ORDER — ABACAVIR 300 MG/1
600 TABLET ORAL DAILY
Status: DISCONTINUED | OUTPATIENT
Start: 2018-08-17 | End: 2018-08-17 | Stop reason: HOSPADM

## 2018-08-16 RX ORDER — ONDANSETRON 2 MG/ML
4 INJECTION INTRAMUSCULAR; INTRAVENOUS EVERY 4 HOURS PRN
Status: DISCONTINUED | OUTPATIENT
Start: 2018-08-16 | End: 2018-08-17 | Stop reason: HOSPADM

## 2018-08-16 RX ORDER — LAMIVUDINE 150 MG/1
300 TABLET, FILM COATED ORAL DAILY
Status: DISCONTINUED | OUTPATIENT
Start: 2018-08-17 | End: 2018-08-17 | Stop reason: HOSPADM

## 2018-08-16 RX ORDER — LORAZEPAM 2 MG/ML
0.5 INJECTION INTRAMUSCULAR ONCE
Status: COMPLETED | OUTPATIENT
Start: 2018-08-16 | End: 2018-08-16

## 2018-08-16 RX ORDER — LORAZEPAM 1 MG/1
1 TABLET ORAL EVERY 4 HOURS PRN
Status: DISCONTINUED | OUTPATIENT
Start: 2018-08-16 | End: 2018-08-17 | Stop reason: HOSPADM

## 2018-08-16 RX ORDER — ACETAMINOPHEN 325 MG/1
650 TABLET ORAL EVERY 6 HOURS PRN
Status: DISCONTINUED | OUTPATIENT
Start: 2018-08-16 | End: 2018-08-17 | Stop reason: HOSPADM

## 2018-08-16 RX ORDER — POLYETHYLENE GLYCOL 3350 17 G/17G
1 POWDER, FOR SOLUTION ORAL
Status: DISCONTINUED | OUTPATIENT
Start: 2018-08-16 | End: 2018-08-17 | Stop reason: HOSPADM

## 2018-08-16 RX ORDER — SODIUM CHLORIDE 9 MG/ML
1000 INJECTION, SOLUTION INTRAVENOUS ONCE
Status: COMPLETED | OUTPATIENT
Start: 2018-08-16 | End: 2018-08-16

## 2018-08-16 RX ORDER — LORAZEPAM 1 MG/1
4 TABLET ORAL
Status: DISCONTINUED | OUTPATIENT
Start: 2018-08-16 | End: 2018-08-17 | Stop reason: HOSPADM

## 2018-08-16 RX ORDER — LORAZEPAM 2 MG/ML
1 INJECTION INTRAMUSCULAR ONCE
Status: COMPLETED | OUTPATIENT
Start: 2018-08-16 | End: 2018-08-16

## 2018-08-16 RX ORDER — QUETIAPINE FUMARATE 200 MG/1
400 TABLET, FILM COATED ORAL
Status: DISCONTINUED | OUTPATIENT
Start: 2018-08-16 | End: 2018-08-17 | Stop reason: HOSPADM

## 2018-08-16 RX ORDER — LORAZEPAM 1 MG/1
0.5 TABLET ORAL EVERY 4 HOURS PRN
Status: DISCONTINUED | OUTPATIENT
Start: 2018-08-16 | End: 2018-08-17 | Stop reason: HOSPADM

## 2018-08-16 RX ORDER — IBUPROFEN 200 MG
400 TABLET ORAL EVERY 6 HOURS PRN
Status: ON HOLD | COMMUNITY
End: 2018-09-27

## 2018-08-16 RX ADMIN — LORAZEPAM 0.5 MG: 2 INJECTION INTRAMUSCULAR; INTRAVENOUS at 17:40

## 2018-08-16 RX ADMIN — IOHEXOL 50 ML: 350 INJECTION, SOLUTION INTRAVENOUS at 17:58

## 2018-08-16 RX ADMIN — LORAZEPAM 1 MG: 2 INJECTION INTRAMUSCULAR; INTRAVENOUS at 18:14

## 2018-08-16 RX ADMIN — GABAPENTIN 300 MG: 300 CAPSULE ORAL at 22:20

## 2018-08-16 RX ADMIN — SODIUM CHLORIDE 1000 ML: 9 INJECTION, SOLUTION INTRAVENOUS at 14:14

## 2018-08-16 RX ADMIN — DOCUSATE SODIUM -SENNOSIDES 1 TABLET: 50; 8.6 TABLET, COATED ORAL at 22:20

## 2018-08-16 RX ADMIN — METOPROLOL TARTRATE 12.5 MG: 25 TABLET, FILM COATED ORAL at 22:20

## 2018-08-16 RX ADMIN — ACETAMINOPHEN 650 MG: 325 TABLET, FILM COATED ORAL at 23:15

## 2018-08-16 RX ADMIN — QUETIAPINE FUMARATE 200 MG: 200 TABLET ORAL at 22:26

## 2018-08-16 RX ADMIN — LABETALOL HYDROCHLORIDE 10 MG: 5 INJECTION, SOLUTION INTRAVENOUS at 23:46

## 2018-08-16 ASSESSMENT — COGNITIVE AND FUNCTIONAL STATUS - GENERAL
MOBILITY SCORE: 13
MOVING FROM LYING ON BACK TO SITTING ON SIDE OF FLAT BED: A LOT
SUGGESTED CMS G CODE MODIFIER DAILY ACTIVITY: CL
EATING MEALS: A LOT
TURNING FROM BACK TO SIDE WHILE IN FLAT BAD: A LITTLE
CLIMB 3 TO 5 STEPS WITH RAILING: A LOT
PERSONAL GROOMING: A LOT
DRESSING REGULAR UPPER BODY CLOTHING: A LOT
DRESSING REGULAR LOWER BODY CLOTHING: A LOT
WALKING IN HOSPITAL ROOM: A LOT
TOILETING: A LOT
STANDING UP FROM CHAIR USING ARMS: A LOT
DAILY ACTIVITIY SCORE: 12
SUGGESTED CMS G CODE MODIFIER MOBILITY: CL
HELP NEEDED FOR BATHING: A LOT
MOVING TO AND FROM BED TO CHAIR: A LOT

## 2018-08-16 ASSESSMENT — CHA2DS2 SCORE
VASCULAR DISEASE: NO
AGE 65 TO 74: YES
AGE 75 OR GREATER: NO
SEX: MALE
CHF OR LEFT VENTRICULAR DYSFUNCTION: NO
DIABETES: NO
HYPERTENSION: YES
CHA2DS2 VASC SCORE: 2
PRIOR STROKE OR TIA OR THROMBOEMBOLISM: NO

## 2018-08-16 ASSESSMENT — COPD QUESTIONNAIRES
HAVE YOU SMOKED AT LEAST 100 CIGARETTES IN YOUR ENTIRE LIFE: YES
DO YOU EVER COUGH UP ANY MUCUS OR PHLEGM?: YES, EVERY DAY
DURING THE PAST 4 WEEKS HOW MUCH DID YOU FEEL SHORT OF BREATH: NONE/LITTLE OF THE TIME
COPD SCREENING SCORE: 6
IN THE PAST 12 MONTHS DO YOU DO LESS THAN YOU USED TO BECAUSE OF YOUR BREATHING PROBLEMS: DISAGREE/UNSURE

## 2018-08-16 ASSESSMENT — LIFESTYLE VARIABLES
HAVE PEOPLE ANNOYED YOU BY CRITICIZING YOUR DRINKING: NO
ORIENTATION AND CLOUDING OF SENSORIUM: ORIENTED AND CAN DO SERIAL ADDITIONS
TOTAL SCORE: 0
VISUAL DISTURBANCES: NOT PRESENT
HOW MANY TIMES IN THE PAST YEAR HAVE YOU HAD 5 OR MORE DRINKS IN A DAY: 12
DOES PATIENT WANT TO TALK TO SOMEONE ABOUT QUITTING: YES
HAVE YOU EVER FELT YOU SHOULD CUT DOWN ON YOUR DRINKING: YES
ANXIETY: MILDLY ANXIOUS
AVERAGE NUMBER OF DAYS PER WEEK YOU HAVE A DRINK CONTAINING ALCOHOL: 21
ORIENTATION AND CLOUDING OF SENSORIUM: ORIENTED AND CAN DO SERIAL ADDITIONS
PAROXYSMAL SWEATS: BARELY PERCEPTIBLE SWEATING. PALMS MOIST
EVER HAD A DRINK FIRST THING IN THE MORNING TO STEADY YOUR NERVES TO GET RID OF A HANGOVER: NO
PAROXYSMAL SWEATS: NO SWEAT VISIBLE
TOTAL SCORE: 2
NAUSEA AND VOMITING: NO NAUSEA AND NO VOMITING
ALCOHOL_USE: YES
DO YOU DRINK ALCOHOL: YES
HEADACHE, FULLNESS IN HEAD: NOT PRESENT
TREMOR: TREMOR NOT VISIBLE BUT CAN BE FELT, FINGERTIP TO FINGERTIP
TREMOR: NO TREMOR
NAUSEA AND VOMITING: NO NAUSEA AND NO VOMITING
TOTAL SCORE: 2
TOTAL SCORE: 0
EVER FELT BAD OR GUILTY ABOUT YOUR DRINKING: YES
ANXIETY: MILDLY ANXIOUS
ON A TYPICAL DAY WHEN YOU DRINK ALCOHOL HOW MANY DRINKS DO YOU HAVE: 3
HAVE PEOPLE ANNOYED YOU BY CRITICIZING YOUR DRINKING: NO
TOTAL SCORE: 3
AGITATION: SOMEWHAT MORE THAN NORMAL ACTIVITY
TOTAL SCORE: 2
TOTAL SCORE: 2
VISUAL DISTURBANCES: NOT PRESENT
EVER FELT BAD OR GUILTY ABOUT YOUR DRINKING: NO
AUDITORY DISTURBANCES: NOT PRESENT
DOES PATIENT WANT TO STOP DRINKING: YES
ON A TYPICAL DAY WHEN YOU DRINK ALCOHOL HOW MANY DRINKS DO YOU HAVE: 3
EVER HAD A DRINK FIRST THING IN THE MORNING TO STEADY YOUR NERVES TO GET RID OF A HANGOVER: NO
HEADACHE, FULLNESS IN HEAD: NOT PRESENT
AVERAGE NUMBER OF DAYS PER WEEK YOU HAVE A DRINK CONTAINING ALCOHOL: 7
AUDITORY DISTURBANCES: NOT PRESENT
EVER_SMOKED: YES
CONSUMPTION TOTAL: POSITIVE
HAVE YOU EVER FELT YOU SHOULD CUT DOWN ON YOUR DRINKING: NO
TOTAL SCORE: 0
AGITATION: NORMAL ACTIVITY
CONSUMPTION TOTAL: POSITIVE
HOW MANY TIMES IN THE PAST YEAR HAVE YOU HAD 5 OR MORE DRINKS IN A DAY: 0

## 2018-08-16 ASSESSMENT — PAIN SCALES - GENERAL
PAINLEVEL_OUTOF10: 0
PAINLEVEL_OUTOF10: 1
PAINLEVEL_OUTOF10: 0

## 2018-08-16 ASSESSMENT — PATIENT HEALTH QUESTIONNAIRE - PHQ9
8. MOVING OR SPEAKING SO SLOWLY THAT OTHER PEOPLE COULD HAVE NOTICED. OR THE OPPOSITE, BEING SO FIGETY OR RESTLESS THAT YOU HAVE BEEN MOVING AROUND A LOT MORE THAN USUAL: NEARLY EVERY DAY
5. POOR APPETITE OR OVEREATING: SEVERAL DAYS
4. FEELING TIRED OR HAVING LITTLE ENERGY: NEARLY EVERY DAY
6. FEELING BAD ABOUT YOURSELF - OR THAT YOU ARE A FAILURE OR HAVE LET YOURSELF OR YOUR FAMILY DOWN: SEVERAL DAYS
2. FEELING DOWN, DEPRESSED, IRRITABLE, OR HOPELESS: NOT AT ALL
7. TROUBLE CONCENTRATING ON THINGS, SUCH AS READING THE NEWSPAPER OR WATCHING TELEVISION: SEVERAL DAYS
SUM OF ALL RESPONSES TO PHQ QUESTIONS 1-9: 12
3. TROUBLE FALLING OR STAYING ASLEEP OR SLEEPING TOO MUCH: MORE THAN HALF THE DAYS
1. LITTLE INTEREST OR PLEASURE IN DOING THINGS: SEVERAL DAYS
9. THOUGHTS THAT YOU WOULD BE BETTER OFF DEAD, OR OF HURTING YOURSELF: NOT AT ALL
SUM OF ALL RESPONSES TO PHQ9 QUESTIONS 1 AND 2: 1

## 2018-08-16 NOTE — ED TRIAGE NOTES
Chief Complaint   Patient presents with   • Near Syncopal     pt BIB EMS from Ortho office ([facility]), pt with near syncopal on scene/ hypotension upon EMS arrival/ pt given 400mL NS/ pt with hx of same, reports hx of A-fib, pt A&Ox4   • Hypotension     90's systolic on scene     ekg performed.

## 2018-08-16 NOTE — ED PROVIDER NOTES
ED Provider Note    CHIEF COMPLAINT  Chief Complaint   Patient presents with   • Near Syncopal     pt BIB EMS from Ortho office ([facility]), pt with near syncopal on scene/ hypotension upon EMS arrival/ pt given 400mL NS/ pt with hx of same, reports hx of A-fib, pt A&Ox4   • Hypotension     90's systolic on scene       HPI  Sukumar Bolton is a 63 y.o. male who presents to the emergency department for a syncopal episode.  The patient states he had a syncopal episode today.  Woke up this morning feeling little weak and dizzy.  Had an appointment with [facility] orthopedic clinic, and it went to his appointment.  When he was getting out of that over he passed out.  Denies any injury.  States his been passing out frequently.  Denies any chest pain.  Has had low blood pressure for some time and feels somewhat weak and dizzy.  Denies any fevers or chills.  Denies any other aggravating alleviating factors or associated complaints.  Patient reports daily alcohol use per    REVIEW OF SYSTEMS  See HPI for further details. All other systems are negative.    PAST MEDICAL HISTORY  Past Medical History:   Diagnosis Date   • Atrial fibrillation (HCC) 2018   • Cataract     sx 1999   • HIV (human immunodeficiency virus infection) (HCC)    • Hypertension    • Kidney disease        FAMILY HISTORY  History reviewed. No pertinent family history.    SOCIAL HISTORY  Social History     Social History   • Marital status:      Spouse name: N/A   • Number of children: N/A   • Years of education: N/A     Social History Main Topics   • Smoking status: Former Smoker     Types: Cigarettes     Quit date: 2/14/2018   • Smokeless tobacco: Current User   • Alcohol use 0.0 oz/week      Comment: 2 drinks a day   • Drug use: Yes     Types: Marijuana, Inhaled      Comment: marijuana, last  use 5/2018   • Sexual activity: Not on file     Other Topics Concern   • Not on file     Social History Narrative   • No narrative on file       SURGICAL  HISTORY  Past Surgical History:   Procedure Laterality Date   • IRRIGATION & DEBRIDEMENT ORTHO Right 6/4/2018    Procedure: IRRIGATION & DEBRIDEMENT ORTHO- ANKLE;  Surgeon: Ulices Berry M.D.;  Location: SURGERY [facility];  Service: Orthopedics   • FIBULA ORIF Right 5/21/2018    Procedure: FIBULA ORIF-FOR NON UNION REPAIR WITH BONE GRAFT;  Surgeon: Ulices Berry M.D.;  Location: SURGERY [facility];  Service: Orthopedics   • ANKLE ARTHROSCOPY Right 5/21/2018    Procedure: ANKLE ARTHROSCOPY;  Surgeon: Ulices Berry M.D.;  Location: SURGERY [facility];  Service: Orthopedics   • OTHER ORTHOPEDIC SURGERY  2008    bilateral shoulder sx   • OTHER ORTHOPEDIC SURGERY  2003    left ankle sx       CURRENT MEDICATIONS  Home Medications     Reviewed by Sharron Graff (Pharmacy Tech) on 08/16/18 at 1458  Med List Status: Complete   Medication Last Dose Status   Abacavir-Dolutegravir-Lamivud (TRIUMEQ) 600- MG Tab 8/16/2018 Active   B Complex Vitamins (B COMPLEX 1 PO) 8/16/2018 Active   ferrous sulfate 325 (65 Fe) MG tablet 8/16/2018 Active   gabapentin (NEURONTIN) 300 MG Cap 8/15/2018 Active   ibuprofen (MOTRIN) 200 MG Tab 8/15/2018 Active   metoprolol (LOPRESSOR) 25 MG Tab > 2 days Active   omeprazole (PRILOSEC) 20 MG delayed-release capsule 8/16/2018 Active   quetiapine (SEROQUEL) 400 MG tablet 8/15/2018 Active   rivaroxaban (XARELTO) 20 MG Tab tablet 8/16/2018 Active   tamsulosin (FLOMAX) 0.4 MG capsule 8/16/2018 Active                ALLERGIES  Allergies   Allergen Reactions   • Sulfa Drugs Itching     Rxn - 1970's         PHYSICAL EXAM  VITAL SIGNS: /63   Pulse (!) 104   Temp 37.2 °C (99 °F)   Resp 18   Ht 1.829 m (6')   Wt 62.6 kg (138 lb)   SpO2 90%   BMI 18.72 kg/m²    Constitutional: Awake alert ill-appearing no acute distress.  HENT: Normocephalic, Atraumatic, Bilateral external ears normal, Oropharynx dry,, No oral exudates, Nose normal.   Eyes: PERRL, EOMI, Conjunctiva  normal, No discharge.   Lymphatic: No lymphadenopathy noted.   Cardiovascular: Normal heart rate, Normal rhythm, No murmurs, No rubs, No gallops.   Thorax & Lungs: Normal breath sounds, No respiratory distress, No wheezing,    Abdomen: Bowel sounds normal, Soft, No tenderness,  Skin: Warm, Dry, No erythema, No rash. .   Musculoskeletal: Right lower extremity is in a short leg cast.  Other extremities are unremarkable  Neurologic: Alert & oriented x 3, Normal motor function, Normal sensory function, No focal deficits noted.   Psychiatric: Affect    DX-CHEST-PORTABLE (1 VIEW)   Final Result      No acute cardiac or pulmonary abnormalities are identified.      CT-CTA CHEST PULMONARY ARTERY W/ RECONS    (Results Pending)     Results for orders placed or performed during the hospital encounter of 08/16/18   CBC WITH DIFFERENTIAL   Result Value Ref Range    WBC 3.0 (L) 4.8 - 10.8 K/uL    RBC 3.74 (L) 4.70 - 6.10 M/uL    Hemoglobin 12.7 (L) 14.0 - 18.0 g/dL    Hematocrit 37.1 (L) 42.0 - 52.0 %    MCV 99.2 (H) 81.4 - 97.8 fL    MCH 34.0 (H) 27.0 - 33.0 pg    MCHC 34.2 33.7 - 35.3 g/dL    RDW 50.7 (H) 35.9 - 50.0 fL    Platelet Count 185 164 - 446 K/uL    MPV 9.0 9.0 - 12.9 fL    Neutrophils-Polys 29.80 (L) 44.00 - 72.00 %    Lymphocytes 55.00 (H) 22.00 - 41.00 %    Monocytes 13.20 0.00 - 13.40 %    Eosinophils 0.70 0.00 - 6.90 %    Basophils 1.00 0.00 - 1.80 %    Immature Granulocytes 0.30 0.00 - 0.90 %    Nucleated RBC 0.00 /100 WBC    Neutrophils (Absolute) 0.90 (L) 1.82 - 7.42 K/uL    Lymphs (Absolute) 1.66 1.00 - 4.80 K/uL    Monos (Absolute) 0.40 0.00 - 0.85 K/uL    Eos (Absolute) 0.02 0.00 - 0.51 K/uL    Baso (Absolute) 0.03 0.00 - 0.12 K/uL    Immature Granulocytes (abs) 0.01 0.00 - 0.11 K/uL    NRBC (Absolute) 0.00 K/uL   COMP METABOLIC PANEL   Result Value Ref Range    Sodium 141 135 - 145 mmol/L    Potassium 4.1 3.6 - 5.5 mmol/L    Chloride 105 96 - 112 mmol/L    Co2 22 20 - 33 mmol/L    Anion Gap 14.0 (H) 0.0 -  11.9    Glucose 126 (H) 65 - 99 mg/dL    Bun 10 8 - 22 mg/dL    Creatinine 1.30 0.50 - 1.40 mg/dL    Calcium 8.6 8.5 - 10.5 mg/dL    AST(SGOT) 24 12 - 45 U/L    ALT(SGPT) 10 2 - 50 U/L    Alkaline Phosphatase 70 30 - 99 U/L    Total Bilirubin 0.4 0.1 - 1.5 mg/dL    Albumin 3.6 3.2 - 4.9 g/dL    Total Protein 6.8 6.0 - 8.2 g/dL    Globulin 3.2 1.9 - 3.5 g/dL    A-G Ratio 1.1 g/dL   LIPASE   Result Value Ref Range    Lipase 10 (L) 11 - 82 U/L   TROPONIN   Result Value Ref Range    Troponin I <0.01 0.00 - 0.04 ng/mL   LACTIC ACID   Result Value Ref Range    Lactic Acid 3.7 (H) 0.5 - 2.0 mmol/L   APTT   Result Value Ref Range    APTT 28.1 24.7 - 36.0 sec   PROTHROMBIN TIME (INR)   Result Value Ref Range    PT 14.1 12.0 - 14.6 sec    INR 1.12 0.87 - 1.13   D-DIMER   Result Value Ref Range    D-Dimer Screen 489 (H) <250 ng/mL(D-DU)   ESTIMATED GFR   Result Value Ref Range    GFR If African American >60 >60 mL/min/1.73 m 2    GFR If Non African American 55 (A) >60 mL/min/1.73 m 2   EKG (NOW)   Result Value Ref Range    Report       [facility] Emergency Dept.    Test Date:  2018  Pt Name:    ETTA LUZ                Department: ER  MRN:        [id number]                      Room:       RD 11  Gender:     Male                         Technician: [id number]  :        1955                   Requested By:ER TRIAGE PROTOCOL  Order #:    [id number]                    Reading MD: ISHAAN GOTTLIEB. AMD    Measurements  Intervals                                Axis  Rate:       115                          P:          41  VT:         148                          QRS:        19  QRSD:       86                           T:          24  QT:         348  QTc:        482    Interpretive Statements  SINUS TACHYCARDIA  PROBABLE LEFT ATRIAL ABNORMALITY  LOW VOLTAGE THROUGHOUT  RSR' IN V1 OR V2, PROBABLY NORMAL VARIANT  CONSIDER ANTERIOR INFARCT  BORDERLINE PROLONGED QT INTERVAL  Compared to ECG 2018  14:52:56  RSR' in V1 or V2 now present  Myocardial infarct finding now present  Sinus rhythm no l onger present    Electronically Signed On 8- 16:07:59 PDT by PEDRO GOTTLIEB. AMD        RADIOLOGY/PROCEDURES    DX-CHEST-PORTABLE (1 VIEW)   Final Result      No acute cardiac or pulmonary abnormalities are identified.      CT-CTA CHEST PULMONARY ARTERY W/ RECONS    (Results Pending)           COURSE & MEDICAL DECISION MAKING  Pertinent Labs & Imaging studies reviewed. (See chart for details)  Patient presents the emergency department for syncopal episode.  He has been having syncopal episodes off and on for some time.  He has been admitted worked up in the past.  Despite that he could according a syncopal episode today.  He continues to have fairly heavy alcohol intake.      Labs EKG have been reviewed.  These are unremarkable.  The patient does have a lactic acidosis.  I do not think he is septic.  While here he has had persistent tach Cardiov not he is hypertensive and somewhat tremulous.  He may be developing alcohol withdrawal he is given a low dose of Ativan.    I patient was given IV fluid hydration*for low blood pressure and dry mucous membranes.  He is reassessed after IV fluids and is improved.      The patient does have an elevated d-dimer.  He is sent over for a chest CT this is pending.  The patient will be admitted for further workup and treatment.  He is admitted to Dr. Hess from the [facility] service.  He is turned over to my partner to check the CT results.  Chest CT is pending.  Patient is turned over to my partner Dr. Andres pending admission.    FINAL IMPRESSION  1. Syncope, unspecified syncope type    2. Dehydration    3. Alcohol dependence with unspecified alcohol-induced disorder (HCC)                 Electronically signed by: Pedro Gottlieb, 8/16/2018 3:27 PM

## 2018-08-16 NOTE — ED NOTES
Med Rec complete per Pt at bedside  Allergies Reviewed  No ABX in the last 30 days    Pt states that he held his METOPROLOL the last couple days bc he has low blood pressure

## 2018-08-17 VITALS
BODY MASS INDEX: 19.71 KG/M2 | DIASTOLIC BLOOD PRESSURE: 98 MMHG | RESPIRATION RATE: 18 BRPM | HEIGHT: 72 IN | TEMPERATURE: 98.2 F | OXYGEN SATURATION: 96 % | HEART RATE: 84 BPM | SYSTOLIC BLOOD PRESSURE: 159 MMHG | WEIGHT: 145.5 LBS

## 2018-08-17 PROBLEM — R73.9 HYPERGLYCEMIA: Status: ACTIVE | Noted: 2018-08-17

## 2018-08-17 PROBLEM — R79.89 ELEVATED LACTIC ACID LEVEL: Status: ACTIVE | Noted: 2018-08-17

## 2018-08-17 PROBLEM — I48.91 ATRIAL FIBRILLATION (HCC): Status: ACTIVE | Noted: 2018-08-17

## 2018-08-17 PROBLEM — I48.19 PERSISTENT ATRIAL FIBRILLATION (HCC): Status: ACTIVE | Noted: 2018-08-17

## 2018-08-17 LAB
ANION GAP SERPL CALC-SCNC: 10 MMOL/L (ref 0–11.9)
BUN SERPL-MCNC: 15 MG/DL (ref 8–22)
CALCIUM SERPL-MCNC: 8.7 MG/DL (ref 8.5–10.5)
CHLORIDE SERPL-SCNC: 105 MMOL/L (ref 96–112)
CO2 SERPL-SCNC: 23 MMOL/L (ref 20–33)
CREAT SERPL-MCNC: 1.15 MG/DL (ref 0.5–1.4)
ERYTHROCYTE [DISTWIDTH] IN BLOOD BY AUTOMATED COUNT: 50.2 FL (ref 35.9–50)
GLUCOSE SERPL-MCNC: 155 MG/DL (ref 65–99)
HCT VFR BLD AUTO: 36.2 % (ref 42–52)
HGB BLD-MCNC: 12.2 G/DL (ref 14–18)
LACTATE BLD-SCNC: 2.4 MMOL/L (ref 0.5–2)
LACTATE BLD-SCNC: 2.9 MMOL/L (ref 0.5–2)
LACTATE BLD-SCNC: 3 MMOL/L (ref 0.5–2)
MCH RBC QN AUTO: 33.7 PG (ref 27–33)
MCHC RBC AUTO-ENTMCNC: 33.7 G/DL (ref 33.7–35.3)
MCV RBC AUTO: 100 FL (ref 81.4–97.8)
PLATELET # BLD AUTO: 182 K/UL (ref 164–446)
PMV BLD AUTO: 9 FL (ref 9–12.9)
POTASSIUM SERPL-SCNC: 3.7 MMOL/L (ref 3.6–5.5)
RBC # BLD AUTO: 3.62 M/UL (ref 4.7–6.1)
SODIUM SERPL-SCNC: 138 MMOL/L (ref 135–145)
WBC # BLD AUTO: 7.1 K/UL (ref 4.8–10.8)

## 2018-08-17 PROCEDURE — G8978 MOBILITY CURRENT STATUS: HCPCS | Mod: CJ

## 2018-08-17 PROCEDURE — 97165 OT EVAL LOW COMPLEX 30 MIN: CPT

## 2018-08-17 PROCEDURE — G8988 SELF CARE GOAL STATUS: HCPCS | Mod: CI

## 2018-08-17 PROCEDURE — 83605 ASSAY OF LACTIC ACID: CPT

## 2018-08-17 PROCEDURE — G0378 HOSPITAL OBSERVATION PER HR: HCPCS

## 2018-08-17 PROCEDURE — 97162 PT EVAL MOD COMPLEX 30 MIN: CPT

## 2018-08-17 PROCEDURE — 85027 COMPLETE CBC AUTOMATED: CPT

## 2018-08-17 PROCEDURE — 94760 N-INVAS EAR/PLS OXIMETRY 1: CPT

## 2018-08-17 PROCEDURE — 700102 HCHG RX REV CODE 250 W/ 637 OVERRIDE(OP): Performed by: HOSPITALIST

## 2018-08-17 PROCEDURE — A9270 NON-COVERED ITEM OR SERVICE: HCPCS | Performed by: HOSPITALIST

## 2018-08-17 PROCEDURE — 99217 PR OBSERVATION CARE DISCHARGE: CPT | Performed by: INTERNAL MEDICINE

## 2018-08-17 PROCEDURE — 36415 COLL VENOUS BLD VENIPUNCTURE: CPT

## 2018-08-17 PROCEDURE — G8987 SELF CARE CURRENT STATUS: HCPCS | Mod: CJ

## 2018-08-17 PROCEDURE — 700105 HCHG RX REV CODE 258: Performed by: HOSPITALIST

## 2018-08-17 PROCEDURE — 80048 BASIC METABOLIC PNL TOTAL CA: CPT

## 2018-08-17 PROCEDURE — G8979 MOBILITY GOAL STATUS: HCPCS | Mod: CI

## 2018-08-17 RX ORDER — SODIUM CHLORIDE 9 MG/ML
INJECTION, SOLUTION INTRAVENOUS CONTINUOUS
Status: DISCONTINUED | OUTPATIENT
Start: 2018-08-17 | End: 2018-08-17 | Stop reason: HOSPADM

## 2018-08-17 RX ADMIN — METOPROLOL TARTRATE 12.5 MG: 25 TABLET, FILM COATED ORAL at 18:03

## 2018-08-17 RX ADMIN — RIVAROXABAN 20 MG: 20 TABLET, FILM COATED ORAL at 18:03

## 2018-08-17 RX ADMIN — OMEPRAZOLE 20 MG: 20 CAPSULE, DELAYED RELEASE ORAL at 05:30

## 2018-08-17 RX ADMIN — FERROUS SULFATE TAB 325 MG (65 MG ELEMENTAL FE) 325 MG: 325 (65 FE) TAB at 05:31

## 2018-08-17 RX ADMIN — LAMIVUDINE 300 MG: 150 TABLET, FILM COATED ORAL at 05:32

## 2018-08-17 RX ADMIN — GABAPENTIN 300 MG: 300 CAPSULE ORAL at 13:25

## 2018-08-17 RX ADMIN — GABAPENTIN 300 MG: 300 CAPSULE ORAL at 05:31

## 2018-08-17 RX ADMIN — METOPROLOL TARTRATE 12.5 MG: 25 TABLET, FILM COATED ORAL at 05:31

## 2018-08-17 RX ADMIN — DOLUTEGRAVIR SODIUM 50 MG: 50 TABLET, FILM COATED ORAL at 05:32

## 2018-08-17 RX ADMIN — TAMSULOSIN HYDROCHLORIDE 0.4 MG: 0.4 CAPSULE ORAL at 09:51

## 2018-08-17 RX ADMIN — SODIUM CHLORIDE: 9 INJECTION, SOLUTION INTRAVENOUS at 05:32

## 2018-08-17 RX ADMIN — GABAPENTIN 300 MG: 300 CAPSULE ORAL at 18:03

## 2018-08-17 ASSESSMENT — COGNITIVE AND FUNCTIONAL STATUS - GENERAL
DAILY ACTIVITIY SCORE: 21
STANDING UP FROM CHAIR USING ARMS: A LITTLE
MOBILITY SCORE: 16
SUGGESTED CMS G CODE MODIFIER DAILY ACTIVITY: CJ
SUGGESTED CMS G CODE MODIFIER MOBILITY: CK
WALKING IN HOSPITAL ROOM: A LITTLE
DRESSING REGULAR LOWER BODY CLOTHING: A LITTLE
HELP NEEDED FOR BATHING: A LITTLE
TOILETING: A LITTLE
CLIMB 3 TO 5 STEPS WITH RAILING: TOTAL
MOVING FROM LYING ON BACK TO SITTING ON SIDE OF FLAT BED: UNABLE

## 2018-08-17 ASSESSMENT — LIFESTYLE VARIABLES
HEADACHE, FULLNESS IN HEAD: NOT PRESENT
HEADACHE, FULLNESS IN HEAD: NOT PRESENT
TREMOR: NO TREMOR
AGITATION: NORMAL ACTIVITY
ANXIETY: MILDLY ANXIOUS
NAUSEA AND VOMITING: NO NAUSEA AND NO VOMITING
TOTAL SCORE: 2
EVER_SMOKED: YES
ORIENTATION AND CLOUDING OF SENSORIUM: ORIENTED AND CAN DO SERIAL ADDITIONS
AUDITORY DISTURBANCES: NOT PRESENT
ANXIETY: MILDLY ANXIOUS
AUDITORY DISTURBANCES: NOT PRESENT
TOTAL SCORE: 2
VISUAL DISTURBANCES: NOT PRESENT
SUBSTANCE_ABUSE: 1
VISUAL DISTURBANCES: NOT PRESENT
PAROXYSMAL SWEATS: BARELY PERCEPTIBLE SWEATING. PALMS MOIST
ORIENTATION AND CLOUDING OF SENSORIUM: ORIENTED AND CAN DO SERIAL ADDITIONS
AGITATION: NORMAL ACTIVITY
PAROXYSMAL SWEATS: BARELY PERCEPTIBLE SWEATING. PALMS MOIST
TREMOR: NO TREMOR
NAUSEA AND VOMITING: NO NAUSEA AND NO VOMITING

## 2018-08-17 ASSESSMENT — ENCOUNTER SYMPTOMS
FALLS: 0
WEAKNESS: 1
PALPITATIONS: 0
FOCAL WEAKNESS: 0
NECK PAIN: 0
HEADACHES: 0
DIZZINESS: 1
FEVER: 0
MYALGIAS: 0
TINGLING: 0
DEPRESSION: 0
BLOOD IN STOOL: 0
LOSS OF CONSCIOUSNESS: 0
WEIGHT LOSS: 0
COUGH: 0
CHILLS: 0
CONSTIPATION: 1
BLURRED VISION: 0
NAUSEA: 0
ABDOMINAL PAIN: 0
SHORTNESS OF BREATH: 0
VOMITING: 0
SEIZURES: 0

## 2018-08-17 ASSESSMENT — COPD QUESTIONNAIRES
DO YOU EVER COUGH UP ANY MUCUS OR PHLEGM?: YES, A FEW DAYS A WEEK OR MONTH
DURING THE PAST 4 WEEKS HOW MUCH DID YOU FEEL SHORT OF BREATH: NONE/LITTLE OF THE TIME
HAVE YOU SMOKED AT LEAST 100 CIGARETTES IN YOUR ENTIRE LIFE: YES
COPD SCREENING SCORE: 5

## 2018-08-17 ASSESSMENT — GAIT ASSESSMENTS
ASSISTIVE DEVICE: HAND HELD ASSIST
DEVIATION: ATAXIC;DECREASED BASE OF SUPPORT
GAIT LEVEL OF ASSIST: MINIMAL ASSIST
DISTANCE (FEET): 50

## 2018-08-17 ASSESSMENT — PAIN SCALES - GENERAL
PAINLEVEL_OUTOF10: 0
PAINLEVEL_OUTOF10: 0

## 2018-08-17 ASSESSMENT — ACTIVITIES OF DAILY LIVING (ADL): TOILETING: INDEPENDENT

## 2018-08-17 NOTE — ASSESSMENT & PLAN NOTE
Currently sinus tachycardia, HR improving, no A fib/flutter bursts recorded on tele last night  - continue metop and xarelto

## 2018-08-17 NOTE — ED NOTES
Report received from King FISHMAN. Assumed care of patient. Pt assessed. A&O x4. Patient's concerns addressed. Fall precautions in place. Pt repositioned and comfortable. Call light within reach. Will continue to monitor.

## 2018-08-17 NOTE — PROGRESS NOTES
2 RN skin check completed with KYE Uriostegui:     R elbow red and slow to joy, biatain applied.  Scattered scabs and bruising on R forearm.  Scab on L hand, mepitel applied.  Scab on R knee.  Bilat heels calloused and dry.  Sacral area pink and blanching.  Scab on L dorsal foot.  Unable to assess R foot due to pt being in fiberglass cast from recent foot surgery, per pt.  All other skin intact.

## 2018-08-17 NOTE — THERAPY
"Physical Therapy Evaluation completed.   Bed Mobility:  Supine to Sit: Supervised  Transfers: Sit to Stand: Minimal Assist  Gait: Level Of Assist: Minimal Assist with physical assist     Plan of Care: Will benefit from Physical Therapy 3 times per week  Discharge Recommendations: Equipment: Single Point Cane.     Mr. Bolton is a 62 y/o male who presents to acute secondary to pre-sycopal episode at [facility] appointment. Of note MAR was .16 upon admit. Orthostatics taken to determine if this is cause of syncopal episodes. Supine 151/101 mmHg, 81 bpm. Sitting 141/94 mmHg, 81 bpm. Standing 133/91 mmHg, 86 bpm. Post gait 143/100 mmHg, 85 bpm. No symptoms. He did experience multiple losses of balance when ambulating which are due to severe ataxia and dynamic balance deficits. Pt completely unaware of LOB and required multiple episodes of min assist to correct. Refused to use AD. Additionally when on toilet impulsively attempted to stand from toilet and experienced LOB posterior, moderate assist required to stand. Pt is not safe to discharge home alone, is a high risk for falls due to dynamic balance impairments and lack of safety awareness. Strongly recommend post acute placement. He would benefit from continued acute physical therapy services to improve his mobility and decrease risk for falls. Strongly recommend OT eval as he struggled greatly with ADLs.     See \"Rehab Therapy-Acute\" Patient Summary Report for complete documentation.     "

## 2018-08-17 NOTE — PROGRESS NOTES
Notified Dr. Hess about pt's elevated blood pressures. Per Dr. Hess she will order an as needed blood pressure medication.

## 2018-08-17 NOTE — ASSESSMENT & PLAN NOTE
Recurrent syncope and pre-syncope. Has been evaluated in past admissions. Likely 2/2 hypotension and likely component of autonomic dysfunction. Echo was largely normal in April, with mildly elevated RVSP at 39.  - CT head negative  - orthostatic VS negative, but this was after receiving IVF's  - PT evaluation, recommends, SNF, placed rehab order, complicated by R foot injury and ongoing ETOH abuse  - continue tele monitoring  - no recurrence here

## 2018-08-17 NOTE — PROGRESS NOTES
Monitor Summary:     SR - ST: 87 - 126  Touched up to 140's (non-sustaining and asymptomatic)  .16/.08/.40

## 2018-08-17 NOTE — ASSESSMENT & PLAN NOTE
"Heavy alcohol use. Endorses using it to help him sleep so he can \"pass the time\".   - alcohol level pending  - [facility] protocol-monitor closey, on tele, aggressively replace Lytes PRN  "

## 2018-08-17 NOTE — ASSESSMENT & PLAN NOTE
Presented with hypotension on admission. Suspect some degree of autonomic dysreflexia 2/2 HIV.   - consider adding an additional agent, will back off on the fluids  - resume home metop-monitor closely, no dosage changes today  - labetalol IV PRN

## 2018-08-17 NOTE — ED NOTES
Report given to Nicole FISHMAN bedside. Pt taken to Tele on [place]. Chart and belongings with patient. No acute signs distress present.

## 2018-08-17 NOTE — THERAPY
"Occupational Therapy Evaluation completed.   Functional Status:  SPV for supine>sit; CGA for sit>stand, grooming while standing, toilet transfer and LB dressing  Plan of Care: Will benefit from Occupational Therapy 3 times per week  Discharge Recommendations:  Equipment: Will Continue to Assess for Equipment Needs. Post-acute therapy Discharge to a transitional care facility for continued skilled therapy services.    See \"Rehab Therapy-Acute\" Patient Summary Report for complete documentation.    OT eval completed on 62 YO M admitted with presyncope. Pt with boot to RLE from previous fall. Pt limited due to impaired safety awareness, insight into current deficits, standing balance and functional mobility. Pt with multiple LOBs throughout session. Pt's standing balance improved with shoe to LLE however pt with increased impulsivity during functional mobility. Attempted to educate pt on concern for falls if d/c'ed home however no learning evident. Pt unsafe to d/c home due to high fall risk. Pt would benefit from stay at transitional care facility prior to d/c home for continued strengthening and safe d/c home. Will continue to follow for acute OT services while in-house.  "

## 2018-08-17 NOTE — PROGRESS NOTES
Notified hospitalist (Dr. Marquez) of pt's elevated lactic acid level. Per Dr. Marquez, begin continuous 0.9% normal saline infusion at 100 mL/hr and trend lactic acid lab every six hours. Orders entered.

## 2018-08-17 NOTE — H&P
Hospital Medicine History & Physical Note    Date of Service  8/16/2018    Primary Care Physician  Butch iLao M.D.    Consultants  None    Code Status  Full    Chief Complaint  Pre-syncope and hypotension.    History of Presenting Illness  63 y.o. male with chronic Afib, alcohol abuse, and HIV who presented 8/16/2018 after being brought in by EMS from [facility]) for an episode of near syncope.  Patient states he was at [facility] for an appointment when he felt very faint/lightheaded.  He brought himself to the ground so he would not fall.  Denies LOC, head trauma.  He reports this lightheaded feeling “happens a lot,” sometimes resulting in syncopal episodes.      He reports recently checking his BPs at home and finding them to be low (at times to 70s/50s).  He recently self-discontinued his antihypertensives. Denies CP, SOB, N/V, fever/chills, diarrhea. Patient also reports continued heavy alcohol use, drinking vodka at times throughout the day, especially when he wants to sleep.      In the ED patient T 99F, P 114, R 18, /63, saturating 89-94% on RA.  WBC 3.0. Lactic acid 3.7.  Troponin <0.01, BNP 14. D-dimer 489; CT-CTA chest PA without evidence of PE.  EKG showed sinus tachycardia, borderline prolonged QT.       Review of Systems  Review of Systems   Constitutional: Positive for malaise/fatigue. Negative for chills and fever.   Respiratory: Negative for cough and shortness of breath.    Cardiovascular: Negative for chest pain, palpitations and leg swelling.   Gastrointestinal: Positive for constipation. Negative for abdominal pain, blood in stool, nausea and vomiting.   Genitourinary: Negative for dysuria.   Musculoskeletal: Negative for falls.   Neurological: Positive for dizziness (lightheaded, no vertigo) and weakness. Negative for focal weakness, seizures, loss of consciousness and headaches.   Psychiatric/Behavioral: Positive for substance abuse.   All other systems reviewed  and are negative.      Past Medical History   has a past medical history of Atrial fibrillation (HCC) (2018); Cataract; HIV (human immunodeficiency virus infection) (HCC); Hypertension; and Kidney disease.    Surgical History   has a past surgical history that includes other orthopedic surgery (2008); other orthopedic surgery (2003); fibula orif (Right, 5/21/2018); ankle arthroscopy (Right, 5/21/2018); and irrigation & debridement ortho (Right, 6/4/2018).     Family History  family history is not on file.   Father with heart block. Grandfather with HD.    Social History   reports that he quit smoking about 6 months ago. His smoking use included Cigarettes. He uses smokeless tobacco. He reports that he drinks alcohol. He reports that he uses drugs, including Marijuana and Inhaled.   Denies tobacco.  Alcohol - drinks vodka throughout day, especially when patient wants to sleep (helps “pass the time”).  Marijuana use - last time 3-4 months ago. Denies current or h/o IVDU.     Allergies  Allergies   Allergen Reactions   • Sulfa Drugs Itching     Rxn - 1970's         Medications  Prior to Admission Medications   Prescriptions Last Dose Informant Patient Reported? Taking?   Abacavir-Dolutegravir-Lamivud (TRIUMEQ) 600- MG Tab 8/16/2018 at 1100 Patient Yes No   Sig: Take 1 Tab by mouth every day.   B Complex Vitamins (B COMPLEX 1 PO) 8/16/2018 at 1100 Patient Yes No   Sig: Take 1 Tab by mouth every day.   ferrous sulfate 325 (65 Fe) MG tablet 8/16/2018 at 1100 Patient Yes No   Sig: Take 325 mg by mouth every day.   gabapentin (NEURONTIN) 300 MG Cap 8/15/2018 at 2200 Patient Yes No   Sig: Take 300 mg by mouth 3 times a day.   ibuprofen (MOTRIN) 200 MG Tab 8/15/2018 at 2100 Patient Yes Yes   Sig: Take 400 mg by mouth every 6 hours as needed.   metoprolol (LOPRESSOR) 25 MG Tab > 2 days at HELD Patient No No   Sig: Take 0.5 Tabs by mouth 2 times a day.   omeprazole (PRILOSEC) 20 MG delayed-release capsule 8/16/2018 at  1100 Patient Yes No   Sig: Take 20 mg by mouth every day.   quetiapine (SEROQUEL) 400 MG tablet 8/15/2018 at 2200 Patient Yes No   Sig: Take 400 mg by mouth every bedtime.   rivaroxaban (XARELTO) 20 MG Tab tablet 8/16/2018 at 1700 Patient No No   Sig: Take 1 Tab by mouth with dinner.   tamsulosin (FLOMAX) 0.4 MG capsule 8/16/2018 at 1000 Patient Yes No   Sig: Take 0.4 mg by mouth ONE-HALF HOUR AFTER BREAKFAST.      Facility-Administered Medications: None       Physical Exam  Blood Pressure : 100/63   Temperature: 37.2 °C (99 °F)   Pulse: (!) 107   Respiration: 18   Pulse Oximetry: 94 %     Physical Exam   Constitutional: He is oriented to person, place, and time. He appears well-developed. No distress.   HENT:   Head: Normocephalic.   Mouth/Throat: Mucous membranes are dry.   Eyes: Pupils are equal, round, and reactive to light. EOM are normal. No scleral icterus.   Neck: Normal range of motion. No tracheal deviation present.   Cardiovascular: Regular rhythm and intact distal pulses.  Tachycardia present.    Pulmonary/Chest: Effort normal. No stridor. No respiratory distress. He has no wheezes. He has no rales.   Abdominal: Soft. Bowel sounds are normal. He exhibits no distension. There is no tenderness.   Musculoskeletal: He exhibits no edema.   Neurological: He is alert and oriented to person, place, and time. No cranial nerve deficit.   Skin: Skin is warm. He is diaphoretic.   Psychiatric: He has a normal mood and affect. His behavior is normal.   Vitals reviewed.      Laboratory:  Recent Labs      08/16/18   1323   WBC  3.0*   RBC  3.74*   HEMOGLOBIN  12.7*   HEMATOCRIT  37.1*   MCV  99.2*   MCH  34.0*   MCHC  34.2   RDW  50.7*   PLATELETCT  185   MPV  9.0     Recent Labs      08/16/18   1323   SODIUM  141   POTASSIUM  4.1   CHLORIDE  105   CO2  22   GLUCOSE  126*   BUN  10   CREATININE  1.30   CALCIUM  8.6     Recent Labs      08/16/18   1323   ALTSGPT  10   ASTSGOT  24   ALKPHOSPHAT  70   TBILIRUBIN  0.4    LIPASE  10*   GLUCOSE  126*     Recent Labs      08/16/18   1323   APTT  28.1   INR  1.12     Recent Labs      08/16/18   1323   BNPBTYPENAT  14         Lab Results   Component Value Date    TROPONINI <0.01 08/16/2018       Urinalysis:    No results found     Imaging:  CT-CTA CHEST PULMONARY ARTERY W/ RECONS   Final Result      1.  No evidence of pulmonary embolus.      2.  Nonobstructive left renal stones.            DX-CHEST-PORTABLE (1 VIEW)   Final Result      No acute cardiac or pulmonary abnormalities are identified.            Assessment/Plan:  I anticipate this patient is appropriate for observation status at this time.    * Syncope- (present on admission)   Assessment & Plan    Recurrent syncope and pre-syncope. Has been evaluated in past admissions. Likely 2/2 hypotension and likely component of autonomic dysfunction. Echo was largely normal in April, with mildly elevated RVSP at 39.  - CT head negative  - orthostatic VS  - PT evaluation  - monitor on tele overnight        Elevated lactic acid level- (present on admission)   Assessment & Plan    Likely from alcohol abuse. Lactic acid 3.7 --> 3  - continue with fluids  - repeat in AM, suspect significant improvement after he metabolizes the alcohol        TENZIN (acute kidney injury) (HCC)- (present on admission)   Assessment & Plan    Mild dehydration, suspect pre-renal. Cr 1.3 today  - repeat in AM  - avoid nephrotoxic agents        Essential hypertension- (present on admission)   Assessment & Plan    Presented with hypotension on admission. Suspect some degree of autonomic dysreflexia 2/2 HIV. Now hypertensive.  - resume home metop  - labetalol IV PRN         Persistent atrial fibrillation (HCC)- (present on admission)   Assessment & Plan    Currently sinus tachycardia.  - continue metop and xarelto        Hyperglycemia- (present on admission)   Assessment & Plan    Hyperglycemic but was just eating. A1C 5.2% in April  - monitor intermittently       "  Alcohol abuse- (present on admission)   Assessment & Plan    Heavy alcohol use. Endorses using it to help him sleep so he can \"pass the time\".   - alcohol level pending  - [facility] protocol         HIV (human immunodeficiency virus infection) (HCC)- (present on admission)   Assessment & Plan    Continue home regimen.            VTE prophylaxis: xarelto  "

## 2018-08-17 NOTE — PROGRESS NOTES
Received report from KYE Renee (ER). Pt arrived to unit via gurney with BRIELLE RN and Zoll monitor on. Tele box on pt, confirmed with Antonette (CCT) from monitor room. Per Antonette, pt's rhythm is sinus tachycardia with heart rate of 123. A&O x 4. No report of pain at this time. Pt unsteadily ambulated with 1-2 assists from gurney to edge of bed, bilateral leg weakness. Fall precautions and seizure precautions in place. Pt oriented to call light and unit, verbalized understanding. Call light and bedside table within reach.  Assessment completed. Will continue to monitor.

## 2018-08-17 NOTE — PROGRESS NOTES
[facility] Medicine Daily Progress Note    Date of Service  8/17/2018    Chief Complaint  63 y.o. male admitted 8/16/2018 with recurrent pre-syncope    Hospital Course  See below    Interval Problem Update  Syncope-recurrent, had a recent admission for the same issue. Been drinking a lot of booze since she struggles with insomnia quite severely. Sees a psychiatrist for this issue as well. TELE showed ST-SR , intermittent 140s, but short bursts, no AFIB/flutter shown.     HIV-well controlled, has had for 35 years and F/B Dr Montaño.     Consultants/Specialty  NONE    Disposition  TELE    Review of Systems  Review of Systems   Constitutional: Positive for malaise/fatigue. Negative for chills, fever and weight loss.   HENT: Negative for hearing loss.    Eyes: Negative for blurred vision.   Respiratory: Negative for shortness of breath.    Cardiovascular: Negative for chest pain, palpitations and leg swelling.   Gastrointestinal: Negative for abdominal pain, nausea and vomiting.   Genitourinary: Negative for dysuria and urgency.   Musculoskeletal: Negative for myalgias and neck pain.   Skin: Negative for itching.   Neurological: Positive for dizziness and weakness. Negative for tingling, focal weakness, loss of consciousness and headaches.   Psychiatric/Behavioral: Negative for depression.   All other systems reviewed and are negative.       Physical Exam  Blood Pressure : (!) 164/97   Temperature: 36.7 °C (98 °F)   Pulse: 84   Respiration: 17   Pulse Oximetry: 92 %     Physical Exam   Constitutional: He is oriented to person, place, and time. He appears well-developed and well-nourished. No distress.   Thin   HENT:   Head: Normocephalic and atraumatic.   Mouth/Throat: No oropharyngeal exudate.   Eyes: Pupils are equal, round, and reactive to light. No scleral icterus.   Neck: Normal range of motion. Neck supple. No thyromegaly present.   Cardiovascular: Normal rate, regular rhythm, normal heart sounds and intact  distal pulses.    No murmur heard.  Pulmonary/Chest: Effort normal and breath sounds normal. No respiratory distress. He has no wheezes.   Abdominal: Soft. Bowel sounds are normal. He exhibits no distension. There is no tenderness.   Musculoskeletal: Normal range of motion. He exhibits no edema or tenderness.   R foot/shin in cast   Neurological: He is alert and oriented to person, place, and time. No cranial nerve deficit.   Skin: Skin is warm and dry. No rash noted.   Psychiatric: He has a normal mood and affect.   Nursing note and vitals reviewed.      Fluids    Intake/Output Summary (Last 24 hours) at 08/17/18 1423  Last data filed at 08/16/18 1543   Gross per 24 hour   Intake                0 ml   Output              100 ml   Net             -100 ml       Laboratory  Recent Labs      08/16/18   1323  08/17/18   0220   WBC  3.0*  7.1   RBC  3.74*  3.62*   HEMOGLOBIN  12.7*  12.2*   HEMATOCRIT  37.1*  36.2*   MCV  99.2*  100.0*   MCH  34.0*  33.7*   MCHC  34.2  33.7   RDW  50.7*  50.2*   PLATELETCT  185  182   MPV  9.0  9.0     Recent Labs      08/16/18   1323  08/17/18   0220   SODIUM  141  138   POTASSIUM  4.1  3.7   CHLORIDE  105  105   CO2  22  23   GLUCOSE  126*  155*   BUN  10  15   CREATININE  1.30  1.15   CALCIUM  8.6  8.7     Recent Labs      08/16/18   1323   APTT  28.1   INR  1.12     Recent Labs      08/16/18   1323   BNPBTYPENAT  14           Imaging  CT-CTA CHEST PULMONARY ARTERY W/ RECONS   Final Result      1.  No evidence of pulmonary embolus.      2.  Nonobstructive left renal stones.            DX-CHEST-PORTABLE (1 VIEW)   Final Result      No acute cardiac or pulmonary abnormalities are identified.           Assessment/Plan  * Syncope- (present on admission)   Assessment & Plan    Recurrent syncope and pre-syncope. Has been evaluated in past admissions. Likely 2/2 hypotension and likely component of autonomic dysfunction. Echo was largely normal in April, with mildly elevated RVSP at 39.  - CT  "head negative  - orthostatic VS negative, but this was after receiving IVF's  - PT evaluation, recommends, SNF, placed rehab order, complicated by R foot injury and ongoing ETOH abuse  - continue tele monitoring  - no recurrence here        Elevated lactic acid level- (present on admission)   Assessment & Plan    Likely from alcohol abuse. Lactic acid 3.7 --> 3->2.9  - continue with fluids  - slowly improving        TENZIN (acute kidney injury) (HCC)- (present on admission)   Assessment & Plan    -improving wifht IVF\"s, monitor uop  - avoid nephrotoxic agents        Essential hypertension- (present on admission)   Assessment & Plan    Presented with hypotension on admission. Suspect some degree of autonomic dysreflexia 2/2 HIV.   - consider adding an additional agent, will back off on the fluids  - resume home metop-monitor closely, no dosage changes today  - labetalol IV PRN         Persistent atrial fibrillation (HCC)- (present on admission)   Assessment & Plan    Currently sinus tachycardia, HR improving, no A fib/flutter bursts recorded on tele last night  - continue metop and xarelto        Hyperglycemia- (present on admission)   Assessment & Plan    Hyperglycemic but was just eating. A1C 5.2% in April  - monitor intermittently        Alcohol abuse- (present on admission)   Assessment & Plan    Heavy alcohol use. Endorses using it to help him sleep so he can \"pass the time\".   - alcohol level pending  - WA protocol-monitor closey, on tele, aggressively replace Lytes PRN        HIV (human immunodeficiency virus infection) (HCC)- (present on admission)   Assessment & Plan    Continue home regimen.          "

## 2018-08-17 NOTE — DISCHARGE PLANNING
"Anticipated Discharge Disposition: Unknown at this time.    Action: Assessment Completed.    LSW spoke with pt at bedside re: discharge planning. Pt stated he came to the hospital in an Uber when he realized his blood pressure was low. Pt is \"confident\" that he can dc home alone. According to PT/OT, pt is unsafe to discharge home alone. Pt does not have any local support. Pt has family in [place]. LSW explained importance of Rehab. Pt refused.      Barriers to Discharge: Pt is noncompliant with treatment.     Plan: Discuss discharge planning with IDT.     "

## 2018-08-18 NOTE — PROGRESS NOTES
Patient encouraged to stay for treatment and still refuses to stay. Hospitalist informed. PIV removed and tele removed, AMA form signed.

## 2018-08-18 NOTE — PROGRESS NOTES
Took minimal report; patient signed ama prior to start of nursing shift; patient ambulatory in room; denies complaints; juan c cna to wheelchair to [address] exit per patient request.

## 2018-08-18 NOTE — DISCHARGE SUMMARY
Discharge Summary    CHIEF COMPLAINT ON ADMISSION  Chief Complaint   Patient presents with   • Near Syncopal     pt BIB EMS from Ortho office ([facility]), pt with near syncopal on scene/ hypotension upon EMS arrival/ pt given 400mL NS/ pt with hx of same, reports hx of A-fib, pt A&Ox4   • Hypotension     90's systolic on scene     PATIENT LEFT AMA    Reason for Admission  EMS 11     Admission Date  8/16/2018    CODE STATUS  Prior    HPI & HOSPITAL COURSE  This is a 63 y.o. male here with above medical issues. Patient was admitted to telemetry and received IVF's with resolution of mild orthostatic blood pressures. Patient had an admission BAL of 0.16. He has recurrent admissions for this. Extensive work up was done on the most recent admission with no clear etiology. Patient's symptoms slowly improved. However, PT/OT evaluated the patient and deemed him not safe to be at home on his own. I talked to the patient extensively and he has been struggling with chronic insomnia for many years along with generalized anxiety and does see a local psychiatrist for these issues. Syncope is likely related to ongoing ETOH abuse along with dysautonomia from long-standing HIV. Patient had full decision make capacity and was able to articulate the risks of leaving the hospital prematurely including death. He signed AMA paperwork and left.        Therefore, he is discharged in guarded and stable condition to home with close outpatient follow-up.        Discharge Date  8/17/2018    FOLLOW UP ITEMS POST DISCHARGE  Strongly encouraged to F/U with his PCP in 1-2 weeks    DISCHARGE DIAGNOSES  Principal Problem:    Syncope POA: Yes  Active Problems:    Elevated lactic acid level POA: Yes    Essential hypertension POA: Yes    TENZIN (acute kidney injury) (HCC) POA: Yes    HIV (human immunodeficiency virus infection) (HCC) POA: Yes    Alcohol abuse POA: Yes    Hyperglycemia POA: Yes    Persistent atrial fibrillation (HCC) POA: Yes  Resolved  Problems:    * No resolved hospital problems. *      FOLLOW UP  Future Appointments  Date Time Provider Department Center   10/22/2018 11:30 AM Butch Martínez M.D. [facility] None     Butch Liao M.D.  [address]  [place] [address]  [phone]            MEDICATIONS ON DISCHARGE     Medication List      ASK your doctor about these medications      Instructions   B COMPLEX 1 PO   Take 1 Tab by mouth every day.  Dose:  1 Tab     ferrous sulfate 325 (65 Fe) MG tablet   Take 325 mg by mouth every day.  Dose:  325 mg     gabapentin 300 MG Caps  Commonly known as:  NEURONTIN   Take 300 mg by mouth 3 times a day.  Dose:  300 mg     ibuprofen 200 MG Tabs  Commonly known as:  MOTRIN   Take 400 mg by mouth every 6 hours as needed.  Dose:  400 mg     metoprolol 25 MG Tabs  Commonly known as:  LOPRESSOR   Take 0.5 Tabs by mouth 2 times a day.  Dose:  12.5 mg     omeprazole 20 MG delayed-release capsule  Commonly known as:  PRILOSEC   Take 20 mg by mouth every day.  Dose:  20 mg     rivaroxaban 20 MG Tabs tablet  Commonly known as:  XARELTO   Take 1 Tab by mouth with dinner.  Dose:  20 mg     SEROQUEL 400 MG tablet  Generic drug:  quetiapine   Take 400 mg by mouth every bedtime.  Dose:  400 mg     tamsulosin 0.4 MG capsule  Commonly known as:  FLOMAX   Take 0.4 mg by mouth ONE-HALF HOUR AFTER BREAKFAST.  Dose:  0.4 mg     TRIUMEQ 600- MG Tabs  Generic drug:  Abacavir-Dolutegravir-Lamivud   Take 1 Tab by mouth every day.  Dose:  1 Tab            Allergies  Allergies   Allergen Reactions   • Sulfa Drugs Itching     Rxn - 1970's         DIET  Low fat, low sugar, heart healthy    ACTIVITY  As tolerated.  Weight bearing as tolerated    CONSULTATIONS  none    PROCEDURES  CT-CTA CHEST PULMONARY ARTERY W/ RECONS   Final Result      1.  No evidence of pulmonary embolus.      2.  Nonobstructive left renal stones.            DX-CHEST-PORTABLE (1 VIEW)   Final Result      No acute cardiac or pulmonary  abnormalities are identified.            LABORATORY  Lab Results   Component Value Date    SODIUM 138 08/17/2018    POTASSIUM 3.7 08/17/2018    CHLORIDE 105 08/17/2018    CO2 23 08/17/2018    GLUCOSE 155 (H) 08/17/2018    BUN 15 08/17/2018    CREATININE 1.15 08/17/2018        Lab Results   Component Value Date    WBC 7.1 08/17/2018    HEMOGLOBIN 12.2 (L) 08/17/2018    HEMATOCRIT 36.2 (L) 08/17/2018    PLATELETCT 182 08/17/2018        Total time of the discharge process exceeds 41 minutes.

## 2018-08-21 LAB
BACTERIA BLD CULT: NORMAL
BACTERIA BLD CULT: NORMAL
SIGNIFICANT IND 70042: NORMAL
SIGNIFICANT IND 70042: NORMAL
SITE SITE: NORMAL
SITE SITE: NORMAL
SOURCE SOURCE: NORMAL
SOURCE SOURCE: NORMAL

## 2018-08-29 ENCOUNTER — HOSPITAL ENCOUNTER (OUTPATIENT)
Dept: RADIOLOGY | Facility: MEDICAL CENTER | Age: 63
End: 2018-08-29
Attending: FAMILY MEDICINE
Payer: MEDICARE

## 2018-08-29 ENCOUNTER — TELEPHONE (OUTPATIENT)
Dept: CARDIOLOGY | Facility: MEDICAL CENTER | Age: 63
End: 2018-08-29

## 2018-08-29 DIAGNOSIS — S09.90XA INJURY OF HEAD, INITIAL ENCOUNTER: ICD-10-CM

## 2018-08-29 PROCEDURE — 70450 CT HEAD/BRAIN W/O DYE: CPT

## 2018-08-29 NOTE — TELEPHONE ENCOUNTER
pt calling about low blood pressure   Received: Today   Message Contents   JUDITH Palmer/Alejandra       Patient says his blood pressure is running low (70/46) and he's been falling down. He can be reached at [phone].      Contacted patient, he spoke to another RN earlier.  Confirmed his appointment tomorrow with AKILA at 1500.  Repeated instructions and location multiple times. Patient verbalizes understanding.

## 2018-08-29 NOTE — TELEPHONE ENCOUNTER
"pt calling about low blood pressure   Received: Today   Message Contents   JUDITH Palmer/Alejandra       Patient says his blood pressure is running low (70/46) and he's been falling down. He can be reached at [phone].      Returned patient call. Pt states he just an another episode prior to my call where he felt lightheaded but did not lose consciousness. We discuss his recent hospitalization for same, pt states \"Yeah but they didn't fix it\". Pt is being called to an appt by someone where he lives. He asks that I just schedule him an appt. Pt urged to call us back after his appointment. Pt reluctant. Pt informed I would try to call him back later this afternoon also.     After chart review. Pt is found to be at [facility] for a CT of the head.   "

## 2018-08-30 ENCOUNTER — OFFICE VISIT (OUTPATIENT)
Dept: CARDIOLOGY | Facility: MEDICAL CENTER | Age: 63
End: 2018-08-30
Payer: MEDICARE

## 2018-08-30 VITALS
DIASTOLIC BLOOD PRESSURE: 76 MMHG | HEART RATE: 108 BPM | WEIGHT: 142 LBS | HEIGHT: 72 IN | OXYGEN SATURATION: 96 % | SYSTOLIC BLOOD PRESSURE: 128 MMHG | BODY MASS INDEX: 19.23 KG/M2

## 2018-08-30 DIAGNOSIS — R42 DIZZINESS: ICD-10-CM

## 2018-08-30 DIAGNOSIS — I10 ESSENTIAL HYPERTENSION: ICD-10-CM

## 2018-08-30 DIAGNOSIS — R55 SYNCOPE AND COLLAPSE: ICD-10-CM

## 2018-08-30 DIAGNOSIS — I48.91 ATRIAL FIBRILLATION WITH RAPID VENTRICULAR RESPONSE (HCC): ICD-10-CM

## 2018-08-30 LAB — EKG IMPRESSION: NORMAL

## 2018-08-30 PROCEDURE — 99214 OFFICE O/P EST MOD 30 MIN: CPT | Performed by: NURSE PRACTITIONER

## 2018-08-30 PROCEDURE — 93000 ELECTROCARDIOGRAM COMPLETE: CPT | Performed by: NURSE PRACTITIONER

## 2018-08-30 ASSESSMENT — ENCOUNTER SYMPTOMS
COUGH: 0
MYALGIAS: 0
SHORTNESS OF BREATH: 0
WEAKNESS: 1
CLAUDICATION: 0
ORTHOPNEA: 0
PND: 0
PALPITATIONS: 1
ABDOMINAL PAIN: 0
DIZZINESS: 1
FEVER: 0

## 2018-08-30 NOTE — PROGRESS NOTES
"Chief Complaint   Patient presents with   • Syncope     F/V: Hypotension       Subjective:   Sukumar Bolton is a 63 y.o. male who presents today for follow-up on his syncope and A. Fib.    Patient of Dr. Martínez.  Patient was last seen in clinic on 4/23/2018.  Since his last visit, patient had new onset of syncopal episodes.  Patient reports these syncopal episodes occur when his blood pressure is low and he stands up too quickly.  Patient denies any prior symptoms.  Patient was admitted to the hospital for workup, but he left AMA.     Patient does drink alcohol up to 4 drinks per day.  Patient reports it helps him sleep.  Patient denies being drunk when he has syncopal episodes.  Patient does report being tired and weak.  Patient also reports dizziness if standing up too quickly.  Patient reports an extra heart beat every so often.  Patient reports no known episodes of A. fib since his cardioversion.    He denies chest pain, orthopnea, PND, edema or any shortness of breath.    Patient also reports having blood pressures that are \"all over the place\".  Patient reports he checks his blood pressure multiple times a day and has BPs as high as the 170s and as low as the 60s.  Patient is currently not taking his metoprolol, but states he takes 12.5 mg as needed if his blood pressure is elevated.    Patient does have right lower extremity boot in place.    Additonally, patient has the following medical problems:    -HIV for over 40 years, followed by the [facility] clinic    -A. fib: Taking Xarelto 20 mg daily, cardioversion 4/4/18     -Hypertension    Past Medical History:   Diagnosis Date   • Atrial fibrillation (HCC) 2018   • Cataract     sx 1999   • HIV (human immunodeficiency virus infection) (HCC)    • Hypertension    • Kidney disease      Past Surgical History:   Procedure Laterality Date   • IRRIGATION & DEBRIDEMENT ORTHO Right 6/4/2018    Procedure: IRRIGATION & DEBRIDEMENT ORTHO- ANKLE;  Surgeon: Ulices Berry, " M.D.;  Location: SURGERY [facility];  Service: Orthopedics   • FIBULA ORIF Right 5/21/2018    Procedure: FIBULA ORIF-FOR NON UNION REPAIR WITH BONE GRAFT;  Surgeon: Ulices Berry M.D.;  Location: SURGERY [facility];  Service: Orthopedics   • ANKLE ARTHROSCOPY Right 5/21/2018    Procedure: ANKLE ARTHROSCOPY;  Surgeon: Ulices Berry M.D.;  Location: SURGERY [facility];  Service: Orthopedics   • OTHER ORTHOPEDIC SURGERY  2008    bilateral shoulder sx   • OTHER ORTHOPEDIC SURGERY  2003    left ankle sx     History reviewed. No pertinent family history.  Social History     Social History   • Marital status:      Spouse name: N/A   • Number of children: N/A   • Years of education: N/A     Occupational History   • Not on file.     Social History Main Topics   • Smoking status: Former Smoker     Types: Cigarettes     Quit date: 2/14/2018   • Smokeless tobacco: Current User   • Alcohol use 0.0 oz/week      Comment: 2 drinks a day   • Drug use: Yes     Types: Marijuana, Inhaled      Comment: marijuana, last  use 5/2018   • Sexual activity: Not on file     Other Topics Concern   • Not on file     Social History Narrative   • No narrative on file     Allergies   Allergen Reactions   • Sulfa Drugs Itching     Rxn - 1970's       Outpatient Encounter Prescriptions as of 8/30/2018   Medication Sig Dispense Refill   • ibuprofen (MOTRIN) 200 MG Tab Take 400 mg by mouth every 6 hours as needed.     • rivaroxaban (XARELTO) 20 MG Tab tablet Take 1 Tab by mouth with dinner. 30 Tab 0   • Abacavir-Dolutegravir-Lamivud (TRIUMEQ) 600- MG Tab Take 1 Tab by mouth every day.     • ferrous sulfate 325 (65 Fe) MG tablet Take 325 mg by mouth every day.     • B Complex Vitamins (B COMPLEX 1 PO) Take 1 Tab by mouth every day.     • omeprazole (PRILOSEC) 20 MG delayed-release capsule Take 20 mg by mouth every day.     • quetiapine (SEROQUEL) 400 MG tablet Take 400 mg by mouth every bedtime.     • tamsulosin (FLOMAX)  0.4 MG capsule Take 0.4 mg by mouth ONE-HALF HOUR AFTER BREAKFAST.     • gabapentin (NEURONTIN) 300 MG Cap Take 300 mg by mouth 3 times a day.     • metoprolol (LOPRESSOR) 25 MG Tab Take 0.5 Tabs by mouth 2 times a day. 30 Tab 0     No facility-administered encounter medications on file as of 8/30/2018.      Review of Systems   Constitutional: Positive for malaise/fatigue. Negative for fever.        Passing out   Respiratory: Negative for cough and shortness of breath.    Cardiovascular: Positive for palpitations (feels extra beat every once in a while). Negative for chest pain, orthopnea, claudication, leg swelling and PND.   Gastrointestinal: Negative for abdominal pain.   Musculoskeletal: Negative for myalgias.   Neurological: Positive for dizziness (usually with standing too quickly) and weakness.   All other systems reviewed and are negative.       Objective:   /76   Pulse (!) 108   Ht 1.829 m (6')   Wt 64.4 kg (142 lb)   SpO2 96%   BMI 19.26 kg/m²     Physical Exam   Constitutional: He is oriented to person, place, and time. He appears well-developed and well-nourished.   HENT:   Head: Normocephalic and atraumatic.   Eyes: Pupils are equal, round, and reactive to light. EOM are normal.   Neck: Normal range of motion. Neck supple. No JVD present.   Cardiovascular: Normal rate, regular rhythm and normal heart sounds.    Pulmonary/Chest: Effort normal and breath sounds normal. No respiratory distress. He has no wheezes. He has no rales.   Abdominal: Soft. Bowel sounds are normal.   Musculoskeletal: He exhibits no edema.   Right foot in boot   Neurological: He is alert and oriented to person, place, and time.   Skin: Skin is warm and dry.   Psychiatric: He has a normal mood and affect. His behavior is normal.   Vitals reviewed.    Lab Results   Component Value Date/Time    CHOLSTRLTOT 136 04/04/2018 05:25 AM    LDL 75 04/04/2018 05:25 AM    HDL 40 04/04/2018 05:25 AM    TRIGLYCERIDE 104 04/04/2018 05:25  AM       Lab Results   Component Value Date/Time    SODIUM 138 08/17/2018 02:20 AM    POTASSIUM 3.7 08/17/2018 02:20 AM    CHLORIDE 105 08/17/2018 02:20 AM    CO2 23 08/17/2018 02:20 AM    GLUCOSE 155 (H) 08/17/2018 02:20 AM    BUN 15 08/17/2018 02:20 AM    CREATININE 1.15 08/17/2018 02:20 AM     Lab Results   Component Value Date/Time    ALKPHOSPHAT 70 08/16/2018 01:23 PM    ASTSGOT 24 08/16/2018 01:23 PM    ALTSGPT 10 08/16/2018 01:23 PM    TBILIRUBIN 0.4 08/16/2018 01:23 PM      Lab Results   Component Value Date/Time    WBC 7.1 08/17/2018 02:20 AM    RBC 3.62 (L) 08/17/2018 02:20 AM    HEMOGLOBIN 12.2 (L) 08/17/2018 02:20 AM    HEMATOCRIT 36.2 (L) 08/17/2018 02:20 AM    .0 (H) 08/17/2018 02:20 AM    MCH 33.7 (H) 08/17/2018 02:20 AM    MCHC 33.7 08/17/2018 02:20 AM    MPV 9.0 08/17/2018 02:20 AM      Transesophageal Echo Report 4/4/18  No thrombus detected in the left atrial appendage.  The left atrial appendage was small and underfilled.      Transthoracic Echo Report 4/6/2018  Normal left ventricular size, thickness, systolic function, and   diastolic function.  No significant valve abnormalities.   Estimated right ventricular systolic pressure  is 39 mmHg.  Dilated inferior vena cava without inspiratory collapse.  Borderline dilated aortic root, measuring 3.9 cm.    Assessment:     1. Syncope and collapse  Carotid Duplex    [facility] EPIPHANY EKG (Clinic Performed)   2. Dizziness  Carotid Duplex    [facility] EPIP[facility]ANY EKG (Clinic Performed)   3. Atrial fibrillation with rapid ventricular response (HCC)     4. Essential hypertension         Medical Decision Making:  Today's Assessment / Status / Plan:   1. Syncope/dizziness: EKG today shows SR 92.  Discussed with patient that his syncopal episodes could be due to carotid disease.  Patient recently had a Head CT scan that was within normal limits.   -Obtain carotid duplex (will notify patient if abnormal)  -Encouraged regular meals  -Encourage adequate  hydration  -Encouraged alcohol cessation (discussed with patient that alcohol use could be the cause of hypotension and falls)  -Discussed ER precautions for recurrent syncope    2.  A. fib: Per EKG not in A. Fib  -Continue Xarelto 20 mg daily (discussed with patient if he continues to fall places him at a high bleeding risk)  -Hold metoprolol at this time    3.  Labile hypertension: Likely due to anxiety of frequent BP checks.  BP is stable in the office today.  Patient asymptomatic when BP elevated at home.  -Discussed with patient to take his blood pressure once a day  -Hold metoprolol at this time  -Monitor and log Blood pressures at home. Call office or RTC if BP increasing or >180/100 or if symptoms of elevated blood pressure present. Reviewed s/sx of stroke and heart attack. Patient to go to ER or call 911 if present.     FU in clinic in 6 weeks with Dr. Martínez. Sooner if needed.    Patient verbalizes understanding and agrees with the plan of care.     Collaborating MD: Alejo Klein MD

## 2018-08-30 NOTE — LETTER
"     [facility]   [address]  [place] [address]  Phone: [phone]  Fax: [phone]              Sukumar Bolton  1955    Encounter Date: 8/30/2018    TIFFANY Perera          PROGRESS NOTE:  Chief Complaint   Patient presents with   • Syncope     F/V: Hypotension       Subjective:   Sukumar Bolton is a 63 y.o. male who presents today for follow-up on his syncope and A. Fib.    Patient of Dr. Martínez.  Patient was last seen in clinic on 4/23/2018.  Since his last visit, patient had new onset of syncopal episodes.  Patient reports these syncopal episodes occur when his blood pressure is low and he stands up too quickly.  Patient denies any prior symptoms.  Patient was admitted to the hospital for workup, but he left AMA.     Patient does drink alcohol up to 4 drinks per day.  Patient reports it helps him sleep.  Patient denies being drunk when he has syncopal episodes.  Patient does report being tired and weak.  Patient also reports dizziness if standing up too quickly.  Patient reports an extra heart beat every so often.  Patient reports no known episodes of A. fib since his cardioversion.    He denies chest pain, orthopnea, PND, edema or any shortness of breath.    Patient also reports having blood pressures that are \"all over the place\".  Patient reports he checks his blood pressure multiple times a day and has BPs as high as the 170s and as low as the 60s.  Patient is currently not taking his metoprolol, but states he takes 12.5 mg as needed if his blood pressure is elevated.    Patient does have right lower extremity boot in place.    Additonally, patient has the following medical problems:    -HIV for over 40 years, followed by [facility]    -A. fib: Taking Xarelto 20 mg daily, cardioversion 4/4/18     -Hypertension    Past Medical History:   Diagnosis Date   • Atrial fibrillation (HCC) 2018   • Cataract     sx 1999   • HIV " (human immunodeficiency virus infection) (HCC)    • Hypertension    • Kidney disease      Past Surgical History:   Procedure Laterality Date   • IRRIGATION & DEBRIDEMENT ORTHO Right 6/4/2018    Procedure: IRRIGATION & DEBRIDEMENT ORTHO- ANKLE;  Surgeon: Ulices Berry M.D.;  Location: SURGERY [facility];  Service: Orthopedics   • FIBULA ORIF Right 5/21/2018    Procedure: FIBULA ORIF-FOR NON UNION REPAIR WITH BONE GRAFT;  Surgeon: Ulices Berry M.D.;  Location: SURGERY [facility];  Service: Orthopedics   • ANKLE ARTHROSCOPY Right 5/21/2018    Procedure: ANKLE ARTHROSCOPY;  Surgeon: Ulices Berry M.D.;  Location: SURGERY [facility];  Service: Orthopedics   • OTHER ORTHOPEDIC SURGERY  2008    bilateral shoulder sx   • OTHER ORTHOPEDIC SURGERY  2003    left ankle sx     History reviewed. No pertinent family history.  Social History     Social History   • Marital status:      Spouse name: N/A   • Number of children: N/A   • Years of education: N/A     Occupational History   • Not on file.     Social History Main Topics   • Smoking status: Former Smoker     Types: Cigarettes     Quit date: 2/14/2018   • Smokeless tobacco: Current User   • Alcohol use 0.0 oz/week      Comment: 2 drinks a day   • Drug use: Yes     Types: Marijuana, Inhaled      Comment: marijuana, last  use 5/2018   • Sexual activity: Not on file     Other Topics Concern   • Not on file     Social History Narrative   • No narrative on file     Allergies   Allergen Reactions   • Sulfa Drugs Itching     Rxn - 1970's       Outpatient Encounter Prescriptions as of 8/30/2018   Medication Sig Dispense Refill   • ibuprofen (MOTRIN) 200 MG Tab Take 400 mg by mouth every 6 hours as needed.     • rivaroxaban (XARELTO) 20 MG Tab tablet Take 1 Tab by mouth with dinner. 30 Tab 0   • Abacavir-Dolutegravir-Lamivud (TRIUMEQ) 600- MG Tab Take 1 Tab by mouth every day.     • ferrous sulfate 325 (65 Fe) MG tablet Take 325 mg by mouth every  day.     • B Complex Vitamins (B COMPLEX 1 PO) Take 1 Tab by mouth every day.     • omeprazole (PRILOSEC) 20 MG delayed-release capsule Take 20 mg by mouth every day.     • quetiapine (SEROQUEL) 400 MG tablet Take 400 mg by mouth every bedtime.     • tamsulosin (FLOMAX) 0.4 MG capsule Take 0.4 mg by mouth ONE-HALF HOUR AFTER BREAKFAST.     • gabapentin (NEURONTIN) 300 MG Cap Take 300 mg by mouth 3 times a day.     • metoprolol (LOPRESSOR) 25 MG Tab Take 0.5 Tabs by mouth 2 times a day. 30 Tab 0     No facility-administered encounter medications on file as of 8/30/2018.      Review of Systems   Constitutional: Positive for malaise/fatigue. Negative for fever.        Passing out   Respiratory: Negative for cough and shortness of breath.    Cardiovascular: Positive for palpitations (feels extra beat every once in a while). Negative for chest pain, orthopnea, claudication, leg swelling and PND.   Gastrointestinal: Negative for abdominal pain.   Musculoskeletal: Negative for myalgias.   Neurological: Positive for dizziness (usually with standing too quickly) and weakness.   All other systems reviewed and are negative.       Objective:   /76   Pulse (!) 108   Ht 1.829 m (6')   Wt 64.4 kg (142 lb)   SpO2 96%   BMI 19.26 kg/m²      Physical Exam   Constitutional: He is oriented to person, place, and time. He appears well-developed and well-nourished.   HENT:   Head: Normocephalic and atraumatic.   Eyes: Pupils are equal, round, and reactive to light. EOM are normal.   Neck: Normal range of motion. Neck supple. No JVD present.   Cardiovascular: Normal rate, regular rhythm and normal heart sounds.    Pulmonary/Chest: Effort normal and breath sounds normal. No respiratory distress. He has no wheezes. He has no rales.   Abdominal: Soft. Bowel sounds are normal.   Musculoskeletal: He exhibits no edema.   Right foot in boot   Neurological: He is alert and oriented to person, place, and time.   Skin: Skin is warm and  dry.   Psychiatric: He has a normal mood and affect. His behavior is normal.   Vitals reviewed.    Lab Results   Component Value Date/Time    CHOLSTRLTOT 136 04/04/2018 05:25 AM    LDL 75 04/04/2018 05:25 AM    HDL 40 04/04/2018 05:25 AM    TRIGLYCERIDE 104 04/04/2018 05:25 AM       Lab Results   Component Value Date/Time    SODIUM 138 08/17/2018 02:20 AM    POTASSIUM 3.7 08/17/2018 02:20 AM    CHLORIDE 105 08/17/2018 02:20 AM    CO2 23 08/17/2018 02:20 AM    GLUCOSE 155 (H) 08/17/2018 02:20 AM    BUN 15 08/17/2018 02:20 AM    CREATININE 1.15 08/17/2018 02:20 AM     Lab Results   Component Value Date/Time    ALKPHOSPHAT 70 08/16/2018 01:23 PM    ASTSGOT 24 08/16/2018 01:23 PM    ALTSGPT 10 08/16/2018 01:23 PM    TBILIRUBIN 0.4 08/16/2018 01:23 PM      Lab Results   Component Value Date/Time    WBC 7.1 08/17/2018 02:20 AM    RBC 3.62 (L) 08/17/2018 02:20 AM    HEMOGLOBIN 12.2 (L) 08/17/2018 02:20 AM    HEMATOCRIT 36.2 (L) 08/17/2018 02:20 AM    .0 (H) 08/17/2018 02:20 AM    MCH 33.7 (H) 08/17/2018 02:20 AM    MCHC 33.7 08/17/2018 02:20 AM    MPV 9.0 08/17/2018 02:20 AM      Transesophageal Echo Report 4/4/18  No thrombus detected in the left atrial appendage.  The left atrial appendage was small and underfilled.      Transthoracic Echo Report 4/6/2018  Normal left ventricular size, thickness, systolic function, and   diastolic function.  No significant valve abnormalities.   Estimated right ventricular systolic pressure  is 39 mmHg.  Dilated inferior vena cava without inspiratory collapse.  Borderline dilated aortic root, measuring 3.9 cm.    Assessment:     1. Syncope and collapse  Carotid Duplex    [facility] EP[facility]ANY EKG (Clinic Performed)   2. Dizziness  Carotid Duplex    [facility] EKG (Clinic Performed)   3. Atrial fibrillation with rapid ventricular response (HCC)     4. Essential hypertension         Medical Decision Making:  Today's Assessment / Status / Plan:   1. Syncope/dizziness: EKG today shows SR 92.   Discussed with patient that his syncopal episodes could be due to carotid disease.  Patient recently had a Head CT scan that was within normal limits.   -Obtain carotid duplex (will notify patient if abnormal)  -Encouraged regular meals  -Encourage adequate hydration  -Encouraged alcohol cessation (discussed with patient that alcohol use could be the cause of hypotension and falls)  -Discussed ER precautions for recurrent syncope    2.  A. fib: Per EKG not in A. Fib  -Continue Xarelto 20 mg daily (discussed with patient if he continues to fall places him at a high bleeding risk)  -Hold metoprolol at this time    3.  Labile hypertension: Likely due to anxiety of frequent BP checks.  BP is stable in the office today.  Patient asymptomatic when BP elevated at home.  -Discussed with patient to take his blood pressure once a day  -Hold metoprolol at this time  -Monitor and log Blood pressures at home. Call office or RTC if BP increasing or >180/100 or if symptoms of elevated blood pressure present. Reviewed s/sx of stroke and heart attack. Patient to go to ER or call 911 if present.     FU in clinic in 6 weeks with Dr. Martínez. Sooner if needed.    Patient verbalizes understanding and agrees with the plan of care.     Collaborating MD: MD Butch Fuller M.D.  [facility] [address] [place] [address] [place] [address]  VIA Facsimile: [phone]

## 2018-09-01 ENCOUNTER — HOSPITAL ENCOUNTER (OUTPATIENT)
Dept: RADIOLOGY | Facility: MEDICAL CENTER | Age: 63
End: 2018-09-01
Attending: PHYSICIAN ASSISTANT
Payer: MEDICARE

## 2018-09-01 DIAGNOSIS — S93.621A SPRAIN OF TARSOMETATARSAL LIGAMENT OF RIGHT FOOT, INITIAL ENCOUNTER: ICD-10-CM

## 2018-09-01 DIAGNOSIS — M79.671 RIGHT FOOT PAIN: ICD-10-CM

## 2018-09-01 PROCEDURE — 73700 CT LOWER EXTREMITY W/O DYE: CPT | Mod: RT

## 2018-09-05 ENCOUNTER — HOSPITAL ENCOUNTER (OUTPATIENT)
Dept: RADIOLOGY | Facility: MEDICAL CENTER | Age: 63
End: 2018-09-05
Attending: NURSE PRACTITIONER
Payer: MEDICARE

## 2018-09-05 DIAGNOSIS — R42 DIZZINESS: ICD-10-CM

## 2018-09-05 DIAGNOSIS — R55 SYNCOPE AND COLLAPSE: ICD-10-CM

## 2018-09-05 PROCEDURE — 93880 EXTRACRANIAL BILAT STUDY: CPT

## 2018-09-05 PROCEDURE — 93880 EXTRACRANIAL BILAT STUDY: CPT | Mod: 26 | Performed by: SURGERY

## 2018-09-13 ENCOUNTER — HOSPITAL ENCOUNTER (OUTPATIENT)
Dept: LAB | Facility: MEDICAL CENTER | Age: 63
End: 2018-09-13
Attending: INTERNAL MEDICINE
Payer: MEDICARE

## 2018-09-13 PROCEDURE — 80053 COMPREHEN METABOLIC PANEL: CPT

## 2018-09-13 PROCEDURE — 87536 HIV-1 QUANT&REVRSE TRNSCRPJ: CPT

## 2018-09-13 PROCEDURE — 86780 TREPONEMA PALLIDUM: CPT

## 2018-09-13 PROCEDURE — 86803 HEPATITIS C AB TEST: CPT

## 2018-09-13 PROCEDURE — 84154 ASSAY OF PSA FREE: CPT

## 2018-09-13 PROCEDURE — 88184 FLOWCYTOMETRY/ TC 1 MARKER: CPT

## 2018-09-13 PROCEDURE — 84153 ASSAY OF PSA TOTAL: CPT

## 2018-09-14 LAB
ALBUMIN SERPL BCP-MCNC: 4.1 G/DL (ref 3.2–4.9)
ALBUMIN/GLOB SERPL: 1.5 G/DL
ALP SERPL-CCNC: 62 U/L (ref 30–99)
ALT SERPL-CCNC: 16 U/L (ref 2–50)
ANION GAP SERPL CALC-SCNC: 12 MMOL/L (ref 0–11.9)
AST SERPL-CCNC: 25 U/L (ref 12–45)
BILIRUB SERPL-MCNC: 0.8 MG/DL (ref 0.1–1.5)
BUN SERPL-MCNC: 10 MG/DL (ref 8–22)
CALCIUM SERPL-MCNC: 9.4 MG/DL (ref 8.5–10.5)
CHLORIDE SERPL-SCNC: 99 MMOL/L (ref 96–112)
CO2 SERPL-SCNC: 27 MMOL/L (ref 20–33)
CREAT SERPL-MCNC: 1.03 MG/DL (ref 0.5–1.4)
GLOBULIN SER CALC-MCNC: 2.7 G/DL (ref 1.9–3.5)
GLUCOSE SERPL-MCNC: 98 MG/DL (ref 65–99)
HCV AB SER QL: NEGATIVE
POTASSIUM SERPL-SCNC: 3.5 MMOL/L (ref 3.6–5.5)
PROT SERPL-MCNC: 6.8 G/DL (ref 6–8.2)
SODIUM SERPL-SCNC: 138 MMOL/L (ref 135–145)
TREPONEMA PALLIDUM IGG+IGM AB [PRESENCE] IN SERUM OR PLASMA BY IMMUNOASSAY: NON REACTIVE

## 2018-09-15 LAB
CD3+CD4+ CELLS # BLD: 561 CELLS/UL (ref 490–1600)
CD3+CD4+ CELLS NFR BLD: 29 % (ref 35–68)
IMMUNODEFICIENCY MARKERS SPEC-IMP: ABNORMAL
PSA FREE MFR SERPL: 18 %
PSA FREE SERPL-MCNC: 0.4 NG/ML
PSA SERPL-MCNC: 2.2 NG/ML (ref 0–4)

## 2018-09-17 ENCOUNTER — HOSPITAL ENCOUNTER (OUTPATIENT)
Facility: MEDICAL CENTER | Age: 63
End: 2018-09-17
Attending: ORTHOPAEDIC SURGERY | Admitting: ORTHOPAEDIC SURGERY
Payer: MEDICARE

## 2018-09-17 LAB
HIV1 RNA # SERPL NAA+PROBE: ABNORMAL CPY/ML
HIV1 RNA SER-IMP: DETECTED
HIV1 RNA SERPL NAA+PROBE-LOG#: ABNORMAL LOG

## 2018-09-21 ENCOUNTER — HOSPITAL ENCOUNTER (INPATIENT)
Facility: MEDICAL CENTER | Age: 63
LOS: 6 days | DRG: 871 | End: 2018-09-27
Attending: EMERGENCY MEDICINE | Admitting: HOSPITALIST
Payer: MEDICARE

## 2018-09-21 ENCOUNTER — APPOINTMENT (OUTPATIENT)
Dept: RADIOLOGY | Facility: MEDICAL CENTER | Age: 63
DRG: 871 | End: 2018-09-21
Attending: STUDENT IN AN ORGANIZED HEALTH CARE EDUCATION/TRAINING PROGRAM
Payer: MEDICARE

## 2018-09-21 DIAGNOSIS — B20 HIV (HUMAN IMMUNODEFICIENCY VIRUS INFECTION) (HCC): ICD-10-CM

## 2018-09-21 DIAGNOSIS — G47.00 INSOMNIA, UNSPECIFIED TYPE: ICD-10-CM

## 2018-09-21 DIAGNOSIS — J18.9 PNEUMONIA OF LEFT LOWER LOBE DUE TO INFECTIOUS ORGANISM: ICD-10-CM

## 2018-09-21 DIAGNOSIS — N17.9 AKI (ACUTE KIDNEY INJURY) (HCC): ICD-10-CM

## 2018-09-21 DIAGNOSIS — R74.8 ELEVATED LIVER ENZYMES: ICD-10-CM

## 2018-09-21 DIAGNOSIS — R53.1 WEAKNESS: ICD-10-CM

## 2018-09-21 DIAGNOSIS — F41.9 ANXIETY: ICD-10-CM

## 2018-09-21 LAB
ALBUMIN SERPL BCP-MCNC: 4 G/DL (ref 3.2–4.9)
ALBUMIN/GLOB SERPL: 1.2 G/DL
ALP SERPL-CCNC: 63 U/L (ref 30–99)
ALT SERPL-CCNC: 63 U/L (ref 2–50)
ANION GAP SERPL CALC-SCNC: 17 MMOL/L (ref 0–11.9)
AST SERPL-CCNC: 211 U/L (ref 12–45)
BASOPHILS # BLD AUTO: 0.2 % (ref 0–1.8)
BASOPHILS # BLD: 0.03 K/UL (ref 0–0.12)
BILIRUB SERPL-MCNC: 1.1 MG/DL (ref 0.1–1.5)
BUN SERPL-MCNC: 25 MG/DL (ref 8–22)
CALCIUM SERPL-MCNC: 9.9 MG/DL (ref 8.5–10.5)
CHLORIDE SERPL-SCNC: 101 MMOL/L (ref 96–112)
CO2 SERPL-SCNC: 23 MMOL/L (ref 20–33)
CREAT SERPL-MCNC: 1.54 MG/DL (ref 0.5–1.4)
EOSINOPHIL # BLD AUTO: 0 K/UL (ref 0–0.51)
EOSINOPHIL NFR BLD: 0 % (ref 0–6.9)
ERYTHROCYTE [DISTWIDTH] IN BLOOD BY AUTOMATED COUNT: 55.1 FL (ref 35.9–50)
ETHANOL BLD-MCNC: 0 G/DL
GLOBULIN SER CALC-MCNC: 3.3 G/DL (ref 1.9–3.5)
GLUCOSE SERPL-MCNC: 95 MG/DL (ref 65–99)
HCT VFR BLD AUTO: 36.1 % (ref 42–52)
HGB BLD-MCNC: 11.9 G/DL (ref 14–18)
IMM GRANULOCYTES # BLD AUTO: 0.05 K/UL (ref 0–0.11)
IMM GRANULOCYTES NFR BLD AUTO: 0.4 % (ref 0–0.9)
LACTATE BLD-SCNC: 1.6 MMOL/L (ref 0.5–2)
LYMPHOCYTES # BLD AUTO: 1.36 K/UL (ref 1–4.8)
LYMPHOCYTES NFR BLD: 10.3 % (ref 22–41)
MAGNESIUM SERPL-MCNC: 2.2 MG/DL (ref 1.5–2.5)
MCH RBC QN AUTO: 34.6 PG (ref 27–33)
MCHC RBC AUTO-ENTMCNC: 33 G/DL (ref 33.7–35.3)
MCV RBC AUTO: 104.9 FL (ref 81.4–97.8)
MONOCYTES # BLD AUTO: 1.38 K/UL (ref 0–0.85)
MONOCYTES NFR BLD AUTO: 10.4 % (ref 0–13.4)
NEUTROPHILS # BLD AUTO: 10.4 K/UL (ref 1.82–7.42)
NEUTROPHILS NFR BLD: 78.7 % (ref 44–72)
NRBC # BLD AUTO: 0 K/UL
NRBC BLD-RTO: 0 /100 WBC
PLATELET # BLD AUTO: 227 K/UL (ref 164–446)
PMV BLD AUTO: 8.5 FL (ref 9–12.9)
POTASSIUM SERPL-SCNC: 3.8 MMOL/L (ref 3.6–5.5)
PROCALCITONIN SERPL-MCNC: 0.13 NG/ML
PROT SERPL-MCNC: 7.3 G/DL (ref 6–8.2)
RBC # BLD AUTO: 3.44 M/UL (ref 4.7–6.1)
SODIUM SERPL-SCNC: 141 MMOL/L (ref 135–145)
TSH SERPL DL<=0.005 MIU/L-ACNC: 2.44 UIU/ML (ref 0.38–5.33)
WBC # BLD AUTO: 13.2 K/UL (ref 4.8–10.8)

## 2018-09-21 PROCEDURE — 85025 COMPLETE CBC W/AUTO DIFF WBC: CPT

## 2018-09-21 PROCEDURE — 71045 X-RAY EXAM CHEST 1 VIEW: CPT

## 2018-09-21 PROCEDURE — 700105 HCHG RX REV CODE 258: Performed by: STUDENT IN AN ORGANIZED HEALTH CARE EDUCATION/TRAINING PROGRAM

## 2018-09-21 PROCEDURE — 80053 COMPREHEN METABOLIC PANEL: CPT

## 2018-09-21 PROCEDURE — 84443 ASSAY THYROID STIM HORMONE: CPT

## 2018-09-21 PROCEDURE — 84145 PROCALCITONIN (PCT): CPT

## 2018-09-21 PROCEDURE — 99285 EMERGENCY DEPT VISIT HI MDM: CPT

## 2018-09-21 PROCEDURE — 770006 HCHG ROOM/CARE - MED/SURG/GYN SEMI*

## 2018-09-21 PROCEDURE — 99223 1ST HOSP IP/OBS HIGH 75: CPT | Mod: AI | Performed by: HOSPITALIST

## 2018-09-21 PROCEDURE — 80307 DRUG TEST PRSMV CHEM ANLYZR: CPT

## 2018-09-21 PROCEDURE — 83605 ASSAY OF LACTIC ACID: CPT

## 2018-09-21 PROCEDURE — 83735 ASSAY OF MAGNESIUM: CPT

## 2018-09-21 RX ORDER — SODIUM CHLORIDE 9 MG/ML
1000 INJECTION, SOLUTION INTRAVENOUS ONCE
Status: COMPLETED | OUTPATIENT
Start: 2018-09-21 | End: 2018-09-22

## 2018-09-21 RX ORDER — GUAIFENESIN/DEXTROMETHORPHAN 100-10MG/5
10 SYRUP ORAL EVERY 6 HOURS PRN
Status: DISCONTINUED | OUTPATIENT
Start: 2018-09-21 | End: 2018-09-24

## 2018-09-21 RX ORDER — QUETIAPINE FUMARATE 200 MG/1
400 TABLET, FILM COATED ORAL
Status: DISCONTINUED | OUTPATIENT
Start: 2018-09-21 | End: 2018-09-22

## 2018-09-21 RX ORDER — AZITHROMYCIN 250 MG/1
250 TABLET, FILM COATED ORAL EVERY 24 HOURS
Status: COMPLETED | OUTPATIENT
Start: 2018-09-22 | End: 2018-09-25

## 2018-09-21 RX ORDER — AZITHROMYCIN 500 MG/1
500 INJECTION, POWDER, LYOPHILIZED, FOR SOLUTION INTRAVENOUS ONCE
Status: DISCONTINUED | OUTPATIENT
Start: 2018-09-21 | End: 2018-09-22

## 2018-09-21 RX ORDER — AMOXICILLIN 250 MG
2 CAPSULE ORAL 2 TIMES DAILY
Status: DISCONTINUED | OUTPATIENT
Start: 2018-09-21 | End: 2018-09-23

## 2018-09-21 RX ORDER — AZITHROMYCIN 250 MG/1
500 TABLET, FILM COATED ORAL ONCE
Status: DISCONTINUED | OUTPATIENT
Start: 2018-09-21 | End: 2018-09-21

## 2018-09-21 RX ORDER — GABAPENTIN 300 MG/1
300 CAPSULE ORAL 3 TIMES DAILY
Status: DISCONTINUED | OUTPATIENT
Start: 2018-09-22 | End: 2018-09-27 | Stop reason: HOSPADM

## 2018-09-21 RX ORDER — TAMSULOSIN HYDROCHLORIDE 0.4 MG/1
0.4 CAPSULE ORAL
Status: DISCONTINUED | OUTPATIENT
Start: 2018-09-22 | End: 2018-09-27 | Stop reason: HOSPADM

## 2018-09-21 RX ORDER — BISACODYL 10 MG
10 SUPPOSITORY, RECTAL RECTAL
Status: DISCONTINUED | OUTPATIENT
Start: 2018-09-21 | End: 2018-09-23

## 2018-09-21 RX ORDER — SODIUM CHLORIDE 9 MG/ML
500 INJECTION, SOLUTION INTRAVENOUS
Status: DISCONTINUED | OUTPATIENT
Start: 2018-09-21 | End: 2018-09-27 | Stop reason: HOSPADM

## 2018-09-21 RX ORDER — OMEPRAZOLE 20 MG/1
20 CAPSULE, DELAYED RELEASE ORAL DAILY
Status: DISCONTINUED | OUTPATIENT
Start: 2018-09-22 | End: 2018-09-27 | Stop reason: HOSPADM

## 2018-09-21 RX ORDER — ACETAMINOPHEN 325 MG/1
650 TABLET ORAL EVERY 6 HOURS PRN
Status: DISCONTINUED | OUTPATIENT
Start: 2018-09-21 | End: 2018-09-27 | Stop reason: HOSPADM

## 2018-09-21 RX ORDER — POLYETHYLENE GLYCOL 3350 17 G/17G
1 POWDER, FOR SOLUTION ORAL
Status: DISCONTINUED | OUTPATIENT
Start: 2018-09-21 | End: 2018-09-23

## 2018-09-21 RX ORDER — SODIUM CHLORIDE 9 MG/ML
1000 INJECTION, SOLUTION INTRAVENOUS ONCE
Status: COMPLETED | OUTPATIENT
Start: 2018-09-21 | End: 2018-09-21

## 2018-09-21 RX ADMIN — SODIUM CHLORIDE 1000 ML: 9 INJECTION, SOLUTION INTRAVENOUS at 21:15

## 2018-09-21 ASSESSMENT — LIFESTYLE VARIABLES: EVER_SMOKED: YES

## 2018-09-21 ASSESSMENT — COPD QUESTIONNAIRES
DURING THE PAST 4 WEEKS HOW MUCH DID YOU FEEL SHORT OF BREATH: NONE/LITTLE OF THE TIME
DO YOU EVER COUGH UP ANY MUCUS OR PHLEGM?: NO/ONLY WITH OCCASIONAL COLDS OR INFECTIONS
HAVE YOU SMOKED AT LEAST 100 CIGARETTES IN YOUR ENTIRE LIFE: YES
COPD SCREENING SCORE: 4

## 2018-09-21 ASSESSMENT — PAIN SCALES - GENERAL: PAINLEVEL_OUTOF10: 0

## 2018-09-22 PROBLEM — Z91.148 NON COMPLIANCE W MEDICATION REGIMEN: Status: ACTIVE | Noted: 2018-09-22

## 2018-09-22 PROBLEM — A41.9 SEPSIS (HCC): Status: ACTIVE | Noted: 2018-09-22

## 2018-09-22 PROBLEM — S82.891A CLOSED FRACTURE OF RIGHT ANKLE: Status: ACTIVE | Noted: 2018-09-22

## 2018-09-22 PROBLEM — F10.929 ALCOHOL INTOXICATION (HCC): Status: ACTIVE | Noted: 2018-09-22

## 2018-09-22 PROBLEM — J18.9 CAP (COMMUNITY ACQUIRED PNEUMONIA): Status: ACTIVE | Noted: 2018-09-22

## 2018-09-22 LAB
ANION GAP SERPL CALC-SCNC: 10 MMOL/L (ref 0–11.9)
APPEARANCE UR: ABNORMAL
BACTERIA #/AREA URNS HPF: NEGATIVE /HPF
BILIRUB UR QL STRIP.AUTO: NEGATIVE
BUN SERPL-MCNC: 24 MG/DL (ref 8–22)
CALCIUM SERPL-MCNC: 8.7 MG/DL (ref 8.5–10.5)
CHLORIDE SERPL-SCNC: 103 MMOL/L (ref 96–112)
CHLORIDE UR-SCNC: 27 MMOL/L
CO2 SERPL-SCNC: 25 MMOL/L (ref 20–33)
COLOR UR: ABNORMAL
CREAT SERPL-MCNC: 1.27 MG/DL (ref 0.5–1.4)
CREAT UR-MCNC: 179.8 MG/DL
EKG IMPRESSION: NORMAL
EPI CELLS #/AREA URNS HPF: NEGATIVE /HPF
ERYTHROCYTE [DISTWIDTH] IN BLOOD BY AUTOMATED COUNT: 53.4 FL (ref 35.9–50)
GLUCOSE SERPL-MCNC: 104 MG/DL (ref 65–99)
GLUCOSE UR STRIP.AUTO-MCNC: NEGATIVE MG/DL
HCT VFR BLD AUTO: 30 % (ref 42–52)
HGB BLD-MCNC: 9.8 G/DL (ref 14–18)
HYALINE CASTS #/AREA URNS LPF: ABNORMAL /LPF
KETONES UR STRIP.AUTO-MCNC: 15 MG/DL
LEUKOCYTE ESTERASE UR QL STRIP.AUTO: NEGATIVE
MCH RBC QN AUTO: 34.4 PG (ref 27–33)
MCHC RBC AUTO-ENTMCNC: 32.7 G/DL (ref 33.7–35.3)
MCV RBC AUTO: 105.3 FL (ref 81.4–97.8)
MICRO URNS: ABNORMAL
NITRITE UR QL STRIP.AUTO: NEGATIVE
PH UR STRIP.AUTO: 5 [PH]
PLATELET # BLD AUTO: 176 K/UL (ref 164–446)
PMV BLD AUTO: 8.9 FL (ref 9–12.9)
POTASSIUM SERPL-SCNC: 3.2 MMOL/L (ref 3.6–5.5)
POTASSIUM UR-SCNC: 35.1 MMOL/L
PROT UR QL STRIP: 30 MG/DL
PROT UR-MCNC: 110.3 MG/DL (ref 0–15)
RBC # BLD AUTO: 2.85 M/UL (ref 4.7–6.1)
RBC # URNS HPF: ABNORMAL /HPF
RBC UR QL AUTO: ABNORMAL
SODIUM SERPL-SCNC: 138 MMOL/L (ref 135–145)
SODIUM UR-SCNC: 20 MMOL/L
SP GR UR STRIP.AUTO: 1.03
UROBILINOGEN UR STRIP.AUTO-MCNC: 0.2 MG/DL
WBC # BLD AUTO: 9.1 K/UL (ref 4.8–10.8)
WBC #/AREA URNS HPF: ABNORMAL /HPF

## 2018-09-22 PROCEDURE — 700105 HCHG RX REV CODE 258: Performed by: EMERGENCY MEDICINE

## 2018-09-22 PROCEDURE — 81001 URINALYSIS AUTO W/SCOPE: CPT

## 2018-09-22 PROCEDURE — 700102 HCHG RX REV CODE 250 W/ 637 OVERRIDE(OP): Performed by: HOSPITALIST

## 2018-09-22 PROCEDURE — 84156 ASSAY OF PROTEIN URINE: CPT

## 2018-09-22 PROCEDURE — 93005 ELECTROCARDIOGRAM TRACING: CPT | Performed by: HOSPITALIST

## 2018-09-22 PROCEDURE — 82570 ASSAY OF URINE CREATININE: CPT

## 2018-09-22 PROCEDURE — 700101 HCHG RX REV CODE 250: Performed by: HOSPITALIST

## 2018-09-22 PROCEDURE — 93010 ELECTROCARDIOGRAM REPORT: CPT | Performed by: INTERNAL MEDICINE

## 2018-09-22 PROCEDURE — 84133 ASSAY OF URINE POTASSIUM: CPT

## 2018-09-22 PROCEDURE — 36415 COLL VENOUS BLD VENIPUNCTURE: CPT

## 2018-09-22 PROCEDURE — 82436 ASSAY OF URINE CHLORIDE: CPT

## 2018-09-22 PROCEDURE — A9270 NON-COVERED ITEM OR SERVICE: HCPCS | Performed by: HOSPITALIST

## 2018-09-22 PROCEDURE — 770006 HCHG ROOM/CARE - MED/SURG/GYN SEMI*

## 2018-09-22 PROCEDURE — 700105 HCHG RX REV CODE 258: Performed by: HOSPITALIST

## 2018-09-22 PROCEDURE — 80048 BASIC METABOLIC PNL TOTAL CA: CPT

## 2018-09-22 PROCEDURE — 84300 ASSAY OF URINE SODIUM: CPT

## 2018-09-22 PROCEDURE — HZ2ZZZZ DETOXIFICATION SERVICES FOR SUBSTANCE ABUSE TREATMENT: ICD-10-PCS | Performed by: HOSPITALIST

## 2018-09-22 PROCEDURE — 85027 COMPLETE CBC AUTOMATED: CPT

## 2018-09-22 PROCEDURE — 700111 HCHG RX REV CODE 636 W/ 250 OVERRIDE (IP): Performed by: EMERGENCY MEDICINE

## 2018-09-22 PROCEDURE — 700111 HCHG RX REV CODE 636 W/ 250 OVERRIDE (IP): Performed by: HOSPITALIST

## 2018-09-22 PROCEDURE — 99233 SBSQ HOSP IP/OBS HIGH 50: CPT | Performed by: HOSPITALIST

## 2018-09-22 RX ORDER — LORAZEPAM 2 MG/ML
0.5 INJECTION INTRAMUSCULAR EVERY 4 HOURS PRN
Status: DISCONTINUED | OUTPATIENT
Start: 2018-09-22 | End: 2018-09-24

## 2018-09-22 RX ORDER — CLONAZEPAM 1 MG/1
1 TABLET ORAL
Status: COMPLETED | OUTPATIENT
Start: 2018-09-22 | End: 2018-09-22

## 2018-09-22 RX ORDER — THIAMINE MONONITRATE (VIT B1) 100 MG
100 TABLET ORAL DAILY
Status: COMPLETED | OUTPATIENT
Start: 2018-09-23 | End: 2018-09-26

## 2018-09-22 RX ORDER — LORAZEPAM 1 MG/1
2 TABLET ORAL
Status: DISCONTINUED | OUTPATIENT
Start: 2018-09-22 | End: 2018-09-24

## 2018-09-22 RX ORDER — AMOXICILLIN AND CLAVULANATE POTASSIUM 875; 125 MG/1; MG/1
1 TABLET, FILM COATED ORAL EVERY 12 HOURS
Status: COMPLETED | OUTPATIENT
Start: 2018-09-22 | End: 2018-09-26

## 2018-09-22 RX ORDER — CLONAZEPAM 1 MG/1
1 TABLET ORAL
Status: DISCONTINUED | OUTPATIENT
Start: 2018-09-22 | End: 2018-09-22

## 2018-09-22 RX ORDER — LORAZEPAM 2 MG/ML
1.5 INJECTION INTRAMUSCULAR
Status: DISCONTINUED | OUTPATIENT
Start: 2018-09-22 | End: 2018-09-24

## 2018-09-22 RX ORDER — LORAZEPAM 2 MG/ML
1 INJECTION INTRAMUSCULAR
Status: DISCONTINUED | OUTPATIENT
Start: 2018-09-22 | End: 2018-09-24

## 2018-09-22 RX ORDER — LAMIVUDINE 100 MG/1
300 TABLET, FILM COATED ORAL DAILY
Status: DISCONTINUED | OUTPATIENT
Start: 2018-09-22 | End: 2018-09-27 | Stop reason: HOSPADM

## 2018-09-22 RX ORDER — LORAZEPAM 1 MG/1
3 TABLET ORAL
Status: DISCONTINUED | OUTPATIENT
Start: 2018-09-22 | End: 2018-09-24

## 2018-09-22 RX ORDER — MAGNESIUM SULFATE HEPTAHYDRATE 40 MG/ML
2 INJECTION, SOLUTION INTRAVENOUS ONCE
Status: DISCONTINUED | OUTPATIENT
Start: 2018-09-22 | End: 2018-09-22

## 2018-09-22 RX ORDER — POTASSIUM CHLORIDE 20 MEQ/1
40 TABLET, EXTENDED RELEASE ORAL 2 TIMES DAILY
Status: DISCONTINUED | OUTPATIENT
Start: 2018-09-22 | End: 2018-09-22

## 2018-09-22 RX ORDER — LORAZEPAM 1 MG/1
1 TABLET ORAL EVERY 4 HOURS PRN
Status: DISCONTINUED | OUTPATIENT
Start: 2018-09-22 | End: 2018-09-24

## 2018-09-22 RX ORDER — LABETALOL HYDROCHLORIDE 5 MG/ML
10 INJECTION, SOLUTION INTRAVENOUS
Status: DISCONTINUED | OUTPATIENT
Start: 2018-09-22 | End: 2018-09-27 | Stop reason: HOSPADM

## 2018-09-22 RX ORDER — ABACAVIR 300 MG/1
600 TABLET ORAL DAILY
Status: DISCONTINUED | OUTPATIENT
Start: 2018-09-22 | End: 2018-09-27 | Stop reason: HOSPADM

## 2018-09-22 RX ORDER — ZOLPIDEM TARTRATE 5 MG/1
10 TABLET ORAL
Status: DISCONTINUED | OUTPATIENT
Start: 2018-09-22 | End: 2018-09-22

## 2018-09-22 RX ORDER — POTASSIUM CHLORIDE 20 MEQ/1
20 TABLET, EXTENDED RELEASE ORAL 2 TIMES DAILY
Status: COMPLETED | OUTPATIENT
Start: 2018-09-22 | End: 2018-09-23

## 2018-09-22 RX ORDER — LORAZEPAM 0.5 MG/1
0.5 TABLET ORAL EVERY 4 HOURS PRN
Status: DISCONTINUED | OUTPATIENT
Start: 2018-09-22 | End: 2018-09-24

## 2018-09-22 RX ORDER — M-VIT,TX,IRON,MINS/CALC/FOLIC 27MG-0.4MG
1 TABLET ORAL DAILY
COMMUNITY
End: 2018-10-08

## 2018-09-22 RX ORDER — LORAZEPAM 2 MG/ML
2 INJECTION INTRAMUSCULAR
Status: DISCONTINUED | OUTPATIENT
Start: 2018-09-22 | End: 2018-09-24

## 2018-09-22 RX ORDER — LORAZEPAM 1 MG/1
4 TABLET ORAL
Status: DISCONTINUED | OUTPATIENT
Start: 2018-09-22 | End: 2018-09-24

## 2018-09-22 RX ORDER — ZOLPIDEM TARTRATE 5 MG/1
5 TABLET ORAL
Status: DISCONTINUED | OUTPATIENT
Start: 2018-09-22 | End: 2018-09-22

## 2018-09-22 RX ORDER — LABETALOL 100 MG/1
200 TABLET, FILM COATED ORAL EVERY 6 HOURS PRN
Status: DISCONTINUED | OUTPATIENT
Start: 2018-09-22 | End: 2018-09-27 | Stop reason: HOSPADM

## 2018-09-22 RX ORDER — ZOLPIDEM TARTRATE 5 MG/1
5 TABLET ORAL
Status: DISCONTINUED | OUTPATIENT
Start: 2018-09-22 | End: 2018-09-24

## 2018-09-22 RX ORDER — FOLIC ACID 1 MG/1
1 TABLET ORAL DAILY
Status: COMPLETED | OUTPATIENT
Start: 2018-09-23 | End: 2018-09-26

## 2018-09-22 RX ADMIN — CEFTRIAXONE SODIUM 1 G: 1 INJECTION, POWDER, FOR SOLUTION INTRAMUSCULAR; INTRAVENOUS at 01:47

## 2018-09-22 RX ADMIN — DOLUTEGRAVIR SODIUM 50 MG: 50 TABLET, FILM COATED ORAL at 17:03

## 2018-09-22 RX ADMIN — AMPICILLIN AND SULBACTAM 3 G: 2; 1 INJECTION, POWDER, FOR SOLUTION INTRAVENOUS at 05:30

## 2018-09-22 RX ADMIN — AMOXICILLIN AND CLAVULANATE POTASSIUM 1 TABLET: 875; 125 TABLET, FILM COATED ORAL at 23:48

## 2018-09-22 RX ADMIN — POTASSIUM CHLORIDE: 2 INJECTION, SOLUTION, CONCENTRATE INTRAVENOUS at 11:59

## 2018-09-22 RX ADMIN — AMOXICILLIN AND CLAVULANATE POTASSIUM 1 TABLET: 875; 125 TABLET, FILM COATED ORAL at 11:58

## 2018-09-22 RX ADMIN — AZITHROMYCIN 250 MG: 250 TABLET, FILM COATED ORAL at 05:33

## 2018-09-22 RX ADMIN — METOPROLOL TARTRATE 12.5 MG: 25 TABLET, FILM COATED ORAL at 05:34

## 2018-09-22 RX ADMIN — ZOLPIDEM TARTRATE 5 MG: 5 TABLET ORAL at 22:47

## 2018-09-22 RX ADMIN — LAMIVUDINE 300 MG: 100 TABLET, FILM COATED ORAL at 17:02

## 2018-09-22 RX ADMIN — SODIUM CHLORIDE 1000 ML: 9 INJECTION, SOLUTION INTRAVENOUS at 07:09

## 2018-09-22 RX ADMIN — SENNOSIDES AND DOCUSATE SODIUM 2 TABLET: 8.6; 5 TABLET ORAL at 17:00

## 2018-09-22 RX ADMIN — ACETAMINOPHEN 650 MG: 325 TABLET, FILM COATED ORAL at 23:48

## 2018-09-22 RX ADMIN — GABAPENTIN 300 MG: 300 CAPSULE ORAL at 05:35

## 2018-09-22 RX ADMIN — ABACAVIR 600 MG: 300 TABLET, FILM COATED ORAL at 17:01

## 2018-09-22 RX ADMIN — RIVAROXABAN 20 MG: 20 TABLET, FILM COATED ORAL at 17:00

## 2018-09-22 RX ADMIN — POTASSIUM CHLORIDE 20 MEQ: 1500 TABLET, EXTENDED RELEASE ORAL at 17:00

## 2018-09-22 RX ADMIN — GABAPENTIN 300 MG: 300 CAPSULE ORAL at 11:58

## 2018-09-22 RX ADMIN — POTASSIUM CHLORIDE 20 MEQ: 1500 TABLET, EXTENDED RELEASE ORAL at 11:58

## 2018-09-22 RX ADMIN — AZITHROMYCIN FOR INJECTION INJECTION, POWDER, LYOPHILIZED, FOR SOLUTION 500 MG: 500 INJECTION INTRAVENOUS at 03:17

## 2018-09-22 RX ADMIN — GABAPENTIN 300 MG: 300 CAPSULE ORAL at 17:00

## 2018-09-22 RX ADMIN — METOPROLOL TARTRATE 12.5 MG: 25 TABLET, FILM COATED ORAL at 17:00

## 2018-09-22 RX ADMIN — TAMSULOSIN HYDROCHLORIDE 0.4 MG: 0.4 CAPSULE ORAL at 08:39

## 2018-09-22 RX ADMIN — OMEPRAZOLE 20 MG: 20 CAPSULE, DELAYED RELEASE ORAL at 05:33

## 2018-09-22 RX ADMIN — AMPICILLIN AND SULBACTAM 3 G: 2; 1 INJECTION, POWDER, FOR SOLUTION INTRAVENOUS at 02:32

## 2018-09-22 RX ADMIN — CLONAZEPAM 1 MG: 1 TABLET ORAL at 22:39

## 2018-09-22 ASSESSMENT — LIFESTYLE VARIABLES
TOTAL SCORE: 0
HEADACHE, FULLNESS IN HEAD: NOT PRESENT
TOTAL SCORE: 0
ALCOHOL_USE: YES
VISUAL DISTURBANCES: NOT PRESENT
AUDITORY DISTURBANCES: NOT PRESENT
ORIENTATION AND CLOUDING OF SENSORIUM: ORIENTED AND CAN DO SERIAL ADDITIONS
HOW MANY TIMES IN THE PAST YEAR HAVE YOU HAD 5 OR MORE DRINKS IN A DAY: 100
ON A TYPICAL DAY WHEN YOU DRINK ALCOHOL HOW MANY DRINKS DO YOU HAVE: 2
CONSUMPTION TOTAL: POSITIVE
PAROXYSMAL SWEATS: NO SWEAT VISIBLE
ANXIETY: NO ANXIETY (AT EASE)
HEADACHE, FULLNESS IN HEAD: NOT PRESENT
ORIENTATION AND CLOUDING OF SENSORIUM: ORIENTED AND CAN DO SERIAL ADDITIONS
AGITATION: NORMAL ACTIVITY
EVER HAD A DRINK FIRST THING IN THE MORNING TO STEADY YOUR NERVES TO GET RID OF A HANGOVER: NO
PAROXYSMAL SWEATS: NO SWEAT VISIBLE
AGITATION: NORMAL ACTIVITY
DOES PATIENT WANT TO TALK TO SOMEONE ABOUT QUITTING: NO
ANXIETY: NO ANXIETY (AT EASE)
ORIENTATION AND CLOUDING OF SENSORIUM: ORIENTED AND CAN DO SERIAL ADDITIONS
TOTAL SCORE: 3
AUDITORY DISTURBANCES: NOT PRESENT
NAUSEA AND VOMITING: NO NAUSEA AND NO VOMITING
VISUAL DISTURBANCES: NOT PRESENT
TREMOR: NO TREMOR
TREMOR: NO TREMOR
TOTAL SCORE: 3
AUDITORY DISTURBANCES: NOT PRESENT
NAUSEA AND VOMITING: NO NAUSEA AND NO VOMITING
HEADACHE, FULLNESS IN HEAD: NOT PRESENT
HAVE PEOPLE ANNOYED YOU BY CRITICIZING YOUR DRINKING: YES
EVER FELT BAD OR GUILTY ABOUT YOUR DRINKING: YES
AGITATION: NORMAL ACTIVITY
VISUAL DISTURBANCES: NOT PRESENT
TREMOR: NO TREMOR
AVERAGE NUMBER OF DAYS PER WEEK YOU HAVE A DRINK CONTAINING ALCOHOL: 7
TOTAL SCORE: 3
PAROXYSMAL SWEATS: NO SWEAT VISIBLE
EVER_SMOKED: YES
TOTAL SCORE: 0
ANXIETY: NO ANXIETY (AT EASE)
DOES PATIENT WANT TO STOP DRINKING: YES
HAVE YOU EVER FELT YOU SHOULD CUT DOWN ON YOUR DRINKING: YES
NAUSEA AND VOMITING: NO NAUSEA AND NO VOMITING

## 2018-09-22 ASSESSMENT — ENCOUNTER SYMPTOMS
ABDOMINAL PAIN: 0
COUGH: 0
TINGLING: 1
STRIDOR: 0
FEVER: 0
DIZZINESS: 0
DIARRHEA: 0
NAUSEA: 0
NECK PAIN: 0
HALLUCINATIONS: 0
FLANK PAIN: 0
DOUBLE VISION: 0
SHORTNESS OF BREATH: 0
MYALGIAS: 0
VOMITING: 0
COUGH: 1
FOCAL WEAKNESS: 0
WEAKNESS: 1
PHOTOPHOBIA: 0
PALPITATIONS: 0
HEADACHES: 0
SORE THROAT: 0
CHILLS: 0
WHEEZING: 0
DEPRESSION: 0

## 2018-09-22 ASSESSMENT — PAIN SCALES - GENERAL
PAINLEVEL_OUTOF10: 0
PAINLEVEL_OUTOF10: 0

## 2018-09-22 ASSESSMENT — COGNITIVE AND FUNCTIONAL STATUS - GENERAL
MOBILITY SCORE: 17
DAILY ACTIVITIY SCORE: 18
DRESSING REGULAR LOWER BODY CLOTHING: A LOT
CLIMB 3 TO 5 STEPS WITH RAILING: A LOT
TOILETING: A LITTLE
WALKING IN HOSPITAL ROOM: A LITTLE
STANDING UP FROM CHAIR USING ARMS: A LITTLE
PERSONAL GROOMING: A LITTLE
DRESSING REGULAR UPPER BODY CLOTHING: A LITTLE
SUGGESTED CMS G CODE MODIFIER DAILY ACTIVITY: CK
MOVING TO AND FROM BED TO CHAIR: A LITTLE
SUGGESTED CMS G CODE MODIFIER MOBILITY: CK
MOVING FROM LYING ON BACK TO SITTING ON SIDE OF FLAT BED: A LOT
HELP NEEDED FOR BATHING: A LITTLE

## 2018-09-22 ASSESSMENT — PATIENT HEALTH QUESTIONNAIRE - PHQ9
7. TROUBLE CONCENTRATING ON THINGS, SUCH AS READING THE NEWSPAPER OR WATCHING TELEVISION: MORE THAN HALF THE DAYS
2. FEELING DOWN, DEPRESSED, IRRITABLE, OR HOPELESS: NOT AT ALL
4. FEELING TIRED OR HAVING LITTLE ENERGY: NEARLY EVERY DAY
3. TROUBLE FALLING OR STAYING ASLEEP OR SLEEPING TOO MUCH: NEARLY EVERY DAY
9. THOUGHTS THAT YOU WOULD BE BETTER OFF DEAD, OR OF HURTING YOURSELF: NOT AT ALL
SUM OF ALL RESPONSES TO PHQ9 QUESTIONS 1 AND 2: 1
SUM OF ALL RESPONSES TO PHQ QUESTIONS 1-9: 9
1. LITTLE INTEREST OR PLEASURE IN DOING THINGS: SEVERAL DAYS
6. FEELING BAD ABOUT YOURSELF - OR THAT YOU ARE A FAILURE OR HAVE LET YOURSELF OR YOUR FAMILY DOWN: NOT AL ALL
5. POOR APPETITE OR OVEREATING: NOT AT ALL
8. MOVING OR SPEAKING SO SLOWLY THAT OTHER PEOPLE COULD HAVE NOTICED. OR THE OPPOSITE, BEING SO FIGETY OR RESTLESS THAT YOU HAVE BEEN MOVING AROUND A LOT MORE THAN USUAL: NOT AT ALL

## 2018-09-22 ASSESSMENT — COPD QUESTIONNAIRES
HAVE YOU SMOKED AT LEAST 100 CIGARETTES IN YOUR ENTIRE LIFE: YES
COPD SCREENING SCORE: 5
DURING THE PAST 4 WEEKS HOW MUCH DID YOU FEEL SHORT OF BREATH: NONE/LITTLE OF THE TIME
IN THE PAST 12 MONTHS DO YOU DO LESS THAN YOU USED TO BECAUSE OF YOUR BREATHING PROBLEMS: DISAGREE/UNSURE
DO YOU EVER COUGH UP ANY MUCUS OR PHLEGM?: YES, A FEW DAYS A WEEK OR MONTH

## 2018-09-22 NOTE — ASSESSMENT & PLAN NOTE
This is chronic, outpatient follow-up with the [facility] clinic once medically stable for discharge.    In the meantime, continue home Triumeq.   Curbside seen by ID specialist Dr. Montaño

## 2018-09-22 NOTE — ASSESSMENT & PLAN NOTE
Imaging sep 1 shows Fracture/dislocation of the second through fifth tarsometatarsal articulations. The second through fourth metatarsals are dislocated dorsally with respect to the cuneiforms and cuboid and the second through fifth metatarsals are dislocated laterally with respect to the cuneiforms and cuboid.   Scheduled surgery with orthopedics Dr Berry 9/24, but after discussed with Dr. Berry procedure postponed it to the next week.  Possible PT OT and possible nursing care facility placement

## 2018-09-22 NOTE — ASSESSMENT & PLAN NOTE
Hx alcohol use, reports last drink was Day before admission  Watch for alcohol withdrawal   EKG 9/22/2018 QTc 485   Checking and replacing K, Mg, PO4

## 2018-09-22 NOTE — ED PROVIDER NOTES
"CHIEF COMPLAINT(1/4)  Chief Complaint   Patient presents with   • Weakness     bilateral LE for the last 2 days, generalized weakness for a year   • Alcohol Intoxication     1/2 bottle of vodka today, daily drinker with home medications.       HPI  Sukumar Bolton is a 63 y.o. male who presents for weakness.     Patient states he has had a history of occasional generalized weakness that he describes more as a peripheral neuropathy and \"buzzy\"/tingling feeling.  However, he states his current weakness is different than his prior baseline.   He states that he has had severe weakness, for past 2 days, especially in his feet.  He notes that yesterday, he had to crawl across the floor, for 12 hours, in order to get a drink of water.      He additionally has a history of HIV, and is currently on Triumeq, but states that he occasionally misses doses of this, approximately twice a week.      He also has a history of drinking.  She states that currently he normally only drinks 2 shots of vodka a day.  And, today, he states he is only had one shot.  However, he also states that he is taking lorazepam and zolpidem at nighttime.  He states that when he was prescribed these, he was advised not to drink.    Additionally, he notes he is also had A. fib, and is on Xarelto and Lopressor.  He has had hypertension, and on Lopressor, only.  He is also noted that she has had past kidney issues (\"CKD-3\" in the past, but \"got better\").    Additionally, his right foot is in a boot, and he is currently scheduled to have surgery in a few days, following a sprain/trauma of the foot when he previously tripped.      REVIEW OF SYSTEMS(1/10)  Pertinent positives include: Generalized weakness -see HPI.  Peripheral neuropathy.  History of HIV, hypertension, kidney disease and A. fib.  Currently on blood thinner.    Pertinent negatives include: Denies recent fevers, chills, diarrhea, constipation.  All other systems are negative.       PAST " MEDICAL HISTORY(PFS1,2)  Past Medical History:   Diagnosis Date   • Atrial fibrillation (HCC) 2018   • Cataract     sx 1999   • HIV (human immunodeficiency virus infection) (HCC)    • Hypertension    • Kidney disease        SURGICAL HISTORY  Past Surgical History:   Procedure Laterality Date   • IRRIGATION & DEBRIDEMENT ORTHO Right 6/4/2018    Procedure: IRRIGATION & DEBRIDEMENT ORTHO- ANKLE;  Surgeon: Ulices Berry M.D.;  Location: SURGERY [facility];  Service: Orthopedics   • FIBULA ORIF Right 5/21/2018    Procedure: FIBULA ORIF-FOR NON UNION REPAIR WITH BONE GRAFT;  Surgeon: Ulices Berry M.D.;  Location: SURGERY [facility];  Service: Orthopedics   • ANKLE ARTHROSCOPY Right 5/21/2018    Procedure: ANKLE ARTHROSCOPY;  Surgeon: Ulices Berry M.D.;  Location: SURGERY [facility];  Service: Orthopedics   • OTHER ORTHOPEDIC SURGERY  2008    bilateral shoulder sx   • OTHER ORTHOPEDIC SURGERY  2003    left ankle sx       FAMILY HISTORY  Family History   Problem Relation Age of Onset   • Alzheimer's Disease Mother    • Heart Disease Father         pacemaker for heart block   • Hypertension Father    • Heart Disease Sister         ?   • Breast Cancer Sister         with double Mastectomy   • Hypertension Brother        SOCIAL HISTORY  Patient states she is an ex-smoker, who quit earlier this year.  He states he drinks 2 shots of alcohol a day.  He notes that he is to be more (approximately 4 per day), but states he is currently only at 2 per day.  He currently does not use illicit drugs, but has a distant history of marijuana, that he has not used recently.    CURRENT MEDICATIONS  No current facility-administered medications for this encounter.      Current Outpatient Prescriptions   Medication Sig Dispense Refill   • folic acid (FOLVITE) 1 MG Tab Take 1 mg by mouth every day.     • ibuprofen (MOTRIN) 200 MG Tab Take 400 mg by mouth every 6 hours as needed.     • metoprolol (LOPRESSOR) 25 MG Tab  Take 0.5 Tabs by mouth 2 times a day. 30 Tab 0   • rivaroxaban (XARELTO) 20 MG Tab tablet Take 1 Tab by mouth with dinner. 30 Tab 0   • Abacavir-Dolutegravir-Lamivud (TRIUMEQ) 600- MG Tab Take 1 Tab by mouth every day.     • ferrous sulfate 325 (65 Fe) MG tablet Take 325 mg by mouth every day.     • B Complex Vitamins (B COMPLEX 1 PO) Take 1 Tab by mouth every day.     • omeprazole (PRILOSEC) 20 MG delayed-release capsule Take 20 mg by mouth every day.     • quetiapine (SEROQUEL) 400 MG tablet Take 400 mg by mouth every bedtime.     • tamsulosin (FLOMAX) 0.4 MG capsule Take 0.4 mg by mouth ONE-HALF HOUR AFTER BREAKFAST.     • gabapentin (NEURONTIN) 300 MG Cap Take 300 mg by mouth 3 times a day.     Additionally, the patient states that he has been taking lorazepam and zolpidem, in the evenings, to help and support sleep.  (He has also been using shot of alcohol at night, to help him sleep).    ALLERGIES  Allergies   Allergen Reactions   • Sulfa Drugs Itching     Rxn - 1970's         PHYSICAL EXAM  /93   Pulse (!) 101   Temp 37.2 °C (99 °F)   Resp 16   Ht 1.829 m (6')   Wt 64.4 kg (142 lb)   SpO2 92%   BMI 19.26 kg/m²    Constitutional: Very thin, cachectic appearing male, with apparent fat wasting.  Resting comfortably.   HENT:   Trachea midline.  No stridor.  Supple neck.   Eyes:    EOMI. No scleral icterus.  Mildly bloodshot eyes.  Cardiovascular:  Regular rate and rhythm to auscultation.   No murmurs, rubs or gallops.    Respiratory:   Clear to auscultation and percussion bilaterally.   Gastrointestinal:  Soft.  Nontender.  +BS. No rebound or guarding noted.  Back:  Mild erythema and soft tissue swelling over lower/mid right ribcage, possible from irritated white-head or lipoma.   EXT/MSK: Very thin extremities.  Weakness noted to both upper extremities, and left leg.  With more weakness noted to right leg (however that one is on a boot, which adds extra weight to his foot).  Neurologic:  No  focal weakness, but 4/5 strength noted in all extremities.    Bilaterally symmetrical sensation.  DTR - 1+ reflexes, globally.  No tremor.   Good finger-to-nose test, bilaterally.   Gait: slow and shuffling, (when tested with several people present to avoid fall).    Psychiatric: Appears very tired, and having difficulty concentrating enough to answer questions.      RADIOLOGY/PROCEDURES  DX-CHEST-PORTABLE (1 VIEW)   Final Result         1. Patchy left basilar opacity, atelectasis versus consolidation.          LABORATORY: Reviewed as below.      Results for orders placed or performed during the hospital encounter of 09/21/18   CBC WITH DIFFERENTIAL   Result Value Ref Range    WBC 13.2 (H) 4.8 - 10.8 K/uL    RBC 3.44 (L) 4.70 - 6.10 M/uL    Hemoglobin 11.9 (L) 14.0 - 18.0 g/dL    Hematocrit 36.1 (L) 42.0 - 52.0 %    .9 (H) 81.4 - 97.8 fL    MCH 34.6 (H) 27.0 - 33.0 pg    MCHC 33.0 (L) 33.7 - 35.3 g/dL    RDW 55.1 (H) 35.9 - 50.0 fL    Platelet Count 227 164 - 446 K/uL    MPV 8.5 (L) 9.0 - 12.9 fL    Neutrophils-Polys 78.70 (H) 44.00 - 72.00 %    Lymphocytes 10.30 (L) 22.00 - 41.00 %    Monocytes 10.40 0.00 - 13.40 %    Eosinophils 0.00 0.00 - 6.90 %    Basophils 0.20 0.00 - 1.80 %    Immature Granulocytes 0.40 0.00 - 0.90 %    Nucleated RBC 0.00 /100 WBC    Neutrophils (Absolute) 10.40 (H) 1.82 - 7.42 K/uL    Lymphs (Absolute) 1.36 1.00 - 4.80 K/uL    Monos (Absolute) 1.38 (H) 0.00 - 0.85 K/uL    Eos (Absolute) 0.00 0.00 - 0.51 K/uL    Baso (Absolute) 0.03 0.00 - 0.12 K/uL    Immature Granulocytes (abs) 0.05 0.00 - 0.11 K/uL    NRBC (Absolute) 0.00 K/uL   LACTIC ACID   Result Value Ref Range    Lactic Acid 1.6 0.5 - 2.0 mmol/L       INTERVENTIONS:  Medications   NS infusion 1,000 mL (1,000 mL Intravenous New Bag 9/21/18 2115)   Response:  Slightly improved alertness, and pulse.       DIFFERENTIAL DIAGNOSIS:  Given the patient's history of increasing weakness, and past medical history, consideration must be  given for possible HIV related neuropathy, causing muscular weakness as well.  He additionally showed signs of fat wasting, and this may also indicate muscular problems secondary to HIV.  However, he also has a history of continuing to drink (in moderation, given his statements), but while using clonazepam and zolpidem, contrary to what he was told for the indications for those medications in the past.  Additionally, he may have an infection (opportunistic versus other), causing his fatigue and weakness    COURSE & MEDICAL DECISION MAKING  - Ordering basic labs including CBC, CMP, lactate, UA, TSH, magnesium, and alcohol level.  - Elevation in WBC, and neutrophils, consideration should be given to infection.  However, his lactic acid was normal at 1.6.  - Getting CXR to check for possible infection/pneumonia.   - CXR with patchy opacity.  Discussed with attending.     PLAN:  Admit for possible pneumonia.   Weakness possibly secondary to infection.   CXR with patchy opacity.  Given WBC, will admit for likely pneumonia.     CONDITION:  Fair / stable.     FINAL IMPRESSION    1. Pneumonia of left lower lobe due to infectious organism (HCC)  2. Weakness  3. HIV (human immunodeficiency virus infection) (HCC)  4. Elevated liver enzymes        A computerized dictation system may have been used for this note.    Despite review, there may be some spelling or grammatical errors.    Mayo Lyons M.D.  9/21/2018   ER Attending:  John Van M.D.       Billy VELEZ (Racquelibmeenakshi), am scribing for, and in the presence of, John Van M.D..  Electronically signed by: Billy Godinez (Ana), 9/21/2018  John VELEZ M.D. personally performed the services described in this documentation, as scribed by Billy Godinez in my presence, and it is both accurate and complete.     I independently evaluated the patient and repeated the important components of the history and physical. I discussed the management with the resident. I have  reviewed and agree with the pertinent clinical information above including history, exam, study findings, and recommendations.     Electronically signed by: John Van, 9/22/2018 2:27 PM

## 2018-09-22 NOTE — PROGRESS NOTES
Pt arrived to the floor at 0023. Report received from Eric. Pt A/Ox4. Assessment performed, on room air. Bed locked, in lowest position, bed alarm on, call light and personal belongings within reach, instructed to call for assistance.

## 2018-09-22 NOTE — ASSESSMENT & PLAN NOTE
Was initially tachy, improving with IVF and resuming his metoprolol   He reports that he has stopped taking his metoprolol for the past two weeks.  Continue home Xarelto and resume home metoprolol.  Currently stable no complaints of    Echo 4/22/2018   Normal left ventricular size, thickness, systolic function, and diastolic function.  No significant valve abnormalities.   Estimated right ventricular systolic pressure  is 39 mmHg.

## 2018-09-22 NOTE — ED TRIAGE NOTES
Chief Complaint   Patient presents with   • Weakness     bilateral LE for the last 2 days, generalized weakness for a year   • Alcohol Intoxication     1/2 bottle of vodka today, daily drinker with home medications.     Pt has fracture to right ankle that he is scheduled for sx on 9/24.  ERP to see.

## 2018-09-22 NOTE — PROGRESS NOTES
Hospital Medicine Daily Progress Note    Date of Service  9/22/2018    Chief Complaint  63 y.o. male admitted 9/21/2018 with generalized weakness more in his lower extremities, found to have left basilar opacity on CXR and leukocytosis.     Hospital Course  63 y.o. male with HIV, Atrial fibrillation, alcohol abuse, medication non compliance and a recent injury of the right ankle using an off loading boot, admitted 9/21/2018 with generalized weakness more in his lower extremities, found to have left basilar opacity on CXR, leukocytosis, and TENZIN. Started on IVFs, Unasyn and azithromycin. He reports that he has stopped taking his metoprolol for the past two weeks.  Has history of alcohol use, reports last drink was one day prior to admission, watch for alcohol withdrawal CIWA ordered. Generalized weakness slightly improvement the following day. PT/OT ordered.      Interval Problem Update  HR 90's-110's  Tolerating PO, switching ABX from IV to PO  Hx alcohol use, reports last drink was yesterday, watch for alcohol withdrawal CIWA ordered   EKG 9/22/2018 QTc 485  Faint end expiratory wheezing   HypoK replacing, checking Mg and PO4   TENZIN improving with IFV, consistent with pre-renal   Generalized weakness reports improvement  Reports tingling improved   Ordering PT/OT    Consultants/Specialty  N/A    Code Status  FULL    Disposition  TBD, pending PT/OT    Review of Systems  Review of Systems   Constitutional: Negative for chills and fever.   HENT: Negative for sore throat.    Eyes: Negative for double vision and photophobia.   Respiratory: Positive for cough. Negative for shortness of breath and stridor.    Cardiovascular: Negative for chest pain and palpitations.   Gastrointestinal: Negative for abdominal pain and vomiting.   Genitourinary: Negative for flank pain.   Musculoskeletal: Negative for neck pain.   Skin: Negative for rash.   Neurological: Negative for dizziness and headaches.   Psychiatric/Behavioral: Negative  for hallucinations.        Physical Exam  Temp:  [36.8 °C (98.2 °F)-37.2 °C (99 °F)] 36.8 °C (98.2 °F)  Pulse:  [] 72  Resp:  [14-18] 14  BP: (105-141)/(56-93) 134/63    Physical Exam   Constitutional: He is oriented to person, place, and time. No distress.   HENT:   Head: Normocephalic.   Eyes: Pupils are equal, round, and reactive to light. Right eye exhibits no discharge. Left eye exhibits no discharge.   Neck: No JVD present.   Cardiovascular: Exam reveals no friction rub.    No murmur heard.  Tachy, irregular    Pulmonary/Chest: No stridor. No respiratory distress. He has wheezes (faint end expiratory wheeze). He has no rales.   Abdominal: He exhibits no distension. There is no tenderness.   Musculoskeletal: He exhibits no edema.   Neurological: He is alert and oriented to person, place, and time.   Generalized weakness reports improvement  Reports tingling improved   4/5 weakness in both LE   Skin: Skin is warm and dry. He is not diaphoretic.   Psychiatric: He has a normal mood and affect.       Fluids    Intake/Output Summary (Last 24 hours) at 09/22/18 1435  Last data filed at 09/22/18 1413   Gross per 24 hour   Intake             1200 ml   Output                0 ml   Net             1200 ml       Laboratory  Recent Labs      09/21/18 2027 09/22/18   0710   WBC  13.2*  9.1   RBC  3.44*  2.85*   HEMOGLOBIN  11.9*  9.8*   HEMATOCRIT  36.1*  30.0*   MCV  104.9*  105.3*   MCH  34.6*  34.4*   MCHC  33.0*  32.7*   RDW  55.1*  53.4*   PLATELETCT  227  176   MPV  8.5*  8.9*     Recent Labs      09/21/18 2027 09/22/18   0710   SODIUM  141  138   POTASSIUM  3.8  3.2*   CHLORIDE  101  103   CO2  23  25   GLUCOSE  95  104*   BUN  25*  24*   CREATININE  1.54*  1.27   CALCIUM  9.9  8.7                   Imaging  DX-CHEST-PORTABLE (1 VIEW)   Final Result         1. Patchy left basilar opacity, atelectasis versus consolidation.           Assessment/Plan  * CAP (community acquired pneumonia)   Assessment & Plan     Patient has leukocytosis, he has a positive chest x-ray  Is immunocompromised in the setting of HIV infection.    Initially stared on Unasyn and azithromycin, switched to orals 9/22/2018         Alcohol abuse- (present on admission)   Assessment & Plan    Hx alcohol use, reports last drink was Day before admission  Watch for alcohol withdrawal BECKYWA ordered   EKG 9/22/2018 QTc 485   Checking and replacing K, Mg, PO4        Closed fracture of right ankle- (present on admission)   Assessment & Plan    Imaging sep 1 shows Fracture/dislocation of the second through fifth tarsometatarsal articulations. The second through fourth metatarsals are dislocated dorsally with respect to the cuneiforms and cuboid and the second through fifth metatarsals are dislocated laterally with respect to the cuneiforms and cuboid.   Already has follow up with orthopedics Dr Berry in the coming week.         Non compliance w medication regimen- (present on admission)   Assessment & Plan    He reports that he has stopped taking his metoprolol for the past two weeks.  Education provided         Persistent atrial fibrillation (HCC)- (present on admission)   Assessment & Plan    Was initially tachy, improving with IVF and resuming his metoprolol   He reports that he has stopped taking his metoprolol for the past two weeks.  Continue home Xarelto and resume home metoprolol.    Echo 4/22/2018   Normal left ventricular size, thickness, systolic function, and diastolic function.  No significant valve abnormalities.   Estimated right ventricular systolic pressure  is 39 mmHg.         TENZIN (acute kidney injury) ([facility])- (present on admission)   Assessment & Plan    Suspect prerenal azotemia in the setting of infection   Improving with IV fluids    CTM        Sepsis ([facility])   Assessment & Plan    TENZIN improving, treatment as noted above.        HIV (human immunodeficiency virus infection) ([facility])- (present on admission)   Assessment & Plan    This is  chronic, outpatient follow-up with the [facility] clinic once medically stable for discharge.    In the meantime, continue home Triumeq.              VTE prophylaxis: On rivaroxaban

## 2018-09-22 NOTE — CARE PLAN
Problem: Safety  Goal: Will remain free from injury  Outcome: PROGRESSING AS EXPECTED  Bed alarm in place. Pt educated on calling for assistance. Pt verbalizes understanding.

## 2018-09-22 NOTE — H&P
[facility] Medicine History & Physical Note    Date of Service  9/21/2018    Primary Care Physician  Lenora Markham M.D.    Consultants  None    Code Status  Full    Chief Complaint  Chief Complaint   Patient presents with   • Weakness     bilateral LE for the last 2 days, generalized weakness for a year   • Alcohol Intoxication     1/2 bottle of vodka today, daily drinker with home medications.       History of Presenting Illness  63 y.o. male who presented on 9/21/2018 with lower extremity weakness.  Patient reports that he has been chronically weak for years.  He carries a known history of HIV and his last CD4 count was obtained less than 10 days ago and was greater than 500.  He is followed by the [facility] clinic.  He also states that he recently fractured his right ankle and is scheduled for surgical repair in the next several days.  However, in the last 2 days, He has had worsening of his global weakness particularly in his lower extremities.  He reports that his symptoms have affected him to the point that yesterday he needed to crawl on the floor in order to move from place to place and to help himself to water.  Patient denies any fevers, chills, nausea, vomiting, headache, or chest pain.  He does report an overall tingling sensation of his body.  He denies any cough or rhinorrhea.  Upon assessment in the emergency room, the patient is noted to have leukocytosis with a positive chest x-ray concerning for pneumonia.  He will be admitted for further treatment.      Review of Systems  Review of Systems   Constitutional: Negative for chills and fever.   HENT: Negative for congestion and sore throat.    Eyes: Negative for photophobia.   Respiratory: Negative for cough, shortness of breath and wheezing.    Cardiovascular: Negative for chest pain and palpitations.   Gastrointestinal: Negative for abdominal pain, diarrhea, nausea and vomiting.   Genitourinary: Negative for dysuria.   Musculoskeletal: Negative for  myalgias.   Skin: Negative.    Neurological: Positive for tingling and weakness. Negative for dizziness, focal weakness and headaches.   Psychiatric/Behavioral: Negative for depression and suicidal ideas.       Past Medical History  Past Medical History:   Diagnosis Date   • Atrial fibrillation (HCC) 2018   • Cataract     sx 1999   • HIV (human immunodeficiency virus infection) ([facility])    • Hypertension    • Kidney disease        Surgical History  Past Surgical History:   Procedure Laterality Date   • IRRIGATION & DEBRIDEMENT ORTHO Right 6/4/2018    Procedure: IRRIGATION & DEBRIDEMENT ORTHO- ANKLE;  Surgeon: Ulices Berry M.D.;  Location: SURGERY [facility];  Service: Orthopedics   • FIBULA ORIF Right 5/21/2018    Procedure: FIBULA ORIF-FOR NON UNION REPAIR WITH BONE GRAFT;  Surgeon: Ulices Berry M.D.;  Location: SURGERY [facility];  Service: Orthopedics   • ANKLE ARTHROSCOPY Right 5/21/2018    Procedure: ANKLE ARTHROSCOPY;  Surgeon: Ulices Berry M.D.;  Location: SURGERY [facility];  Service: Orthopedics   • OTHER ORTHOPEDIC SURGERY  2008    bilateral shoulder sx   • OTHER ORTHOPEDIC SURGERY  2003    left ankle sx       Family History  Family History   Problem Relation Age of Onset   • Alzheimer's Disease Mother    • Heart Disease Father         pacemaker for heart block   • Hypertension Father    • Heart Disease Sister         ?   • Breast Cancer Sister         with double Mastectomy   • Hypertension Brother        Social History  Social History   Substance Use Topics   • Smoking status: Former Smoker     Years: 2.00     Types: Cigarettes     Quit date: 2/14/2018   • Smokeless tobacco: Never Used      Comment: 1 cigarette per day for 2-3 years   • Alcohol use 0.0 oz/week      Comment: 4 vodka drinks per day       Allergies  Allergies   Allergen Reactions   • Sulfa Drugs Itching     Rxn - 1970's         Medications  No current facility-administered medications on file prior to encounter.       Current Outpatient Prescriptions on File Prior to Encounter   Medication Sig Dispense Refill   • folic acid (FOLVITE) 1 MG Tab Take 1 mg by mouth every day.     • ibuprofen (MOTRIN) 200 MG Tab Take 400 mg by mouth every 6 hours as needed.     • metoprolol (LOPRESSOR) 25 MG Tab Take 0.5 Tabs by mouth 2 times a day. 30 Tab 0   • rivaroxaban (XARELTO) 20 MG Tab tablet Take 1 Tab by mouth with dinner. 30 Tab 0   • Abacavir-Dolutegravir-Lamivud (TRIUMEQ) 600- MG Tab Take 1 Tab by mouth every day.     • ferrous sulfate 325 (65 Fe) MG tablet Take 325 mg by mouth every day.     • B Complex Vitamins (B COMPLEX 1 PO) Take 1 Tab by mouth every day.     • omeprazole (PRILOSEC) 20 MG delayed-release capsule Take 20 mg by mouth every day.     • quetiapine (SEROQUEL) 400 MG tablet Take 400 mg by mouth every bedtime.     • tamsulosin (FLOMAX) 0.4 MG capsule Take 0.4 mg by mouth ONE-HALF HOUR AFTER BREAKFAST.     • gabapentin (NEURONTIN) 300 MG Cap Take 300 mg by mouth 3 times a day.         Physical Exam  Hemodynamics  Temp (24hrs), Av.2 °C (99 °F), Min:37.2 °C (99 °F), Max:37.2 °C (99 °F)   Temperature: 37.2 °C (99 °F)  Pulse  Av  Min: 101  Max: 101    Blood Pressure : 141/93      Respiratory      Respiration: 16, Pulse Oximetry: 92 %             Physical Exam   Constitutional: He is oriented to person, place, and time. No distress.   Chronically ill-appearing   HENT:   Head: Normocephalic and atraumatic.   Right Ear: External ear normal.   Left Ear: External ear normal.   Eyes: EOM are normal. Right eye exhibits no discharge. Left eye exhibits no discharge.   Neck: Neck supple. No JVD present.   Cardiovascular: Normal rate, regular rhythm and normal heart sounds.    Pulmonary/Chest: Effort normal and breath sounds normal. No respiratory distress. He exhibits no tenderness.   Abdominal: Soft. Bowel sounds are normal. He exhibits no distension. There is no tenderness.   Musculoskeletal: He exhibits no edema.    Right foot in a immobilizer boot   Neurological: He is alert and oriented to person, place, and time. No cranial nerve deficit.   Skin: Skin is dry. He is not diaphoretic. No erythema.   Psychiatric: He has a normal mood and affect. His behavior is normal.   Nursing note and vitals reviewed.    Capillary refill less than 3 seconds, distal pulses intact    Laboratory:  Recent Labs      09/21/18 2027   WBC  13.2*   RBC  3.44*   HEMOGLOBIN  11.9*   HEMATOCRIT  36.1*   MCV  104.9*   MCH  34.6*   MCHC  33.0*   RDW  55.1*   PLATELETCT  227   MPV  8.5*     Recent Labs      09/21/18 2027   SODIUM  141   POTASSIUM  3.8   CHLORIDE  101   CO2  23   GLUCOSE  95   BUN  25*   CREATININE  1.54*   CALCIUM  9.9     Recent Labs      09/21/18 2027   ALTSGPT  63*   ASTSGOT  211*   ALKPHOSPHAT  63   TBILIRUBIN  1.1   GLUCOSE  95                 Lab Results   Component Value Date    TROPONINI <0.01 08/16/2018       Imaging  Ct-foot W/o Plus Recons Right    Result Date: 9/1/2018 9/1/2018 5:13 PM HISTORY/REASON FOR EXAM:  Right foot pain. Lisfranc injury.. TECHNIQUE/ EXAM DESCRIPTION: CT scan of the RIGHT ankle/foot without contrast, with reconstructions. Thin-section helical noncontrast images were obtained from the distal tibia/fibula through the forefoot. Sagittal and coronal reconstructions were generated from the axial images. Up to date radiation dose reduction adjustments have been utilized to meet ALARA standards for radiation dose reduction. COMPARISON:  None. FINDINGS: There are surgical changes involving the distal fibula consistent with plate and screw fixation. There is lucency seen involving the calcaneus posteriorly and laterally with cortical discontinuity in that area. This area of lytic lucency measures 3 x 2 x  2 cm in size. Osteopenia is present. There is marked deformity of the mid foot. There are fracture/dislocations involving the second, third, fourth and fifth metatarsals with dorsal dislocation of the  metatarsals with respect to the cuneiforms. There are comminuted fractures seen involving the base of the second, third and fourth metatarsals. There is lateral subluxation of the second, third, fourth and fifth metatarsals seen as well the respect to the adjacent cuneiforms and cuboid. There is likely nondisplaced fracture involving the base  of the fifth metatarsal. There is surrounding soft tissue swelling and fluid. There is extensive vascular calcification.     1.  Fracture/dislocation of the second through fifth tarsometatarsal articulations. The second through fourth metatarsals are dislocated dorsally with respect to the cuneiforms and cuboid and the second through fifth metatarsals are dislocated laterally with respect to the cuneiforms and cuboid. 2.  Comminuted fractures involving the base of the second through fourth metatarsals with nondisplaced fracture involving the fifth proximal metatarsal. 3.  Surgical fixation of the lateral malleolus. 4.  Extensive soft tissue swelling involving the mid foot. 5.  Lytic process involving the calcaneus posteriorly and laterally which measures 3 x 2 x 2 cm in size. There is either cortical thinning or disruption of the lateral aspect of the calcaneus in this area. This may represent old postsurgical change versus presence of a calcaneal cyst or lipoma with overlying cortical thinning. 6.  Lisfranc ligament cannot be evaluated with CT scan. Ligamentous structures can only be visualized with MRI. The degree of fracture/dislocation of the midfoot however makes the likelihood of Lisfranc ligament injury very high.    Ct-head W/o    Result Date: 8/29/2018 8/29/2018 2:59 PM HISTORY/REASON FOR EXAM:  Head injury. TECHNIQUE/EXAM DESCRIPTION AND NUMBER OF VIEWS: CT of the head without contrast. The study was performed on a helical multidetector CT scanner. Contiguous 5 mm axial sections were obtained from the skull base through the vertex. Up to date radiation dose  reduction adjustments have been utilized to meet ALARA standards for radiation dose reduction. COMPARISON:  CT head 2018 FINDINGS: The calvaria are normal in appearance. There are white matter changes that could be from previous small vessel ischemia, demyelination, or gliosis. There is cerebral volume loss. The ventricular system is normal in size and position. There is no hemorrhage or evidence for acute infarct. There are no extra-axial fluid collection. The visualized paranasal sinuses are clear. The mastoid air cells and middle ear are clear. There are ocular changes that are consistent with previous cataract surgery.     1.  No acute intracranial abnormality 2.  Underlying white matter change and cerebral volume loss    Dx-chest-portable (1 View)    Result Date: 2018 9:40 PM HISTORY/REASON FOR EXAM:  Weakness. Leukocytosis. TECHNIQUE/EXAM DESCRIPTION AND NUMBER OF VIEWS: Single portable view of the chest. COMPARISON: 18 FINDINGS: Patchy left basilar opacity. No pleural effusion. No pneumothorax. Stable cardiopericardial silhouette.     1. Patchy left basilar opacity, atelectasis versus consolidation.    Carotid Duplex    Result Date: 2018   Carotid Duplex  Report  Vascular Laboratory  CONCLUSIONS  Antegrade flow, bilateral vertebral arteries.  Flow within both subclavian arteries appears to be within normal limits.  Mild bilateral cervical internal carotid artery stenosis (<50%).  NATTY ETTA  Exam Date:     2018 15:10  Room #:     Out Patient  Priority:     Routine  Ht (in):             Wt (lb):  Ordering Physician:        VLAD CASTRO  Referring Physician:       [id number] Robles  Sonographer:               Megan Kay RVT  Study Type:                Complete Bilateral  Technical Quality:         Adequate  Age:    63    Gender:     M  MRN:    [id number]  :    1955      BSA:  Indications:     Syncope and collapse  CPT Codes:       17118  ICD Codes:       R55   History:         Syncopal episode, hypotension, dizziness; No previous duplex.  Limitations:  Right Brachial BP:              /  Left Brachial BP:             /  RIGHT  Plaque          Plaque         Velocity (cm/s)  Characteristics Composition    Syst/Diast                                 64   / 20      Distal ICA                                 49   / 14      Mid ICA  Irregular      Heterogeneo     65   / 15      Prox ICA                 us  Irregular      Heterogeneo     78   / 20      Distal CCA                 us                                 79   / 20      Mid CCA                                 108  / 24      Prox CCA                                 71   / 11      ECA  Waveform:   Triphasic          92             SCA  LEFT  Plaque         Plaque          Velocity (cm/s)  CharacteristicsComposition     Syst/Diast                                 62   / 20      Distal ICA                                 61   / 21      Mid ICA  Irregular      Heterogeneo     87   / 25      Prox ICA                 us  Irregular      Heterogeneo     68   / 18      Distal CCA                 us                                 77   / 16      Mid CCA                                 90   / 16      Prox CCA                                 67   / 16      ECA  Waveform:   Triphasic          105            SCA          0.6          ICA/CCA       1.0        Antegrade      Vertebral   Antegrade         48   / 15     cm/s        33    / 9  FINDINGS  Right carotid-  Very mild plaque of the carotid bifurcation. Doppler velocities are normal.  Left carotid-  Plaque of the bifurcation extending into the internal carotid. Velocities  are consistent with < 50% stenosis of the internal carotid artery.  Bilateral subclavian and vertebral artery waveforms are antegrade and  waveforms are normal in character and velocity.  Shamir Roberto M.D.  (Electronically Signed)  Final Date:      05 September 2018                    17:16        Assessment/Plan:  Anticipate that patient will need greater than 2 midnights for management of the discussed medical issues.    * CAP (community acquired pneumonia)   Assessment & Plan    Patient has leukocytosis, he has a positive chest x-ray, otherwise he is afebrile with stable vital signs however he is immunocompromised in the setting of HIV infection.  He will be admitted to the hospital and started on Unasyn and azithromycin, I will obtain sputum cultures and monitor for speciation and sensitivities.  I will check a pro calcitonin level and he will receive as needed respiratory therapy protocol and supplement O2.  I believe his weakness and malaise may be due to his community acquired pneumonia, I have requested a PT and OT evaluation.        TENZIN (acute kidney injury) (HCC)- (present on admission)   Assessment & Plan    Suspect prerenal azotemia in the setting of infection/sepsis.  I will check urine electro lites for completeness, continue IV fluids and repeat chemistries to ensure correction.        Sepsis (HCC)   Assessment & Plan    No evidence of endorgan damage, treatment as noted above.        Persistent atrial fibrillation (HCC)- (present on admission)   Assessment & Plan    This is chronic and stable, currently in sinus rhythm.  Continue home Xarelto and resume home metoprolol.        HIV (human immunodeficiency virus infection) (HCC)- (present on admission)   Assessment & Plan    This is chronic, outpatient follow-up with the [facility] clinic once medically stable for discharge.  In the meantime, continue home Triumeq.            Prophylaxis: Sequential compression devices for DVT prophylaxis, home PPI indicated, bowel protocol as needed

## 2018-09-22 NOTE — ED NOTES
Received report from Hazel Brian. Pt care responsibilities assumed. Pt resting in bed, Monitors in place, VSS, will continue to monitor.

## 2018-09-22 NOTE — CARE PLAN
Problem: Infection  Goal: Will remain free from infection  Outcome: PROGRESSING AS EXPECTED  Lab monitored, IV abx given per MAR, no s/s of complication. CTM    Problem: Venous Thromboembolism (VTW)/Deep Vein Thrombosis (DVT) Prevention:  Goal: Patient will participate in Venous Thrombosis (VTE)/Deep Vein Thrombosis (DVT)Prevention Measures  Outcome: PROGRESSING AS EXPECTED  xarelto implemented, no s/s of complication , CTM

## 2018-09-22 NOTE — ED NOTES
Pt ambulated with assistance of RN and ED tech. Pt found to have slow, unsteady gate and weakness. Unknown if unsteadiness is from fractured ankle or illness.

## 2018-09-22 NOTE — ASSESSMENT & PLAN NOTE
This is likely simple sepsis from pneumonia  Currently stable no and organ damage  All culture no growth to date  Improved

## 2018-09-22 NOTE — ASSESSMENT & PLAN NOTE
He reports that he has stopped taking his metoprolol for the past two weeks.  Education provided currently

## 2018-09-22 NOTE — ASSESSMENT & PLAN NOTE
Patient has leukocytosis, he has a positive chest x-ray  Is immunocompromised in the setting of HIV infection.    Initially stared on Unasyn and azithromycin, switched to orals 9/22/2018 total course set up as 5 days

## 2018-09-22 NOTE — PROGRESS NOTES
Two RN Skin Check Performed  Sacrum pink, blanching. Bilateral heels pink, blanching. Right foot swollen with lump on top of foot.

## 2018-09-22 NOTE — ED NOTES
Med Rec Updated PARTIALLY  Allergies Reviewed  No PO ABX last 30 days    Pt does not know any strengths of medications but knows the names.  Pt states he takes Xarelto Regularly and Triumeq Regularly.  Pt states he gave a list of medication strengths to the nurse, however, I was unable to obtain that list.     Home Pharmacy closed.    Tech to follow up on 09/24/18 with [facility] -- if list cannot be retrieved.

## 2018-09-23 LAB
ANION GAP SERPL CALC-SCNC: 5 MMOL/L (ref 0–11.9)
BASOPHILS # BLD AUTO: 0.4 % (ref 0–1.8)
BASOPHILS # BLD: 0.03 K/UL (ref 0–0.12)
BUN SERPL-MCNC: 13 MG/DL (ref 8–22)
C DIFF DNA SPEC QL NAA+PROBE: NEGATIVE
C DIFF TOX GENS STL QL NAA+PROBE: NEGATIVE
CALCIUM SERPL-MCNC: 8.5 MG/DL (ref 8.5–10.5)
CHLORIDE SERPL-SCNC: 106 MMOL/L (ref 96–112)
CO2 SERPL-SCNC: 26 MMOL/L (ref 20–33)
CREAT SERPL-MCNC: 0.99 MG/DL (ref 0.5–1.4)
EKG IMPRESSION: NORMAL
EOSINOPHIL # BLD AUTO: 0.02 K/UL (ref 0–0.51)
EOSINOPHIL NFR BLD: 0.3 % (ref 0–6.9)
ERYTHROCYTE [DISTWIDTH] IN BLOOD BY AUTOMATED COUNT: 54.4 FL (ref 35.9–50)
GLUCOSE SERPL-MCNC: 121 MG/DL (ref 65–99)
HCT VFR BLD AUTO: 29.7 % (ref 42–52)
HGB BLD-MCNC: 9.6 G/DL (ref 14–18)
IMM GRANULOCYTES # BLD AUTO: 0.01 K/UL (ref 0–0.11)
IMM GRANULOCYTES NFR BLD AUTO: 0.1 % (ref 0–0.9)
LYMPHOCYTES # BLD AUTO: 1.7 K/UL (ref 1–4.8)
LYMPHOCYTES NFR BLD: 24 % (ref 22–41)
MAGNESIUM SERPL-MCNC: 1.6 MG/DL (ref 1.5–2.5)
MCH RBC QN AUTO: 34.2 PG (ref 27–33)
MCHC RBC AUTO-ENTMCNC: 32.3 G/DL (ref 33.7–35.3)
MCV RBC AUTO: 105.7 FL (ref 81.4–97.8)
MONOCYTES # BLD AUTO: 0.76 K/UL (ref 0–0.85)
MONOCYTES NFR BLD AUTO: 10.7 % (ref 0–13.4)
NEUTROPHILS # BLD AUTO: 4.57 K/UL (ref 1.82–7.42)
NEUTROPHILS NFR BLD: 64.5 % (ref 44–72)
NRBC # BLD AUTO: 0 K/UL
NRBC BLD-RTO: 0 /100 WBC
PHOSPHATE SERPL-MCNC: 2.3 MG/DL (ref 2.5–4.5)
PLATELET # BLD AUTO: 208 K/UL (ref 164–446)
PMV BLD AUTO: 9.1 FL (ref 9–12.9)
POTASSIUM SERPL-SCNC: 3.6 MMOL/L (ref 3.6–5.5)
RBC # BLD AUTO: 2.81 M/UL (ref 4.7–6.1)
SODIUM SERPL-SCNC: 137 MMOL/L (ref 135–145)
WBC # BLD AUTO: 7.1 K/UL (ref 4.8–10.8)

## 2018-09-23 PROCEDURE — 85025 COMPLETE CBC W/AUTO DIFF WBC: CPT

## 2018-09-23 PROCEDURE — 83735 ASSAY OF MAGNESIUM: CPT

## 2018-09-23 PROCEDURE — 36415 COLL VENOUS BLD VENIPUNCTURE: CPT

## 2018-09-23 PROCEDURE — 90471 IMMUNIZATION ADMIN: CPT

## 2018-09-23 PROCEDURE — 87493 C DIFF AMPLIFIED PROBE: CPT

## 2018-09-23 PROCEDURE — 770006 HCHG ROOM/CARE - MED/SURG/GYN SEMI*

## 2018-09-23 PROCEDURE — 700102 HCHG RX REV CODE 250 W/ 637 OVERRIDE(OP): Performed by: HOSPITALIST

## 2018-09-23 PROCEDURE — 90662 IIV NO PRSV INCREASED AG IM: CPT | Performed by: HOSPITALIST

## 2018-09-23 PROCEDURE — 302255 BARRIER CREAM MOISTURE BAZA PROTECT (ZINC) 5OZ: Performed by: INTERNAL MEDICINE

## 2018-09-23 PROCEDURE — 700102 HCHG RX REV CODE 250 W/ 637 OVERRIDE(OP): Performed by: INTERNAL MEDICINE

## 2018-09-23 PROCEDURE — 93010 ELECTROCARDIOGRAM REPORT: CPT | Performed by: INTERNAL MEDICINE

## 2018-09-23 PROCEDURE — 700111 HCHG RX REV CODE 636 W/ 250 OVERRIDE (IP): Performed by: HOSPITALIST

## 2018-09-23 PROCEDURE — A9270 NON-COVERED ITEM OR SERVICE: HCPCS | Performed by: HOSPITALIST

## 2018-09-23 PROCEDURE — 93005 ELECTROCARDIOGRAM TRACING: CPT | Performed by: HOSPITALIST

## 2018-09-23 PROCEDURE — 3E0234Z INTRODUCTION OF SERUM, TOXOID AND VACCINE INTO MUSCLE, PERCUTANEOUS APPROACH: ICD-10-PCS | Performed by: INTERNAL MEDICINE

## 2018-09-23 PROCEDURE — 80048 BASIC METABOLIC PNL TOTAL CA: CPT

## 2018-09-23 PROCEDURE — 99232 SBSQ HOSP IP/OBS MODERATE 35: CPT | Performed by: INTERNAL MEDICINE

## 2018-09-23 PROCEDURE — A9270 NON-COVERED ITEM OR SERVICE: HCPCS | Performed by: INTERNAL MEDICINE

## 2018-09-23 PROCEDURE — 84100 ASSAY OF PHOSPHORUS: CPT

## 2018-09-23 RX ORDER — LOPERAMIDE HYDROCHLORIDE 2 MG/1
2 CAPSULE ORAL 3 TIMES DAILY PRN
Status: DISCONTINUED | OUTPATIENT
Start: 2018-09-23 | End: 2018-09-27 | Stop reason: HOSPADM

## 2018-09-23 RX ORDER — CLONAZEPAM 1 MG/1
1 TABLET ORAL ONCE
Status: COMPLETED | OUTPATIENT
Start: 2018-09-23 | End: 2018-09-23

## 2018-09-23 RX ADMIN — INFLUENZA A VIRUS A/MICHIGAN/45/2015 X-275 (H1N1) ANTIGEN (FORMALDEHYDE INACTIVATED), INFLUENZA A VIRUS A/SINGAPORE/INFIMH-16-0019/2016 IVR-186 (H3N2) ANTIGEN (FORMALDEHYDE INACTIVATED), AND INFLUENZA B VIRUS B/MARYLAND/15/2016 BX-69A (A B/COLORADO/6/2017-LIKE VIRUS) ANTIGEN (FORMALDEHYDE INACTIVATED) 0.5 ML: 60; 60; 60 INJECTION, SUSPENSION INTRAMUSCULAR at 06:20

## 2018-09-23 RX ADMIN — LOPERAMIDE HYDROCHLORIDE 2 MG: 2 CAPSULE ORAL at 20:00

## 2018-09-23 RX ADMIN — AMOXICILLIN AND CLAVULANATE POTASSIUM 1 TABLET: 875; 125 TABLET, FILM COATED ORAL at 13:54

## 2018-09-23 RX ADMIN — AMOXICILLIN AND CLAVULANATE POTASSIUM 1 TABLET: 875; 125 TABLET, FILM COATED ORAL at 23:28

## 2018-09-23 RX ADMIN — ABACAVIR 600 MG: 300 TABLET, FILM COATED ORAL at 06:10

## 2018-09-23 RX ADMIN — OMEPRAZOLE 20 MG: 20 CAPSULE, DELAYED RELEASE ORAL at 06:12

## 2018-09-23 RX ADMIN — ZOLPIDEM TARTRATE 5 MG: 5 TABLET ORAL at 22:21

## 2018-09-23 RX ADMIN — TAMSULOSIN HYDROCHLORIDE 0.4 MG: 0.4 CAPSULE ORAL at 09:27

## 2018-09-23 RX ADMIN — POTASSIUM CHLORIDE 20 MEQ: 1500 TABLET, EXTENDED RELEASE ORAL at 06:14

## 2018-09-23 RX ADMIN — GABAPENTIN 300 MG: 300 CAPSULE ORAL at 13:54

## 2018-09-23 RX ADMIN — LAMIVUDINE 300 MG: 100 TABLET, FILM COATED ORAL at 06:12

## 2018-09-23 RX ADMIN — AZITHROMYCIN 250 MG: 250 TABLET, FILM COATED ORAL at 06:12

## 2018-09-23 RX ADMIN — GABAPENTIN 300 MG: 300 CAPSULE ORAL at 18:28

## 2018-09-23 RX ADMIN — METOPROLOL TARTRATE 12.5 MG: 25 TABLET, FILM COATED ORAL at 18:28

## 2018-09-23 RX ADMIN — RIVAROXABAN 20 MG: 20 TABLET, FILM COATED ORAL at 18:28

## 2018-09-23 RX ADMIN — METOPROLOL TARTRATE 12.5 MG: 25 TABLET, FILM COATED ORAL at 06:15

## 2018-09-23 RX ADMIN — THERA TABS 1 TABLET: TAB at 06:13

## 2018-09-23 RX ADMIN — CLONAZEPAM 1 MG: 1 TABLET ORAL at 23:28

## 2018-09-23 RX ADMIN — GABAPENTIN 300 MG: 300 CAPSULE ORAL at 06:13

## 2018-09-23 RX ADMIN — FOLIC ACID 1 MG: 1 TABLET ORAL at 06:12

## 2018-09-23 RX ADMIN — DOLUTEGRAVIR SODIUM 50 MG: 50 TABLET, FILM COATED ORAL at 06:13

## 2018-09-23 RX ADMIN — Medication 100 MG: at 06:15

## 2018-09-23 ASSESSMENT — ENCOUNTER SYMPTOMS
PALPITATIONS: 0
TREMORS: 0
WHEEZING: 0
SPEECH CHANGE: 0
HEARTBURN: 0
FLANK PAIN: 0
CHILLS: 0
DIZZINESS: 0
ABDOMINAL PAIN: 0
BLURRED VISION: 0
HEADACHES: 0
DIARRHEA: 0
SHORTNESS OF BREATH: 0
VOMITING: 0
WEAKNESS: 0
WEIGHT LOSS: 0
SEIZURES: 0
CONSTIPATION: 0
STRIDOR: 0
BACK PAIN: 0
EYE PAIN: 0
NAUSEA: 0
SORE THROAT: 0
DOUBLE VISION: 0
DEPRESSION: 0
FALLS: 0
PND: 0
NECK PAIN: 0
HALLUCINATIONS: 0
PHOTOPHOBIA: 0
FEVER: 0
COUGH: 1
SPUTUM PRODUCTION: 0

## 2018-09-23 ASSESSMENT — LIFESTYLE VARIABLES
HEADACHE, FULLNESS IN HEAD: NOT PRESENT
AGITATION: NORMAL ACTIVITY
TREMOR: NO TREMOR
AGITATION: NORMAL ACTIVITY
HEADACHE, FULLNESS IN HEAD: NOT PRESENT
HEADACHE, FULLNESS IN HEAD: NOT PRESENT
TOTAL SCORE: 0
AGITATION: NORMAL ACTIVITY
NAUSEA AND VOMITING: NO NAUSEA AND NO VOMITING
PAROXYSMAL SWEATS: NO SWEAT VISIBLE
VISUAL DISTURBANCES: NOT PRESENT
TOTAL SCORE: 0
ANXIETY: NO ANXIETY (AT EASE)
TREMOR: TREMOR NOT VISIBLE BUT CAN BE FELT, FINGERTIP TO FINGERTIP
TREMOR: NO TREMOR
ORIENTATION AND CLOUDING OF SENSORIUM: ORIENTED AND CAN DO SERIAL ADDITIONS
AUDITORY DISTURBANCES: NOT PRESENT
TOTAL SCORE: 0
VISUAL DISTURBANCES: NOT PRESENT
TOTAL SCORE: 0
HEADACHE, FULLNESS IN HEAD: NOT PRESENT
SUBSTANCE_ABUSE: 0
ORIENTATION AND CLOUDING OF SENSORIUM: ORIENTED AND CAN DO SERIAL ADDITIONS
AUDITORY DISTURBANCES: NOT PRESENT
ORIENTATION AND CLOUDING OF SENSORIUM: ORIENTED AND CAN DO SERIAL ADDITIONS
HEADACHE, FULLNESS IN HEAD: NOT PRESENT
PAROXYSMAL SWEATS: NO SWEAT VISIBLE
AGITATION: NORMAL ACTIVITY
AGITATION: NORMAL ACTIVITY
TREMOR: NO TREMOR
TOTAL SCORE: 0
PAROXYSMAL SWEATS: NO SWEAT VISIBLE
VISUAL DISTURBANCES: NOT PRESENT
PAROXYSMAL SWEATS: NO SWEAT VISIBLE
ANXIETY: NO ANXIETY (AT EASE)
VISUAL DISTURBANCES: NOT PRESENT
PAROXYSMAL SWEATS: NO SWEAT VISIBLE
AUDITORY DISTURBANCES: NOT PRESENT
ANXIETY: NO ANXIETY (AT EASE)
TOTAL SCORE: 1
AGITATION: NORMAL ACTIVITY
ANXIETY: NO ANXIETY (AT EASE)
VISUAL DISTURBANCES: NOT PRESENT
TREMOR: NO TREMOR
NAUSEA AND VOMITING: NO NAUSEA AND NO VOMITING
AUDITORY DISTURBANCES: NOT PRESENT
NAUSEA AND VOMITING: NO NAUSEA AND NO VOMITING
AUDITORY DISTURBANCES: NOT PRESENT
ORIENTATION AND CLOUDING OF SENSORIUM: ORIENTED AND CAN DO SERIAL ADDITIONS
NAUSEA AND VOMITING: NO NAUSEA AND NO VOMITING
ANXIETY: NO ANXIETY (AT EASE)
ANXIETY: NO ANXIETY (AT EASE)
NAUSEA AND VOMITING: NO NAUSEA AND NO VOMITING
ORIENTATION AND CLOUDING OF SENSORIUM: ORIENTED AND CAN DO SERIAL ADDITIONS
TREMOR: NO TREMOR
PAROXYSMAL SWEATS: NO SWEAT VISIBLE
AUDITORY DISTURBANCES: NOT PRESENT
ORIENTATION AND CLOUDING OF SENSORIUM: ORIENTED AND CAN DO SERIAL ADDITIONS
NAUSEA AND VOMITING: NO NAUSEA AND NO VOMITING
HEADACHE, FULLNESS IN HEAD: NOT PRESENT
VISUAL DISTURBANCES: NOT PRESENT

## 2018-09-23 ASSESSMENT — PAIN SCALES - GENERAL
PAINLEVEL_OUTOF10: 0
PAINLEVEL_OUTOF10: 6
PAINLEVEL_OUTOF10: 0

## 2018-09-23 NOTE — PROGRESS NOTES
[facility] Medicine Daily Progress Note    Date of Service  9/23/2018    Chief Complaint  63 y.o. male admitted 9/21/2018 with generalized weakness more in his lower extremities, found to have left basilar opacity on CXR and leukocytosis.     Hospital Course  63 y.o. male with HIV, Atrial fibrillation, alcohol abuse, medication non compliance and a recent injury of the right ankle using an off loading boot, admitted 9/21/2018 with generalized weakness more in his lower extremities, found to have left basilar opacity on CXR, leukocytosis, and TENZIN. Started on IVFs, Unasyn and azithromycin. He reports that he has stopped taking his metoprolol for the past two weeks.  Has history of alcohol use, reports last drink was one day prior to admission, watch for alcohol withdrawal CIWA ordered. Generalized weakness slightly improvement the following day. PT/OT ordered.      Interval Problem Update  9/23 patient's condition stable overnight, still complains of general fatigue and weakness.  No shortness of breath.  Tolerated oral antibiotics.  Currently no signs of alcohol withdrawal.  Still on withdrawal protocol.  Kidney function stabilized.  Pending PT OT evaluation.    Consultants/Specialty  N/A    Code Status  FULL    Disposition  TBD, pending PT/OT    Review of Systems  Review of Systems   Constitutional: Negative for chills, fever and weight loss.   HENT: Negative for congestion, ear discharge, ear pain, hearing loss, nosebleeds and sore throat.    Eyes: Negative for blurred vision, double vision, photophobia and pain.   Respiratory: Positive for cough. Negative for sputum production, shortness of breath, wheezing and stridor.    Cardiovascular: Negative for chest pain, palpitations, leg swelling and PND.   Gastrointestinal: Negative for abdominal pain, constipation, diarrhea, heartburn, nausea and vomiting.   Genitourinary: Negative for dysuria, flank pain, frequency and hematuria.   Musculoskeletal: Negative for back  pain, falls, joint pain and neck pain.   Skin: Negative for rash.   Neurological: Negative for dizziness, tremors, speech change, seizures, weakness and headaches.   Psychiatric/Behavioral: Negative for depression, hallucinations, substance abuse and suicidal ideas.        Physical Exam  Temp:  [36.3 °C (97.4 °F)-36.9 °C (98.5 °F)] 36.3 °C (97.4 °F)  Pulse:  [64-86] 64  Resp:  [16-18] 16  BP: (105-139)/(68-77) 139/76    Physical Exam   Constitutional: He is oriented to person, place, and time. He appears well-developed and well-nourished. No distress.   HENT:   Head: Normocephalic.   Eyes: Pupils are equal, round, and reactive to light. EOM are normal. Right eye exhibits no discharge. Left eye exhibits no discharge.   Neck: Normal range of motion. Neck supple. No JVD present. No thyromegaly present.   Cardiovascular: Normal rate, regular rhythm and normal heart sounds.  Exam reveals no gallop and no friction rub.    No murmur heard.  Tachy, irregular    Pulmonary/Chest: Effort normal. No stridor. No respiratory distress. He has wheezes (faint end expiratory wheeze). He has no rales. He exhibits no tenderness.   Abdominal: Soft. Bowel sounds are normal. He exhibits no distension and no mass. There is no tenderness. There is no rebound and no guarding.   Musculoskeletal: Normal range of motion. He exhibits no edema.   Lymphadenopathy:     He has no cervical adenopathy.   Neurological: He is alert and oriented to person, place, and time. He displays normal reflexes. No cranial nerve deficit.   Generalized weakness reports improvement  Reports tingling improved   4/5 weakness in both LE   Skin: Skin is warm and dry. No rash noted. He is not diaphoretic.   Psychiatric: He has a normal mood and affect.   Nursing note and vitals reviewed.      Fluids    Intake/Output Summary (Last 24 hours) at 09/23/18 0184  Last data filed at 09/23/18 1000   Gross per 24 hour   Intake              760 ml   Output              500 ml   Net               260 ml       Laboratory  Recent Labs      09/21/18 2027 09/22/18   0710  09/23/18 0222   WBC  13.2*  9.1  7.1   RBC  3.44*  2.85*  2.81*   HEMOGLOBIN  11.9*  9.8*  9.6*   HEMATOCRIT  36.1*  30.0*  29.7*   MCV  104.9*  105.3*  105.7*   MCH  34.6*  34.4*  34.2*   MCHC  33.0*  32.7*  32.3*   RDW  55.1*  53.4*  54.4*   PLATELETCT  227  176  208   MPV  8.5*  8.9*  9.1     Recent Labs      09/21/18 2027 09/22/18   0710  09/23/18 0222   SODIUM  141  138  137   POTASSIUM  3.8  3.2*  3.6   CHLORIDE  101  103  106   CO2  23  25  26   GLUCOSE  95  104*  121*   BUN  25*  24*  13   CREATININE  1.54*  1.27  0.99   CALCIUM  9.9  8.7  8.5                   Imaging  DX-CHEST-PORTABLE (1 VIEW)   Final Result         1. Patchy left basilar opacity, atelectasis versus consolidation.           Assessment/Plan  * CAP (community acquired pneumonia)   Assessment & Plan    Patient has leukocytosis, he has a positive chest x-ray  Is immunocompromised in the setting of HIV infection.    Initially stared on Unasyn and azithromycin, switched to orals 9/22/2018   Continue oral antibiotics for now currently stable no signs of sepsis.        Alcohol abuse- (present on admission)   Assessment & Plan    Hx alcohol use, reports last drink was Day before admission  Watch for alcohol withdrawal CIWA ordered   EKG 9/22/2018 QTc 485   Checking and replacing K, Mg, PO4        Closed fracture of right ankle- (present on admission)   Assessment & Plan    Imaging sep 1 shows Fracture/dislocation of the second through fifth tarsometatarsal articulations. The second through fourth metatarsals are dislocated dorsally with respect to the cuneiforms and cuboid and the second through fifth metatarsals are dislocated laterally with respect to the cuneiforms and cuboid.   Already has follow up with orthopedics Dr Berry in the coming week.  Close monitor currently stable        Non compliance w medication regimen- (present on admission)    Assessment & Plan    He reports that he has stopped taking his metoprolol for the past two weeks.  Education provided         Persistent atrial fibrillation (HCC)- (present on admission)   Assessment & Plan    Was initially tachy, improving with IVF and resuming his metoprolol   He reports that he has stopped taking his metoprolol for the past two weeks.  Continue home Xarelto and resume home metoprolol.    Echo 4/22/2018   Normal left ventricular size, thickness, systolic function, and diastolic function.  No significant valve abnormalities.   Estimated right ventricular systolic pressure  is 39 mmHg.         TENZIN (acute kidney injury) (HCC)- (present on admission)   Assessment & Plan    Suspect prerenal azotemia in the setting of infection   Improving with IV fluids    CTM  Kidney function continued to improve        Sepsis ([facility])   Assessment & Plan    TENZIN improving, treatment as noted above.        HIV (human immunodeficiency virus infection) (HCC)- (present on admission)   Assessment & Plan    This is chronic, outpatient follow-up with the [facility] clinic once medically stable for discharge.    In the meantime, continue home Triumeq.              VTE prophylaxis: On rivaroxaban     For complexity-based billing, please refer to the history, exam, and decison making above. In addition, I spent >35 minutes caring for the patient today. More than 50% of the time was spent counseling and coordinating care.    I have discussed with RN and EDGAR and SW and other consultants about patient's plan.

## 2018-09-23 NOTE — CARE PLAN
Problem: Infection  Goal: Will remain free from infection  Outcome: PROGRESSING AS EXPECTED  Reinforced the importance of hand washing    Problem: Skin Integrity  Goal: Risk for impaired skin integrity will decrease  Outcome: PROGRESSING AS EXPECTED  Encouraged to turn to side as possible.

## 2018-09-23 NOTE — PROGRESS NOTES
Patient alert and oriented x4 with condom catheter. Complains of headache. Given PRN medication for pain. Safety precautions in placed. Bed on lowest position. Personal belongings within reach. Reinforced the use of call light when needing assistance

## 2018-09-24 ENCOUNTER — APPOINTMENT (OUTPATIENT)
Dept: RADIOLOGY | Facility: MEDICAL CENTER | Age: 63
DRG: 871 | End: 2018-09-24
Attending: ORTHOPAEDIC SURGERY
Payer: MEDICARE

## 2018-09-24 PROBLEM — F10.20 UNCOMPLICATED ALCOHOL DEPENDENCE (HCC): Status: ACTIVE | Noted: 2018-07-31

## 2018-09-24 LAB — EKG IMPRESSION: NORMAL

## 2018-09-24 PROCEDURE — G8988 SELF CARE GOAL STATUS: HCPCS | Mod: CI

## 2018-09-24 PROCEDURE — 99232 SBSQ HOSP IP/OBS MODERATE 35: CPT | Performed by: INTERNAL MEDICINE

## 2018-09-24 PROCEDURE — A9270 NON-COVERED ITEM OR SERVICE: HCPCS | Performed by: INTERNAL MEDICINE

## 2018-09-24 PROCEDURE — 700102 HCHG RX REV CODE 250 W/ 637 OVERRIDE(OP): Performed by: INTERNAL MEDICINE

## 2018-09-24 PROCEDURE — G8987 SELF CARE CURRENT STATUS: HCPCS | Mod: CK

## 2018-09-24 PROCEDURE — 93010 ELECTROCARDIOGRAM REPORT: CPT | Performed by: INTERNAL MEDICINE

## 2018-09-24 PROCEDURE — 93005 ELECTROCARDIOGRAM TRACING: CPT | Performed by: HOSPITALIST

## 2018-09-24 PROCEDURE — 700111 HCHG RX REV CODE 636 W/ 250 OVERRIDE (IP): Performed by: INTERNAL MEDICINE

## 2018-09-24 PROCEDURE — 97165 OT EVAL LOW COMPLEX 30 MIN: CPT

## 2018-09-24 PROCEDURE — A9270 NON-COVERED ITEM OR SERVICE: HCPCS | Performed by: HOSPITALIST

## 2018-09-24 PROCEDURE — 700102 HCHG RX REV CODE 250 W/ 637 OVERRIDE(OP): Performed by: HOSPITALIST

## 2018-09-24 PROCEDURE — 97162 PT EVAL MOD COMPLEX 30 MIN: CPT

## 2018-09-24 PROCEDURE — G8979 MOBILITY GOAL STATUS: HCPCS | Mod: CI

## 2018-09-24 PROCEDURE — G8978 MOBILITY CURRENT STATUS: HCPCS | Mod: CK

## 2018-09-24 PROCEDURE — 770006 HCHG ROOM/CARE - MED/SURG/GYN SEMI*

## 2018-09-24 RX ORDER — MAGNESIUM SULFATE HEPTAHYDRATE 40 MG/ML
2 INJECTION, SOLUTION INTRAVENOUS ONCE
Status: COMPLETED | OUTPATIENT
Start: 2018-09-24 | End: 2018-09-24

## 2018-09-24 RX ORDER — QUETIAPINE FUMARATE 400 MG/1
400 TABLET, FILM COATED ORAL
Status: ON HOLD | COMMUNITY
End: 2018-09-27

## 2018-09-24 RX ORDER — ZOLPIDEM TARTRATE 5 MG/1
10 TABLET ORAL
Status: DISCONTINUED | OUTPATIENT
Start: 2018-09-24 | End: 2018-09-27 | Stop reason: HOSPADM

## 2018-09-24 RX ORDER — RISPERIDONE 2 MG/1
2 TABLET ORAL EVERY EVENING
Status: DISCONTINUED | OUTPATIENT
Start: 2018-09-24 | End: 2018-09-27 | Stop reason: HOSPADM

## 2018-09-24 RX ORDER — RISPERIDONE 2 MG/1
2 TABLET ORAL
COMMUNITY
End: 2018-10-08

## 2018-09-24 RX ORDER — ZOLPIDEM TARTRATE 10 MG/1
10 TABLET ORAL NIGHTLY PRN
Status: ON HOLD | COMMUNITY
End: 2018-09-27

## 2018-09-24 RX ORDER — FOLIC ACID 1 MG/1
1 TABLET ORAL DAILY
Status: DISCONTINUED | OUTPATIENT
Start: 2018-09-24 | End: 2018-09-24

## 2018-09-24 RX ORDER — CLONAZEPAM 1 MG/1
1 TABLET ORAL 2 TIMES DAILY
Status: ON HOLD | COMMUNITY
End: 2018-09-27

## 2018-09-24 RX ORDER — CLONAZEPAM 1 MG/1
1 TABLET ORAL
Status: DISCONTINUED | OUTPATIENT
Start: 2018-09-24 | End: 2018-09-27 | Stop reason: HOSPADM

## 2018-09-24 RX ADMIN — GABAPENTIN 300 MG: 300 CAPSULE ORAL at 17:14

## 2018-09-24 RX ADMIN — TAMSULOSIN HYDROCHLORIDE 0.4 MG: 0.4 CAPSULE ORAL at 09:26

## 2018-09-24 RX ADMIN — Medication 100 MG: at 05:24

## 2018-09-24 RX ADMIN — DIBASIC SODIUM PHOSPHATE, MONOBASIC POTASSIUM PHOSPHATE AND MONOBASIC SODIUM PHOSPHATE 2 TABLET: 852; 155; 130 TABLET ORAL at 17:13

## 2018-09-24 RX ADMIN — FOLIC ACID 1 MG: 1 TABLET ORAL at 05:24

## 2018-09-24 RX ADMIN — THERA TABS 1 TABLET: TAB at 05:21

## 2018-09-24 RX ADMIN — OMEPRAZOLE 20 MG: 20 CAPSULE, DELAYED RELEASE ORAL at 05:22

## 2018-09-24 RX ADMIN — LAMIVUDINE 300 MG: 100 TABLET, FILM COATED ORAL at 05:21

## 2018-09-24 RX ADMIN — CLONAZEPAM 1 MG: 1 TABLET ORAL at 21:13

## 2018-09-24 RX ADMIN — METOPROLOL TARTRATE 12.5 MG: 25 TABLET, FILM COATED ORAL at 05:24

## 2018-09-24 RX ADMIN — AZITHROMYCIN 250 MG: 250 TABLET, FILM COATED ORAL at 05:21

## 2018-09-24 RX ADMIN — ABACAVIR 600 MG: 300 TABLET, FILM COATED ORAL at 06:00

## 2018-09-24 RX ADMIN — LOPERAMIDE HYDROCHLORIDE 2 MG: 2 CAPSULE ORAL at 21:51

## 2018-09-24 RX ADMIN — DOLUTEGRAVIR SODIUM 50 MG: 50 TABLET, FILM COATED ORAL at 05:21

## 2018-09-24 RX ADMIN — ZOLPIDEM TARTRATE 10 MG: 5 TABLET ORAL at 21:12

## 2018-09-24 RX ADMIN — LOPERAMIDE HYDROCHLORIDE 2 MG: 2 CAPSULE ORAL at 05:23

## 2018-09-24 RX ADMIN — DIBASIC SODIUM PHOSPHATE, MONOBASIC POTASSIUM PHOSPHATE AND MONOBASIC SODIUM PHOSPHATE 2 TABLET: 852; 155; 130 TABLET ORAL at 12:42

## 2018-09-24 RX ADMIN — MAGNESIUM SULFATE IN WATER 2 G: 40 INJECTION, SOLUTION INTRAVENOUS at 12:42

## 2018-09-24 RX ADMIN — RISPERIDONE 2 MG: 2 TABLET ORAL at 21:12

## 2018-09-24 RX ADMIN — GABAPENTIN 300 MG: 300 CAPSULE ORAL at 11:09

## 2018-09-24 RX ADMIN — METOPROLOL TARTRATE 25 MG: 25 TABLET, FILM COATED ORAL at 17:14

## 2018-09-24 RX ADMIN — AMOXICILLIN AND CLAVULANATE POTASSIUM 1 TABLET: 875; 125 TABLET, FILM COATED ORAL at 11:09

## 2018-09-24 RX ADMIN — ACETAMINOPHEN 650 MG: 325 TABLET, FILM COATED ORAL at 15:41

## 2018-09-24 RX ADMIN — LOPERAMIDE HYDROCHLORIDE 2 MG: 2 CAPSULE ORAL at 13:35

## 2018-09-24 RX ADMIN — GABAPENTIN 300 MG: 300 CAPSULE ORAL at 05:21

## 2018-09-24 ASSESSMENT — ENCOUNTER SYMPTOMS
DIZZINESS: 0
SORE THROAT: 0
FEVER: 0
DEPRESSION: 0
INSOMNIA: 1
TREMORS: 0
SPEECH CHANGE: 0
NECK PAIN: 0
CHILLS: 0
PND: 0
CONSTIPATION: 0
HALLUCINATIONS: 0
COUGH: 1
HEADACHES: 0
NAUSEA: 0
SPUTUM PRODUCTION: 0
HEARTBURN: 0
FALLS: 0
SEIZURES: 0
WEIGHT LOSS: 0
BLURRED VISION: 0
BACK PAIN: 0
PHOTOPHOBIA: 0
PALPITATIONS: 0
WHEEZING: 0
WEAKNESS: 1
FLANK PAIN: 0
DOUBLE VISION: 0
ABDOMINAL PAIN: 0
EYE PAIN: 0

## 2018-09-24 ASSESSMENT — COGNITIVE AND FUNCTIONAL STATUS - GENERAL
PERSONAL GROOMING: A LITTLE
MOBILITY SCORE: 16
MOVING TO AND FROM BED TO CHAIR: UNABLE
EATING MEALS: A LITTLE
SUGGESTED CMS G CODE MODIFIER DAILY ACTIVITY: CK
MOVING FROM LYING ON BACK TO SITTING ON SIDE OF FLAT BED: A LITTLE
DRESSING REGULAR UPPER BODY CLOTHING: A LITTLE
SUGGESTED CMS G CODE MODIFIER MOBILITY: CK
TURNING FROM BACK TO SIDE WHILE IN FLAT BAD: A LITTLE
STANDING UP FROM CHAIR USING ARMS: A LITTLE
DAILY ACTIVITIY SCORE: 15
HELP NEEDED FOR BATHING: A LOT
TOILETING: A LOT
DRESSING REGULAR LOWER BODY CLOTHING: A LOT
CLIMB 3 TO 5 STEPS WITH RAILING: A LITTLE
WALKING IN HOSPITAL ROOM: A LITTLE

## 2018-09-24 ASSESSMENT — ACTIVITIES OF DAILY LIVING (ADL): TOILETING: INDEPENDENT

## 2018-09-24 ASSESSMENT — LIFESTYLE VARIABLES
AUDITORY DISTURBANCES: NOT PRESENT
PAROXYSMAL SWEATS: NO SWEAT VISIBLE
PAROXYSMAL SWEATS: NO SWEAT VISIBLE
NAUSEA AND VOMITING: NO NAUSEA AND NO VOMITING
AGITATION: NORMAL ACTIVITY
VISUAL DISTURBANCES: NOT PRESENT
SUBSTANCE_ABUSE: 0
ORIENTATION AND CLOUDING OF SENSORIUM: ORIENTED AND CAN DO SERIAL ADDITIONS
NAUSEA AND VOMITING: NO NAUSEA AND NO VOMITING
ORIENTATION AND CLOUDING OF SENSORIUM: ORIENTED AND CAN DO SERIAL ADDITIONS
HEADACHE, FULLNESS IN HEAD: NOT PRESENT
TREMOR: NO TREMOR
VISUAL DISTURBANCES: NOT PRESENT
AUDITORY DISTURBANCES: NOT PRESENT
ANXIETY: NO ANXIETY (AT EASE)
TREMOR: NO TREMOR
AGITATION: NORMAL ACTIVITY
TOTAL SCORE: 0
TOTAL SCORE: 0
HEADACHE, FULLNESS IN HEAD: NOT PRESENT
ANXIETY: NO ANXIETY (AT EASE)

## 2018-09-24 ASSESSMENT — GAIT ASSESSMENTS
GAIT LEVEL OF ASSIST: MINIMAL ASSIST
DISTANCE (FEET): 150
DEVIATION: ANTALGIC

## 2018-09-24 ASSESSMENT — PAIN SCALES - GENERAL: PAINLEVEL_OUTOF10: 0

## 2018-09-24 NOTE — PROGRESS NOTES
[facility] Medicine Daily Progress Note    Date of Service  9/24/2018    Chief Complaint  63 y.o. male admitted 9/21/2018 with generalized weakness more in his lower extremities, found to have left basilar opacity on CXR and leukocytosis.     Hospital Course  63 y.o. male with HIV, Atrial fibrillation, alcohol abuse, medication non compliance and a recent injury of the right ankle using an off loading boot, admitted 9/21/2018 with generalized weakness more in his lower extremities, found to have left basilar opacity on CXR, leukocytosis, and TENZIN. Started on IVFs, Unasyn and azithromycin. He reports that he has stopped taking his metoprolol for the past two weeks.  Has history of alcohol use, reports last drink was one day prior to admission, watch for alcohol withdrawal CIWA ordered. Generalized weakness slightly improvement the following day. PT/OT ordered.      Interval Problem Update  9/23 patient's condition stable overnight, still complains of general fatigue and weakness.  No shortness of breath.  Tolerated oral antibiotics.  Currently no signs of alcohol withdrawal.  Still on withdrawal protocol.  Kidney function stabilized.  Pending PT OT evaluation.  9/24 patient's complains of insomnia and wants to resume his Ambien, clonazepam and Risperdal.  Discussed with pharmacy patient also has been taking quetiapine.  We will continue Ambien and clonazepam and Risperdal and we will held quetiapine at this moment.  Patient still complains of weakness and fatigue.  Pending PT OT    Consultants/Specialty  N/A    Code Status  FULL    Disposition  TBD, pending PT/OT likely need nursing care facility.    Review of Systems  Review of Systems   Constitutional: Positive for malaise/fatigue. Negative for chills, fever and weight loss.   HENT: Negative for ear discharge, ear pain, hearing loss and sore throat.    Eyes: Negative for blurred vision, double vision, photophobia and pain.   Respiratory: Positive for cough. Negative  for sputum production and wheezing.    Cardiovascular: Negative for chest pain, palpitations, leg swelling and PND.   Gastrointestinal: Negative for abdominal pain, constipation, heartburn and nausea.   Genitourinary: Negative for dysuria, flank pain, frequency and hematuria.   Musculoskeletal: Negative for back pain, falls, joint pain and neck pain.   Skin: Negative for rash.   Neurological: Positive for weakness. Negative for dizziness, tremors, speech change, seizures and headaches.   Psychiatric/Behavioral: Negative for depression, hallucinations, substance abuse and suicidal ideas. The patient has insomnia.         Physical Exam  Temp:  [36.2 °C (97.2 °F)-36.7 °C (98 °F)] 36.6 °C (97.8 °F)  Pulse:  [70-89] 70  Resp:  [16-18] 18  BP: (133-144)/(80-98) 137/98    Physical Exam   Constitutional: He is oriented to person, place, and time. He appears well-developed and well-nourished. No distress.   HENT:   Right Ear: External ear normal.   Left Ear: External ear normal.   Eyes: Pupils are equal, round, and reactive to light. Conjunctivae and EOM are normal.   Neck: Normal range of motion. Neck supple. No thyromegaly present.   Cardiovascular: Normal rate, regular rhythm and normal heart sounds.  Exam reveals no gallop and no friction rub.    No murmur heard.  Tachy, irregular    Pulmonary/Chest: Effort normal. No stridor. No respiratory distress. He has wheezes (faint end expiratory wheeze). He has no rales.   Abdominal: Soft. Bowel sounds are normal. He exhibits no distension and no mass. There is no rebound and no guarding.   Musculoskeletal: Normal range of motion. He exhibits no edema.   Lymphadenopathy:     He has no cervical adenopathy.   Neurological: He is alert and oriented to person, place, and time. He displays normal reflexes. No cranial nerve deficit.   Generalized weakness reports improvement  Reports tingling improved   4/5 weakness in both LE   Skin: Skin is warm and dry. No rash noted. He is not  diaphoretic.   Psychiatric: He has a normal mood and affect.   Nursing note and vitals reviewed.      Fluids    Intake/Output Summary (Last 24 hours) at 09/24/18 1359  Last data filed at 09/24/18 1100   Gross per 24 hour   Intake              570 ml   Output             1200 ml   Net             -630 ml       Laboratory  Recent Labs      09/21/18 2027 09/22/18   0710  09/23/18   0222   WBC  13.2*  9.1  7.1   RBC  3.44*  2.85*  2.81*   HEMOGLOBIN  11.9*  9.8*  9.6*   HEMATOCRIT  36.1*  30.0*  29.7*   MCV  104.9*  105.3*  105.7*   MCH  34.6*  34.4*  34.2*   MCHC  33.0*  32.7*  32.3*   RDW  55.1*  53.4*  54.4*   PLATELETCT  227  176  208   MPV  8.5*  8.9*  9.1     Recent Labs      09/21/18 2027 09/22/18   0710  09/23/18 0222   SODIUM  141  138  137   POTASSIUM  3.8  3.2*  3.6   CHLORIDE  101  103  106   CO2  23  25  26   GLUCOSE  95  104*  121*   BUN  25*  24*  13   CREATININE  1.54*  1.27  0.99   CALCIUM  9.9  8.7  8.5                   Imaging  DX-CHEST-PORTABLE (1 VIEW)   Final Result         1. Patchy left basilar opacity, atelectasis versus consolidation.      DX-PORTABLE FLUORO > 1 HOUR    (Results Pending)   DX-FOOT-COMPLETE 3+ LEFT    (Results Pending)        Assessment/Plan  * CAP (community acquired pneumonia)   Assessment & Plan    Patient has leukocytosis, he has a positive chest x-ray  Is immunocompromised in the setting of HIV infection.    Initially stared on Unasyn and azithromycin, switched to orals 9/22/2018 total course set up as 5 days        Uncomplicated alcohol dependence (HCC)- (present on admission)   Assessment & Plan    Hx alcohol use, reports last drink was Day before admission  Watch for alcohol withdrawal CIWA ordered   EKG 9/22/2018 QTc 485   Checking and replacing K, Mg, PO4  Currently stable no signs of withdrawal        Closed fracture of right ankle- (present on admission)   Assessment & Plan    Imaging sep 1 shows Fracture/dislocation of the second through fifth  tarsometatarsal articulations. The second through fourth metatarsals are dislocated dorsally with respect to the cuneiforms and cuboid and the second through fifth metatarsals are dislocated laterally with respect to the cuneiforms and cuboid.   Scheduled surgery with orthopedics Dr Berry 9/24, but after discussed with Dr. Berry procedure postponed it to the next week.  Possible PT OT and possible nursing care facility placement        Non compliance w medication regimen- (present on admission)   Assessment & Plan    He reports that he has stopped taking his metoprolol for the past two weeks.  Education provided currently        Persistent atrial fibrillation (HCC)- (present on admission)   Assessment & Plan    Was initially tachy, improving with IVF and resuming his metoprolol   He reports that he has stopped taking his metoprolol for the past two weeks.  Continue home Xarelto and resume home metoprolol.  Currently stable no complaints of    Echo 4/22/2018   Normal left ventricular size, thickness, systolic function, and diastolic function.  No significant valve abnormalities.   Estimated right ventricular systolic pressure  is 39 mmHg.         TENZIN (acute kidney injury) ([facility])- (present on admission)   Assessment & Plan    Suspect prerenal azotemia in the setting of infection   Improving with IV fluids    Showing signs of improvement        Sepsis ([facility])   Assessment & Plan    This is likely simple sepsis from pneumonia  Currently stable no and organ damage  Continue to monitor  Continue oral antibiotics at this moment  All culture no growth to date        HIV (human immunodeficiency virus infection) ([facility])- (present on admission)   Assessment & Plan    This is chronic, outpatient follow-up with the [facility] clinic once medically stable for discharge.    In the meantime, continue home Triumeq.   Curbside seen by ID specialist Dr. Montaño                  VTE prophylaxis: On rivaroxaban     For complexity-based  billing, please refer to the history, exam, and decison making above. In addition, I spent >35 minutes caring for the patient today. More than 50% of the time was spent counseling and coordinating care.    I have discussed with RN and CM and SW and other consultants about patient's plan.

## 2018-09-24 NOTE — PROGRESS NOTES
Patient alert and oriented. Patient denies pain. Patient refusing to get out of bed for evaluation. PT/OT ordered per patient-not available today for evaluation. Patient turned q2 hours. Patient having diarrhea-C.Diff collected-negative. Physician notified-immodium ordered. Patient scrotum reddened-orly cream ordered. Condom Cath in place. All belongings and call light within reach. Monitoring to continue.

## 2018-09-24 NOTE — PROGRESS NOTES
When helping pt out of bed OT and CNA noticed some bleeding coming from pt's scrotum. Upon closer examination, pt has bleeding site from excoriation. Wound pictures uploaded and wound consult placed. Cleaned w/ NS and covered w/ mepilex lite. Cleaned pt of incont episode of diarrhea and turned onto left side. Informed pt staff would be coming in every two hours to turn him, he is aware and agrees it is necessary.

## 2018-09-24 NOTE — CARE PLAN
Problem: Safety  Goal: Will remain free from falls  Outcome: PROGRESSING AS EXPECTED  Pt resting in bed. Nonskid socks in place. Pt uses call bell appropriately. Bed in lowest position. Will continue to monitor.     Problem: Bowel/Gastric:  Goal: Normal bowel function is maintained or improved  Outcome: PROGRESSING AS EXPECTED  Pt having diarrhea. He stated that is normal for him. PRN imodium ordered and administered. Will continue to monitor.

## 2018-09-24 NOTE — CARE PLAN
Problem: Safety  Goal: Will remain free from falls  Outcome: PROGRESSING AS EXPECTED  Pt will be free from falls w/ hourly rounding, appropriate signs placed and call lights remaining within reach.    Problem: Skin Integrity  Goal: Risk for impaired skin integrity will decrease  Outcome: PROGRESSING SLOWER THAN EXPECTED  Pt's perineal region very red and excoriated, small bleeding wound to scrotum found today. Wound consult now ordered along w/ preventative measures for skin protection: waffle overlay, q 2 turns, barrier cream/wipes initiated and educated provided to pt

## 2018-09-24 NOTE — PROGRESS NOTES
"Assumed care of pt at 0700, informed pt was scheduled for surgery today for his fractures in R foot. Made NPO and called orthopedic surgeon Dr. Berry to inform him pt had some ginger ale around 0200 and sips of water w/ medication around 0530. Dr. Berry was not aware at the time pt had become inpt at hospital. Also informed Dr. Berry pt had gotten PO xarelto yesterday, Kristi concluded the surgery should be cancelled and rescheduled most likely next week. Regular diet resumed. Pt updated w/ POC he agrees w/ plan.    Up w/ OT this morning, OT reports 1 assist. Pt tells staff that he has help at home w/ his roommate \"caregiver.\" Just talked w/ pt's sister who informs us this roommate works at [employer] and he does not have enough outside support. Sister insists pt needs a skilled nursing facility until he is able to walk on his own again.  "

## 2018-09-24 NOTE — PROGRESS NOTES
A&OX4. No c/o of pain or discomfort. PRN sleep medication given. Pt resting in bed at this time. Pt continues to have diarrhea. PRN immodium given. Condom cath in place. Q2H turns. Nonskid socks in place. Bed in lowest position. Call bell and bedside table within reach. Will continue to monitor.

## 2018-09-24 NOTE — DOCUMENTATION QUERY
DOCUMENTATION QUERY    PROVIDERS: Please select “Cosign w/ note”to reply to query.    To better represent the severity of illness of your patient, please review the following information and exercise your independent professional judgment in responding to this query.     There is conflicting documentation in the medical record:    1. Sepsis without end organ dysfunction is noted in the H&P and 9/24 Hospitalist Progress Note.  2. TENZIN - in the setting of infection/sepsis is also noted in the History and Physical.  3. Sepsis - TENZIN improving is noted in the 9/22-9/23 Hospitalist Progress Notes.    Based upon the clinical findings, risk factors, and treatment, can the relationship between these two diagnoses be further specified?    • TENZIN is related to sepsis (severe sepsis)  • TENZIN is not related to sepsis  • Other explanation of clinical findings  • Unable to determine    The medical record reflects the following:   Clinical Findings 9/24 Hosp PN:   · This is likely simple sepsis from pneumonia, currently stable no end organ damage  · TENZIN - suspect prerenal azotemia in the setting of infection    9/22-9/23 Hosp PN:   · TENZIN - suspect prerenal azotemia in the setting of infection  · Sepsis - TENZIN improving    H&P:   · Sepsis - no evidence of end organ damage  · TENZIN - suspect prerenal azotemia in the setting of infection/sepsis    Lactic acid 1.6  procalcitonin 0.13  BUN 25 - 13  Creatinine 1.54 - 0.99  GFR 45 - >60  WBC 13.2 - 7.1   Treatment IVF  Unasyn  Azithromycin   Augmentin  NS boluses   Risk Factors Pneumonia  TENZIN  PAF  HIV   Location within medical record History and Physical, Progress Notes and Lab Results      Thank you,   Tricia Cosby, RN, BSN  Clinical   [phone]

## 2018-09-24 NOTE — THERAPY
"Occupational Therapy Evaluation completed.   Functional Status:  Mod A supine>sit EOB, max A w/donning boot, min A sit>Stand, pt incontinent of bowel max A for yvonne care; noted redness and bleeding w/pts scrotum, then min A pivot to chair at bedside, c/o fatigue and pain. Set up w/grooming seated reports some assist from friend/roomate at d/c   Plan of Care: Will benefit from Occupational Therapy 3 times per week  Discharge Recommendations:  Equipment: Will Continue to Assess for Equipment Needs. Post-acute therapy Discharge: at this moment may be benefit from post acute placement however will continue to assess as pt reports good support from friend at d/c, need to confirm     See \"Rehab Therapy-Acute\" Patient Summary Report for complete documentation.    63 yr old male admitted for generalized weakness, now dx w/left basilar opacity and leukocytosis. Pt has a hx of HIV, Afib, ETOH and medication non-compliance. Pt also has a recent R foot injury and has been wearing an off loading boot, with possible sx intervention needed, during session RN reported that pts sx has been cancelled until further notice. Pt is currently demonstrating generalized weakness, poor activity tolerance and decreased balance all impacting pts ability to complete basic ADL's and txfs. At this time pt may benefit from post acute placement prior to d/c home, will continue to monitor functional status as pt may progress in this setting.   "

## 2018-09-24 NOTE — THERAPY
"Pt is a 64 y/o male admitted for new onset weakness, found to have CAP and recent hx of HIV (immunocompromised) and R foot injury which is being treated with walking boot and follow as outpatient. Pt reports he was independent PTA, does have cxs at home as needed. Pt demonstrated slight impairments in functional mobility, balance, and gait.  Pt will benefit from acute PT interventions to address present impairments and optimize function and safety prior to DC home.     Physical Therapy Evaluation completed.   Bed Mobility:  Supine to Sit: Contact Guard Assist (HOB elevated)  Transfers: Sit to Stand: Contact Guard Assist  Gait: Level Of Assist: Minimal Assist (-> CGA) with No Equipment Needed       Plan of Care: Will benefit from Physical Therapy 3 times per week  Discharge Recommendations: Equipment: Will Continue to Assess for Equipment Needs. May benefit from FWW should pt require sx.   Post-acute therapy: Pending medical POC. Based on current function, pt appears functionally capable of DC home with HH therapy follow up to reduce risk of future falls and potential for readmission.     See \"Rehab Therapy-Acute\" Patient Summary Report for complete documentation.     "

## 2018-09-24 NOTE — CARE PLAN
Problem: Safety  Goal: Will remain free from injury  Outcome: PROGRESSING AS EXPECTED  All fall precautions in place. No evidence of fall or harm.

## 2018-09-25 PROCEDURE — 700102 HCHG RX REV CODE 250 W/ 637 OVERRIDE(OP): Performed by: INTERNAL MEDICINE

## 2018-09-25 PROCEDURE — 97535 SELF CARE MNGMENT TRAINING: CPT

## 2018-09-25 PROCEDURE — A9270 NON-COVERED ITEM OR SERVICE: HCPCS | Performed by: INTERNAL MEDICINE

## 2018-09-25 PROCEDURE — 700102 HCHG RX REV CODE 250 W/ 637 OVERRIDE(OP): Performed by: HOSPITALIST

## 2018-09-25 PROCEDURE — 770006 HCHG ROOM/CARE - MED/SURG/GYN SEMI*

## 2018-09-25 PROCEDURE — 97530 THERAPEUTIC ACTIVITIES: CPT

## 2018-09-25 PROCEDURE — A9270 NON-COVERED ITEM OR SERVICE: HCPCS | Performed by: HOSPITALIST

## 2018-09-25 PROCEDURE — 99232 SBSQ HOSP IP/OBS MODERATE 35: CPT | Performed by: INTERNAL MEDICINE

## 2018-09-25 PROCEDURE — 302255 BARRIER CREAM MOISTURE BAZA PROTECT (ZINC) 5OZ: Performed by: INTERNAL MEDICINE

## 2018-09-25 RX ADMIN — DOLUTEGRAVIR SODIUM 50 MG: 50 TABLET, FILM COATED ORAL at 04:59

## 2018-09-25 RX ADMIN — LOPERAMIDE HYDROCHLORIDE 2 MG: 2 CAPSULE ORAL at 21:02

## 2018-09-25 RX ADMIN — AMOXICILLIN AND CLAVULANATE POTASSIUM 1 TABLET: 875; 125 TABLET, FILM COATED ORAL at 11:50

## 2018-09-25 RX ADMIN — OMEPRAZOLE 20 MG: 20 CAPSULE, DELAYED RELEASE ORAL at 05:00

## 2018-09-25 RX ADMIN — GABAPENTIN 300 MG: 300 CAPSULE ORAL at 17:11

## 2018-09-25 RX ADMIN — GABAPENTIN 300 MG: 300 CAPSULE ORAL at 11:50

## 2018-09-25 RX ADMIN — ABACAVIR 600 MG: 300 TABLET, FILM COATED ORAL at 05:03

## 2018-09-25 RX ADMIN — ACETAMINOPHEN 650 MG: 325 TABLET, FILM COATED ORAL at 05:51

## 2018-09-25 RX ADMIN — TAMSULOSIN HYDROCHLORIDE 0.4 MG: 0.4 CAPSULE ORAL at 09:53

## 2018-09-25 RX ADMIN — Medication 100 MG: at 05:00

## 2018-09-25 RX ADMIN — CLONAZEPAM 1 MG: 1 TABLET ORAL at 21:02

## 2018-09-25 RX ADMIN — THERA TABS 1 TABLET: TAB at 05:00

## 2018-09-25 RX ADMIN — FOLIC ACID 1 MG: 1 TABLET ORAL at 04:59

## 2018-09-25 RX ADMIN — AZITHROMYCIN 250 MG: 250 TABLET, FILM COATED ORAL at 05:00

## 2018-09-25 RX ADMIN — ZOLPIDEM TARTRATE 10 MG: 5 TABLET ORAL at 21:02

## 2018-09-25 RX ADMIN — METOPROLOL TARTRATE 25 MG: 25 TABLET, FILM COATED ORAL at 17:11

## 2018-09-25 RX ADMIN — RISPERIDONE 2 MG: 2 TABLET ORAL at 21:02

## 2018-09-25 RX ADMIN — AMOXICILLIN AND CLAVULANATE POTASSIUM 1 TABLET: 875; 125 TABLET, FILM COATED ORAL at 00:22

## 2018-09-25 RX ADMIN — GABAPENTIN 300 MG: 300 CAPSULE ORAL at 05:00

## 2018-09-25 RX ADMIN — LOPERAMIDE HYDROCHLORIDE 2 MG: 2 CAPSULE ORAL at 09:53

## 2018-09-25 RX ADMIN — METOPROLOL TARTRATE 25 MG: 25 TABLET, FILM COATED ORAL at 05:00

## 2018-09-25 RX ADMIN — LAMIVUDINE 300 MG: 100 TABLET, FILM COATED ORAL at 05:01

## 2018-09-25 RX ADMIN — DIBASIC SODIUM PHOSPHATE, MONOBASIC POTASSIUM PHOSPHATE AND MONOBASIC SODIUM PHOSPHATE 2 TABLET: 852; 155; 130 TABLET ORAL at 05:00

## 2018-09-25 ASSESSMENT — ENCOUNTER SYMPTOMS
SEIZURES: 0
SPUTUM PRODUCTION: 0
HEARTBURN: 0
FEVER: 0
NECK PAIN: 0
DIZZINESS: 0
SPEECH CHANGE: 0
INSOMNIA: 1
CONSTIPATION: 0
NAUSEA: 0
SORE THROAT: 0
WHEEZING: 0
BLURRED VISION: 0
WEAKNESS: 1
DEPRESSION: 0
TREMORS: 0
BACK PAIN: 0
ABDOMINAL PAIN: 0
HEADACHES: 0
PALPITATIONS: 0

## 2018-09-25 ASSESSMENT — COGNITIVE AND FUNCTIONAL STATUS - GENERAL
PERSONAL GROOMING: A LITTLE
EATING MEALS: A LITTLE
DRESSING REGULAR UPPER BODY CLOTHING: A LITTLE
SUGGESTED CMS G CODE MODIFIER DAILY ACTIVITY: CK
HELP NEEDED FOR BATHING: A LOT
TOILETING: A LITTLE
DAILY ACTIVITIY SCORE: 17
DRESSING REGULAR LOWER BODY CLOTHING: A LITTLE

## 2018-09-25 ASSESSMENT — PAIN SCALES - GENERAL
PAINLEVEL_OUTOF10: 0
PAINLEVEL_OUTOF10: 7
PAINLEVEL_OUTOF10: 0
PAINLEVEL_OUTOF10: 0

## 2018-09-25 NOTE — PROGRESS NOTES
Pt A&O x4, but having problems w/ memory and critical thinking at times. Still very incontinent of bowel, 3 episodes of loose stool today even w/ continual imodium administration. Pt says he has chronic diarrhea and takes imodium constantly at home.     Scrotal bleeding has stayed consistent, continuing to change pad in stretchy underwear that has been catching scrotal blood. Wound consult ordered yesterday. Dr. Cornelius informed of scrotal wound/bleeding; told him this RN held pt's Xarelto yesterday and would hold it again today. Adryan was fine with this, no orders placed at that time. Pharmacy contacted this RN this afternoon wondering why medication was not discontinued, said she would consult w/ hospitalist regarding matter.    OT worked w/ pt today for awhile, pt fairly independent but not enough to take care of himself at home, at a high risk for injury.     Trialed removal of pt's condom catheter today. Pt still had two episodes of urinary incontinence. Condom catheter replaced as it is helping contain urine from leaking into scrotal wound as well as his excoriated, swollen perineal region (prevent risk of further skin breakdown).

## 2018-09-25 NOTE — PROGRESS NOTES
[facility] Medicine Daily Progress Note    Date of Service  9/25/2018    Chief Complaint  63 y.o. male admitted 9/21/2018 with generalized weakness more in his lower extremities, found to have left basilar opacity on CXR and leukocytosis.     Hospital Course  63 y.o. male with HIV, Atrial fibrillation, alcohol abuse, medication non compliance and a recent injury of the right ankle using an off loading boot, admitted 9/21/2018 with generalized weakness more in his lower extremities, found to have left basilar opacity on CXR, leukocytosis, and TENZIN. Started on IVFs, Unasyn and azithromycin. He reports that he has stopped taking his metoprolol for the past two weeks.  Has history of alcohol use, reports last drink was one day prior to admission, watch for alcohol withdrawal CIWA ordered. Generalized weakness slightly improvement the following day. PT/OT ordered.      Interval Problem Update  9/23 patient's condition stable overnight, still complains of general fatigue and weakness.  No shortness of breath.  Tolerated oral antibiotics.  Currently no signs of alcohol withdrawal.  Still on withdrawal protocol.  Kidney function stabilized.  Pending PT OT evaluation.  9/24 patient's complains of insomnia and wants to resume his Ambien, clonazepam and Risperdal.  Discussed with pharmacy patient also has been taking quetiapine.  We will continue Ambien and clonazepam and Risperdal and we will held quetiapine at this moment.  Patient still complains of weakness and fatigue.  Pending PT OT    9/25-Pt seen and examined, feeling weak and fatigued, no overnight events, afebrile, no nausea or vomiting.    Consultants/Specialty  None     Code Status  FULL    Disposition  TBD, pending PT/OT likely need nursing care facility.    Review of Systems  Review of Systems   Constitutional: Positive for malaise/fatigue. Negative for fever.   HENT: Negative for hearing loss and sore throat.    Eyes: Negative for blurred vision.   Respiratory:  Negative for sputum production and wheezing.    Cardiovascular: Negative for chest pain and palpitations.   Gastrointestinal: Negative for abdominal pain, constipation, heartburn and nausea.   Genitourinary: Negative for dysuria and urgency.   Musculoskeletal: Negative for back pain and neck pain.   Skin: Negative for rash.   Neurological: Positive for weakness. Negative for dizziness, tremors, speech change, seizures and headaches.   Psychiatric/Behavioral: Negative for depression and suicidal ideas. The patient has insomnia.         Physical Exam  Temp:  [35.8 °C (96.5 °F)-36.7 °C (98.1 °F)] 36.7 °C (98.1 °F)  Pulse:  [61-80] 61  Resp:  [16-18] 18  BP: (109-138)/(73-99) 109/75    Physical Exam   Constitutional: He is oriented to person, place, and time. He appears well-developed and well-nourished. No distress.   HENT:   Right Ear: External ear normal.   Left Ear: External ear normal.   Eyes: Pupils are equal, round, and reactive to light. Conjunctivae and EOM are normal.   Neck: Normal range of motion. Neck supple. No thyromegaly present.   Cardiovascular: Normal rate, regular rhythm and normal heart sounds.  Exam reveals no gallop and no friction rub.    No murmur heard.  Tachy, irregular    Pulmonary/Chest: Effort normal. No stridor. No respiratory distress. He has wheezes (faint end expiratory wheeze).   Abdominal: Soft. Bowel sounds are normal. He exhibits no distension. There is no rebound.   Musculoskeletal: Normal range of motion. He exhibits no edema.   Lymphadenopathy:     He has no cervical adenopathy.   Neurological: He is alert and oriented to person, place, and time. He displays normal reflexes. No cranial nerve deficit.   Generalized weakness reports improvement  Reports tingling improved   4/5 weakness in both LE   Skin: Skin is warm and dry. No rash noted. He is not diaphoretic.   Psychiatric: He has a normal mood and affect.   Nursing note and vitals reviewed.      Fluids    Intake/Output Summary  (Last 24 hours) at 09/25/18 1222  Last data filed at 09/25/18 1013   Gross per 24 hour   Intake              530 ml   Output             1550 ml   Net            -1020 ml       Laboratory  Recent Labs      09/23/18   0222   WBC  7.1   RBC  2.81*   HEMOGLOBIN  9.6*   HEMATOCRIT  29.7*   MCV  105.7*   MCH  34.2*   MCHC  32.3*   RDW  54.4*   PLATELETCT  208   MPV  9.1     Recent Labs      09/23/18   0222   SODIUM  137   POTASSIUM  3.6   CHLORIDE  106   CO2  26   GLUCOSE  121*   BUN  13   CREATININE  0.99   CALCIUM  8.5                   Imaging  DX-CHEST-PORTABLE (1 VIEW)   Final Result         1. Patchy left basilar opacity, atelectasis versus consolidation.           Assessment/Plan  * CAP (community acquired pneumonia)   Assessment & Plan    Patient has leukocytosis, he has a positive chest x-ray  Is immunocompromised in the setting of HIV infection.    Initially stared on Unasyn and azithromycin, switched to orals 9/22/2018 total course set up as 5 days        Uncomplicated alcohol dependence (HCC)- (present on admission)   Assessment & Plan    Hx alcohol use, reports last drink was Day before admission  Watch for alcohol withdrawal   EKG 9/22/2018 QTc 485   Checking and replacing K, Mg, PO4          Closed fracture of right ankle- (present on admission)   Assessment & Plan    Imaging sep 1 shows Fracture/dislocation of the second through fifth tarsometatarsal articulations. The second through fourth metatarsals are dislocated dorsally with respect to the cuneiforms and cuboid and the second through fifth metatarsals are dislocated laterally with respect to the cuneiforms and cuboid.   Scheduled surgery with orthopedics Dr Berry 9/24, but after discussed with Dr. Berry procedure postponed it to the next week.  Possible PT OT and possible nursing care facility placement        Non compliance w medication regimen- (present on admission)   Assessment & Plan    He reports that he has stopped taking his metoprolol  for the past two weeks.  Education provided currently        Persistent atrial fibrillation (HCC)- (present on admission)   Assessment & Plan    Was initially tachy, improving with IVF and resuming his metoprolol   He reports that he has stopped taking his metoprolol for the past two weeks.  Continue home Xarelto and resume home metoprolol.  Currently stable no complaints of    Echo 4/22/2018   Normal left ventricular size, thickness, systolic function, and diastolic function.  No significant valve abnormalities.   Estimated right ventricular systolic pressure  is 39 mmHg.         TENZIN (acute kidney injury) ([facility])- (present on admission)   Assessment & Plan    Suspect prerenal azotemia in the setting of infection   Improving with IV fluids            Sepsis ([facility])   Assessment & Plan    This is likely simple sepsis from pneumonia  Currently stable no and organ damage  Continue to monitor  Continue oral antibiotics at this moment  All culture no growth to date        HIV (human immunodeficiency virus infection) ([facility])- (present on admission)   Assessment & Plan    This is chronic, outpatient follow-up with the [facility] clinic once medically stable for discharge.    In the meantime, continue home Triumeq.   [place] seen by ID specialist Dr. Montaño                  VTE prophylaxis: On rivaroxaban

## 2018-09-25 NOTE — CARE PLAN
Problem: Mobility  Goal: Risk for activity intolerance will decrease  Outcome: PROGRESSING AS EXPECTED  RN helped pt ambulate out of bed, pt unsteady and staggering, and swaying backwards. Pt's scrotum was bleeding, RN had to bring pt back to bed and educated pt to ambulate later in the night after some rest. RN encouraged a walker to help with mobility, pt refusing, yet unsteady. Pt using a boot on right foot.     Problem: Skin Integrity  Goal: Risk for impaired skin integrity will decrease  Outcome: PROGRESSING AS EXPECTED  Encouraged pt to turn in every couple of hours, pt needs reinforcement, pillows in use for support. Scrotum wound present, RN packed with gauze and cleaned wound, and used underwear to help to keep it in place.

## 2018-09-25 NOTE — DISCHARGE PLANNING
Anticipated Discharge Disposition: skilled    Action: talked to patient about d/c planning and recommendations of PT/OT.  Patient stated he is open to going to a skilled for a short time to get stronger.  Requested to know facilities that are close to his home.  Provide Choice form with closet to him, went over co-pays as patient has [facility].  Patient requested I talk to him later about which one he would like to go to.  Barriers to Discharge: Skilled choice and acceptance    Plan: follow up with patient on choice.

## 2018-09-25 NOTE — PROGRESS NOTES
Pt A/O x 4. Denies pain or discomfort at this time. Pt encouraged to turn in bed, q2 turns in place. Assessment complete. Pt incontinent, condom cath in place, and pt continues to have multiple episode of loose stool. Scheduled medications and PRN imodium administered per MAR. Per pt, pt takes imodium at home, states been having multiple loose BMs at home for years and states it runs in the family. RN helped pt to ambulate, however pt staggering and gait very unsteady, and pt swaying backwards. Encouraged a walker, to provide support since pt is using a boot on right foot, pt refuses. RN noticed bleeding of scrotum dripping to floor, RN placed pt back in bed. Cleaned pt from incontinence episode, cleaned scrotum wound with NS irrigation and packed with gauze and place a brief to help support guaze in place. RN educated pt that can help pt ambulate later during the night, when pt gets some rest and is more steady on feet, pt verbalized understanding. Plan or care discussed with pt, pt verbalized understanding. Need addressed at bedside. Bed in lowest position and locked. Bed alarm on and working. Call light and belongings within reach.

## 2018-09-25 NOTE — THERAPY
Occupational Therapy Treatment completed with focus on ADLs, ADL transfers, patient education and cognition.  Functional Status:  Pt was seen for Occupational Therapy treatment today, see Therapy Kardex for details.  Treatment included education in breath control with activity and at rest, self pacing techs and energy conservation for pain management. Educated pt in safety awareness techs as well.  Psychosocial intervention addressed. Pt demo SBA for bed mobility due to impulsivity. Pt able to doff and don boot on RLE with verbal cues for problem solving. Pt demos only fair problem solving, safety awareness and fair insight into his deficits.  Pt needed assist to problem solve donning undergarments over boot and over Left foot. Pt demo Min A for toilet transfers  using a RTS with arm rests for safety and for ease. Pt demonstrated SBA/Sup for UB dressing, SBA  Seated base and CGA for clothing management up over hips in standing for LB dressing.  Pt did demo 2-3 sudden LOBs while standing with a posterior lean needing CGA to right self and prevent a fall. SBA/CGA for full toilet hygiene standing. Pt stood at sink for oral hygiene and grooming with direct supervision and occasional CGA for LOB when distracted and turning to talk.  Pt also demonstrated Min A/CGA for Ambulating ADL's without AD.  Pt stated he has a friend at home to help. Pt's sister stated to RN that pt's friend works and is not always home. ASHISH informed of this matter due to pt will need 24/7 supervision and assist as needed if pt d/c's home from this acute setting. Discussed the recommendations of needing SNF rehab prior to d/c home for increased in safety, strength and Indep with simple ADL's, I ADL's and ADL transfers.  Pt agreed. ASHISH informed.  Pt was left up in chair at the bedside with chair alarm on, call light in reach, bedside table in reach and nursing is aware.  Spoke with OTR re: D/C planning and concerns. Will Continue Occupational Therapy  "services as per plan and prepare for d/c.  Plan of Care: Will benefit from Occupational Therapy 3 times per week  Discharge Recommendations:  Equipment Shower Chair and Will Continue to Assess for Equipment Needs. Post-acute therapy Discharge to a transitional care facility for continued skilled therapy services TBD    See \"Rehab Therapy-Acute\" Patient Summary Report for complete documentation.   "

## 2018-09-26 PROCEDURE — A9270 NON-COVERED ITEM OR SERVICE: HCPCS | Performed by: INTERNAL MEDICINE

## 2018-09-26 PROCEDURE — 770006 HCHG ROOM/CARE - MED/SURG/GYN SEMI*

## 2018-09-26 PROCEDURE — 302112 WASHCLOTH,PERINEAL CARE: Performed by: INTERNAL MEDICINE

## 2018-09-26 PROCEDURE — 97112 NEUROMUSCULAR REEDUCATION: CPT

## 2018-09-26 PROCEDURE — 700102 HCHG RX REV CODE 250 W/ 637 OVERRIDE(OP): Performed by: HOSPITALIST

## 2018-09-26 PROCEDURE — A9270 NON-COVERED ITEM OR SERVICE: HCPCS | Performed by: HOSPITALIST

## 2018-09-26 PROCEDURE — 97530 THERAPEUTIC ACTIVITIES: CPT

## 2018-09-26 PROCEDURE — 700102 HCHG RX REV CODE 250 W/ 637 OVERRIDE(OP): Performed by: INTERNAL MEDICINE

## 2018-09-26 PROCEDURE — 302255 BARRIER CREAM MOISTURE BAZA PROTECT (ZINC) 5OZ: Performed by: INTERNAL MEDICINE

## 2018-09-26 PROCEDURE — A6250 SKIN SEAL PROTECT MOISTURIZR: HCPCS | Performed by: INTERNAL MEDICINE

## 2018-09-26 PROCEDURE — 99232 SBSQ HOSP IP/OBS MODERATE 35: CPT | Performed by: INTERNAL MEDICINE

## 2018-09-26 PROCEDURE — 97116 GAIT TRAINING THERAPY: CPT

## 2018-09-26 RX ADMIN — TAMSULOSIN HYDROCHLORIDE 0.4 MG: 0.4 CAPSULE ORAL at 09:36

## 2018-09-26 RX ADMIN — ACETAMINOPHEN 650 MG: 325 TABLET, FILM COATED ORAL at 04:53

## 2018-09-26 RX ADMIN — METOPROLOL TARTRATE 25 MG: 25 TABLET, FILM COATED ORAL at 04:54

## 2018-09-26 RX ADMIN — LOPERAMIDE HYDROCHLORIDE 2 MG: 2 CAPSULE ORAL at 12:18

## 2018-09-26 RX ADMIN — THERA TABS 1 TABLET: TAB at 04:53

## 2018-09-26 RX ADMIN — AMOXICILLIN AND CLAVULANATE POTASSIUM 1 TABLET: 875; 125 TABLET, FILM COATED ORAL at 12:17

## 2018-09-26 RX ADMIN — AMOXICILLIN AND CLAVULANATE POTASSIUM 1 TABLET: 875; 125 TABLET, FILM COATED ORAL at 00:13

## 2018-09-26 RX ADMIN — Medication 100 MG: at 04:54

## 2018-09-26 RX ADMIN — ZOLPIDEM TARTRATE 10 MG: 5 TABLET ORAL at 20:48

## 2018-09-26 RX ADMIN — OMEPRAZOLE 20 MG: 20 CAPSULE, DELAYED RELEASE ORAL at 04:54

## 2018-09-26 RX ADMIN — GABAPENTIN 300 MG: 300 CAPSULE ORAL at 17:21

## 2018-09-26 RX ADMIN — GABAPENTIN 300 MG: 300 CAPSULE ORAL at 04:53

## 2018-09-26 RX ADMIN — CLONAZEPAM 1 MG: 1 TABLET ORAL at 20:48

## 2018-09-26 RX ADMIN — GABAPENTIN 300 MG: 300 CAPSULE ORAL at 12:17

## 2018-09-26 RX ADMIN — ABACAVIR 600 MG: 300 TABLET, FILM COATED ORAL at 04:54

## 2018-09-26 RX ADMIN — DOLUTEGRAVIR SODIUM 50 MG: 50 TABLET, FILM COATED ORAL at 04:54

## 2018-09-26 RX ADMIN — METOPROLOL TARTRATE 25 MG: 25 TABLET, FILM COATED ORAL at 17:21

## 2018-09-26 RX ADMIN — RISPERIDONE 2 MG: 2 TABLET ORAL at 17:20

## 2018-09-26 RX ADMIN — FOLIC ACID 1 MG: 1 TABLET ORAL at 04:54

## 2018-09-26 RX ADMIN — LAMIVUDINE 300 MG: 100 TABLET, FILM COATED ORAL at 04:53

## 2018-09-26 RX ADMIN — LOPERAMIDE HYDROCHLORIDE 2 MG: 2 CAPSULE ORAL at 20:58

## 2018-09-26 ASSESSMENT — ENCOUNTER SYMPTOMS
WEAKNESS: 1
FEVER: 0
SPUTUM PRODUCTION: 0
DIZZINESS: 0
SEIZURES: 0
PALPITATIONS: 0
INSOMNIA: 1
BLURRED VISION: 0
SORE THROAT: 0
SPEECH CHANGE: 0
HEARTBURN: 0
DEPRESSION: 0
HEADACHES: 0
WHEEZING: 0
NAUSEA: 0
BACK PAIN: 0
CONSTIPATION: 0
TREMORS: 0
NECK PAIN: 0
ABDOMINAL PAIN: 0

## 2018-09-26 ASSESSMENT — GAIT ASSESSMENTS
DISTANCE (FEET): 200
GAIT LEVEL OF ASSIST: CONTACT GUARD ASSIST
DEVIATION: ATAXIC

## 2018-09-26 ASSESSMENT — PAIN SCALES - GENERAL
PAINLEVEL_OUTOF10: 0
PAINLEVEL_OUTOF10: 0
PAINLEVEL_OUTOF10: 4

## 2018-09-26 ASSESSMENT — COGNITIVE AND FUNCTIONAL STATUS - GENERAL
WALKING IN HOSPITAL ROOM: A LITTLE
CLIMB 3 TO 5 STEPS WITH RAILING: A LITTLE
STANDING UP FROM CHAIR USING ARMS: A LITTLE
TURNING FROM BACK TO SIDE WHILE IN FLAT BAD: A LITTLE
MOVING FROM LYING ON BACK TO SITTING ON SIDE OF FLAT BED: A LITTLE
MOVING TO AND FROM BED TO CHAIR: A LITTLE
MOBILITY SCORE: 18
SUGGESTED CMS G CODE MODIFIER MOBILITY: CK

## 2018-09-26 NOTE — DISCHARGE PLANNING
Medical Social Work  Informed Dr. Cornelius patient has been accepted by [facility].  Dr. Cornelius requested transport be set up for tomorrow for a 10 X 10.  Faxed Choice to [facility] for a tentative transport time of 10:00.

## 2018-09-26 NOTE — DISCHARGE PLANNING
Received Transport Form @ 4426  Spoke to Kojo @ [facility]    Transport is scheduled for 9/27 @1000 going to [facility].

## 2018-09-26 NOTE — CARE PLAN
Problem: Discharge Barriers/Planning  Goal: Patient's continuum of care needs will be met  Outcome: PROGRESSING AS EXPECTED  Discussed with pt on discharge barriers and transition to a snf, pt asking if possible to go to a [facility], RN stated will discussed with ASHISH and day RN. Pt verbalized understanding.     Problem: Respiratory:  Goal: Respiratory status will improve  Outcome: PROGRESSING AS EXPECTED  Encouraged to deep breath and cough, pt needs coaching for a stronger cough, provided pt an IS, education provided on IS. Pt verbalized understanding.

## 2018-09-26 NOTE — PROGRESS NOTES
Changed pt's underwear and re-inspected scrotum. Bleeding has stopped, but site is still red and swollen. Mepitel lite applied to area.

## 2018-09-26 NOTE — THERAPY
"Physical Therapy Treatment completed.   Bed Mobility:  Supine to Sit: Contact Guard Assist  Transfers: Sit to Stand: Contact Guard Assist  Gait: Level Of Assist: Contact Guard Assist with No Equipment Needed       Plan of Care: Will benefit from Physical Therapy 3 times per week  Discharge Recommendations: Equipment: Will Continue to Assess for Equipment Needs.     See \"Rehab Therapy-Acute\" Patient Summary Report for complete documentation.     Pt presenting w/ improved functional mobility. Pt is able to perform most mobility at CGA level. He does demonstrate an ataxic gait pattern most likely from the R boot. Pt is unable to don brace independently. Pt stating that he wants to go to SNF \"to get as strong as possible before going home.\"  "

## 2018-09-26 NOTE — DISCHARGE PLANNING
Anticipated Discharge Disposition: Skilled    Action: Patient would like to go to [facility], if not then [facility]  Faxed Choice to [facility]     Barriers to Discharge: acceptance    Plan: follow up with [facility] for acceptance

## 2018-09-26 NOTE — DISCHARGE PLANNING
Received Choice form at 0896  Agency/Facility Name: [facility] (1) [facility] (2)  Referral sent per Choice form @ 8699

## 2018-09-26 NOTE — PROGRESS NOTES
Pt A/O x 4. However, pt continues to memory or critical thinking at times. No complains of pain at this time. PRN imodium administered for loose stool, and scheduled medications administered per MAR. Assessment completed. Pt has had incontinent BMs, mepilex lite changed on pt's scrotum. Scrotum cleansed with Normal saline and not bleeding as much as yesterday. Excoriated is more scabbed today and scant bleeding and swollen and warm. Xaretlo has been held according to day shift RN. Pt has movement in upper body, however movement is slower, sometimes needs reinforcement when holding a cup. Plan of care discussed with pt and discharge planning, pt expressing wanting to go to a [facility] skilled, RN discussed with pt can discuss with  and day RN in the morning. Pt verbalized understanding. Condom cath in place due to incontinence. Pt uses boot on right foot when ambulating, with a one person assist. Need addressed at bedside. Bed alarm on and working, bed in lowest position and locked, call light and belongings within reach.       RN updated pt's sister, Karli, on pt's plan of care with pt's consent. Questions and concerns answered on the phone, Karli verbalized understanding.

## 2018-09-26 NOTE — PROGRESS NOTES
[facility] Medicine Daily Progress Note    Date of Service  9/26/2018    Chief Complaint  63 y.o. male admitted 9/21/2018 with generalized weakness more in his lower extremities, found to have left basilar opacity on CXR and leukocytosis.     Hospital Course  63 y.o. male with HIV, Atrial fibrillation, alcohol abuse, medication non compliance and a recent injury of the right ankle using an off loading boot, admitted 9/21/2018 with generalized weakness more in his lower extremities, found to have left basilar opacity on CXR, leukocytosis, and TENZIN. Started on IVFs, Unasyn and azithromycin. He reports that he has stopped taking his metoprolol for the past two weeks.  Has history of alcohol use, reports last drink was one day prior to admission, watch for alcohol withdrawal CIWA ordered. Generalized weakness slightly improvement the following day. PT/OT ordered.      Interval Problem Update  9/23 patient's condition stable overnight, still complains of general fatigue and weakness.  No shortness of breath.  Tolerated oral antibiotics.  Currently no signs of alcohol withdrawal.  Still on withdrawal protocol.  Kidney function stabilized.  Pending PT OT evaluation.  9/24 patient's complains of insomnia and wants to resume his Ambien, clonazepam and Risperdal.  Discussed with pharmacy patient also has been taking quetiapine.  We will continue Ambien and clonazepam and Risperdal and we will held quetiapine at this moment.  Patient still complains of weakness and fatigue.  Pending PT OT    9/25-Pt seen and examined, feeling weak and fatigued, no overnight events, afebrile, no nausea or vomiting.    9/26- No overnight events, afebrile, no nausea or vomiting. Denies any acute complains     Consultants/Specialty  None     Code Status  FULL    Disposition  TBD, pending PT/OT likely need nursing care facility.    Review of Systems  Review of Systems   Constitutional: Positive for malaise/fatigue. Negative for fever.   HENT: Negative  for hearing loss and sore throat.    Eyes: Negative for blurred vision.   Respiratory: Negative for sputum production and wheezing.    Cardiovascular: Negative for chest pain and palpitations.   Gastrointestinal: Negative for abdominal pain, constipation, heartburn and nausea.   Genitourinary: Negative for dysuria and urgency.   Musculoskeletal: Negative for back pain and neck pain.   Skin: Negative for rash.   Neurological: Positive for weakness. Negative for dizziness, tremors, speech change, seizures and headaches.   Psychiatric/Behavioral: Negative for depression and suicidal ideas. The patient has insomnia.         Physical Exam  Temp:  [36.1 °C (97 °F)-37.2 °C (98.9 °F)] 36.6 °C (97.8 °F)  Pulse:  [66-78] 66  Resp:  [16-18] 16  BP: (106-143)/(73-82) 106/73    Physical Exam   Constitutional: He is oriented to person, place, and time. He appears well-developed and well-nourished. No distress.   HENT:   Right Ear: External ear normal.   Left Ear: External ear normal.   Eyes: Pupils are equal, round, and reactive to light. Conjunctivae and EOM are normal.   Neck: Normal range of motion. Neck supple. No thyromegaly present.   Cardiovascular: Normal rate, regular rhythm and normal heart sounds.  Exam reveals no gallop and no friction rub.    No murmur heard.  Tachy, irregular    Pulmonary/Chest: Effort normal. No stridor. No respiratory distress. He has wheezes (faint end expiratory wheeze).   Abdominal: Soft. Bowel sounds are normal. He exhibits no distension. There is no rebound.   Musculoskeletal: Normal range of motion. He exhibits no edema.   Lymphadenopathy:     He has no cervical adenopathy.   Neurological: He is alert and oriented to person, place, and time. He displays normal reflexes. No cranial nerve deficit.   Generalized weakness reports improvement  Reports tingling improved   4/5 weakness in both LE   Skin: Skin is warm and dry. No rash noted. He is not diaphoretic.   Psychiatric: He has a normal mood  and affect.   Nursing note and vitals reviewed.      Fluids    Intake/Output Summary (Last 24 hours) at 09/26/18 1136  Last data filed at 09/26/18 0452   Gross per 24 hour   Intake             1160 ml   Output             2000 ml   Net             -840 ml       Laboratory                        Imaging  DX-CHEST-PORTABLE (1 VIEW)   Final Result         1. Patchy left basilar opacity, atelectasis versus consolidation.           Assessment/Plan  * CAP (community acquired pneumonia)   Assessment & Plan    Patient has leukocytosis, he has a positive chest x-ray  Is immunocompromised in the setting of HIV infection.    Initially stared on Unasyn and azithromycin, switched to orals 9/22/2018 total course set up as 5 days        Uncomplicated alcohol dependence (HCC)- (present on admission)   Assessment & Plan    Hx alcohol use, reports last drink was Day before admission  Watch for alcohol withdrawal   EKG 9/22/2018 QTc 485   Checking and replacing K, Mg, PO4          Closed fracture of right ankle- (present on admission)   Assessment & Plan    Imaging sep 1 shows Fracture/dislocation of the second through fifth tarsometatarsal articulations. The second through fourth metatarsals are dislocated dorsally with respect to the cuneiforms and cuboid and the second through fifth metatarsals are dislocated laterally with respect to the cuneiforms and cuboid.   Scheduled surgery with orthopedics Dr Berry 9/24, but after discussed with Dr. Berry procedure postponed it to the next week.  Possible PT OT and possible nursing care facility placement        Non compliance w medication regimen- (present on admission)   Assessment & Plan    He reports that he has stopped taking his metoprolol for the past two weeks.  Education provided currently        Persistent atrial fibrillation (HCC)- (present on admission)   Assessment & Plan    Was initially tachy, improving with IVF and resuming his metoprolol   He reports that he has stopped  taking his metoprolol for the past two weeks.  Continue home Xarelto and resume home metoprolol.  Currently stable no complaints of    Echo 4/22/2018   Normal left ventricular size, thickness, systolic function, and diastolic function.  No significant valve abnormalities.   Estimated right ventricular systolic pressure  is 39 mmHg.         TENZIN (acute kidney injury) ([facility])- (present on admission)   Assessment & Plan    Suspect prerenal azotemia in the setting of infection   Improved with IV fluids            Sepsis ([facility])   Assessment & Plan    This is likely simple sepsis from pneumonia  Currently stable no and organ damage  All culture no growth to date  Improved         HIV (human immunodeficiency virus infection) ([facility])- (present on admission)   Assessment & Plan    This is chronic, outpatient follow-up with the [facility] clinic once medically stable for discharge.    In the meantime, continue home Triumeq.   Isauro seen by ID specialist Dr. Montaño                  VTE prophylaxis: On rivaroxaban

## 2018-09-26 NOTE — WOUND TEAM
Patient seen by Wound Team this morning regarding wound consult for scrotal wound and excoriation. Patient reports that he believes his scrotum started bleeding when he pulled it off his bed sheet. Pt was on Xarelto. Patient with small BM during assessment. Patient is incontinent of stool. Scrotum, sacrococcygeal, and bilateral groin cleansed with warm wash cloths and No Rinse Foam Cleanser. Patted dry with dry wash cloths. Scrotum appears to have 2 scabs, with no active bleeding noted. Bottom of scrotum noted to be red, moist, excoriated, and slightly swollen. Luis Miguel cream applied to scrotum and sacrum. Inter-dry placed underneath to support scrotum and absorb mositure. Skin care orders placed. Dimethicone wipes ordered. Discussed POC with Myriam, bedside nursing.

## 2018-09-26 NOTE — CARE PLAN
Problem: Discharge Barriers/Planning  Goal: Patient's continuum of care needs will be met  Outcome: PROGRESSING AS EXPECTED  Pt and RN spoke with SW this AM about patient's desire to go to [facility].    Problem: Mobility  Goal: Risk for activity intolerance will decrease  Outcome: PROGRESSING AS EXPECTED  Pt encouraged to turn in bed every 2 hours.

## 2018-09-26 NOTE — PROGRESS NOTES
Pt is A&Ox4 at the start of shift. Pt asking for snacks, crackers and ginger ale provided. Pt continues with condom cath and bleeding of scrotum to which mepilex was applied. Pt ambulatory with assist and boot on R ankle. Pt expressed desire to go to [facility], SW aware and referrals sent. Bed in lowest position with alarm on and call light within reach. RN will continue to monitor.

## 2018-09-27 VITALS
BODY MASS INDEX: 19.42 KG/M2 | SYSTOLIC BLOOD PRESSURE: 123 MMHG | RESPIRATION RATE: 18 BRPM | HEART RATE: 64 BPM | TEMPERATURE: 98.1 F | OXYGEN SATURATION: 93 % | HEIGHT: 72 IN | DIASTOLIC BLOOD PRESSURE: 76 MMHG | WEIGHT: 143.4 LBS

## 2018-09-27 LAB
ANION GAP SERPL CALC-SCNC: 8 MMOL/L (ref 0–11.9)
BUN SERPL-MCNC: 13 MG/DL (ref 8–22)
CALCIUM SERPL-MCNC: 9.3 MG/DL (ref 8.5–10.5)
CHLORIDE SERPL-SCNC: 104 MMOL/L (ref 96–112)
CO2 SERPL-SCNC: 25 MMOL/L (ref 20–33)
CREAT SERPL-MCNC: 0.95 MG/DL (ref 0.5–1.4)
ERYTHROCYTE [DISTWIDTH] IN BLOOD BY AUTOMATED COUNT: 49.5 FL (ref 35.9–50)
GLUCOSE SERPL-MCNC: 99 MG/DL (ref 65–99)
HCT VFR BLD AUTO: 31.6 % (ref 42–52)
HGB BLD-MCNC: 10.9 G/DL (ref 14–18)
MCH RBC QN AUTO: 35.2 PG (ref 27–33)
MCHC RBC AUTO-ENTMCNC: 34.5 G/DL (ref 33.7–35.3)
MCV RBC AUTO: 101.9 FL (ref 81.4–97.8)
PLATELET # BLD AUTO: 295 K/UL (ref 164–446)
PMV BLD AUTO: 9 FL (ref 9–12.9)
POTASSIUM SERPL-SCNC: 3.9 MMOL/L (ref 3.6–5.5)
RBC # BLD AUTO: 3.1 M/UL (ref 4.7–6.1)
SODIUM SERPL-SCNC: 137 MMOL/L (ref 135–145)
WBC # BLD AUTO: 6.6 K/UL (ref 4.8–10.8)

## 2018-09-27 PROCEDURE — A9270 NON-COVERED ITEM OR SERVICE: HCPCS | Performed by: HOSPITALIST

## 2018-09-27 PROCEDURE — 700102 HCHG RX REV CODE 250 W/ 637 OVERRIDE(OP): Performed by: INTERNAL MEDICINE

## 2018-09-27 PROCEDURE — 80048 BASIC METABOLIC PNL TOTAL CA: CPT

## 2018-09-27 PROCEDURE — 700102 HCHG RX REV CODE 250 W/ 637 OVERRIDE(OP): Performed by: HOSPITALIST

## 2018-09-27 PROCEDURE — 99239 HOSP IP/OBS DSCHRG MGMT >30: CPT | Performed by: INTERNAL MEDICINE

## 2018-09-27 PROCEDURE — 85027 COMPLETE CBC AUTOMATED: CPT

## 2018-09-27 PROCEDURE — 36415 COLL VENOUS BLD VENIPUNCTURE: CPT

## 2018-09-27 PROCEDURE — A9270 NON-COVERED ITEM OR SERVICE: HCPCS | Performed by: INTERNAL MEDICINE

## 2018-09-27 RX ORDER — ZOLPIDEM TARTRATE 10 MG/1
10 TABLET ORAL NIGHTLY PRN
Qty: 2 TAB | Refills: 0 | Status: SHIPPED | OUTPATIENT
Start: 2018-09-27 | End: 2018-09-29

## 2018-09-27 RX ORDER — CLONAZEPAM 1 MG/1
1 TABLET ORAL 2 TIMES DAILY
Qty: 4 TAB | Refills: 0 | Status: SHIPPED | OUTPATIENT
Start: 2018-09-27 | End: 2018-09-29

## 2018-09-27 RX ORDER — LOPERAMIDE HYDROCHLORIDE 2 MG/1
2 CAPSULE ORAL 3 TIMES DAILY PRN
Qty: 30 CAP | Refills: 0
Start: 2018-09-27

## 2018-09-27 RX ADMIN — ABACAVIR 600 MG: 300 TABLET, FILM COATED ORAL at 04:43

## 2018-09-27 RX ADMIN — METOPROLOL TARTRATE 25 MG: 25 TABLET, FILM COATED ORAL at 04:43

## 2018-09-27 RX ADMIN — LOPERAMIDE HYDROCHLORIDE 2 MG: 2 CAPSULE ORAL at 08:29

## 2018-09-27 RX ADMIN — LAMIVUDINE 300 MG: 100 TABLET, FILM COATED ORAL at 04:43

## 2018-09-27 RX ADMIN — OMEPRAZOLE 20 MG: 20 CAPSULE, DELAYED RELEASE ORAL at 04:43

## 2018-09-27 RX ADMIN — DOLUTEGRAVIR SODIUM 50 MG: 50 TABLET, FILM COATED ORAL at 04:43

## 2018-09-27 RX ADMIN — TAMSULOSIN HYDROCHLORIDE 0.4 MG: 0.4 CAPSULE ORAL at 08:29

## 2018-09-27 RX ADMIN — GABAPENTIN 300 MG: 300 CAPSULE ORAL at 04:43

## 2018-09-27 RX ADMIN — GABAPENTIN 300 MG: 300 CAPSULE ORAL at 11:28

## 2018-09-27 RX ADMIN — LOPERAMIDE HYDROCHLORIDE 2 MG: 2 CAPSULE ORAL at 04:43

## 2018-09-27 ASSESSMENT — PAIN SCALES - GENERAL: PAINLEVEL_OUTOF10: 0

## 2018-09-27 NOTE — DISCHARGE SUMMARY
Discharge Summary    CHIEF COMPLAINT ON ADMISSION  Chief Complaint   Patient presents with   • Weakness     bilateral LE for the last 2 days, generalized weakness for a year   • Alcohol Intoxication     1/2 bottle of vodka today, daily drinker with home medications.       Reason for Admission  Leg Pain     CODE STATUS  Full Code    HPI & HOSPITAL COURSE  This is a 63 y.o. male who presented on 9/21/2018 with lower extremity weakness, found to have left basilar opacity on CXR, leukocytosis, and TENZIN. Started on IVFs, Unasyn and azithromycin. He reports that he has stopped taking his metoprolol for the past two weeks.  Has history of alcohol use, reports last drink was one day prior to admission. Pt symptoms continued to improve, cultures negative.  Pt finished abx course while in hospital. Pt was seen by physical therapy and skilled has been recommended.   Pt will be discharged to skilled today      The patient met 2-midnight criteria for an inpatient stay at the time of discharge.      DISCHARGE DIAGNOSES  Principal Problem:    CAP (community acquired pneumonia) POA: Unknown  Active Problems:    Uncomplicated alcohol dependence (HCC) POA: Yes    TENZIN (acute kidney injury) (HCC) POA: Yes    Persistent atrial fibrillation (HCC) POA: Yes    Non compliance w medication regimen POA: Yes    Closed fracture of right ankle POA: Yes    HIV (human immunodeficiency virus infection) (HCC) POA: Yes    Sepsis (HCC) POA: Unknown  Resolved Problems:    * No resolved hospital problems. *      FOLLOW UP  Future Appointments  Date Time Provider Department Center   10/22/2018 11:30 AM Butch Martínez M.D. [facility] None     [facility] ([facility])  [address] [place], [place] [address]  [phone]          MEDICATIONS ON DISCHARGE     Medication List      START taking these medications      Instructions   loperamide 2 MG Caps  Commonly known as:  IMODIUM   Take 1 Cap by mouth 3 times a day as needed for  Diarrhea.  Dose:  2 mg        CONTINUE taking these medications      Instructions   B COMPLEX 1 PO   Take 1 Tab by mouth every day.  Dose:  1 Tab     clonazePAM 1 MG Tabs  Commonly known as:  KLONOPIN   Take 1 mg by mouth 2 times a day.  Dose:  1 mg     FOLIC ACID PO   Take 1 Tab by mouth every day. Unknown OTC Strength  Dose:  1 Tab     gabapentin 300 MG Caps  Commonly known as:  NEURONTIN   Take 300 mg by mouth 3 times a day.  Dose:  300 mg     IRON PO   Take 1 tablet by mouth every day. Unknown OTC Strength  Dose:  1 tablet     metoprolol 25 MG Tabs  Commonly known as:  LOPRESSOR   Take 25 mg by mouth 2 times a day.  Dose:  25 mg     omeprazole 20 MG delayed-release capsule  Commonly known as:  PRILOSEC   Take 20 mg by mouth every day.  Dose:  20 mg     risperiDONE 2 MG Tabs  Commonly known as:  RISPERDAL   Take 2 mg by mouth every bedtime.  Dose:  2 mg     rivaroxaban 20 MG Tabs tablet  Commonly known as:  XARELTO   Take 1 Tab by mouth with dinner.  Dose:  20 mg     tamsulosin 0.4 MG capsule  Commonly known as:  FLOMAX   Take 0.4 mg by mouth ONE-HALF HOUR AFTER BREAKFAST.  Dose:  0.4 mg     therapeutic multivitamin-minerals Tabs   Take 1 Tab by mouth every day.  Dose:  1 Tab     TRIUMEQ 600- MG Tabs  Generic drug:  Abacavir-Dolutegravir-Lamivud   Take 1 Tab by mouth every day.  Dose:  1 Tab     zolpidem 10 MG Tabs  Commonly known as:  AMBIEN   Take 10 mg by mouth at bedtime as needed for Sleep.  Dose:  10 mg        STOP taking these medications    ibuprofen 200 MG Tabs  Commonly known as:  MOTRIN     SEROQUEL 400 MG tablet  Generic drug:  quetiapine            Allergies  Allergies   Allergen Reactions   • Sulfa Drugs Itching     Rxn - 1970's         DIET  Orders Placed This Encounter   Procedures   • Diet Order Regular     Standing Status:   Standing     Number of Occurrences:   1     Order Specific Question:   Diet:     Answer:   Regular [1]       ACTIVITY  As tolerated.      LINES, DRAINS, AND  WOUNDS  This is an automated list. Peripheral IVs will be removed prior to discharge.  Peripheral IV 09/21/18 18 G Right Antecubital (Active)   Site Assessment Clean;Dry;Intact 9/26/2018  8:45 PM   Dressing Type Transparent 9/26/2018  8:45 PM   Line Status Saline locked 9/26/2018  8:45 PM   Dressing Status Clean;Dry;Intact 9/26/2018  8:45 PM   Dressing Intervention Other (Comment) 9/25/2018  9:00 PM   Date Primary Tubing Changed 09/24/18 9/25/2018  8:30 AM   Date Secondary Tubing Changed 09/24/18 9/25/2018  8:30 AM   NEXT Primary Tubing Change  09/28/18 9/25/2018  8:30 AM   NEXT Secondary Tubing Change  09/25/18 9/25/2018  8:30 AM   Infil[place] Grading [place] [place] [facility] [place] 0 9/26/2018  8:45 PM   Phlebitis Scale ([facility] Only) 0 9/26/2018  8:45 PM       PIV Group Right Antecubital 20g Flexible Catheter (Active)       Moisture Associated Skin Damage Other (comment) (Active)   Drainage Amount None 9/26/2018  8:45 PM   Saige-wound Assessment Excoriated;Red;Warm;Intact 9/26/2018  8:45 PM   Cleansing Dimethecone Wipes 9/26/2018  8:45 PM   Periwound Protectant Barrier Paste 9/26/2018  8:45 PM   Weekly Photo (Inpatient Only) 09/24/18 9/26/2018 11:05 AM   WOUND NURSE ONLY - Time Spent with Patient (mins) 60 9/26/2018 11:05 AM       Wound POA Abrasion Knee Right (Active)       Peripheral IV 09/21/18 18 G Right Antecubital (Active)   Site Assessment Clean;Dry;Intact 9/26/2018  8:45 PM   Dressing Type Transparent 9/26/2018  8:45 PM   Line Status Saline locked 9/26/2018  8:45 PM   Dressing Status Clean;Dry;Intact 9/26/2018  8:45 PM   Dressing Intervention Other (Comment) 9/25/2018  9:00 PM   Date Primary Tubing Changed 09/24/18 9/25/2018  8:30 AM   Date Secondary Tubing Changed 09/24/18 9/25/2018  8:30 AM   NEXT Primary Tubing Change  09/28/18 9/25/2018  8:30 AM   NEXT Secondary Tubing Change  09/25/18 9/25/2018  8:30 AM   Infiltration Grading [place] [place] [facility] [place] 0 9/26/2018  8:45 PM   Phlebitis Scale ([facility] Only) 0 9/26/2018  8:45 PM                MENTAL STATUS ON TRANSFER  Level of Consciousness: Alert  Orientation : Oriented x 4  Speech: Speech Clear    CONSULTATIONS  None     PROCEDURES  None     LABORATORY  Lab Results   Component Value Date    SODIUM 137 09/27/2018    POTASSIUM 3.9 09/27/2018    CHLORIDE 104 09/27/2018    CO2 25 09/27/2018    GLUCOSE 99 09/27/2018    BUN 13 09/27/2018    CREATININE 0.95 09/27/2018        Lab Results   Component Value Date    WBC 6.6 09/27/2018    HEMOGLOBIN 10.9 (L) 09/27/2018    HEMATOCRIT 31.6 (L) 09/27/2018    PLATELETCT 295 09/27/2018        Total time of the discharge process exceeds 42 minutes.

## 2018-09-27 NOTE — PROGRESS NOTES
"Assumed care of pt at change of shift.  Pt resting in bed during report.  Discussed pt's goal to ambulate 3x/day, and pt ammenable to walking.  During assessment, pt reports feeling \"weak\" evidenced by difficulty in moving breakfast try around and opening lids.  Discussed discharge plan with pt, as pt is a \"10x10\" with transport scheduled to [facility].  PRN Imodium dispensed per pt request.  Bed alarm in place.  Safety precautions in place.  No other needs at this time.  "

## 2018-09-27 NOTE — DISCHARGE PLANNING
Agency/Facility Name: [facility]  Spoke To: Antonette  Outcome: Per Antonette transportation has been scheduled for 1330 via [facility]. MILTON Carvalho notified of new transportation time.

## 2018-09-27 NOTE — DISCHARGE INSTRUCTIONS
Discharge Instructions    Discharged to other by medical transportation with escort. Discharged via wheelchair, hospital escort: Yes.  Special equipment needed: Ankle boot     Be sure to schedule a follow-up appointment with your primary care doctor or any specialists as instructed.     Discharge Plan:   Diet Plan: Discussed (Heart Healthy)  Activity Level: Discussed (Encourage Ambulation)  Smoking Cessation Offered: Patient Refused  Confirmed Follow up Appointment: Patient to Call and Schedule Appointment  Confirmed Symptoms Management: Discussed  Medication Reconciliation Updated: Yes  Pneumococcal Vaccine Administered/Refused: Not given - Patient refused pneumococcal vaccine  Influenza Vaccine Indication: Not indicated: Previously immunized this influenza season and > 8 years of age  Influenza Vaccine Given - only chart on this line when given: Influenza Vaccine Given (See MAR)    I understand that a diet low in cholesterol, fat, and sodium is recommended for good health. Unless I have been given specific instructions below for another diet, I accept this instruction as my diet prescription.   Other diet: Heart Healthy    Special Instructions: None    · Is patient discharged on Warfarin / Coumadin?   No     Depression / Suicide Risk    As you are discharged from this [facility] facility, it is important to learn how to keep safe from harming yourself.    Recognize the warning signs:  · Abrupt changes in personality, positive or negative- including increase in energy   · Giving away possessions  · Change in eating patterns- significant weight changes-  positive or negative  · Change in sleeping patterns- unable to sleep or sleeping all the time   · Unwillingness or inability to communicate  · Depression  · Unusual sadness, discouragement and loneliness  · Talk of wanting to die  · Neglect of personal appearance   · Rebelliousness- reckless behavior  · Withdrawal from people/activities they love  · Confusion-  inability to concentrate     If you or a loved one observes any of these behaviors or has concerns about self-harm, here's what you can do:  · Talk about it- your feelings and reasons for harming yourself  · Remove any means that you might use to hurt yourself (examples: pills, rope, extension cords, firearm)  · Get professional help from the community (Mental Health, Substance Abuse, psychological counseling)  · Do not be alone:Call your Safe Contact- someone whom you trust who will be there for you.  · Call your local CRISIS HOTLINE 383-3056 or 197-446-6599  · Call your local Children's Mobile Crisis Response Team [place] (157) 591-2630 or www.[employer]  · Call the toll free National Suicide Prevention Hotlines   · National Suicide Prevention Lifeline 379-627-DLKH (4486)  · National Hope Line Network 800-SUICIDE (013-9359)

## 2018-09-27 NOTE — CARE PLAN
Problem: Safety  Goal: Will remain free from injury    Intervention: Provide assistance with mobility  Pt will remain free from falls; bed alarm in place and pt calls appropriately       Problem: Knowledge Deficit  Goal: Knowledge of disease process/condition, treatment plan, diagnostic tests, and medications will improve    Intervention: Explain information regarding disease process/condition, treatment plan, diagnostic tests, and medications and document in education  Pt encouraged to continue ambulation and to keep pressure off of right heel

## 2018-10-08 ENCOUNTER — APPOINTMENT (OUTPATIENT)
Dept: RADIOLOGY | Facility: MEDICAL CENTER | Age: 63
DRG: 064 | End: 2018-10-08
Attending: EMERGENCY MEDICINE
Payer: MEDICARE

## 2018-10-08 ENCOUNTER — HOSPITAL ENCOUNTER (INPATIENT)
Facility: MEDICAL CENTER | Age: 63
LOS: 20 days | DRG: 064 | End: 2018-10-28
Attending: EMERGENCY MEDICINE | Admitting: HOSPITALIST
Payer: MEDICARE

## 2018-10-08 DIAGNOSIS — I63.49 CEREBROVASCULAR ACCIDENT (CVA) DUE TO EMBOLISM OF OTHER CEREBRAL ARTERY (HCC): ICD-10-CM

## 2018-10-08 DIAGNOSIS — S82.891S CLOSED FRACTURE OF RIGHT ANKLE, SEQUELA: ICD-10-CM

## 2018-10-08 DIAGNOSIS — T79.6XXA TRAUMATIC RHABDOMYOLYSIS, INITIAL ENCOUNTER (HCC): ICD-10-CM

## 2018-10-08 DIAGNOSIS — I63.9 CEREBROVASCULAR ACCIDENT (CVA), UNSPECIFIED MECHANISM (HCC): ICD-10-CM

## 2018-10-08 DIAGNOSIS — I63.10 CEREBROVASCULAR ACCIDENT (CVA) DUE TO EMBOLISM OF PRECEREBRAL ARTERY (HCC): ICD-10-CM

## 2018-10-08 DIAGNOSIS — F41.9 ANXIETY: ICD-10-CM

## 2018-10-08 DIAGNOSIS — N17.9 ACUTE RENAL FAILURE, UNSPECIFIED ACUTE RENAL FAILURE TYPE (HCC): ICD-10-CM

## 2018-10-08 PROBLEM — D72.829 LEUKOCYTOSIS: Status: ACTIVE | Noted: 2018-10-08

## 2018-10-08 PROBLEM — R53.1 LEFT-SIDED WEAKNESS: Status: ACTIVE | Noted: 2018-10-08

## 2018-10-08 PROBLEM — M62.82 RHABDOMYOLYSIS: Status: ACTIVE | Noted: 2018-10-08

## 2018-10-08 PROBLEM — R79.89 ELEVATED LIVER FUNCTION TESTS: Status: ACTIVE | Noted: 2018-10-08

## 2018-10-08 PROBLEM — R79.89 ELEVATED TROPONIN: Status: ACTIVE | Noted: 2018-10-08

## 2018-10-08 LAB
ABO GROUP BLD: NORMAL
ABO GROUP BLD: NORMAL
ALBUMIN SERPL BCP-MCNC: 3.2 G/DL (ref 3.2–4.9)
ALBUMIN/GLOB SERPL: 0.9 G/DL
ALP SERPL-CCNC: 75 U/L (ref 30–99)
ALT SERPL-CCNC: 183 U/L (ref 2–50)
AMORPH CRY #/AREA URNS HPF: PRESENT /HPF
ANION GAP SERPL CALC-SCNC: 17 MMOL/L (ref 0–11.9)
APPEARANCE UR: CLEAR
APTT PPP: 32.7 SEC (ref 24.7–36)
AST SERPL-CCNC: 354 U/L (ref 12–45)
BACTERIA #/AREA URNS HPF: NEGATIVE /HPF
BASOPHILS # BLD AUTO: 0.1 % (ref 0–1.8)
BASOPHILS # BLD: 0.02 K/UL (ref 0–0.12)
BILIRUB SERPL-MCNC: 0.7 MG/DL (ref 0.1–1.5)
BILIRUB UR QL STRIP.AUTO: NEGATIVE
BLD GP AB SCN SERPL QL: NORMAL
BUN SERPL-MCNC: 69 MG/DL (ref 8–22)
CALCIUM SERPL-MCNC: 9.2 MG/DL (ref 8.5–10.5)
CHLORIDE SERPL-SCNC: 102 MMOL/L (ref 96–112)
CK SERPL-CCNC: ABNORMAL U/L (ref 0–154)
CO2 SERPL-SCNC: 21 MMOL/L (ref 20–33)
COLOR UR: YELLOW
CREAT SERPL-MCNC: 4.05 MG/DL (ref 0.5–1.4)
EKG IMPRESSION: NORMAL
EOSINOPHIL # BLD AUTO: 0 K/UL (ref 0–0.51)
EOSINOPHIL NFR BLD: 0 % (ref 0–6.9)
EPI CELLS #/AREA URNS HPF: NEGATIVE /HPF
ERYTHROCYTE [DISTWIDTH] IN BLOOD BY AUTOMATED COUNT: 51.4 FL (ref 35.9–50)
EST. AVERAGE GLUCOSE BLD GHB EST-MCNC: 105 MG/DL
GLOBULIN SER CALC-MCNC: 3.5 G/DL (ref 1.9–3.5)
GLUCOSE BLD-MCNC: 91 MG/DL (ref 65–99)
GLUCOSE SERPL-MCNC: 84 MG/DL (ref 65–99)
GLUCOSE UR STRIP.AUTO-MCNC: NEGATIVE MG/DL
HBA1C MFR BLD: 5.3 % (ref 0–5.6)
HCT VFR BLD AUTO: 36.6 % (ref 42–52)
HGB BLD-MCNC: 12.2 G/DL (ref 14–18)
HYALINE CASTS #/AREA URNS LPF: ABNORMAL /LPF
IMM GRANULOCYTES # BLD AUTO: 0.07 K/UL (ref 0–0.11)
IMM GRANULOCYTES NFR BLD AUTO: 0.5 % (ref 0–0.9)
INR PPP: 1.4 (ref 0.87–1.13)
KETONES UR STRIP.AUTO-MCNC: 15 MG/DL
LEUKOCYTE ESTERASE UR QL STRIP.AUTO: NEGATIVE
LYMPHOCYTES # BLD AUTO: 0.48 K/UL (ref 1–4.8)
LYMPHOCYTES NFR BLD: 3.5 % (ref 22–41)
MAGNESIUM SERPL-MCNC: 2.8 MG/DL (ref 1.5–2.5)
MCH RBC QN AUTO: 34.2 PG (ref 27–33)
MCHC RBC AUTO-ENTMCNC: 33.3 G/DL (ref 33.7–35.3)
MCV RBC AUTO: 102.5 FL (ref 81.4–97.8)
MICRO URNS: ABNORMAL
MONOCYTES # BLD AUTO: 0.97 K/UL (ref 0–0.85)
MONOCYTES NFR BLD AUTO: 7.1 % (ref 0–13.4)
NEUTROPHILS # BLD AUTO: 12.21 K/UL (ref 1.82–7.42)
NEUTROPHILS NFR BLD: 88.8 % (ref 44–72)
NITRITE UR QL STRIP.AUTO: NEGATIVE
NRBC # BLD AUTO: 0 K/UL
NRBC BLD-RTO: 0 /100 WBC
PH UR STRIP.AUTO: 5.5 [PH]
PLATELET # BLD AUTO: 213 K/UL (ref 164–446)
PMV BLD AUTO: 9.8 FL (ref 9–12.9)
POTASSIUM SERPL-SCNC: 4.5 MMOL/L (ref 3.6–5.5)
PROT SERPL-MCNC: 6.7 G/DL (ref 6–8.2)
PROT UR QL STRIP: 100 MG/DL
PROTHROMBIN TIME: 17.3 SEC (ref 12–14.6)
RBC # BLD AUTO: 3.57 M/UL (ref 4.7–6.1)
RBC # URNS HPF: ABNORMAL /HPF
RBC UR QL AUTO: ABNORMAL
RH BLD: NORMAL
RH BLD: NORMAL
SODIUM SERPL-SCNC: 140 MMOL/L (ref 135–145)
SP GR UR STRIP.AUTO: 1.02
TROPONIN I SERPL-MCNC: 1.64 NG/ML (ref 0–0.04)
TROPONIN I SERPL-MCNC: 2.13 NG/ML (ref 0–0.04)
TSH SERPL DL<=0.005 MIU/L-ACNC: 3 UIU/ML (ref 0.38–5.33)
UROBILINOGEN UR STRIP.AUTO-MCNC: 0.2 MG/DL
WBC # BLD AUTO: 13.8 K/UL (ref 4.8–10.8)
WBC #/AREA URNS HPF: ABNORMAL /HPF

## 2018-10-08 PROCEDURE — 83735 ASSAY OF MAGNESIUM: CPT

## 2018-10-08 PROCEDURE — 85025 COMPLETE CBC W/AUTO DIFF WBC: CPT

## 2018-10-08 PROCEDURE — 70450 CT HEAD/BRAIN W/O DYE: CPT

## 2018-10-08 PROCEDURE — 99223 1ST HOSP IP/OBS HIGH 75: CPT | Performed by: INTERNAL MEDICINE

## 2018-10-08 PROCEDURE — 71045 X-RAY EXAM CHEST 1 VIEW: CPT

## 2018-10-08 PROCEDURE — 82550 ASSAY OF CK (CPK): CPT

## 2018-10-08 PROCEDURE — 700102 HCHG RX REV CODE 250 W/ 637 OVERRIDE(OP): Performed by: HOSPITALIST

## 2018-10-08 PROCEDURE — 93005 ELECTROCARDIOGRAM TRACING: CPT | Performed by: EMERGENCY MEDICINE

## 2018-10-08 PROCEDURE — 700105 HCHG RX REV CODE 258: Performed by: HOSPITALIST

## 2018-10-08 PROCEDURE — 80053 COMPREHEN METABOLIC PANEL: CPT

## 2018-10-08 PROCEDURE — 700105 HCHG RX REV CODE 258: Performed by: EMERGENCY MEDICINE

## 2018-10-08 PROCEDURE — 85730 THROMBOPLASTIN TIME PARTIAL: CPT

## 2018-10-08 PROCEDURE — 83036 HEMOGLOBIN GLYCOSYLATED A1C: CPT

## 2018-10-08 PROCEDURE — 82962 GLUCOSE BLOOD TEST: CPT

## 2018-10-08 PROCEDURE — A9270 NON-COVERED ITEM OR SERVICE: HCPCS | Performed by: HOSPITALIST

## 2018-10-08 PROCEDURE — 700111 HCHG RX REV CODE 636 W/ 250 OVERRIDE (IP): Performed by: HOSPITALIST

## 2018-10-08 PROCEDURE — 86850 RBC ANTIBODY SCREEN: CPT

## 2018-10-08 PROCEDURE — 700101 HCHG RX REV CODE 250: Performed by: HOSPITALIST

## 2018-10-08 PROCEDURE — 36415 COLL VENOUS BLD VENIPUNCTURE: CPT

## 2018-10-08 PROCEDURE — 99223 1ST HOSP IP/OBS HIGH 75: CPT | Mod: AI | Performed by: HOSPITALIST

## 2018-10-08 PROCEDURE — 84484 ASSAY OF TROPONIN QUANT: CPT

## 2018-10-08 PROCEDURE — 85610 PROTHROMBIN TIME: CPT

## 2018-10-08 PROCEDURE — 84443 ASSAY THYROID STIM HORMONE: CPT

## 2018-10-08 PROCEDURE — 86901 BLOOD TYPING SEROLOGIC RH(D): CPT

## 2018-10-08 PROCEDURE — 96365 THER/PROPH/DIAG IV INF INIT: CPT

## 2018-10-08 PROCEDURE — 770022 HCHG ROOM/CARE - ICU (200)

## 2018-10-08 PROCEDURE — 99291 CRITICAL CARE FIRST HOUR: CPT

## 2018-10-08 PROCEDURE — 81001 URINALYSIS AUTO W/SCOPE: CPT

## 2018-10-08 PROCEDURE — 86900 BLOOD TYPING SEROLOGIC ABO: CPT

## 2018-10-08 PROCEDURE — 96366 THER/PROPH/DIAG IV INF ADDON: CPT

## 2018-10-08 RX ORDER — ALPRAZOLAM 0.5 MG/1
1 TABLET ORAL 3 TIMES DAILY PRN
Status: DISCONTINUED | OUTPATIENT
Start: 2018-10-08 | End: 2018-10-28 | Stop reason: HOSPADM

## 2018-10-08 RX ORDER — ASPIRIN 81 MG/1
324 TABLET, CHEWABLE ORAL DAILY
Status: DISCONTINUED | OUTPATIENT
Start: 2018-10-08 | End: 2018-10-13

## 2018-10-08 RX ORDER — OMEPRAZOLE 20 MG/1
20 CAPSULE, DELAYED RELEASE ORAL DAILY
Status: DISCONTINUED | OUTPATIENT
Start: 2018-10-08 | End: 2018-10-28 | Stop reason: HOSPADM

## 2018-10-08 RX ORDER — SODIUM CHLORIDE 9 MG/ML
500 INJECTION, SOLUTION INTRAVENOUS ONCE
Status: COMPLETED | OUTPATIENT
Start: 2018-10-08 | End: 2018-10-08

## 2018-10-08 RX ORDER — POLYETHYLENE GLYCOL 3350 17 G/17G
1 POWDER, FOR SOLUTION ORAL
Status: DISCONTINUED | OUTPATIENT
Start: 2018-10-08 | End: 2018-10-09

## 2018-10-08 RX ORDER — LOPERAMIDE HYDROCHLORIDE 2 MG/1
2 CAPSULE ORAL 3 TIMES DAILY PRN
Status: DISCONTINUED | OUTPATIENT
Start: 2018-10-08 | End: 2018-10-28 | Stop reason: HOSPADM

## 2018-10-08 RX ORDER — CLONAZEPAM 1 MG/1
1 TABLET ORAL 2 TIMES DAILY
COMMUNITY

## 2018-10-08 RX ORDER — CLONAZEPAM 1 MG/1
1 TABLET ORAL 2 TIMES DAILY
Status: DISCONTINUED | OUTPATIENT
Start: 2018-10-08 | End: 2018-10-28 | Stop reason: HOSPADM

## 2018-10-08 RX ORDER — HYDROXYZINE PAMOATE 50 MG/1
50 CAPSULE ORAL 3 TIMES DAILY PRN
Status: DISCONTINUED | OUTPATIENT
Start: 2018-10-08 | End: 2018-10-28 | Stop reason: HOSPADM

## 2018-10-08 RX ORDER — ONDANSETRON 4 MG/1
4 TABLET, ORALLY DISINTEGRATING ORAL EVERY 4 HOURS PRN
Status: DISCONTINUED | OUTPATIENT
Start: 2018-10-08 | End: 2018-10-28 | Stop reason: HOSPADM

## 2018-10-08 RX ORDER — ZOLPIDEM TARTRATE 10 MG/1
10 TABLET ORAL
COMMUNITY

## 2018-10-08 RX ORDER — GABAPENTIN 300 MG/1
300 CAPSULE ORAL 2 TIMES DAILY
Status: DISCONTINUED | OUTPATIENT
Start: 2018-10-08 | End: 2018-10-28 | Stop reason: HOSPADM

## 2018-10-08 RX ORDER — ZOLPIDEM TARTRATE 5 MG/1
5 TABLET ORAL
Status: DISCONTINUED | OUTPATIENT
Start: 2018-10-08 | End: 2018-10-08

## 2018-10-08 RX ORDER — ONDANSETRON 2 MG/ML
4 INJECTION INTRAMUSCULAR; INTRAVENOUS EVERY 4 HOURS PRN
Status: DISCONTINUED | OUTPATIENT
Start: 2018-10-08 | End: 2018-10-28 | Stop reason: HOSPADM

## 2018-10-08 RX ORDER — ASPIRIN 325 MG
325 TABLET ORAL DAILY
Status: DISCONTINUED | OUTPATIENT
Start: 2018-10-08 | End: 2018-10-15

## 2018-10-08 RX ORDER — ALPRAZOLAM 1 MG/1
1 TABLET ORAL 3 TIMES DAILY PRN
Status: ON HOLD | COMMUNITY
End: 2018-10-28

## 2018-10-08 RX ORDER — SODIUM CHLORIDE 9 MG/ML
1000 INJECTION, SOLUTION INTRAVENOUS ONCE
Status: COMPLETED | OUTPATIENT
Start: 2018-10-08 | End: 2018-10-08

## 2018-10-08 RX ORDER — ASPIRIN 300 MG/1
300 SUPPOSITORY RECTAL DAILY
Status: DISCONTINUED | OUTPATIENT
Start: 2018-10-08 | End: 2018-10-13

## 2018-10-08 RX ORDER — BISACODYL 10 MG
10 SUPPOSITORY, RECTAL RECTAL
Status: DISCONTINUED | OUTPATIENT
Start: 2018-10-08 | End: 2018-10-09

## 2018-10-08 RX ORDER — HYDROXYZINE PAMOATE 50 MG/1
50-100 CAPSULE ORAL 3 TIMES DAILY PRN
COMMUNITY
End: 2019-04-03

## 2018-10-08 RX ORDER — ACETAMINOPHEN 325 MG/1
650 TABLET ORAL EVERY 6 HOURS PRN
Status: DISCONTINUED | OUTPATIENT
Start: 2018-10-08 | End: 2018-10-28 | Stop reason: HOSPADM

## 2018-10-08 RX ORDER — AMOXICILLIN 250 MG
2 CAPSULE ORAL 2 TIMES DAILY
Status: DISCONTINUED | OUTPATIENT
Start: 2018-10-08 | End: 2018-10-09

## 2018-10-08 RX ADMIN — SODIUM CHLORIDE 500 ML: 9 INJECTION, SOLUTION INTRAVENOUS at 21:55

## 2018-10-08 RX ADMIN — METOPROLOL TARTRATE 25 MG: 25 TABLET, FILM COATED ORAL at 18:56

## 2018-10-08 RX ADMIN — SODIUM CHLORIDE 500 ML: 9 INJECTION, SOLUTION INTRAVENOUS at 22:53

## 2018-10-08 RX ADMIN — OMEPRAZOLE 20 MG: 20 CAPSULE, DELAYED RELEASE ORAL at 18:56

## 2018-10-08 RX ADMIN — LOPERAMIDE HYDROCHLORIDE 2 MG: 2 CAPSULE ORAL at 20:11

## 2018-10-08 RX ADMIN — SODIUM CHLORIDE 1000 ML: 9 INJECTION, SOLUTION INTRAVENOUS at 14:35

## 2018-10-08 RX ADMIN — SENNOSIDES AND DOCUSATE SODIUM 2 TABLET: 8.6; 5 TABLET ORAL at 18:56

## 2018-10-08 RX ADMIN — ASPIRIN 325 MG: 325 TABLET, COATED ORAL at 18:55

## 2018-10-08 RX ADMIN — SODIUM ACETATE: 3.28 INJECTION, SOLUTION, CONCENTRATE INTRAVENOUS at 16:30

## 2018-10-08 RX ADMIN — GABAPENTIN 300 MG: 300 CAPSULE ORAL at 18:56

## 2018-10-08 RX ADMIN — SODIUM CHLORIDE 500 ML: 9 INJECTION, SOLUTION INTRAVENOUS at 20:29

## 2018-10-08 RX ADMIN — CLONAZEPAM 1 MG: 1 TABLET ORAL at 18:55

## 2018-10-08 ASSESSMENT — ENCOUNTER SYMPTOMS
COUGH: 0
LOSS OF CONSCIOUSNESS: 0
CHILLS: 0
PALPITATIONS: 0
BLURRED VISION: 0
BRUISES/BLEEDS EASILY: 0
SINUS PAIN: 0
HEARTBURN: 0
HEMOPTYSIS: 0
PND: 0
DEPRESSION: 0
NECK PAIN: 0
FEVER: 0
EYE REDNESS: 0
DIZZINESS: 0
MYALGIAS: 0

## 2018-10-08 ASSESSMENT — LIFESTYLE VARIABLES: EVER_SMOKED: YES

## 2018-10-08 ASSESSMENT — COPD QUESTIONNAIRES
DO YOU EVER COUGH UP ANY MUCUS OR PHLEGM?: YES, A FEW DAYS A WEEK OR MONTH
HAVE YOU SMOKED AT LEAST 100 CIGARETTES IN YOUR ENTIRE LIFE: YES
DURING THE PAST 4 WEEKS HOW MUCH DID YOU FEEL SHORT OF BREATH: SOME OF THE TIME
COPD SCREENING SCORE: 7

## 2018-10-08 ASSESSMENT — PAIN SCALES - GENERAL: PAINLEVEL_OUTOF10: 5

## 2018-10-08 NOTE — ED TRIAGE NOTES
Pt BIBA from home after being unable to get off floor x 2 days d/t stroke-like s/s. Pt states he woke up Sat. morning w/ L sided weakness; he attempted to get out of bed but could not walk. His neighbor finally heard him today, and she called 911. L side facial droop noted w/ LUE paralysis and LLE weakness.

## 2018-10-08 NOTE — ED PROVIDER NOTES
ED Provider Note    CHIEF COMPLAINT  Chief Complaint   Patient presents with   • Facial Droop   • Extremity Weakness       HPI  Sukumar Bolton is a 63 y.o. male who presents with weakness.  He try to get out of bed on Saturday, was unable to do so and ended up helping himself down to the floor.  He had left-sided weakness was unable to get up.  Eventually a neighbor heard him today.  He denies injuring anything when he fell.  He is somewhat sore from laying on the ground.  He denies any headache.  There is no neck pain.  No chest pain or shortness of breath.  No belly pain.  There is no other complaint.    PAST MEDICAL HISTORY  Past Medical History:   Diagnosis Date   • Atrial fibrillation (HCC) 2018   • Cataract     sx 1999   • HIV (human immunodeficiency virus infection) ([facility])    • Hypertension    • Kidney disease        FAMILY HISTORY  Family History   Problem Relation Age of Onset   • Alzheimer's Disease Mother    • Heart Disease Father         pacemaker for heart block   • Hypertension Father    • Heart Disease Sister         ?   • Breast Cancer Sister         with double Mastectomy   • Hypertension Brother        SOCIAL HISTORY  Social History   Substance Use Topics   • Smoking status: Former Smoker     Years: 2.00     Types: Cigarettes     Quit date: 2/14/2018   • Smokeless tobacco: Never Used      Comment: 1 cigarette per day for 2-3 years   • Alcohol use 0.0 oz/week      Comment: 4 vodka drinks per day         SURGICAL HISTORY  Past Surgical History:   Procedure Laterality Date   • IRRIGATION & DEBRIDEMENT ORTHO Right 6/4/2018    Procedure: IRRIGATION & DEBRIDEMENT ORTHO- ANKLE;  Surgeon: Ulices Berry M.D.;  Location: [facility];  Service: Orthopedics   • FIBULA ORIF Right 5/21/2018    Procedure: FIBULA ORIF-FOR NON UNION REPAIR WITH BONE GRAFT;  Surgeon: Ulices Berry M.D.;  Location: [facility];  Service: Orthopedics   • ANKLE ARTHROSCOPY Right 5/21/2018    Procedure:  ANKLE ARTHROSCOPY;  Surgeon: Ulices Berry M.D.;  Location: SUR[facility];  Service: Orthopedics   • OTHER ORTHOPEDIC SURGERY  2008    bilateral shoulder sx   • OTHER ORTHOPEDIC SURGERY  2003    left ankle sx       CURRENT MEDICATIONS  Home Medications     Reviewed by Madyson Vallecillo R.N. (Registered Nurse) on 10/08/18 at 1503  Med List Status: Partial   Medication Last Dose Status   Abacavir-Dolutegravir-Lamivud (TRIUMEQ) 600- MG Tab 9/22/2018 Active   B Complex Vitamins (B COMPLEX 1 PO) 9/22/2018 Active   FOLIC ACID PO 9/22/2018 Active   gabapentin (NEURONTIN) 300 MG Cap 9/21/2018 Active   IRON PO 9/22/2018 Active   loperamide (IMODIUM) 2 MG Cap  Active   metoprolol (LOPRESSOR) 25 MG Tab 10/5/2018 Active   omeprazole (PRILOSEC) 20 MG delayed-release capsule 9/22/2018 Active   risperiDONE (RISPERDAL) 2 MG Tab  Active   rivaroxaban (XARELTO) 20 MG Tab tablet 10/5/2018 Active   tamsulosin (FLOMAX) 0.4 MG capsule 9/22/2018 Active   therapeutic multivitamin-minerals (THERAGRAN-M) Tab 9/22/2018 Active                I have reviewed the nurses notes and/or the list brought with the patient.    ALLERGIES  Allergies   Allergen Reactions   • Sulfa Drugs Itching     Rxn - 1970's         REVIEW OF SYSTEMS  See HPI for further details. Review of systems as above, otherwise all other systems are negative.     PHYSICAL EXAM  VITAL SIGNS: Pulse 94   Temp 36.1 °C (96.9 °F)   Resp 18   Wt 61 kg (134 lb 7.7 oz)   SpO2 94%   BMI 18.24 kg/m²     Constitutional: Somewhat ill-appearing although not toxic.  HENT: Mucus membranes moist.  Oropharynx is clear.  His head is atraumatic.  Eyes: Pupils equally round.  No scleral icterus.   Neck: Full nontender range of motion.  Lymphatic: No cervical lymphadenopathy noted.   Cardiovascular: Regular heart rate and rhythm.  No murmurs, rubs, nor gallop appreciated.   Thorax & Lungs: Chest is nontender.  Lungs are clear to auscultation with good air movement bilaterally.   No wheeze, rhonchi, nor rales.   Abdomen: Soft, with no tenderness, rebound nor guarding.  No mass, pulsatile mass, nor hepatosplenomegaly appreciated.  Skin: No purpura nor petechia noted.  Refer to the nursing notes, however briefly appears he has early decubiti or blanching erythema at the greater trochanters bilaterally, his right ear, and his groin, over his sacrum, both heels, the medial malleolus, and his elbows.  None of these appear superinfected at this point.  Extremities/Musculoskeletal: No sign of trauma.  Calves are nontender with no cords nor edema.  No Avani's sign.  Pulses are intact all around.   Neurologic: Alert & oriented.  Sensation is intact throughout.  He has no strength in left upper extremity, minimal in the left lower extremity.  Cranial nerves notable for diminished strength in the left face.  Slight slurring of his speech.  Gait is not assessed  Psychiatric: Normal affect appropriate for the clinical situation.    EKG  I interpreted this EKG myself.  This is a 12-lead study.  The rhythm is sinus with a normal rate.  There are no ST segment nor T wave abnormalities.  Interpretation: No ST segment elevation myocardial infarction.    LABS  Labs Reviewed   CBC WITH DIFFERENTIAL - Abnormal; Notable for the following:        Result Value    WBC 13.8 (*)     RBC 3.57 (*)     Hemoglobin 12.2 (*)     Hematocrit 36.6 (*)     .5 (*)     MCH 34.2 (*)     MCHC 33.3 (*)     RDW 51.4 (*)     Neutrophils-Polys 88.80 (*)     Lymphocytes 3.50 (*)     Neutrophils (Absolute) 12.21 (*)     Lymphs (Absolute) 0.48 (*)     Monos (Absolute) 0.97 (*)     All other components within normal limits    Narrative:     Indicate which anticoagulants the patient is on:->UNKNOWN   COMP METABOLIC PANEL - Abnormal; Notable for the following:     Anion Gap 17.0 (*)     Bun 69 (*)     Creatinine 4.05 (*)     AST(SGOT) 354 (*)     ALT(SGPT) 183 (*)     All other components within normal limits    Narrative:      Indicate which anticoagulants the patient is on:->UNKNOWN   PROTHROMBIN TIME - Abnormal; Notable for the following:     PT 17.3 (*)     INR 1.40 (*)     All other components within normal limits    Narrative:     Indicate which anticoagulants the patient is on:->UNKNOWN   TROPONIN - Abnormal; Notable for the following:     Troponin I 2.13 (*)     All other components within normal limits    Narrative:     Indicate which anticoagulants the patient is on:->UNKNOWN   CREATINE KINASE - Abnormal; Notable for the following:     CPK Total >05892 (*)     All other components within normal limits    Narrative:     Indicate which anticoagulants the patient is on:->UNKNOWN   ESTIMATED GFR - Abnormal; Notable for the following:     GFR If  18 (*)     GFR If Non  15 (*)     All other components within normal limits    Narrative:     Indicate which anticoagulants the patient is on:->UNKNOWN   APTT    Narrative:     Indicate which anticoagulants the patient is on:->UNKNOWN   COD (ADULT)   URINALYSIS,CULTURE IF INDICATED   ABO AND RH CONFIRMATION   ACCU-CHEK GLUCOSE         RADIOLOGY/PROCEDURES  I have reviewed the patient's film interpretations myself, and they are read out by the radiologist as:   CT-HEAD W/O   Final Result      1.  Cerebral atrophy.      2.  White matter lucencies most consistent with small vessel ischemic change versus demyelination or gliosis.      3.  Otherwise, Head CT without contrast with no acute findings. No evidence of acute cerebral  hemorrhage or mass lesion.      DX-CHEST-PORTABLE (1 VIEW)   Final Result      Lung base atelectasis.        .    MEDICAL RECORD  I have reviewed patient's medical record and pertinent results are listed above.    COURSE & MEDICAL DECISION MAKING  I have reviewed any medical record information, laboratory studies and radiographic results as noted above.  Patient presents with weakness.  Clinically I suspect he had a stroke.  Obviously he has  not within the window of any intervention.  I am concerned about the possibility of rhabdomyolysis, his laboratories do suggest this.  I spoke with the pharmacist, initially was started on IV fluids, however with return of his new renal failure, we will go ahead and give alkalinization as well.  We do not have sodium bicarbonate however we will give him sodium acetate.  The case was discussed with the [facility] hospitalist, Dr. Salazar.  Case also discussed with Dr. mansfield, nephrology.  The patient will be admitted in critical ill condition.    IVF for dry mucous membranes, rhabdomyolysis and kidney failure.  He is unchanged after this, we will continue with the fluids.      FINAL IMPRESSION  1. Cerebrovascular accident (CVA), unspecified mechanism (HCC)    2. Traumatic rhabdomyolysis, initial encounter (HCC)    3. Acute renal failure, unspecified acute renal failure type (HCC)    4.  Critical care time, 35 minutes, which includes obtaining history from patient and/or family/prehospital historians, examination of patient, reevaluation of patient, direction of nursing staff, and discussion with admitting and consulting physicians. It does not include any procedures.       This dictation was created using voice recognition software.    Electronically signed by: Ulices Acosta, 10/8/2018 4:04 PM

## 2018-10-08 NOTE — H&P
Hospital Medicine History & Physical Note    Date of Service  10/8/2018    Primary Care Physician  Lenora Markham M.D.    Consultants  Nephrology    Code Status  Full code    Chief Complaint  Left-sided weakness, dehydration, rhabdomyolysis    History of Presenting Illness  63 y.o. male who presented 10/8/2018 with past medical history of HIV, chronic atrial fibrillation, hypertension, is coming today after 2 days of being on the floor in his house, as per patient he woke up 2 days ago and tried to get out of bed but he could not go he slide himself down to the floor where he spent the last 2 days, patient was not able to move his left side, a neighbor found him today and called EMS, patient denies any trauma, denies any nausea vomiting or diarrhea, no headaches, no abdominal pain, he has left-sided weakness both arm and legs and there is on mild facial droop on the left side, he denies any neck pain, patient in the emergency room was found to be very dehydrated he was in acute kidney failure creatinine 4.05, his CPK levels more than 1600, his troponins elevated 2.13, although he denies any chest pain shortness of breath, patient has history of chronic atrial fibrillation he is on metoprolol and he was on Xarelto patient was scheduled for surgery in his leg last Friday so he was told to stop Xarelto unfortunately surgery was canceled and patient did not start his Xarelto, patient is alert oriented follows commands, I have consulted nephrology, patient started on bicarb drip, I have explained the plan of care to patient and he expressed understanding and agree with it, all questions answered.    Review of Systems  Review of Systems   Constitutional: Negative for chills and fever.   HENT: Negative for congestion and sinus pain.    Eyes: Negative for blurred vision and redness.   Respiratory: Negative for cough and hemoptysis.    Cardiovascular: Negative for chest pain, palpitations and PND.   Gastrointestinal:  Negative for heartburn and melena.   Genitourinary: Negative for dysuria and urgency.   Musculoskeletal: Negative for myalgias and neck pain.   Skin: Negative for itching.   Neurological: Negative for dizziness and loss of consciousness.   Endo/Heme/Allergies: Does not bruise/bleed easily.   Psychiatric/Behavioral: Negative for depression and suicidal ideas.       Past Medical History   has a past medical history of Atrial fibrillation (HCC) (2018); Cataract; HIV (human immunodeficiency virus infection) (HCC); Hypertension; and Kidney disease.    Surgical History   has a past surgical history that includes other orthopedic surgery (2008); other orthopedic surgery (2003); fibula orif (Right, 5/21/2018); ankle arthroscopy (Right, 5/21/2018); and irrigation & debridement ortho (Right, 6/4/2018).     Family History  family history includes Alzheimer's Disease in his mother; Breast Cancer in his sister; Heart Disease in his father and sister; Hypertension in his brother and father.     Social History   reports that he quit smoking about 7 months ago. His smoking use included Cigarettes. He quit after 2.00 years of use. He has never used smokeless tobacco. He reports that he drinks alcohol. He reports that he does not use drugs.    Allergies  Allergies   Allergen Reactions   • Sulfa Drugs Itching     Rxn - 1970's         Medications  Prior to Admission Medications   Prescriptions Last Dose Informant Patient Reported? Taking?   ALPRAZolam (XANAX) 1 MG Tab 10/6/2018 at 2100 Patient Yes Yes   Sig: Take 1 mg by mouth 3 times a day as needed for Anxiety.   Abacavir-Dolutegravir-Lamivud (TRIUMEQ) 600- MG Tab 10/6/2018 at 0730 Patient Yes No   Sig: Take 1 Tab by mouth every day.   clonazePAM (KLONOPIN) 1 MG Tab 10/6/2018 at 2100 Patient Yes Yes   Sig: Take 1 mg by mouth 2 times a day.   gabapentin (NEURONTIN) 300 MG Cap 10/6/2018 at 2100 Patient Yes No   Sig: Take 300 mg by mouth 3 times a day.   hydrOXYzine pamoate  (VISTARIL) 50 MG Cap 10/6/2018 at 2100 Patient Yes Yes   Sig: Take  mg by mouth 3 times a day as needed (n/v).   loperamide (IMODIUM) 2 MG Cap 10/6/2018 at 2100 Patient No No   Sig: Take 1 Cap by mouth 3 times a day as needed for Diarrhea.   metoprolol (LOPRESSOR) 25 MG Tab 10/6/2018 at 2100 Patient Yes No   Sig: Take 25 mg by mouth 2 times a day.   omeprazole (PRILOSEC) 20 MG delayed-release capsule 10/6/2018 at 1200 Patient Yes No   Sig: Take 20 mg by mouth every day.   rivaroxaban (XARELTO) 20 MG Tab tablet 10/8/2018 at 1700 Patient No No   Sig: Take 1 Tab by mouth with dinner.   zolpidem (AMBIEN) 10 MG Tab 10/6/2018 at 2100 Patient Yes Yes   Sig: Take 10 mg by mouth every bedtime.      Facility-Administered Medications: None       Physical Exam  Temp:  [36.1 °C (96.9 °F)] 36.1 °C (96.9 °F)  Pulse:  [93-94] 93  Resp:  [18] 18    Physical Exam   Constitutional: He appears well-nourished. He appears cachectic. No distress.   HENT:   Head: Normocephalic and atraumatic.   Mouth/Throat: No oropharyngeal exudate.   Eyes: Conjunctivae are normal. Right eye exhibits no discharge. Left eye exhibits no discharge. No scleral icterus.   Neck: Normal range of motion. Neck supple. No JVD present.   Cardiovascular: Normal heart sounds.  Exam reveals no gallop and no friction rub.    Pulmonary/Chest: Effort normal and breath sounds normal. No stridor. No respiratory distress. He has no wheezes.   Abdominal: Bowel sounds are normal. He exhibits no distension. There is no tenderness. There is no rebound.   Musculoskeletal: He exhibits no edema or tenderness.   Lymphadenopathy:     He has no cervical adenopathy.   Neurological: He is alert. He exhibits abnormal muscle tone (Left-sided weakness).   Left-sided facial droop.  He is alert oriented follows commands.   Skin: Skin is dry. No erythema.   Psychiatric: He has a normal mood and affect. His behavior is normal.   Nursing note and vitals reviewed.      Laboratory:  Recent  Labs      10/08/18   1426   WBC  13.8*   RBC  3.57*   HEMOGLOBIN  12.2*   HEMATOCRIT  36.6*   MCV  102.5*   MCH  34.2*   MCHC  33.3*   RDW  51.4*   PLATELETCT  213   MPV  9.8     Recent Labs      10/08/18   1426   SODIUM  140   POTASSIUM  4.5   CHLORIDE  102   CO2  21   GLUCOSE  84   BUN  69*   CREATININE  4.05*   CALCIUM  9.2     Recent Labs      10/08/18   1426   ALTSGPT  183*   ASTSGOT  354*   ALKPHOSPHAT  75   TBILIRUBIN  0.7   GLUCOSE  84     Recent Labs      10/08/18   1426   APTT  32.7   INR  1.40*             Recent Labs      10/08/18   1426   TROPONINI  2.13*       Urinalysis:    No results found     Imaging:  CT-HEAD W/O   Final Result      1.  Cerebral atrophy.      2.  White matter lucencies most consistent with small vessel ischemic change versus demyelination or gliosis.      3.  Otherwise, Head CT without contrast with no acute findings. No evidence of acute cerebral  hemorrhage or mass lesion.      DX-CHEST-PORTABLE (1 VIEW)   Final Result      Lung base atelectasis.      EC-ECHOCARDIOGRAM COMPLETE W/O CONT    (Results Pending)   US-CAROTID DOPPLER BILAT    (Results Pending)   MR-BRAIN-W/O    (Results Pending)         Assessment/Plan:  I anticipate this patient will require at least two midnights for appropriate medical management, necessitating inpatient admission.    * Rhabdomyolysis- (present on admission)   Assessment & Plan    Likely due to being on the floor for 2 days, nephrology consulted, bicarb drip started, continue trending CPK, and BMP.        TENZIN (acute kidney injury) (HCC)- (present on admission)   Assessment & Plan    Most likely due to dehydration versus acute rhabdomyolysis, nephrology consulted, bicarb drip started.  Follow-up CPK, BMP.        Essential hypertension- (present on admission)   Assessment & Plan    Now with borderline hypotension, continue IV fluids, holding metoprolol.        Elevated troponin- (present on admission)   Assessment & Plan    Likely due to  rhabdomyolysis, acute kidney failure, denies any chest pain, troponins are trending down.        Elevated liver function tests- (present on admission)   Assessment & Plan    Likely due to rhabdomyolysis, continue trending liver function test, denies any right upper quadrant pain.        Leukocytosis- (present on admission)   Assessment & Plan    Likely reactive, no signs of infection.        Left-sided weakness- (present on admission)   Assessment & Plan    Started 2 days ago, most likely CVA, patient is not a candidate for TPA at this time, CT head negative, will get MRI brain, carotid ultrasound, echocardiogram.  Patient was on Xarelto but he is stop it due to possible surgery past Friday.  Continue aspirin for now.        Chronic atrial fibrillation (HCC)- (present on admission)   Assessment & Plan    Holding metoprolol due to hypotension, patient was on Xarelto but it was held due to surgery that he had plan for the last Friday, surgery was not done and patient did not start his Xarelto.  Continue holding Xarelto now for possible stroke, MRI pending.        HIV (human immunodeficiency virus infection) (HCC)- (present on admission)   Assessment & Plan    Holding his medications due to rhabdomyolysis and elevated liver function test, will need ID consult in the morning.            VTE prophylaxis: SCDs

## 2018-10-08 NOTE — ED NOTES
Med rec updated and complete.  Allergies reviewed . Met with pt at bedside and dicussed current  Medications and last doses taken.  Pt has some of his prescription medications at bedside.  Only 1 controlled substance noted. Pt denies antibiotic use in last  30 days.

## 2018-10-08 NOTE — ED NOTES
Pt has blanchable redness to sacrum. Pt has bilateral sores to ischeal wing areas w/ surrounding redness. Pt has open wound to L medial ankle. He has a sore to R ear. Pt has some skin breakdown to scrotum and perineum.   Repositioned pt, and placed pillow under R side to relieve pressure points.

## 2018-10-09 PROBLEM — T14.8XXA MULTIPLE WOUNDS OF SKIN: Status: ACTIVE | Noted: 2018-10-09

## 2018-10-09 LAB
ALBUMIN SERPL BCP-MCNC: 2.7 G/DL (ref 3.2–4.9)
ALBUMIN/GLOB SERPL: 1 G/DL
ALP SERPL-CCNC: 54 U/L (ref 30–99)
ALT SERPL-CCNC: 122 U/L (ref 2–50)
ANION GAP SERPL CALC-SCNC: 10 MMOL/L (ref 0–11.9)
ANION GAP SERPL CALC-SCNC: 15 MMOL/L (ref 0–11.9)
AST SERPL-CCNC: 176 U/L (ref 12–45)
BILIRUB SERPL-MCNC: 0.6 MG/DL (ref 0.1–1.5)
BUN SERPL-MCNC: 38 MG/DL (ref 8–22)
BUN SERPL-MCNC: 51 MG/DL (ref 8–22)
CALCIUM SERPL-MCNC: 7.9 MG/DL (ref 8.5–10.5)
CALCIUM SERPL-MCNC: 8.3 MG/DL (ref 8.5–10.5)
CHLORIDE SERPL-SCNC: 102 MMOL/L (ref 96–112)
CHLORIDE SERPL-SCNC: 98 MMOL/L (ref 96–112)
CHOLEST SERPL-MCNC: 105 MG/DL (ref 100–199)
CK SERPL-CCNC: 5602 U/L (ref 0–154)
CK SERPL-CCNC: 8590 U/L (ref 0–154)
CO2 SERPL-SCNC: 25 MMOL/L (ref 20–33)
CO2 SERPL-SCNC: 32 MMOL/L (ref 20–33)
CREAT SERPL-MCNC: 1.8 MG/DL (ref 0.5–1.4)
CREAT SERPL-MCNC: 2.41 MG/DL (ref 0.5–1.4)
ERYTHROCYTE [DISTWIDTH] IN BLOOD BY AUTOMATED COUNT: 51.7 FL (ref 35.9–50)
GLOBULIN SER CALC-MCNC: 2.6 G/DL (ref 1.9–3.5)
GLUCOSE SERPL-MCNC: 79 MG/DL (ref 65–99)
GLUCOSE SERPL-MCNC: 82 MG/DL (ref 65–99)
HCT VFR BLD AUTO: 26 % (ref 42–52)
HDLC SERPL-MCNC: 22 MG/DL
HGB BLD-MCNC: 8.8 G/DL (ref 14–18)
LDLC SERPL CALC-MCNC: 54 MG/DL
MCH RBC QN AUTO: 35.1 PG (ref 27–33)
MCHC RBC AUTO-ENTMCNC: 33.8 G/DL (ref 33.7–35.3)
MCV RBC AUTO: 103.6 FL (ref 81.4–97.8)
PLATELET # BLD AUTO: 167 K/UL (ref 164–446)
PMV BLD AUTO: 10.2 FL (ref 9–12.9)
POTASSIUM SERPL-SCNC: 3.6 MMOL/L (ref 3.6–5.5)
POTASSIUM SERPL-SCNC: 3.9 MMOL/L (ref 3.6–5.5)
PROT SERPL-MCNC: 5.3 G/DL (ref 6–8.2)
RBC # BLD AUTO: 2.51 M/UL (ref 4.7–6.1)
SODIUM SERPL-SCNC: 140 MMOL/L (ref 135–145)
SODIUM SERPL-SCNC: 142 MMOL/L (ref 135–145)
TRIGL SERPL-MCNC: 144 MG/DL (ref 0–149)
TROPONIN I SERPL-MCNC: 0.97 NG/ML (ref 0–0.04)
TROPONIN I SERPL-MCNC: 1.13 NG/ML (ref 0–0.04)
WBC # BLD AUTO: 8.3 K/UL (ref 4.8–10.8)

## 2018-10-09 PROCEDURE — 700101 HCHG RX REV CODE 250: Performed by: HOSPITALIST

## 2018-10-09 PROCEDURE — 80053 COMPREHEN METABOLIC PANEL: CPT

## 2018-10-09 PROCEDURE — 99291 CRITICAL CARE FIRST HOUR: CPT | Performed by: INTERNAL MEDICINE

## 2018-10-09 PROCEDURE — G8996 SWALLOW CURRENT STATUS: HCPCS | Mod: CK

## 2018-10-09 PROCEDURE — 99292 CRITICAL CARE ADDL 30 MIN: CPT | Performed by: INTERNAL MEDICINE

## 2018-10-09 PROCEDURE — 84484 ASSAY OF TROPONIN QUANT: CPT

## 2018-10-09 PROCEDURE — 700102 HCHG RX REV CODE 250 W/ 637 OVERRIDE(OP): Performed by: HOSPITALIST

## 2018-10-09 PROCEDURE — 770022 HCHG ROOM/CARE - ICU (200)

## 2018-10-09 PROCEDURE — 85027 COMPLETE CBC AUTOMATED: CPT

## 2018-10-09 PROCEDURE — 700105 HCHG RX REV CODE 258: Performed by: INTERNAL MEDICINE

## 2018-10-09 PROCEDURE — A9270 NON-COVERED ITEM OR SERVICE: HCPCS | Performed by: HOSPITALIST

## 2018-10-09 PROCEDURE — 82550 ASSAY OF CK (CPK): CPT

## 2018-10-09 PROCEDURE — 80061 LIPID PANEL: CPT

## 2018-10-09 PROCEDURE — 92610 EVALUATE SWALLOWING FUNCTION: CPT

## 2018-10-09 PROCEDURE — G8997 SWALLOW GOAL STATUS: HCPCS | Mod: CH

## 2018-10-09 PROCEDURE — 80048 BASIC METABOLIC PNL TOTAL CA: CPT

## 2018-10-09 PROCEDURE — 99233 SBSQ HOSP IP/OBS HIGH 50: CPT | Performed by: INTERNAL MEDICINE

## 2018-10-09 PROCEDURE — 700111 HCHG RX REV CODE 636 W/ 250 OVERRIDE (IP): Performed by: HOSPITALIST

## 2018-10-09 PROCEDURE — 99233 SBSQ HOSP IP/OBS HIGH 50: CPT | Performed by: HOSPITALIST

## 2018-10-09 RX ORDER — ABACAVIR 300 MG/1
600 TABLET ORAL DAILY
Status: DISCONTINUED | OUTPATIENT
Start: 2018-10-09 | End: 2018-10-28

## 2018-10-09 RX ORDER — LAMIVUDINE 150 MG/1
300 TABLET, FILM COATED ORAL DAILY
Status: DISCONTINUED | OUTPATIENT
Start: 2018-10-09 | End: 2018-10-28

## 2018-10-09 RX ORDER — SODIUM CHLORIDE, SODIUM LACTATE, POTASSIUM CHLORIDE, CALCIUM CHLORIDE 600; 310; 30; 20 MG/100ML; MG/100ML; MG/100ML; MG/100ML
1000 INJECTION, SOLUTION INTRAVENOUS ONCE
Status: COMPLETED | OUTPATIENT
Start: 2018-10-09 | End: 2018-10-09

## 2018-10-09 RX ORDER — MICONAZOLE NITRATE 20 MG/G
CREAM TOPICAL 2 TIMES DAILY
Status: DISCONTINUED | OUTPATIENT
Start: 2018-10-09 | End: 2018-10-28 | Stop reason: HOSPADM

## 2018-10-09 RX ADMIN — SODIUM CHLORIDE, POTASSIUM CHLORIDE, SODIUM LACTATE AND CALCIUM CHLORIDE 1000 ML: 600; 310; 30; 20 INJECTION, SOLUTION INTRAVENOUS at 05:15

## 2018-10-09 RX ADMIN — GABAPENTIN 300 MG: 300 CAPSULE ORAL at 17:12

## 2018-10-09 RX ADMIN — MICONAZOLE NITRATE: 20 CREAM TOPICAL at 17:12

## 2018-10-09 RX ADMIN — SODIUM ACETATE: 3.28 INJECTION, SOLUTION, CONCENTRATE INTRAVENOUS at 16:07

## 2018-10-09 RX ADMIN — GABAPENTIN 300 MG: 300 CAPSULE ORAL at 05:07

## 2018-10-09 RX ADMIN — SODIUM ACETATE: 3.28 INJECTION, SOLUTION, CONCENTRATE INTRAVENOUS at 03:11

## 2018-10-09 RX ADMIN — SODIUM ACETATE: 3.28 INJECTION, SOLUTION, CONCENTRATE INTRAVENOUS at 10:11

## 2018-10-09 RX ADMIN — ACETAMINOPHEN 650 MG: 325 TABLET, FILM COATED ORAL at 19:50

## 2018-10-09 RX ADMIN — ALPRAZOLAM 1 MG: 0.5 TABLET ORAL at 22:08

## 2018-10-09 RX ADMIN — ABACAVIR 600 MG: 300 TABLET, FILM COATED ORAL at 17:30

## 2018-10-09 RX ADMIN — OMEPRAZOLE 20 MG: 20 CAPSULE, DELAYED RELEASE ORAL at 05:06

## 2018-10-09 RX ADMIN — DOLUTEGRAVIR SODIUM 50 MG: 50 TABLET, FILM COATED ORAL at 17:30

## 2018-10-09 RX ADMIN — CLONAZEPAM 1 MG: 1 TABLET ORAL at 05:07

## 2018-10-09 RX ADMIN — ASPIRIN 325 MG: 325 TABLET, COATED ORAL at 05:09

## 2018-10-09 RX ADMIN — CLONAZEPAM 1 MG: 1 TABLET ORAL at 17:12

## 2018-10-09 RX ADMIN — LAMIVUDINE 300 MG: 150 TABLET, FILM COATED ORAL at 17:30

## 2018-10-09 RX ADMIN — SODIUM ACETATE: 3.28 INJECTION, SOLUTION, CONCENTRATE INTRAVENOUS at 22:05

## 2018-10-09 ASSESSMENT — LIFESTYLE VARIABLES
HAVE PEOPLE ANNOYED YOU BY CRITICIZING YOUR DRINKING: NO
HOW MANY TIMES IN THE PAST YEAR HAVE YOU HAD 5 OR MORE DRINKS IN A DAY: 0
TOTAL SCORE: 0
EVER HAD A DRINK FIRST THING IN THE MORNING TO STEADY YOUR NERVES TO GET RID OF A HANGOVER: NO
ON A TYPICAL DAY WHEN YOU DRINK ALCOHOL HOW MANY DRINKS DO YOU HAVE: 1
ALCOHOL_USE: YES
HAVE YOU EVER FELT YOU SHOULD CUT DOWN ON YOUR DRINKING: NO
CONSUMPTION TOTAL: NEGATIVE
AVERAGE NUMBER OF DAYS PER WEEK YOU HAVE A DRINK CONTAINING ALCOHOL: 7
EVER FELT BAD OR GUILTY ABOUT YOUR DRINKING: NO

## 2018-10-09 ASSESSMENT — ENCOUNTER SYMPTOMS
CHILLS: 0
LIGHT-HEADEDNESS: 0
FEVER: 0
BACK PAIN: 0
SHORTNESS OF BREATH: 0
FOCAL WEAKNESS: 0
COUGH: 0
HEARTBURN: 0
ABDOMINAL PAIN: 0
WHEEZING: 0
SEIZURES: 0
TROUBLE SWALLOWING: 0
NUMBNESS: 1
SORE THROAT: 0
FACIAL ASYMMETRY: 1
NAUSEA: 0
TREMORS: 0
SPEECH DIFFICULTY: 1
ARTHRALGIAS: 0
BLURRED VISION: 0
PHOTOPHOBIA: 0
ORTHOPNEA: 0
CONFUSION: 0
PALPITATIONS: 0
VOMITING: 0
MYALGIAS: 0
WEAKNESS: 1
HEMOPTYSIS: 0

## 2018-10-09 ASSESSMENT — PAIN SCALES - GENERAL
PAINLEVEL_OUTOF10: 0
PAINLEVEL_OUTOF10: 4
PAINLEVEL_OUTOF10: 8
PAINLEVEL_OUTOF10: 0

## 2018-10-09 ASSESSMENT — PATIENT HEALTH QUESTIONNAIRE - PHQ9
SUM OF ALL RESPONSES TO PHQ9 QUESTIONS 1 AND 2: 0
1. LITTLE INTEREST OR PLEASURE IN DOING THINGS: NOT AT ALL
2. FEELING DOWN, DEPRESSED, IRRITABLE, OR HOPELESS: NOT AT ALL
2. FEELING DOWN, DEPRESSED, IRRITABLE, OR HOPELESS: NOT AT ALL
SUM OF ALL RESPONSES TO PHQ9 QUESTIONS 1 AND 2: 0
2. FEELING DOWN, DEPRESSED, IRRITABLE, OR HOPELESS: NOT AT ALL
SUM OF ALL RESPONSES TO PHQ9 QUESTIONS 1 AND 2: 0
1. LITTLE INTEREST OR PLEASURE IN DOING THINGS: NOT AT ALL
1. LITTLE INTEREST OR PLEASURE IN DOING THINGS: NOT AT ALL

## 2018-10-09 NOTE — ED NOTES
2nd 500cc nsb complete & Na acetate. Pt remains hypotensive, asymptomatic. Drinking juice. Call out to admitting md to update.

## 2018-10-09 NOTE — THERAPY
"Speech Language Therapy Clinical Swallow Evaluation completed.  Functional Status: Patient strict NPO pending evaluation, despite regular diet being ordered in EMR, as RN was concerned about aspiration. Patient sleeping, but rousing to verbal and tactile cues. Patient awake, although appearing weak and fatigued throughout evaluation. Patient required moderate verbal cues to bring chin down to level, pick head up off pillow, and position himself safely for PO trials. Patient unable to complete oral motor evaluation d/t weakness, lethargy, and fatigue. Patient consumed PO trials of single ice chips, NTL via tsp, cup sip, and straw, purees, pudding, and thin liquids via tsp. Patient presented with wet/gurgly vocal quality on PO trials of thins via tsp. No other overt s/sx of aspiration were noted on any other consistencies trialed, but patient fatigued as evaluation progressed. Laryngeal elevation palpated as weak and initiation of swallow trigger was delayed 2-3 seconds at times. At this time, recommend patient downgrade to NTFL diet with 1:1 feeding d/t LUE weakness and fatigue. RN aware. SLP to follow. Thank you for the consult.     Recommendations - Diet: Nectar Thick Full Liquid                          Strategies: Strict 1:1 feeding  and Head of Bed at 90 Degrees                          Medication Administration: Crush all Medications in Puree  Plan of Care: Will benefit from Speech Therapy 5 times per week  Post-Acute Therapy: Discharge to a transitional care facility for continued speech therapy services.    See \"Rehab Therapy-Acute\" Patient Summary Report for complete documentation.   "

## 2018-10-09 NOTE — ASSESSMENT & PLAN NOTE
Had been off Xarelto x several days, now with stroke symptoms  metoprolol 25 bid if pressures sufficient  Start dvt prophy with heparin now  Restart full AC on 10/14 with fabiana.

## 2018-10-09 NOTE — PROGRESS NOTES
[facility] Hospitalist Progress Note    Date of Service: 10/9/2018    Chief Complaint  63 y.o. male admitted 10/8/2018 with left-sided weakness.    Interval Problem Update  Mr. Bolton has a history of atrial fibrillation, HTN, and HIV that woke up two days prior to presentation with left sided weakness and fell to the floor. He was on the floor for 2 days until a neighbor found him and called 911. He was found to have acute kidney failure and rhabdomyolysis. His Xarelto had been held due to possible left leg surgery.   920 ml urine over night.   MRI is pending today. His speech remains slurred and weak on the left side. Mr. Bolton denies pain.   Consultants/Specialty  Critical Care. I discussed with Dr. Gillespie on ICU Hot Rounds.  Nephrology   Disposition  ICU        Review of Systems   Constitutional: Negative for chills and fever.   Eyes: Negative for blurred vision.   Respiratory: Negative for shortness of breath.    Cardiovascular: Negative for chest pain.   Gastrointestinal: Negative for nausea and vomiting.   Neurological: Negative for focal weakness.   All other systems reviewed and are negative.     Physical Exam  Laboratory/Imaging   Hemodynamics  Temp (24hrs), Av.7 °C (98.1 °F), Min:35.7 °C (96.3 °F), Max:37.6 °C (99.7 °F)   Temperature: 37.6 °C (99.7 °F)  Pulse  Av.5  Min: 61  Max: 94 Heart Rate (Monitored): 67  Blood Pressure : 103/62, NIBP: (!) 92/55      Respiratory      Respiration: 13, Pulse Oximetry: 95 %, O2 Daily Delivery Respiratory : Room Air with O2 Available        RUL Breath Sounds: Clear, RML Breath Sounds: Clear, RLL Breath Sounds: Diminished, DONI Breath Sounds: Clear, LLL Breath Sounds: Diminished    Fluids    Intake/Output Summary (Last 24 hours) at 10/09/18 0706  Last data filed at 10/09/18 0530   Gross per 24 hour   Intake          4213.33 ml   Output              920 ml   Net          3293.33 ml       Nutrition  Orders Placed This Encounter   Procedures   • Diet Order Regular      Standing Status:   Standing     Number of Occurrences:   1     Order Specific Question:   Diet:     Answer:   Regular [1]     Physical Exam   Constitutional: No distress.   Neck: Normal range of motion. Neck supple.   Cardiovascular: Normal rate.    No murmur heard.  Pulmonary/Chest: Effort normal. No respiratory distress. He has no wheezes.   Abdominal: Soft. He exhibits no distension. There is no tenderness.   Musculoskeletal: He exhibits no edema.   Poor muscle tone quads  No swelling   Neurological:   Sleepy, he wakes up and follows commands  Speech is intelligible   Weak 3/5 both legs  Left arm 1/5 strength.    Skin: Skin is warm and dry. He is not diaphoretic.   Nursing note and vitals reviewed.      Recent Labs      10/08/18   1426   WBC  13.8*   RBC  3.57*   HEMOGLOBIN  12.2*   HEMATOCRIT  36.6*   MCV  102.5*   MCH  34.2*   MCHC  33.3*   RDW  51.4*   PLATELETCT  213   MPV  9.8     Recent Labs      10/08/18   1426  10/09/18   0450   SODIUM  140  142   POTASSIUM  4.5  3.9   CHLORIDE  102  102   CO2  21  25   GLUCOSE  84  79   BUN  69*  51*   CREATININE  4.05*  2.41*   CALCIUM  9.2  7.9*     Recent Labs      10/08/18   1426   APTT  32.7   INR  1.40*         Recent Labs      10/09/18   0450   TRIGLYCERIDE  144   HDL  22*   LDL  54          Assessment/Plan     * Rhabdomyolysis- (present on admission)   Assessment & Plan    Secondary to being down for 2 days, nephrology consulted, bicarb drip, continue trending CPK, and BMP.        Left-sided weakness- (present on admission)   Assessment & Plan    Started 2 days prior to admit. CT head was negative.  Clinically, this is consistent with a CVA though await MRI brain.  He was not a candidate for alteplase due to timing.         TENZIN (acute kidney injury) (HCC)- (present on admission)   Assessment & Plan    Secondary to rhabdomyolysis.        Elevated troponin- (present on admission)   Assessment & Plan    Likely due to rhabdomyolysis, acute kidney failure, denies any  chest pain, troponins are trending down.        Elevated liver function tests- (present on admission)   Assessment & Plan    Likely due to rhabdomyolysis, continue trending liver function test, denies any right upper quadrant pain.        Leukocytosis- (present on admission)   Assessment & Plan    Likely reactive, no signs of infection.  WBC was 13.8 on admit and has come down to 8.3        Chronic atrial fibrillation (HCC)- (present on admission)   Assessment & Plan    Holding metoprolol due to hypotension, patient was on Xarelto but it was held due to surgery that he had plan for the last Friday, surgery was not done and patient did not start his Xarelto.  Continue holding Xarelto now for possible stroke and consider restarting based on MRI.        HIV (human immunodeficiency virus infection) (HCC)- (present on admission)   Assessment & Plan    Restart home regimen.         Essential hypertension- (present on admission)   Assessment & Plan    Hold metoprolol due to low BP          Quality-Core Measures   Reviewed items::  Labs reviewed and Radiology images reviewed  DVT prophylaxis pharmacological::  Contraindicated - High bleeding risk  DVT prophylaxis - mechanical:  SCDs  Assessed for rehabilitation services:  Patient was assess for and/or received rehabilitation services during this hospitalization

## 2018-10-09 NOTE — PROGRESS NOTES
Pt has multpile skin issues. Please see photos.  Admitted with the following issues. Verified and assessed with Olga RN and Natalya RN    Head: R ear DTI/abrasion/ scab  Chest:: Left chest abrasion/bruising  Abdomen: intact  Right leg: hip DTi / Unstageable, black, open. Scattered bruising, rash in inner groin,  Right ankle/foot: deformity, swelling, bruising DTI   Left leg: hip DTI/ unstageable, scattered bruising  Left ankle: scab/unstageable DTI  Bilateral arms: scattered bruising/abrasiosn  Right Elbow: skin tear  Coccyx: blanchable redness   Buttox: blanchable redness  Right sacrum increased redness

## 2018-10-09 NOTE — PROGRESS NOTES
Nephrology Daily Progress Note    Date of Service  10/9/2018    Chief Complaint  63 y.o. male admitted 10/8/2018 with TENZIN, elevated CPK/rhabdo, severely depleted  With new onset of left sided weakness -found down in his appartement -states for 2 days.      Interval Problem Update  Doing better today  With IVF good UOP  Creatinine improved from 4 to 2.4  CPK from> 24519 to 8.5  On bicarb gtt  Review of Systems  Review of Systems   Constitutional: Positive for malaise/fatigue. Negative for chills and fever.   HENT: Negative for congestion, nosebleeds and sore throat.    Respiratory: Negative for cough, hemoptysis, shortness of breath and wheezing.    Cardiovascular: Negative for chest pain, palpitations, orthopnea and leg swelling.   Gastrointestinal: Negative for abdominal pain, heartburn, nausea and vomiting.   Genitourinary:        Ramirez catheter     Musculoskeletal: Negative for back pain, joint pain and myalgias.   Skin: Negative.    Neurological: Positive for weakness.   All other systems reviewed and are negative.       Physical Exam  Temp:  [35.7 °C (96.3 °F)-37.6 °C (99.7 °F)] 36.7 °C (98 °F)  Pulse:  [61-94] 77  Resp:  [11-18] 13  BP: (103)/(62) 103/62    Physical Exam   Constitutional: He is oriented to person, place, and time. He appears well-developed. No distress.   Thin male   HENT:   Head: Normocephalic and atraumatic.   Dry mucosa   Eyes: Pupils are equal, round, and reactive to light. Conjunctivae are normal. No scleral icterus.   Neck: Normal range of motion. Neck supple. No thyromegaly present.   Cardiovascular: Normal rate and regular rhythm.  Exam reveals no gallop and no friction rub.    Pulmonary/Chest: Effort normal and breath sounds normal. No respiratory distress. He has no wheezes.   Abdominal: Soft. Bowel sounds are normal. He exhibits no distension. There is no tenderness.   Musculoskeletal: He exhibits no edema.   Neurological: He is alert and oriented to person, place, and time.   Left  sided weakness   Skin: Skin is warm. No rash noted. No erythema.   Nursing note and vitals reviewed.      Fluids    Intake/Output Summary (Last 24 hours) at 10/09/18 1350  Last data filed at 10/09/18 1200   Gross per 24 hour   Intake          5513.33 ml   Output             1495 ml   Net          4018.33 ml       Laboratory  Recent Labs      10/08/18   1426  10/09/18   0450   WBC  13.8*  8.3   RBC  3.57*  2.51*   HEMOGLOBIN  12.2*  8.8*   HEMATOCRIT  36.6*  26.0*   MCV  102.5*  103.6*   MCH  34.2*  35.1*   MCHC  33.3*  33.8   RDW  51.4*  51.7*   PLATELETCT  213  167   MPV  9.8  10.2     Recent Labs      10/08/18   1426  10/09/18   0450   SODIUM  140  142   POTASSIUM  4.5  3.9   CHLORIDE  102  102   CO2  21  25   GLUCOSE  84  79   BUN  69*  51*   CREATININE  4.05*  2.41*   CALCIUM  9.2  7.9*     Recent Labs      10/08/18   1426   APTT  32.7   INR  1.40*         Recent Labs      10/09/18   0450   TRIGLYCERIDE  144   HDL  22*   LDL  54       Imaging  Renal US, CT head - reviewed    Assessment/Plan  1.TENZIN/ATN/CKD II -improving - good UOP  2.HTN: low BP under better control  3.Electrolytes: well controlled.  4.Anemia: drop in Hb level -to monitor  5.Left sided weakness/CVA -=workup pending  6.Volume:depleted -continue IVF  7.Rhabdo -CPK improving -on bicarb gtt    Recs; no need for emergent dialysis             continue current treatment,              Daily BMP, Hb, CPK             Avoid hypotension             Avoid nephrotoxic agents             Will follow

## 2018-10-09 NOTE — WOUND TEAM
"[facility]  Inpatient Services  Initial Wound and Skin Care Evaluation    Admission Date:  10/8/2018   HPI, PMH, SH: Reviewed  Unit where seen by Wound Team: [id number]    WOUND CONSULT RELATED TO: pressure injury mgmt    SUBJECTIVE:   \"I yelled for the neighbor when I could hear her.\"    Self Report / Pain Level: no c/o pain      OBJECTIVE: on NILDA bed, mepilex drsg in place, heels floated off pillows  WOUND TYPE, LOCATION, CHARACTERISTICS (Pressure ulcers: location, stage, POA or date identified)  R temporal POA DTI     Pressure Injury Stage DTI   State of Healing Closed wound edges   Site Assessment Red;Light purple   Saige-wound Assessment Edema   Margins Attached edges   Wound Length (cm) 1 cm   Wound Width (cm) 0.6 cm   Wound Surface Area (cm^2) 0.6 cm^2   Drainage Amount None   Cleansing Not Applicable   Periwound Protectant Not Applicable   Dressing Options Open to Air   Dressing Change Frequency Every Shift    Odor None    Exposed Structures None    Tissue Type and Percentage           100% purple   R Hip unstageable POA and R waist DTI     Pressure Injury Stage Unstageable    Site Assessment Brown;Red;Light purple   Saige-wound Assessment Edema   Margins Defined edges   Wound Length (cm) 6.5 cm   Wound Width (cm) 5.5 cm   Wound Surface Area (cm^2) 35.75 cm^2   Drainage Amount Scant   Drainage Description Serous   Treatments Cleansed   Cleansing Normal Saline Irrigation   Periwound Protectant Not Applicable   Dressing Options Hydrofiber;Mepilex   Dressing Cleansing/Solutions Not Applicable   Dressing Status Intact   Dressing Change Frequency Every 72 hrs   NEXT Dressing Change  10/12/18    Odor None    Exposed Structures None    Tissue Type and Percentage           80% brown/yellow 20% red/purple   Lateral R ankle, 5th MTH and 5th toe POA DTI     Pressure Injury Stage DTI   Site Assessment Light purple;Red   Saige-wound Assessment Pink   Margins Attached edges   Wound Length (cm) 1.8 cm  5th MTH " 2.5, 5th toe 1.5, medial foot 4, heel 5   Wound Width (cm) 1.2 cm  5th MTH 1.5, 5th toe 0.5, medial foot 4, heel 5   Wound Surface Area (cm^2) 2.16 cm^2   Drainage Amount None   Cleansing Not Applicable   Periwound Protectant Not Applicable   Dressing Options Open to Air   Dressing Change Frequency Every Shift   Odor None   Exposed Structures None   Tissue Type and Percentage         100% red/purple   R ear POA DTI     Pressure Injury Stage Unstageable with  Proximal and medial DTI   Site Assessment Brown;Light purple   Saige-wound Assessment Red   Margins Attached edges   Wound Length (cm) 2 cm  prox 1.3 medial 0.5   Wound Width (cm) 1 cm  prox 1.4 medial 0.5   Wound Surface Area (cm^2) 2 cm^2   Drainage Amount None   Cleansing Not Applicable   Periwound Protectant Not Applicable   Dressing Options Open to Air   Dressing Change Frequency Every Shift   Odor None   Exposed Structures None   Tissue Type and Percentage         lateral 100% brownn, prox and medial 100% red/purple   Sacrococcygeal area POA DTI     Pressure Injury Stage DTI   Site Assessment Red;Light purple   Saige-wound Assessment Intact   Margins Defined edges;Attached edges   Wound Length (cm) 8 cm   coccyx 2.3, distal 1   Wound Width (cm) 4.5 cm   coccyx 1.5, distal 0.7   Wound Surface Area (cm^2) 36 cm^2   Drainage Amount None   Periwound Protectant Not Applicable   Dressing Options Mepilex   Dressing Status Clean;Dry;Intact   Dressing Change Frequency Every 72 hrs   NEXT Dressing Change  10/12/18   Odor None    Exposed Structures None    Tissue Type and Percentage         100% red and purple   R chest POA DTI     Pressure Injury Stage DTI   Site Assessment Red;Light purple   Saige-wound Assessment Intact   Margins Defined edges   Wound Length (cm) 1.5 cm   Wound Width (cm) 8.5 cm   Wound Surface Area (cm^2) 12.75 cm^2   Drainage Amount None   Cleansing Not Applicable   Periwound Protectant Not Applicable   Dressing Options Open to Air   Dressing  Cleansing/Solutions Not Applicable   Dressing Change Frequency Every Shift   Odor None    Exposed Structures None    Tissue Type and Percentage         100% red with scant purple   POA Unstageable R elbow     Pressure Injury Stage Unstageable   State of Healing Eschar   Site Assessment Yellow;Red   Saige-wound Assessment Red   Margins Attached edges   Wound Length (cm) 2.5 cm   Wound Width (cm) 2 cm   Wound Surface Area (cm^2) 5 cm^2   Drainage Amount None   Non-staged Wound Description Not applicable   Cleansing Not Applicable   Periwound Protectant Not Applicable   Dressing Options Open to Air   Odor None    Exposed Structures None    Tissue Type and Percentage       95% yellow, 5% red/purple edges   L and R waist POA DTI     Pressure Injury Stage DTI   Site Assessment Light purple   Saige-wound Assessment [place]   Margins Attached edges;Defined edges   Wound Length (cm) 2 cm  right 1   Wound Width (cm) 7.5 cm   right 4.3   Wound Surface Area (cm^2) 15 cm^2   Drainage Amount None   Dressing Options Open to Air   Dressing Change Frequency Every Shift   Odor None    Exposed Structures None    Tissue Type and Percentage         100% red/purple   L medial ankle, foot and heel     Pressure Injury Stage DTI   Site Assessment Light purple;Tan   Saige-wound Assessment Red;Purple   Margins Defined edges   Wound Length (cm) 4.5 cm  open 2, distal 3, heel 2   Wound Width (cm) 4 cm  open 1.3, distal 1.3, heel 2.5   Wound Surface Area (cm^2) 18 cm^2   Drainage Amount None   Dressing Options Open to Air   Dressing Change Frequency Every Shift   Odor None    Exposed Structures None    Tissue Type and Percentage 70% red/purple 30% open area tan,  distal 100% purple         Vascular:  Wounds not r/t vascular problem    Lab Values:    WBC:       WBC   Date/Time Value Ref Range Status   10/09/2018 04:50 AM 8.3 4.8 - 10.8 K/uL Final     AIC:      Lab Results   Component Value Date/Time    HBA1C 5.3 10/08/2018 08:28 PM           "      Culture:  na    INTERVENTIONS BY WOUND TEAM: viewed and palpated multiple wounds on pt's skin, drsg applied to R hip. Sacrococcygeal has mepilex in place, so just peeled back assessed skin and reapplied. Pt positioned L side lying.    Hip R and sacrococcygeal -Dressing Options: Hydrofiber, Mepilex  Rest of wounds-Dressing Options: Open to Air    Interdisciplinary consultation:   With Nursing;With Patient     EVALUATION:  Pt was found down @ home, has multiple POA DTI.  R hip and R elbow already revealed as unstageable pressure injuries. Bilateral foot wounds are blistered and MILAGROS for body to form \"biological band-aid\"  Pt has POA DTI to R side of back 3.5 x 3 cm;  L axilla 5.5 x 2.5 cm.  R thumb with red area in yellow blister 0.6 x 1.2 cm. Skin over zygomatic bone on has light purplish discoloration 3 x 5.5 cm    Factors affecting wound healing: found down, HIV, decreased mobility, decreased nutrition, HTN  Goals:  Maintain intact skin until DTI reveal      NURSING PLAN OF CARE ORDERS (X):    Dressing changes: See Dressing Care orders:  x   Skin care: See Skin Care orders: x  Rectal tube care: See Rectal Tube Care orders:   Other orders:    RSKIN: CURRENT (X) ORDERED (O)  Q shift Guilherme:  X  Q shift pressure point assessments:  X  Pressure redistribution mattress        Waffle overlay  NLIDA   x   Bariatric NILDA      Bariatric foam        Heel float boots       Heels floated off pillows   x   Barrier wipes    x  Barrier Cream      Barrier paste      Sacral silicone dressing   x   Silicone O2 tubing   Pad O2 mask with foam or mepilex lite pieces  Anchorfast      Trach with Optifoam split foam       Waffle cushion      Rectal tube or BMS      Antifungal tx  o  Turn q 2 hours   x  Up to chair     Ambulate   PT/OT     Dietician    o  PO  x  TF   TPN   NPO   # days   Other     WOUND TEAM PLAN OF CARE (X):   NPWT change 3 x week:        Dressing changes by wound team:       Follow up as needed:   x    Other " (explain):    Anticipated discharge plans (X):  SNF:           Home Care:           Outpatient Wound Center:            Self Care:            Other:  tbd

## 2018-10-09 NOTE — ED NOTES
New gown & blankets applied. Shifted to R side. Call light in reach. Juice at bs. Pending bed. Will ctm.

## 2018-10-09 NOTE — ED NOTES
Started on 3rd 500cc ns bolus for hypotension per vo of admitting md. Infusing well. Pt deneis any needs.

## 2018-10-09 NOTE — THERAPY
PT consult received.  Pt awaiting imaging and neuro consult.  Will re attempt as able/appropriate once POC has been established.      Ave Ortega PT/DPT   Pager# [phone]

## 2018-10-09 NOTE — CONSULTS
Critical Care Consultation    Date of consult: 10/9/2018    Referring Physician  Chetan Haines M.D     Reason for Consultation  hypotension    History of Presenting Illness  63 y.o. male with a past medical history of hypertension, chronic kidney disease, chronic atrial fibrillation, HIV who presented 10/8/2018 to the ER after being on the ground since Saturday, the patient was attempting to get out of bed on Saturday and subsequently fell as he was severely weak in his left arm and left leg.  After falling to the ground the patient could not lift himself up and laid there until today when his neighbor heard him and called 911.  He was found with left facial droop, left upper extremity weakness and left lower extremity weakness.  In the ER a CT head was obtained which did not demonstrate any acute findings, he was also found to have severe acute kidney injury with a creatinine of 4.05 and a BUN of 69 with otherwise normal electrolytes, his AST and ALT are elevated at 354 and 183 respectively, his troponin was elevated at 2.13 and he was persistently hypotensive in the ER despite multiple fluid boluses.  According the patient he recently stopped taking his Xarelto for a surgery and has not restarted.    Code Status  Full Code    Review of Systems  Review of Systems   Constitutional: Negative for fever.   HENT: Negative for trouble swallowing.    Eyes: Negative for photophobia and visual disturbance.   Respiratory: Negative for shortness of breath and wheezing.    Cardiovascular: Negative for chest pain and palpitations.   Gastrointestinal: Negative for abdominal pain, nausea and vomiting.   Genitourinary: Positive for decreased urine volume.   Musculoskeletal: Negative for arthralgias and myalgias.   Skin: Negative for rash.   Neurological: Positive for facial asymmetry, speech difficulty, weakness and numbness. Negative for tremors, seizures and light-headedness.   Psychiatric/Behavioral: Negative for  confusion.       Past Medical History   has a past medical history of Atrial fibrillation (HCC) (2018); Cataract; HIV (human immunodeficiency virus infection) (HCC); Hypertension; and Kidney disease.    Surgical History   has a past surgical history that includes other orthopedic surgery (2008); other orthopedic surgery (2003); fibula orif (Right, 5/21/2018); ankle arthroscopy (Right, 5/21/2018); and irrigation & debridement ortho (Right, 6/4/2018).    Family History  family history includes Alzheimer's Disease in his mother; Breast Cancer in his sister; Heart Disease in his father and sister; Hypertension in his brother and father.    Social History   reports that he quit smoking about 7 months ago. His smoking use included Cigarettes. He quit after 2.00 years of use. He has never used smokeless tobacco. He reports that he drinks alcohol. He reports that he does not use drugs.    Medications  Home Medications     Reviewed by Sharron Echevarria (Pharmacy Tech) on 10/08/18 at 1625  Med List Status: Complete   Medication Last Dose Status   Abacavir-Dolutegravir-Lamivud (TRIUMEQ) 600- MG Tab 10/6/2018 Active   ALPRAZolam (XANAX) 1 MG Tab 10/6/2018 Active   clonazePAM (KLONOPIN) 1 MG Tab 10/6/2018 Active   gabapentin (NEURONTIN) 300 MG Cap 10/6/2018 Active   hydrOXYzine pamoate (VISTARIL) 50 MG Cap 10/6/2018 Active   loperamide (IMODIUM) 2 MG Cap 10/6/2018 Active   metoprolol (LOPRESSOR) 25 MG Tab 10/6/2018 Active   omeprazole (PRILOSEC) 20 MG delayed-release capsule 10/6/2018 Active   rivaroxaban (XARELTO) 20 MG Tab tablet 10/8/2018 Active   zolpidem (AMBIEN) 10 MG Tab 10/6/2018 Active              Current Facility-Administered Medications   Medication Dose Route Frequency Provider Last Rate Last Dose   • sodium acetate 150 mEq in sterile water for inj(pf) 1,000 mL infusion   Intravenous Continuous Chetan Haines M.D.   Stopped at 10/08/18 4216   • ALPRAZolam (XANAX) tablet 1 mg  1 mg Oral TID PRN Chetan REEVES  MICHOACANO Haines       • clonazePAM (KLONOPIN) tablet 1 mg  1 mg Oral BID Chetan Haines M.D.   1 mg at 10/08/18 1855   • gabapentin (NEURONTIN) capsule 300 mg  300 mg Oral BID Chetan Haines M.D.   300 mg at 10/08/18 1856   • hydrOXYzine pamoate (VISTARIL) capsule 50 mg  50 mg Oral TID PRN Chetan Haines M.D.       • loperamide (IMODIUM) capsule 2 mg  2 mg Oral TID PRN Chetan Haines M.D.   2 mg at 10/08/18 2011   • omeprazole (PRILOSEC) capsule 20 mg  20 mg Oral DAILY Chetan Haines M.D.   20 mg at 10/08/18 1856   • Pharmacy Consult Request - Allow for Permissive Hypertension   Other PRN Chetan Haines M.D.       • senna-docusate (PERICOLACE or SENOKOT S) 8.6-50 MG per tablet 2 Tab  2 Tab Oral BID Chetan Haines M.D.   2 Tab at 10/08/18 1856    And   • polyethylene glycol/lytes (MIRALAX) PACKET 1 Packet  1 Packet Oral QDAY PRN Chetan Haines M.D.        And   • magnesium hydroxide (MILK OF MAGNESIA) suspension 30 mL  30 mL Oral QDAY PRN Chetan Haines M.D.        And   • bisacodyl (DULCOLAX) suppository 10 mg  10 mg Rectal QDAY PRN Chetan Haines M.D.       • Respiratory Care per Protocol   Nebulization Continuous RT Chetan Haines M.D.       • acetaminophen (TYLENOL) tablet 650 mg  650 mg Oral Q6HRS PRN Chetan Haines M.D.       • aspirin (ASA) tablet 325 mg  325 mg Oral DAILY Chetan Haines M.D.   325 mg at 10/08/18 1855    Or   • aspirin (ASA) chewable tab 324 mg  324 mg Oral DAILY CORETTA Ho.D.        Or   • aspirin (ASA) suppository 300 mg  300 mg Rectal DAILY Chetan Haines M.D.       • ondansetron (ZOFRAN) syringe/vial injection 4 mg  4 mg Intravenous Q4HRS PRN Chetan Haines M.D.       • ondansetron (ZOFRAN ODT) dispertab 4 mg  4 mg Oral Q4HRS PRN Chetan Haines M.D.           Allergies  Allergies   Allergen Reactions   • Sulfa Drugs Itching     Rxn - 1970's         Vital Signs  last 24 hours  Temp:  [35.7 °C (96.3 °F)-36.1 °C (96.9 °F)] 35.7 °C (96.3 °F)  Pulse:  [61-94] 71  Resp:  [16-18] 16    Physical Exam  Physical Exam   Constitutional: He is oriented to person, place, and time. He appears well-developed and well-nourished.   HENT:   Right Ear: External ear normal.   Left Ear: External ear normal.   Mouth/Throat: Oropharynx is clear and moist.   Eyes: Pupils are equal, round, and reactive to light. Conjunctivae and EOM are normal. No scleral icterus.   Neck: Neck supple. No JVD present. No tracheal deviation present.   Cardiovascular: Normal rate, regular rhythm and intact distal pulses.    Pulmonary/Chest: Effort normal and breath sounds normal. No respiratory distress.   Abdominal: Soft. Bowel sounds are normal. He exhibits no distension.   Musculoskeletal: Normal range of motion. He exhibits no edema or tenderness.   Neurological: He is alert and oriented to person, place, and time. No cranial nerve deficit. He exhibits normal muscle tone. Coordination normal.   2/5 LUE and 4/5 LLE strength, left facial droop and dysarthria.  Light touch sensation decreased on left   Skin: Skin is warm and dry.   Multiple skin wounds at various sizes and various stages of healing, see nursing documentation for further details.   Psychiatric: He has a normal mood and affect.   Nursing note and vitals reviewed.      Fluids    Intake/Output Summary (Last 24 hours) at 10/09/18 0133  Last data filed at 10/08/18 2322   Gross per 24 hour   Intake             3500 ml   Output              100 ml   Net             3400 ml       Laboratory  Recent Results (from the past 48 hour(s))   ACCU-CHEK GLUCOSE    Collection Time: 10/08/18  2:24 PM   Result Value Ref Range    Glucose - Accu-Ck 91 65 - 99 mg/dL   CBC WITH DIFFERENTIAL    Collection Time: 10/08/18  2:26 PM   Result Value Ref Range    WBC 13.8 (H) 4.8 - 10.8 K/uL    RBC 3.57 (L) 4.70 - 6.10 M/uL    Hemoglobin 12.2 (L) 14.0 - 18.0 g/dL    Hematocrit 36.6  (L) 42.0 - 52.0 %    .5 (H) 81.4 - 97.8 fL    MCH 34.2 (H) 27.0 - 33.0 pg    MCHC 33.3 (L) 33.7 - 35.3 g/dL    RDW 51.4 (H) 35.9 - 50.0 fL    Platelet Count 213 164 - 446 K/uL    MPV 9.8 9.0 - 12.9 fL    Neutrophils-Polys 88.80 (H) 44.00 - 72.00 %    Lymphocytes 3.50 (L) 22.00 - 41.00 %    Monocytes 7.10 0.00 - 13.40 %    Eosinophils 0.00 0.00 - 6.90 %    Basophils 0.10 0.00 - 1.80 %    Immature Granulocytes 0.50 0.00 - 0.90 %    Nucleated RBC 0.00 /100 WBC    Neutrophils (Absolute) 12.21 (H) 1.82 - 7.42 K/uL    Lymphs (Absolute) 0.48 (L) 1.00 - 4.80 K/uL    Monos (Absolute) 0.97 (H) 0.00 - 0.85 K/uL    Eos (Absolute) 0.00 0.00 - 0.51 K/uL    Baso (Absolute) 0.02 0.00 - 0.12 K/uL    Immature Granulocytes (abs) 0.07 0.00 - 0.11 K/uL    NRBC (Absolute) 0.00 K/uL   COMP METABOLIC PANEL    Collection Time: 10/08/18  2:26 PM   Result Value Ref Range    Sodium 140 135 - 145 mmol/L    Potassium 4.5 3.6 - 5.5 mmol/L    Chloride 102 96 - 112 mmol/L    Co2 21 20 - 33 mmol/L    Anion Gap 17.0 (H) 0.0 - 11.9    Glucose 84 65 - 99 mg/dL    Bun 69 (H) 8 - 22 mg/dL    Creatinine 4.05 (H) 0.50 - 1.40 mg/dL    Calcium 9.2 8.5 - 10.5 mg/dL    AST(SGOT) 354 (H) 12 - 45 U/L    ALT(SGPT) 183 (H) 2 - 50 U/L    Alkaline Phosphatase 75 30 - 99 U/L    Total Bilirubin 0.7 0.1 - 1.5 mg/dL    Albumin 3.2 3.2 - 4.9 g/dL    Total Protein 6.7 6.0 - 8.2 g/dL    Globulin 3.5 1.9 - 3.5 g/dL    A-G Ratio 0.9 g/dL   PROTHROMBIN TIME    Collection Time: 10/08/18  2:26 PM   Result Value Ref Range    PT 17.3 (H) 12.0 - 14.6 sec    INR 1.40 (H) 0.87 - 1.13   APTT    Collection Time: 10/08/18  2:26 PM   Result Value Ref Range    APTT 32.7 24.7 - 36.0 sec   COD (ADULT)    Collection Time: 10/08/18  2:26 PM   Result Value Ref Range    ABO Grouping Only A     Rh Grouping Only NEG     Antibody Screen-Cod NEG    TROPONIN    Collection Time: 10/08/18  2:26 PM   Result Value Ref Range    Troponin I 2.13 (H) 0.00 - 0.04 ng/mL   CREATINE KINASE    Collection  Time: 10/08/18  2:26 PM   Result Value Ref Range    CPK Total >67580 (HH) 0 - 154 U/L   ESTIMATED GFR    Collection Time: 10/08/18  2:26 PM   Result Value Ref Range    GFR If  18 (A) >60 mL/min/1.73 m 2    GFR If Non African American 15 (A) >60 mL/min/1.73 m 2   TSH (Thyroid Stimulating Hormone)    Collection Time: 10/08/18  2:26 PM   Result Value Ref Range    TSH 3.000 0.380 - 5.330 uIU/mL   Magnesium    Collection Time: 10/08/18  2:26 PM   Result Value Ref Range    Magnesium 2.8 (H) 1.5 - 2.5 mg/dL   EKG (NOW)    Collection Time: 10/08/18  3:51 PM   Result Value Ref Range    Report       [facility] Emergency Dept.    Test Date:  2018-10-08  Pt Name:    ETTA LUZ                Department: ER  MRN:        [id number]                      Room:       [facility]  Gender:     Male                         Technician: [id number]  :        [id number]                   Requested By:ER TRIAGE PROTOCOL  Order #:    [id number]                    Reading MD:    Measurements  Intervals                                Axis  Rate:       90                           P:          65  CT:         140                          QRS:        47  QRSD:       100                          T:          59  QT:         412  QTc:        504    Interpretive Statements  SINUS RHYTHM  LEFT ATRIAL ABNORMALITY  LOW VOLTAGE IN FRONTAL LEADS  RSR' IN V1 OR V2, PROBABLY NORMAL VARIANT  PROLONGED QT INTERVAL  Compared to ECG 2018 07:10:39  Atrial abnormality now present  Low QRS voltage now present  RSR' in V1 or V2 now present  Prolonged QT interval now present     URINALYSIS CULTURE, IF INDICATED    Collection Time: 10/08/18  4:50 PM   Result Value Ref Range    Color Yellow     Character Clear     Specific Gravity 1.018 <1.035    Ph 5.5 5.0 - 8.0    Glucose Negative Negative mg/dL    Ketones 15 (A) Negative mg/dL    Protein 100 (A) Negative mg/dL    Bilirubin Negative Negative    Urobilinogen, Urine 0.2 Negative     Nitrite Negative Negative    Leukocyte Esterase Negative Negative    Occult Blood Small (A) Negative    Micro Urine Req Microscopic    URINE MICROSCOPIC (W/UA)    Collection Time: 10/08/18  4:50 PM   Result Value Ref Range    WBC 0-2 (A) /hpf    RBC 0-2 (A) /hpf    Bacteria Negative None /hpf    Epithelial Cells Negative /hpf    Amorphous Crystal Present /hpf    Hyaline Cast 0-2 /lpf   ABO AND RH CONFIRMATION    Collection Time: 10/08/18  8:28 PM   Result Value Ref Range    ABO Confirm A     Second Rh Group NEG    Hemoglobin A1c    Collection Time: 10/08/18  8:28 PM   Result Value Ref Range    Glycohemoglobin 5.3 0.0 - 5.6 %    Est Avg Glucose 105 mg/dL   Troponin-I    Collection Time: 10/08/18  9:39 PM   Result Value Ref Range    Troponin I 1.64 (H) 0.00 - 0.04 ng/mL       Imaging  CT-HEAD W/O   Final Result      1.  Cerebral atrophy.      2.  White matter lucencies most consistent with small vessel ischemic change versus demyelination or gliosis.      3.  Otherwise, Head CT without contrast with no acute findings. No evidence of acute cerebral  hemorrhage or mass lesion.      DX-CHEST-PORTABLE (1 VIEW)   Final Result      Lung base atelectasis.      EC-ECHOCARDIOGRAM COMPLETE W/O CONT    (Results Pending)   US-CAROTID DOPPLER BILAT    (Results Pending)   MR-BRAIN-W/O    (Results Pending)       Assessment/Plan  * Rhabdomyolysis- (present on admission)   Assessment & Plan    Nontraumatic, secondary to laying on the floor for 2 days  Alkalinize urine with sodium acetate  Aggressive fluid resuscitation to maintain adequate urine output.  Trend CPKs        TENZIN (acute kidney injury) (HCC)- (present on admission)   Assessment & Plan    2/2 rhabdomyolysis  Continue sodium acetate to alkalinize urine, maintain normal blood pressure  IVF  Renal dose meds, avoid nephrotoxins  Strict I/Os  Follow renal function  Nephrology on board        Essential hypertension- (present on admission)   Assessment & Plan    Currently  borderline hypotensive  Hold antihypertensives until hemodynamically stable        Multiple wounds of skin- (present on admission)   Assessment & Plan    Wound team consult  Secondary to inactivity for 2 days        Elevated troponin- (present on admission)   Assessment & Plan    Likely secondary to acute kidney injury  Patient denies chest pain, palpitations, shortness of breath        Elevated liver function tests- (present on admission)   Assessment & Plan    ?  Hypotension vs rhabdo  Trend  Monitor for hypoglycemia and coagulopathies        Leukocytosis- (present on admission)   Assessment & Plan    Likely reactive  No clear signs of infection  Monitor off antibiotics        Left-sided weakness- (present on admission)   Assessment & Plan    Likely right hemispheric CVA  Negative CT  No CTA performed due to acute kidney injury and patient being outside of window for TPA and neuro intervention  Neurology consult in a.m.  Admit to ICU, cardiac monitoring  Neuro checks q2 hrs  MRI, Echo pending  Carotid Dopplers  seizure, aspiration, and fall precautions   NPO pending dysphagia screen  SLP/PT/OT evaluation  lipid panel, HbA1c, Troponin, Coags, routine labs  ASA/Heparin 24 hours post tPA, Atorvastatin  Goal SBP <220 mmHg; hydralazine, labetalol nicardipine as needed          Chronic atrial fibrillation (HCC)- (present on admission)   Assessment & Plan    Off of Xarelto for several days, now with stroke symptoms  Restart anticoagulation when able continue Lopressor 25 mg twice daily          HIV (human immunodeficiency virus infection) (HCC)- (present on admission)   Assessment & Plan    ID or pharmacy consult in a.m. to dose adjust antiretroviral medications for acute kidney injury            Discussed patient condition and risk of morbidity and/or mortality with RN, RT, Pharmacy, Charge nurse / hot rounds and Patient.    The patient remains critically ill.  Critical care time = 55 minutes in directly providing and  coordinating critical care and extensive data review.  No time overlap and excludes procedures.

## 2018-10-09 NOTE — ED NOTES
md contacted for persistent hypotension. States will put in orders for continuous fluids & change admission to icu.

## 2018-10-09 NOTE — ED NOTES
Admitting md aware of hypotension post 500cc bolus. vo for 2nd 500cc ns bolus. Infusing well. 16f hagan cath placed c sterile tech. Pt tolerated well. ~100cc clear yellow urine return. Pt denies any pains. Call light inreach. Will ctm.

## 2018-10-09 NOTE — ED NOTES
Pt hypotensive. Asymptomatic. md aware. 500cc nsb ordered & started. Lab at bs for abo. Will ctm.

## 2018-10-09 NOTE — PROGRESS NOTES
Mulu from Lab called with critical result of Troponin 1.13 at 0742. Critical lab result read back to Mulu.   Dr. Gillespie notified of critical lab result at 0742.  Critical lab result read back by Dr. Gillespie.

## 2018-10-09 NOTE — PROGRESS NOTES
mihaela from Lab called with critical result of ck of 9590 at 0643. Critical lab result read back to mihaela.   Dr. garcia not notified of critical lab as patient is trending down as expected and is an improvement from [id number]

## 2018-10-09 NOTE — THERAPY
OT orders received. Pt awaiting imaging and neuro consult. Will hold OT eval until POC is established.    Rosy Navarrete, OTR/L  Pager: [phone]

## 2018-10-09 NOTE — ASSESSMENT & PLAN NOTE
Started 2 days prior to admit. CT head was negative.  MRI brain reveals bilateral acute strokes.  He was not a candidate for alteplase due to timing.   Echocardiogram negative for clot.   Therapies ordered.

## 2018-10-09 NOTE — ASSESSMENT & PLAN NOTE
Was not tpa candidate  Ct head 10/9 unrevealing  MRI Brain with multifocal infarct - clinically related to A Fib off AC  Pt/ot/speechp  ASA/Statin  Will need rehab eventually

## 2018-10-09 NOTE — ED NOTES
Took pt's home meds to pharmacy for lockup; confirmation slip placed in pt's chart.   Pt sleeping on and off, and is drinking water. Provided w/ more blankets for comfort. No other needs at this time.

## 2018-10-09 NOTE — ASSESSMENT & PLAN NOTE
2ndary to Rhabdo  CPK continues trending down  Renally dose meds, minimize nephrotoxic substances  Follow UO  Continue ivf

## 2018-10-09 NOTE — PROGRESS NOTES
This is in addition to Dr Correa's note earlier today.    Pt remains encephalopathic and lethargic, urine op improving on ivf w sodium acetate @ 200cc/hr. CPK remains quite elevated but trending down, cr showing signs of improvement as well as down trend in liver enzymes. Pt to continue with serial neurologic examinations, aspiration/sz precautions. Continue hagan with continuous/strict Is/Os. Trop likely related to rhabdo and down trending 1.6->1.1. Will need further cva work up with imaging, will need to resume AC when clinically appropriate post cva. Pt to restart HAART medications.      Additional critical care time, not including billable procedures, 25 minutes.

## 2018-10-09 NOTE — CONSULTS
DATE OF SERVICE:  10/08/2018    NEPHROLOGY CONSULTATION    CONSULTATION AT THE REQUEST OF:  Dr. Ulices Acosta.    REASON FOR CONSULTATION:  To evaluate patient with acute kidney injury,   rhabdomyolysis.    HISTORY OF PRESENT ILLNESS:  The patient is a 63-year-old male with history of   hypertension, chronic kidney disease stage II with a creatinine baseline of   0.95 who presented to the emergency room with left-sided weakness, found to be   on the floor for 2 days, states that it was unable to get out of the bed on   Saturday and fell.  He found was on charge, not able to communicate with   anybody and finally his neighbor found him 2 days later.  Upon evaluation in   the emergency room, found to be in acute kidney injury with creatinine level   up to 4.05 and CPK more than 16,000.  Currently, the patient is complaining of   severe mouth dryness, otherwise no headache.  No fever or chills.  No chest   pain or shortness of breath.  States not having difficulties to urinate.    REVIEW OF SYSTEMS:  GENERAL:  Positive for weakness, fatigue.  No fever or chills.  HEENT:  No nose bleeds.  No sinus congestion.  No sore throat.  Positive for   dry mouth.  NECK:  No pain, no stiffness.  RESPIRATORY:  No cough, no wheezes, no hemoptysis.  No shortness of breath.  CARDIOVASCULAR:  No dyspnea, no edema, no chest pain.  GASTROINTESTINAL:  No abdominal pain.  No nausea, vomiting, diarrhea.  GENITOURINARY:  No dysuria, no hematuria, no flank pain.  MUSCULOSKELETAL:  Positive for muscle aching, weakness.  SKIN:  Positive for back pain.  NEUROLOGICAL:  Positive for left-sided weakness.    All other systems are negative.    PAST MEDICAL HISTORY:  Atrial fibrillation, cataract, HIV, hypertension,   chronic kidney disease stage II.    PAST SURGICAL HISTORY:  Ankle surgery.    OUTPATIENT MEDICATIONS:  Xarelto, Flomax, Prilosec, Lopressor, Imodium,   Neurontin, B complex, Triumeq, which is abacavir, dolutegravir, and    lamivudine.    ALLERGIES:  ALLERGIC TO SULFA DRUGS.    FAMILY HISTORY:  Alzheimer's disease, heart disease, pacemaker, hypertension,   breast cancer.    SOCIAL HISTORY:  Used to smoke tobacco, quit in 02/2018 drinks daily hard   liquor.  No drugs.    PHYSICAL EXAMINATION:  VITAL SIGNS:  Blood pressure 138/85, pulse 94, temperature 96.1 Celsius.  GENERAL APPEARANCE:  Well-developed, thin male, in no acute distress.  HEENT:  Normocephalic, atraumatic.  Pupils are reacting to light.  Extraocular   movements intact.  Nares patent.  Oropharynx, dry mucosa, no erythema or   exudate.  NECK:  Supple, no lymphadenopathy, no thyromegaly appreciated.  LUNGS:  Clear to auscultation bilaterally.  No rales, wheezes, or rhonchi.  HEART:  Regular rate.  No rub or gallop.  ABDOMEN:  Soft, nontender, nondistended.  Bowel sounds present.  No   hepatosplenomegaly appreciated.  EXTREMITIES:  No edema.  No cyanosis.  Distal pulses intact.  NEUROLOGIC:  Patient is alert and oriented x3.  There is a left upper and   lower extremity paresis.    Noticed facial droop on the left side.    LABORATORY RESULTS:  White blood count 13.8, hemoglobin 12.2.  platelets 213.  BUN 69, creatinine 4.0, potassium 4.5, CO2 21.  CPK more than 16,000.    ASSESSMENT AND PLAN:  The patient is a 63-year-old male with acute kidney   injury secondary to rhabdomyolysis from being down for 2 days with new   left-sided paresis.  1.  Acute kidney injury.  Continue bicarbonate drip.  2.  No need for emergent dialysis; however, if no improvement, we may need to   start renal replacement therapy.  3.  Electrolytes remains well controlled.  4.  Rhabdomyolysis.  Continue bicarbonate drip.  5.  Hypertension.  Blood pressure remains well controlled.  We would avoid ACE   inhibitor, ARB at present time when kidney function unstable.  6.  Volume.  Continue IV fluids.    RECOMMENDATIONS:  1.  No need for emergent dialysis at the present time.  Continue bicarbonate   drip at  200 mL per hour.  2.  Daily monitoring of basic metabolic panel, CPK.  3.  Place a Ramirez catheter for strict monitoring of urine output.  4.  We will follow the patient closely.    Thank you for the consult.       ____________________________________     MD FRANCISCO HERMOSILLO / TAYLOR    DD:  10/08/2018 17:47:55  DT:  10/08/2018 20:23:50    D#:  [id number]  Job#:  [id number]

## 2018-10-10 ENCOUNTER — APPOINTMENT (OUTPATIENT)
Dept: RADIOLOGY | Facility: MEDICAL CENTER | Age: 63
DRG: 064 | End: 2018-10-10
Attending: HOSPITALIST
Payer: MEDICARE

## 2018-10-10 ENCOUNTER — APPOINTMENT (OUTPATIENT)
Dept: CARDIOLOGY | Facility: MEDICAL CENTER | Age: 63
DRG: 064 | End: 2018-10-10
Attending: HOSPITALIST
Payer: MEDICARE

## 2018-10-10 PROBLEM — E43 PROTEIN-CALORIE MALNUTRITION, SEVERE (HCC): Status: ACTIVE | Noted: 2018-10-10

## 2018-10-10 PROBLEM — I63.9 CEREBROVASCULAR ACCIDENT (CVA) DUE TO EMBOLISM (HCC): Status: ACTIVE | Noted: 2018-10-10

## 2018-10-10 LAB
ALBUMIN SERPL BCP-MCNC: 2.6 G/DL (ref 3.2–4.9)
ALBUMIN/GLOB SERPL: 1 G/DL
ALP SERPL-CCNC: 57 U/L (ref 30–99)
ALT SERPL-CCNC: 92 U/L (ref 2–50)
ANION GAP SERPL CALC-SCNC: 7 MMOL/L (ref 0–11.9)
AST SERPL-CCNC: 95 U/L (ref 12–45)
BASOPHILS # BLD AUTO: 0.1 % (ref 0–1.8)
BASOPHILS # BLD: 0.01 K/UL (ref 0–0.12)
BILIRUB SERPL-MCNC: 0.6 MG/DL (ref 0.1–1.5)
BUN SERPL-MCNC: 26 MG/DL (ref 8–22)
CALCIUM SERPL-MCNC: 8.2 MG/DL (ref 8.5–10.5)
CHLORIDE SERPL-SCNC: 94 MMOL/L (ref 96–112)
CK SERPL-CCNC: 3892 U/L (ref 0–154)
CO2 SERPL-SCNC: 36 MMOL/L (ref 20–33)
CREAT SERPL-MCNC: 1.44 MG/DL (ref 0.5–1.4)
EOSINOPHIL # BLD AUTO: 0.02 K/UL (ref 0–0.51)
EOSINOPHIL NFR BLD: 0.2 % (ref 0–6.9)
ERYTHROCYTE [DISTWIDTH] IN BLOOD BY AUTOMATED COUNT: 50 FL (ref 35.9–50)
GLOBULIN SER CALC-MCNC: 2.6 G/DL (ref 1.9–3.5)
GLUCOSE SERPL-MCNC: 131 MG/DL (ref 65–99)
HCT VFR BLD AUTO: 30.2 % (ref 42–52)
HGB BLD-MCNC: 10 G/DL (ref 14–18)
IMM GRANULOCYTES # BLD AUTO: 0.02 K/UL (ref 0–0.11)
IMM GRANULOCYTES NFR BLD AUTO: 0.2 % (ref 0–0.9)
LV EJECT FRACT  99904: 65
LV EJECT FRACT MOD 2C 99903: 60.25
LV EJECT FRACT MOD 4C 99902: 57.59
LV EJECT FRACT MOD BP 99901: 59.46
LYMPHOCYTES # BLD AUTO: 0.69 K/UL (ref 1–4.8)
LYMPHOCYTES NFR BLD: 7.7 % (ref 22–41)
MAGNESIUM SERPL-MCNC: 1.7 MG/DL (ref 1.5–2.5)
MCH RBC QN AUTO: 34.4 PG (ref 27–33)
MCHC RBC AUTO-ENTMCNC: 33.1 G/DL (ref 33.7–35.3)
MCV RBC AUTO: 103.8 FL (ref 81.4–97.8)
MONOCYTES # BLD AUTO: 0.97 K/UL (ref 0–0.85)
MONOCYTES NFR BLD AUTO: 10.8 % (ref 0–13.4)
NEUTROPHILS # BLD AUTO: 7.24 K/UL (ref 1.82–7.42)
NEUTROPHILS NFR BLD: 81 % (ref 44–72)
NRBC # BLD AUTO: 0 K/UL
NRBC BLD-RTO: 0 /100 WBC
PLATELET # BLD AUTO: 177 K/UL (ref 164–446)
PMV BLD AUTO: 9.7 FL (ref 9–12.9)
POTASSIUM SERPL-SCNC: 3.2 MMOL/L (ref 3.6–5.5)
PROT SERPL-MCNC: 5.2 G/DL (ref 6–8.2)
RBC # BLD AUTO: 2.91 M/UL (ref 4.7–6.1)
SODIUM SERPL-SCNC: 137 MMOL/L (ref 135–145)
WBC # BLD AUTO: 9 K/UL (ref 4.8–10.8)

## 2018-10-10 PROCEDURE — 700102 HCHG RX REV CODE 250 W/ 637 OVERRIDE(OP): Performed by: HOSPITALIST

## 2018-10-10 PROCEDURE — 99221 1ST HOSP IP/OBS SF/LOW 40: CPT | Performed by: PSYCHIATRY & NEUROLOGY

## 2018-10-10 PROCEDURE — 99233 SBSQ HOSP IP/OBS HIGH 50: CPT | Performed by: INTERNAL MEDICINE

## 2018-10-10 PROCEDURE — 700101 HCHG RX REV CODE 250: Performed by: HOSPITALIST

## 2018-10-10 PROCEDURE — 83735 ASSAY OF MAGNESIUM: CPT

## 2018-10-10 PROCEDURE — 93880 EXTRACRANIAL BILAT STUDY: CPT

## 2018-10-10 PROCEDURE — 93306 TTE W/DOPPLER COMPLETE: CPT | Mod: 26 | Performed by: INTERNAL MEDICINE

## 2018-10-10 PROCEDURE — A9270 NON-COVERED ITEM OR SERVICE: HCPCS | Performed by: HOSPITALIST

## 2018-10-10 PROCEDURE — 85025 COMPLETE CBC W/AUTO DIFF WBC: CPT

## 2018-10-10 PROCEDURE — 93306 TTE W/DOPPLER COMPLETE: CPT

## 2018-10-10 PROCEDURE — 82550 ASSAY OF CK (CPK): CPT

## 2018-10-10 PROCEDURE — 70551 MRI BRAIN STEM W/O DYE: CPT

## 2018-10-10 PROCEDURE — 700111 HCHG RX REV CODE 636 W/ 250 OVERRIDE (IP): Performed by: HOSPITALIST

## 2018-10-10 PROCEDURE — 700111 HCHG RX REV CODE 636 W/ 250 OVERRIDE (IP): Performed by: INTERNAL MEDICINE

## 2018-10-10 PROCEDURE — 93880 EXTRACRANIAL BILAT STUDY: CPT | Mod: 26 | Performed by: SURGERY

## 2018-10-10 PROCEDURE — 80053 COMPREHEN METABOLIC PANEL: CPT

## 2018-10-10 PROCEDURE — 770022 HCHG ROOM/CARE - ICU (200)

## 2018-10-10 PROCEDURE — 99233 SBSQ HOSP IP/OBS HIGH 50: CPT | Performed by: HOSPITALIST

## 2018-10-10 RX ORDER — SODIUM CHLORIDE AND POTASSIUM CHLORIDE 150; 900 MG/100ML; MG/100ML
INJECTION, SOLUTION INTRAVENOUS CONTINUOUS
Status: DISCONTINUED | OUTPATIENT
Start: 2018-10-10 | End: 2018-10-15

## 2018-10-10 RX ORDER — ATORVASTATIN CALCIUM 40 MG/1
40 TABLET, FILM COATED ORAL EVERY EVENING
Status: DISCONTINUED | OUTPATIENT
Start: 2018-10-10 | End: 2018-10-12

## 2018-10-10 RX ORDER — MAGNESIUM SULFATE HEPTAHYDRATE 40 MG/ML
2 INJECTION, SOLUTION INTRAVENOUS ONCE
Status: COMPLETED | OUTPATIENT
Start: 2018-10-10 | End: 2018-10-10

## 2018-10-10 RX ADMIN — ATORVASTATIN CALCIUM 40 MG: 40 TABLET, FILM COATED ORAL at 17:20

## 2018-10-10 RX ADMIN — SODIUM ACETATE: 3.28 INJECTION, SOLUTION, CONCENTRATE INTRAVENOUS at 03:01

## 2018-10-10 RX ADMIN — MICONAZOLE NITRATE: 20 CREAM TOPICAL at 05:31

## 2018-10-10 RX ADMIN — ACETAMINOPHEN 650 MG: 325 TABLET, FILM COATED ORAL at 03:41

## 2018-10-10 RX ADMIN — ASPIRIN 325 MG: 325 TABLET, COATED ORAL at 05:30

## 2018-10-10 RX ADMIN — DOLUTEGRAVIR SODIUM 50 MG: 50 TABLET, FILM COATED ORAL at 05:30

## 2018-10-10 RX ADMIN — MICONAZOLE NITRATE: 20 CREAM TOPICAL at 17:13

## 2018-10-10 RX ADMIN — ABACAVIR 600 MG: 300 TABLET, FILM COATED ORAL at 05:30

## 2018-10-10 RX ADMIN — POTASSIUM CHLORIDE AND SODIUM CHLORIDE: 900; 150 INJECTION, SOLUTION INTRAVENOUS at 17:30

## 2018-10-10 RX ADMIN — GABAPENTIN 300 MG: 300 CAPSULE ORAL at 17:13

## 2018-10-10 RX ADMIN — OMEPRAZOLE 20 MG: 20 CAPSULE, DELAYED RELEASE ORAL at 05:30

## 2018-10-10 RX ADMIN — CLONAZEPAM 1 MG: 1 TABLET ORAL at 17:13

## 2018-10-10 RX ADMIN — GABAPENTIN 300 MG: 300 CAPSULE ORAL at 05:30

## 2018-10-10 RX ADMIN — LAMIVUDINE 300 MG: 150 TABLET, FILM COATED ORAL at 05:30

## 2018-10-10 RX ADMIN — MAGNESIUM SULFATE HEPTAHYDRATE 2 G: 40 INJECTION, SOLUTION INTRAVENOUS at 16:33

## 2018-10-10 RX ADMIN — CLONAZEPAM 1 MG: 1 TABLET ORAL at 05:30

## 2018-10-10 ASSESSMENT — ENCOUNTER SYMPTOMS
MYALGIAS: 0
VOMITING: 0
FEVER: 0
PALPITATIONS: 0
NAUSEA: 0
COUGH: 0
CHILLS: 0
FATIGUE: 1
BLURRED VISION: 0
WEAKNESS: 1
HEADACHES: 0
SORE THROAT: 0
ORTHOPNEA: 0
WHEEZING: 0
AGITATION: 0
BACK PAIN: 0
SHORTNESS OF BREATH: 0
CHEST TIGHTNESS: 0
COUGH: 1
HEMOPTYSIS: 0
ABDOMINAL PAIN: 0
HEARTBURN: 0

## 2018-10-10 ASSESSMENT — PAIN SCALES - GENERAL
PAINLEVEL_OUTOF10: 3
PAINLEVEL_OUTOF10: 2
PAINLEVEL_OUTOF10: 8
PAINLEVEL_OUTOF10: 4
PAINLEVEL_OUTOF10: 2

## 2018-10-10 ASSESSMENT — PATIENT HEALTH QUESTIONNAIRE - PHQ9
2. FEELING DOWN, DEPRESSED, IRRITABLE, OR HOPELESS: NOT AT ALL
1. LITTLE INTEREST OR PLEASURE IN DOING THINGS: NOT AT ALL
SUM OF ALL RESPONSES TO PHQ9 QUESTIONS 1 AND 2: 0

## 2018-10-10 NOTE — PROGRESS NOTES
mihaela from Lab called with critical result of cpk at 0640 for 3892  Critical lab result not reported as it is trending down

## 2018-10-10 NOTE — PROGRESS NOTES
[facility] Hospitalist Progress Note    Date of Service: 10/10/2018    Chief Complaint  63 y.o. male admitted 10/8/2018 with left-sided weakness.    Interval Problem Update  Mr. Bolton has a history of atrial fibrillation, HTN, and HIV that woke up two days prior to presentation with left sided weakness and fell to the floor. He was on the floor for 2 days until a neighbor found him and called 911. He was found to have acute kidney failure and rhabdomyolysis. His Xarelto had been held due to possible left leg surgery.     MRI is pending today. His speech remains slurred and weak on the left side. Mr. Bolton denies pain.   1,600 ml urine over night. He is on 4 liters nasal cannula oxygen.   His left arm remains very weak. We discussed his very low weight which he attributes to HIV meds since .   Consultants/Specialty  Critical Care. I discussed with Dr. Gillespie on ICU Hot Rounds.  Nephrology   Disposition  ICU        Review of Systems   Constitutional: Negative for chills and fever.   Eyes: Negative for blurred vision.   Respiratory: Negative for cough and shortness of breath.    Cardiovascular: Negative for chest pain.   Gastrointestinal: Negative for nausea and vomiting.        Swallowing well   Neurological: Positive for weakness.        Left arm is weak   All other systems reviewed and are negative.     Physical Exam  Laboratory/Imaging   Hemodynamics  Temp (24hrs), Av.9 °C (98.5 °F), Min:36.4 °C (97.6 °F), Max:37.3 °C (99.1 °F)   Temperature: 36.6 °C (97.9 °F)  Pulse  Av  Min: 61  Max: 94 Heart Rate (Monitored): 84  NIBP: 110/66      Respiratory      Respiration: 17, Pulse Oximetry: 93 %        RUL Breath Sounds: Clear, RML Breath Sounds: Clear, RLL Breath Sounds: Diminished, DONI Breath Sounds: Clear, LLL Breath Sounds: Diminished    Fluids    Intake/Output Summary (Last 24 hours) at 10/10/18 0740  Last data filed at 10/10/18 0600   Gross per 24 hour   Intake             5200 ml   Output             2830  ml   Net             2370 ml       Nutrition  Orders Placed This Encounter   Procedures   • Diet Order Full Liquid (Crush meds in applesauce/pudding please)     Standing Status:   Standing     Number of Occurrences:   1     Order Specific Question:   Diet:     Answer:   Full Liquid [11]     Comments:   Crush meds in applesauce/pudding please     Order Specific Question:   Consistency/Fluid modifications:     Answer:   Nectar Thick [2]     Order Specific Question:   Miscellaneous modifications:     Answer:   SLP - 1:1 Supervision by Nursing [21]     Physical Exam   Constitutional: No distress.   HENT:   Temporal wasting   Neck: Normal range of motion. Neck supple.   Cardiovascular: Normal rate.    No murmur heard.  Pulmonary/Chest: Effort normal. No respiratory distress. He has no wheezes.   Abdominal: Soft. He exhibits no distension. There is no tenderness.   Musculoskeletal: He exhibits no edema.   Poor muscle tone quads  No swelling   Neurological:   Awake and alert  Speech is intelligible   Weak 4/5 both legs but symmetric  He is not able to flex the left arm and get the hand off the bed. Left hand  2/5   Skin: Skin is warm and dry. He is not diaphoretic.   Nursing note and vitals reviewed.      Recent Labs      10/08/18   1426  10/09/18   0450  10/10/18   0520   WBC  13.8*  8.3  9.0   RBC  3.57*  2.51*  2.91*   HEMOGLOBIN  12.2*  8.8*  10.0*   HEMATOCRIT  36.6*  26.0*  30.2*   MCV  102.5*  103.6*  103.8*   MCH  34.2*  35.1*  34.4*   MCHC  33.3*  33.8  33.1*   RDW  51.4*  51.7*  50.0   PLATELETCT  213  167  177   MPV  9.8  10.2  9.7     Recent Labs      10/09/18   0450  10/09/18   1748  10/10/18   0520   SODIUM  142  140  137   POTASSIUM  3.9  3.6  3.2*   CHLORIDE  102  98  94*   CO2  25  32  36*   GLUCOSE  79  82  131*   BUN  51*  38*  26*   CREATININE  2.41*  1.80*  1.44*   CALCIUM  7.9*  8.3*  8.2*     Recent Labs      10/08/18   1426   APTT  32.7   INR  1.40*         Recent Labs      10/09/18   0450    TRIGLYCERIDE  144   HDL  22*   LDL  54          Assessment/Plan     * Cerebrovascular accident (CVA) due to embolism (HCC)- (present on admission)   Assessment & Plan    Started 2 days prior to admit. CT head was negative.  MRI brain reveals bilateral acute strokes.  He was not a candidate for alteplase due to timing.   Echocardiogram negative for clot.   Therapies ordered.  Neurology consult due to MRI findings.   Likely due to afib off of Xarelto.  Start statin        TENZIN (acute kidney injury) (HCC)- (present on admission)   Assessment & Plan    Secondary to rhabdomyolysis.  Change bicarb drip to NS + 20 mEq  K at 125  BMP ordered for the morning.         Protein-calorie malnutrition, severe (HCC)- (present on admission)   Assessment & Plan    This is a clinical diagnosis in the setting of a wasting syndrome either from HIV or his medications.  BMI is 19        Elevated troponin- (present on admission)   Assessment & Plan    Likely due to rhabdomyolysis, acute kidney failure, denies any chest pain, troponins went up to 2.13 and trended down        Elevated liver function tests- (present on admission)   Assessment & Plan    Likely due to rhabdomyolysis, continue trending liver function test, denies any right upper quadrant pain.  A statin was contraindicated due to transaminitis but can now be started.         Leukocytosis- (present on admission)   Assessment & Plan    Likely reactive, no signs of infection.  WBC was 13.8 on admit and normalized        Rhabdomyolysis- (present on admission)   Assessment & Plan    Secondary to being down for 2 days, nephrology consulted, bicarb drip, continue trending CPK, and BMP.        Chronic atrial fibrillation (HCC)- (present on admission)   Assessment & Plan    Holding metoprolol due to hypotension, patient was on Xarelto but it was held due to surgery that he had plan for the last Friday, surgery was not done and patient did not start his Xarelto.  Continue holding Xarelto  now for acute stroke and restart when okay with neurology.         HIV (human immunodeficiency virus infection) (HCC)- (present on admission)   Assessment & Plan    Restarted home regimen.         Essential hypertension- (present on admission)   Assessment & Plan    Hold metoprolol due to low BP          Quality-Core Measures   Reviewed items::  Labs reviewed and Radiology images reviewed  DVT prophylaxis pharmacological::  Contraindicated - High bleeding risk  DVT prophylaxis - mechanical:  SCDs  Assessed for rehabilitation services:  Patient was assess for and/or received rehabilitation services during this hospitalization

## 2018-10-10 NOTE — PROGRESS NOTES
Lab from Lab called with critical result of CK 5602 at 1830. Critical lab result read back to Lab.   Dr. Drew notified of critical lab result at 1830.  Critical lab result read back by Dr. Drew.

## 2018-10-10 NOTE — PROGRESS NOTES
Nephrology Daily Progress Note    Date of Service  10/10/2018    Chief Complaint  63 y.o. male admitted 10/8/2018 with TENZIN, elevated CPK/rhabdo, severely depleted  With new onset of left sided weakness -found down in his appartement -states for 2 days.      Interval Problem Update  Doing better today  With IVF good UOP  Creatinine improved from 4 to 2.4 -1.44  CPK from> 09672 to 8.5 - 5.6-3.8  On bicarb gtt  Review of Systems  Review of Systems   Constitutional: Positive for malaise/fatigue. Negative for chills and fever.   HENT: Negative for congestion, nosebleeds and sore throat.    Respiratory: Negative for cough, hemoptysis, shortness of breath and wheezing.    Cardiovascular: Negative for chest pain, palpitations, orthopnea and leg swelling.   Gastrointestinal: Negative for abdominal pain, heartburn, nausea and vomiting.   Genitourinary:        Ramirez catheter     Musculoskeletal: Negative for back pain, joint pain and myalgias.   Skin: Negative.    Neurological: Positive for weakness.   All other systems reviewed and are negative.       Physical Exam  Temp:  [36.4 °C (97.6 °F)-37.2 °C (98.9 °F)] 37.1 °C (98.8 °F)  Pulse:  [71-87] 77  Resp:  [11-24] 17    Physical Exam   Constitutional: He is oriented to person, place, and time. He appears well-developed. No distress.   Thin male   HENT:   Head: Normocephalic and atraumatic.   Dry mucosa   Eyes: Pupils are equal, round, and reactive to light. Conjunctivae are normal. No scleral icterus.   Neck: Normal range of motion. Neck supple. No thyromegaly present.   Cardiovascular: Normal rate and regular rhythm.  Exam reveals no gallop and no friction rub.    Pulmonary/Chest: Effort normal and breath sounds normal. No respiratory distress. He has no wheezes.   Abdominal: Soft. Bowel sounds are normal. He exhibits no distension. There is no tenderness.   Musculoskeletal: He exhibits no edema.   Neurological: He is alert and oriented to person, place, and time.   Left  sided weakness   Skin: Skin is warm. No rash noted. No erythema.   Nursing note and vitals reviewed.      Fluids    Intake/Output Summary (Last 24 hours) at 10/10/18 1430  Last data filed at 10/10/18 1200   Gross per 24 hour   Intake             4200 ml   Output             3055 ml   Net             1145 ml       Laboratory  Recent Labs      10/08/18   1426  10/09/18   0450  10/10/18   0520   WBC  13.8*  8.3  9.0   RBC  3.57*  2.51*  2.91*   HEMOGLOBIN  12.2*  8.8*  10.0*   HEMATOCRIT  36.6*  26.0*  30.2*   MCV  102.5*  103.6*  103.8*   MCH  34.2*  35.1*  34.4*   MCHC  33.3*  33.8  33.1*   RDW  51.4*  51.7*  50.0   PLATELETCT  213  167  177   MPV  9.8  10.2  9.7     Recent Labs      10/09/18   0450  10/09/18   1748  10/10/18   0520   SODIUM  142  140  137   POTASSIUM  3.9  3.6  3.2*   CHLORIDE  102  98  94*   CO2  25  32  36*   GLUCOSE  79  82  131*   BUN  51*  38*  26*   CREATININE  2.41*  1.80*  1.44*   CALCIUM  7.9*  8.3*  8.2*     Recent Labs      10/08/18   1426   APTT  32.7   INR  1.40*         Recent Labs      10/09/18   0450   TRIGLYCERIDE  144   HDL  22*   LDL  54       Imaging  Renal US, CT head - reviewed    Assessment/Plan  1.TENZIN/ATN/CKD II -improving - good UOP  2.HTN: low BP under better control  3.Electrolytes: hypokalemia -to replace with KCl  4.Anemia: Hb level better  5.Left sided weakness/CVA   6.Volume:depleted -continue IVF  7.Rhabdo -CPK improving     Recs; no need for emergent dialysis             D/c bicarb gtt, rep;ace with NS             Daily BMP, Hb, CPK             Avoid hypotension             Avoid nephrotoxic agents             Will follow

## 2018-10-10 NOTE — PROGRESS NOTES
lAB from Lab called with critical result of cpk 5602 at 1840. Critical lab result read back to lAB.   Dr. Hayward notified of critical lab result at 1840.  Critical lab result read back by Dr. Hayward.

## 2018-10-10 NOTE — THERAPY
Pt awaiting MRI and neuro consult. Will hold OT eval until POC is established    Rosy Navarrete, OTR/L  Pager: [phone]

## 2018-10-10 NOTE — CARE PLAN
Problem: Communication  Goal: The ability to communicate needs accurately and effectively will improve  Outcome: PROGRESSING AS EXPECTED  A/o x 4 progressing as normal    Problem: Safety  Goal: Will remain free from injury  Outcome: PROGRESSING AS EXPECTED  Fall precautions in place    Problem: Infection  Goal: Will remain free from infection  Outcome: PROGRESSING AS EXPECTED  No s/s of infection      Problem: Venous Thromboembolism (VTW)/Deep Vein Thrombosis (DVT) Prevention:  Goal: Patient will participate in Venous Thrombosis (VTE)/Deep Vein Thrombosis (DVT)Prevention Measures  Outcome: PROGRESSING AS EXPECTED      Problem: Bowel/Gastric:  Goal: Normal bowel function is maintained or improved  Outcome: PROGRESSING AS EXPECTED      Problem: Knowledge Deficit  Goal: Knowledge of disease process/condition, treatment plan, diagnostic tests, and medications will improve  Outcome: PROGRESSING AS EXPECTED      Problem: Discharge Barriers/Planning  Goal: Patient's continuum of care needs will be met  Outcome: PROGRESSING SLOWER THAN EXPECTED  Will need rehab/ pt/ ot/ speech continuous eval until discharge    Problem: Pain Management  Goal: Pain level will decrease to patient's comfort goal  Outcome: PROGRESSING AS EXPECTED  Monitoring for s/s of pain, pain scaled explained    Problem: Respiratory:  Goal: Respiratory status will improve  Outcome: PROGRESSING AS EXPECTED      Problem: Mobility  Goal: Risk for activity intolerance will decrease  Outcome: PROGRESSING AS EXPECTED      Problem: Skin Integrity  Goal: Risk for impaired skin integrity will decrease  Outcome: PROGRESSING AS EXPECTED  Wound care saw patient today, turn q 2 side to side no supine    Problem: Acute Care of the Stroke Patient  Goal: Optimal Outcome for the Stroke patient  Outcome: PROGRESSING AS EXPECTED  Monitoring NIHSS

## 2018-10-10 NOTE — DIETARY
Nutrition services: Day 2 of admit.  Sukumar Bolton is a 63 y.o. male with admitting DX of TENZIN, CVA.  He has a known PMHx of HTN, CKD, chronic AFib and HIV  Pt noted with nutrition admit screen trigger: unplanned wt loss PTA     Assessment:  Height: 182.9 cm (6')  Weight: 64.7 kg (142 lb 10.2 oz) via bed scale on 10/9  Body mass index is 19.35 kg/m². - WNL   Diet/Intake: FLNTL - per ADLs, po intake 50-75% of meals     Evaluation:   1. Per chart review, pt is noted with having a recent  hospitalization in August 2018 and at that time he is noted with stating his usual body weight is 142# which is the same as his current wt   2. Serum Glucose 131, BUN 26, creat 1.44, K 3.2 and CPK 3892 (improving)   3. Meds reviewed    4. Per wound team, pt noted with DTI to right temporal; Unstageable pressure injury to right hip; DTI to right lateral ankle, right ear, Sacrococcygeal area, right chest and unstageable to right elbow   5. Last BM PTA     Recommendations/Plan:   1. Encourage PO Intake   2. Due to impaired skin integrity, will send boost to help provide adequate nutrition and aid in healing   3. Document all PO intake as % taken in ADL's to provide interdisciplinary communication across all shifts.   4. Monitor weight.  5. Nutrition rep will continue to see patient for ongoing meal and snack preferences.     Due to stable weight x 2 months and adequate nutritional intake thus far; RD will con't to monitor per dept policy.  Please consult nutrition services as warranted.

## 2018-10-10 NOTE — PROGRESS NOTES
Critical Care Progress Note    Date of admission  10/8/2018    Chief Complaint  63 y.o. male admitted 10/8/2018 with hypotension    Hospital Course    63 y.o. male with a past medical history of hypertension, chronic kidney disease, chronic atrial fibrillation, HIV who presented 10/8/2018 to the ER after being on the ground since Saturday, the patient was attempting to get out of bed on Saturday and subsequently fell as he was severely weak in his left arm and left leg.  After falling to the ground the patient could not lift himself up and laid there until today when his neighbor heard him and called 911.  He was found with left facial droop, left upper extremity weakness and left lower extremity weakness.  In the ER a CT head was obtained which did not demonstrate any acute findings, he was also found to have severe acute kidney injury with a creatinine of 4.05 and a BUN of 69 with otherwise normal electrolytes, his AST and ALT are elevated at 354 and 183 respectively, his troponin was elevated at 2.13 and he was persistently hypotensive in the ER despite multiple fluid boluses.  According the patient he recently stopped taking his Xarelto for a surgery and has not restarted.      Interval Problem Update  Reviewed last 24 hour events:  Tm 99.1  +2.3L over last 24hr, +5.6L since admit  No cxr this am  K 3.2  Cr 2.4->1.4  C02 36  Cpk 8K->3.8K    Sodium Acetate @ 200/hr    4L Nc    More awake today, remains chronically ill and weak appearing  Denies present pain  Tolerating full liquid diet  Pending mri brain      Review of Systems  Review of Systems   Constitutional: Positive for fatigue. Negative for chills and fever.   HENT: Negative for congestion and sore throat.    Eyes: Negative for visual disturbance.   Respiratory: Positive for cough. Negative for chest tightness, shortness of breath and wheezing.    Cardiovascular: Negative for chest pain.   Gastrointestinal: Negative for abdominal pain, nausea and vomiting.    Genitourinary: Negative for dysuria.   Neurological: Positive for weakness. Negative for headaches.   Psychiatric/Behavioral: Negative for agitation.   All other systems reviewed and are negative.       Vital Signs for last 24 hours   Temp:  [36.4 °C (97.6 °F)-37.3 °C (99.1 °F)] 36.6 °C (97.9 °F)  Pulse:  [71-87] 85  Resp:  [11-24] 17    Hemodynamic parameters for last 24 hours       Vent Settings for last 24 hours       Physical Exam   Physical Exam   Constitutional:   Acute on chronically ill appearing, malnurished   HENT:   Head: Normocephalic and atraumatic.   Eyes: Pupils are equal, round, and reactive to light. No scleral icterus.   Neck: No tracheal deviation present.   Cardiovascular: Normal rate and intact distal pulses.    Pulmonary/Chest: He has no wheezes. He has no rales.   Scattered rhonchi   Abdominal: Soft. He exhibits no distension. There is no tenderness. There is no rebound.   Musculoskeletal: He exhibits no edema.   Neurological:   4/5 strength on left, 4+/5 on right   Skin: Skin is dry. He is not diaphoretic.   Numerous skin tears    Psychiatric:   Flat affect   Nursing note and vitals reviewed.      Medications  Current Facility-Administered Medications   Medication Dose Route Frequency Provider Last Rate Last Dose   • miconazole 2%-zinc oxide (MADELINE) topical cream   Topical BID Chetan Haines M.D.       • abacavir (ZIAGEN) tablet 600 mg  600 mg Oral DAILY Erasto Drew M.D.   600 mg at 10/10/18 0530    And   • dolutegravir (TIVICAY) tablet 50 mg  50 mg Oral DAILY Erasto Drew M.D.   50 mg at 10/10/18 0530    And   • lamivudine (EPIVIR) tablet 300 mg  300 mg Oral DAILY Erasto Drew M.D.   300 mg at 10/10/18 0530   • sodium acetate 150 mEq in sterile water for inj(pf) 1,000 mL infusion   Intravenous Continuous Chetan Haines M.D. 200 mL/hr at 10/10/18 0301     • ALPRAZolam (XANAX) tablet 1 mg  1 mg Oral TID PRN Chetan Haines M.D.   1 mg at 10/09/18 2208   • clonazePAM  (KLONOPIN) tablet 1 mg  1 mg Oral BID Chetan Haines M.D.   1 mg at 10/10/18 0530   • gabapentin (NEURONTIN) capsule 300 mg  300 mg Oral BID Chetan Haines M.D.   300 mg at 10/10/18 0530   • hydrOXYzine pamoate (VISTARIL) capsule 50 mg  50 mg Oral TID PRN Chetan Haines M.D.       • loperamide (IMODIUM) capsule 2 mg  2 mg Oral TID PRN Chetan Haines M.D.   2 mg at 10/08/18 2011   • omeprazole (PRILOSEC) capsule 20 mg  20 mg Oral DAILY Chetan Haines M.D.   20 mg at 10/10/18 0530   • Respiratory Care per Protocol   Nebulization Continuous RT Chetan Haines M.D.       • acetaminophen (TYLENOL) tablet 650 mg  650 mg Oral Q6HRS PRN Chetan Haines M.D.   650 mg at 10/10/18 0341   • aspirin (ASA) tablet 325 mg  325 mg Oral DAILY Chetan Haines M.D.   325 mg at 10/10/18 0530    Or   • aspirin (ASA) chewable tab 324 mg  324 mg Oral DAILY Chetan Haines M.D.        Or   • aspirin (ASA) suppository 300 mg  300 mg Rectal DAILY Chetan Haines M.D.       • ondansetron (ZOFRAN) syringe/vial injection 4 mg  4 mg Intravenous Q4HRS PRJUSTIN Haines M.D.       • ondansetron (ZOFRAN ODT) dispertab 4 mg  4 mg Oral Q4HRS PRJUSTIN Haines M.D.           Fluids    Intake/Output Summary (Last 24 hours) at 10/10/18 0748  Last data filed at 10/10/18 0600   Gross per 24 hour   Intake             5200 ml   Output             2830 ml   Net             2370 ml       Laboratory  Recent Results (from the past 48 hour(s))   ACCU-CHEK GLUCOSE    Collection Time: 10/08/18  2:24 PM   Result Value Ref Range    Glucose - Accu-Ck 91 65 - 99 mg/dL   CBC WITH DIFFERENTIAL    Collection Time: 10/08/18  2:26 PM   Result Value Ref Range    WBC 13.8 (H) 4.8 - 10.8 K/uL    RBC 3.57 (L) 4.70 - 6.10 M/uL    Hemoglobin 12.2 (L) 14.0 - 18.0 g/dL    Hematocrit 36.6 (L) 42.0 - 52.0 %    .5 (H) 81.4 - 97.8 fL    MCH 34.2 (H) 27.0 - 33.0 pg    MCHC 33.3 (L) 33.7 - 35.3 g/dL     RDW 51.4 (H) 35.9 - 50.0 fL    Platelet Count 213 164 - 446 K/uL    MPV 9.8 9.0 - 12.9 fL    Neutrophils-Polys 88.80 (H) 44.00 - 72.00 %    Lymphocytes 3.50 (L) 22.00 - 41.00 %    Monocytes 7.10 0.00 - 13.40 %    Eosinophils 0.00 0.00 - 6.90 %    Basophils 0.10 0.00 - 1.80 %    Immature Granulocytes 0.50 0.00 - 0.90 %    Nucleated RBC 0.00 /100 WBC    Neutrophils (Absolute) 12.21 (H) 1.82 - 7.42 K/uL    Lymphs (Absolute) 0.48 (L) 1.00 - 4.80 K/uL    Monos (Absolute) 0.97 (H) 0.00 - 0.85 K/uL    Eos (Absolute) 0.00 0.00 - 0.51 K/uL    Baso (Absolute) 0.02 0.00 - 0.12 K/uL    Immature Granulocytes (abs) 0.07 0.00 - 0.11 K/uL    NRBC (Absolute) 0.00 K/uL   COMP METABOLIC PANEL    Collection Time: 10/08/18  2:26 PM   Result Value Ref Range    Sodium 140 135 - 145 mmol/L    Potassium 4.5 3.6 - 5.5 mmol/L    Chloride 102 96 - 112 mmol/L    Co2 21 20 - 33 mmol/L    Anion Gap 17.0 (H) 0.0 - 11.9    Glucose 84 65 - 99 mg/dL    Bun 69 (H) 8 - 22 mg/dL    Creatinine 4.05 (H) 0.50 - 1.40 mg/dL    Calcium 9.2 8.5 - 10.5 mg/dL    AST(SGOT) 354 (H) 12 - 45 U/L    ALT(SGPT) 183 (H) 2 - 50 U/L    Alkaline Phosphatase 75 30 - 99 U/L    Total Bilirubin 0.7 0.1 - 1.5 mg/dL    Albumin 3.2 3.2 - 4.9 g/dL    Total Protein 6.7 6.0 - 8.2 g/dL    Globulin 3.5 1.9 - 3.5 g/dL    A-G Ratio 0.9 g/dL   PROTHROMBIN TIME    Collection Time: 10/08/18  2:26 PM   Result Value Ref Range    PT 17.3 (H) 12.0 - 14.6 sec    INR 1.40 (H) 0.87 - 1.13   APTT    Collection Time: 10/08/18  2:26 PM   Result Value Ref Range    APTT 32.7 24.7 - 36.0 sec   COD (ADULT)    Collection Time: 10/08/18  2:26 PM   Result Value Ref Range    ABO Grouping Only A     Rh Grouping Only NEG     Antibody Screen-Cod NEG    TROPONIN    Collection Time: 10/08/18  2:26 PM   Result Value Ref Range    Troponin I 2.13 (H) 0.00 - 0.04 ng/mL   CREATINE KINASE    Collection Time: 10/08/18  2:26 PM   Result Value Ref Range    CPK Total >48310 (HH) 0 - 154 U/L   ESTIMATED GFR     Collection Time: 10/08/18  2:26 PM   Result Value Ref Range    GFR If  18 (A) >60 mL/min/1.73 m 2    GFR If Non African American 15 (A) >60 mL/min/1.73 m 2   TSH (Thyroid Stimulating Hormone)    Collection Time: 10/08/18  2:26 PM   Result Value Ref Range    TSH 3.000 0.380 - 5.330 uIU/mL   Magnesium    Collection Time: 10/08/18  2:26 PM   Result Value Ref Range    Magnesium 2.8 (H) 1.5 - 2.5 mg/dL   EKG (NOW)    Collection Time: 10/08/18  3:51 PM   Result Value Ref Range    Report       [facility] Emergency Dept.    Test Date:  2018-10-08  Pt Name:    ETTA LUZ                Department: ER  MRN:        [id number]                      Room:       [facility]  Gender:     Male                         Technician: [id number]  :        [id number]                   Requested By:ER TRIAGE PROTOCOL  Order #:    [id number]                    Reading MD:    Measurements  Intervals                                Axis  Rate:       90                           P:          65  KS:         140                          QRS:        47  QRSD:       100                          T:          59  QT:         412  QTc:        504    Interpretive Statements  SINUS RHYTHM  LEFT ATRIAL ABNORMALITY  LOW VOLTAGE IN FRONTAL LEADS  RSR' IN V1 OR V2, PROBABLY NORMAL VARIANT  PROLONGED QT INTERVAL  Compared to ECG 2018 07:10:39  Atrial abnormality now present  Low QRS voltage now present  RSR' in V1 or V2 now present  Prolonged QT interval now present     URINALYSIS CULTURE, IF INDICATED    Collection Time: 10/08/18  4:50 PM   Result Value Ref Range    Color Yellow     Character Clear     Specific Gravity 1.018 <1.035    Ph 5.5 5.0 - 8.0    Glucose Negative Negative mg/dL    Ketones 15 (A) Negative mg/dL    Protein 100 (A) Negative mg/dL    Bilirubin Negative Negative    Urobilinogen, Urine 0.2 Negative    Nitrite Negative Negative    Leukocyte Esterase Negative Negative    Occult Blood Small (A) Negative     Micro Urine Req Microscopic    URINE MICROSCOPIC (W/UA)    Collection Time: 10/08/18  4:50 PM   Result Value Ref Range    WBC 0-2 (A) /hpf    RBC 0-2 (A) /hpf    Bacteria Negative None /hpf    Epithelial Cells Negative /hpf    Amorphous Crystal Present /hpf    Hyaline Cast 0-2 /lpf   ABO AND RH CONFIRMATION    Collection Time: 10/08/18  8:28 PM   Result Value Ref Range    ABO Confirm A     Second Rh Group NEG    Hemoglobin A1c    Collection Time: 10/08/18  8:28 PM   Result Value Ref Range    Glycohemoglobin 5.3 0.0 - 5.6 %    Est Avg Glucose 105 mg/dL   Troponin-I    Collection Time: 10/08/18  9:39 PM   Result Value Ref Range    Troponin I 1.64 (H) 0.00 - 0.04 ng/mL   CBC without Differential    Collection Time: 10/09/18  4:50 AM   Result Value Ref Range    WBC 8.3 4.8 - 10.8 K/uL    RBC 2.51 (L) 4.70 - 6.10 M/uL    Hemoglobin 8.8 (L) 14.0 - 18.0 g/dL    Hematocrit 26.0 (L) 42.0 - 52.0 %    .6 (H) 81.4 - 97.8 fL    MCH 35.1 (H) 27.0 - 33.0 pg    MCHC 33.8 33.7 - 35.3 g/dL    RDW 51.7 (H) 35.9 - 50.0 fL    Platelet Count 167 164 - 446 K/uL    MPV 10.2 9.0 - 12.9 fL   Lipid Profile (Lipid Panel) Fasting    Collection Time: 10/09/18  4:50 AM   Result Value Ref Range    Cholesterol,Tot 105 100 - 199 mg/dL    Triglycerides 144 0 - 149 mg/dL    HDL 22 (A) >=40 mg/dL    LDL 54 <100 mg/dL   CREATINE KINASE    Collection Time: 10/09/18  4:50 AM   Result Value Ref Range    CPK Total 8590 (HH) 0 - 154 U/L   COMP METABOLIC PANEL    Collection Time: 10/09/18  4:50 AM   Result Value Ref Range    Sodium 142 135 - 145 mmol/L    Potassium 3.9 3.6 - 5.5 mmol/L    Chloride 102 96 - 112 mmol/L    Co2 25 20 - 33 mmol/L    Anion Gap 15.0 (H) 0.0 - 11.9    Glucose 79 65 - 99 mg/dL    Bun 51 (H) 8 - 22 mg/dL    Creatinine 2.41 (H) 0.50 - 1.40 mg/dL    Calcium 7.9 (L) 8.5 - 10.5 mg/dL    AST(SGOT) 176 (H) 12 - 45 U/L    ALT(SGPT) 122 (H) 2 - 50 U/L    Alkaline Phosphatase 54 30 - 99 U/L    Total Bilirubin 0.6 0.1 - 1.5 mg/dL     Albumin 2.7 (L) 3.2 - 4.9 g/dL    Total Protein 5.3 (L) 6.0 - 8.2 g/dL    Globulin 2.6 1.9 - 3.5 g/dL    A-G Ratio 1.0 g/dL   TROPONIN    Collection Time: 10/09/18  4:50 AM   Result Value Ref Range    Troponin I 1.13 (H) 0.00 - 0.04 ng/mL   ESTIMATED GFR    Collection Time: 10/09/18  4:50 AM   Result Value Ref Range    GFR If  33 (A) >60 mL/min/1.73 m 2    GFR If Non  27 (A) >60 mL/min/1.73 m 2   Troponin-I    Collection Time: 10/09/18 11:50 AM   Result Value Ref Range    Troponin I 0.97 (H) 0.00 - 0.04 ng/mL   BASIC METABOLIC PANEL    Collection Time: 10/09/18  5:48 PM   Result Value Ref Range    Sodium 140 135 - 145 mmol/L    Potassium 3.6 3.6 - 5.5 mmol/L    Chloride 98 96 - 112 mmol/L    Co2 32 20 - 33 mmol/L    Glucose 82 65 - 99 mg/dL    Bun 38 (H) 8 - 22 mg/dL    Creatinine 1.80 (H) 0.50 - 1.40 mg/dL    Calcium 8.3 (L) 8.5 - 10.5 mg/dL    Anion Gap 10.0 0.0 - 11.9   CREATINE KINASE    Collection Time: 10/09/18  5:48 PM   Result Value Ref Range    CPK Total 5602 (HH) 0 - 154 U/L   ESTIMATED GFR    Collection Time: 10/09/18  5:48 PM   Result Value Ref Range    GFR If  46 (A) >60 mL/min/1.73 m 2    GFR If Non  38 (A) >60 mL/min/1.73 m 2   CBC WITH DIFFERENTIAL    Collection Time: 10/10/18  5:20 AM   Result Value Ref Range    WBC 9.0 4.8 - 10.8 K/uL    RBC 2.91 (L) 4.70 - 6.10 M/uL    Hemoglobin 10.0 (L) 14.0 - 18.0 g/dL    Hematocrit 30.2 (L) 42.0 - 52.0 %    .8 (H) 81.4 - 97.8 fL    MCH 34.4 (H) 27.0 - 33.0 pg    MCHC 33.1 (L) 33.7 - 35.3 g/dL    RDW 50.0 35.9 - 50.0 fL    Platelet Count 177 164 - 446 K/uL    MPV 9.7 9.0 - 12.9 fL    Neutrophils-Polys 81.00 (H) 44.00 - 72.00 %    Lymphocytes 7.70 (L) 22.00 - 41.00 %    Monocytes 10.80 0.00 - 13.40 %    Eosinophils 0.20 0.00 - 6.90 %    Basophils 0.10 0.00 - 1.80 %    Immature Granulocytes 0.20 0.00 - 0.90 %    Nucleated RBC 0.00 /100 WBC    Neutrophils (Absolute) 7.24 1.82 - 7.42 K/uL     Lymphs (Absolute) 0.69 (L) 1.00 - 4.80 K/uL    Monos (Absolute) 0.97 (H) 0.00 - 0.85 K/uL    Eos (Absolute) 0.02 0.00 - 0.51 K/uL    Baso (Absolute) 0.01 0.00 - 0.12 K/uL    Immature Granulocytes (abs) 0.02 0.00 - 0.11 K/uL    NRBC (Absolute) 0.00 K/uL   COMP METABOLIC PANEL    Collection Time: 10/10/18  5:20 AM   Result Value Ref Range    Sodium 137 135 - 145 mmol/L    Potassium 3.2 (L) 3.6 - 5.5 mmol/L    Chloride 94 (L) 96 - 112 mmol/L    Co2 36 (H) 20 - 33 mmol/L    Anion Gap 7.0 0.0 - 11.9    Glucose 131 (H) 65 - 99 mg/dL    Bun 26 (H) 8 - 22 mg/dL    Creatinine 1.44 (H) 0.50 - 1.40 mg/dL    Calcium 8.2 (L) 8.5 - 10.5 mg/dL    AST(SGOT) 95 (H) 12 - 45 U/L    ALT(SGPT) 92 (H) 2 - 50 U/L    Alkaline Phosphatase 57 30 - 99 U/L    Total Bilirubin 0.6 0.1 - 1.5 mg/dL    Albumin 2.6 (L) 3.2 - 4.9 g/dL    Total Protein 5.2 (L) 6.0 - 8.2 g/dL    Globulin 2.6 1.9 - 3.5 g/dL    A-G Ratio 1.0 g/dL   MAGNESIUM    Collection Time: 10/10/18  5:20 AM   Result Value Ref Range    Magnesium 1.7 1.5 - 2.5 mg/dL   CREATINE KINASE    Collection Time: 10/10/18  5:20 AM   Result Value Ref Range    CPK Total 3892 (HH) 0 - 154 U/L   ESTIMATED GFR    Collection Time: 10/10/18  5:20 AM   Result Value Ref Range    GFR If  59 (A) >60 mL/min/1.73 m 2    GFR If Non African American 49 (A) >60 mL/min/1.73 m 2       Imaging  CT:    Reviewed    Assessment/Plan  * Rhabdomyolysis- (present on admission)   Assessment & Plan    Nontraumatic, secondary to laying on the floor for 2 days  DC Sodium Acetate - CO2 36  Follow UO  CPK continue down trending        Left-sided weakness- (present on admission)   Assessment & Plan    Was not tpa candidate  Ct head 10/9 unrevealing  Pending brain mri  Pt/ot/speech  Serial neurologic examinations  Continue 2ndary stroke work up  Continue asa  No statin at this time given rhabdo   Continue cardiac monitoring, f/u echo        TENZIN (acute kidney injury) (HCC)- (present on admission)   Assessment &  Plan    2ndary to Rhabdo  CPK trending down  D/C Sodium Acetate  Renally dose meds, minimize nephrotoxic substances  Follow UO        Multiple wounds of skin- (present on admission)   Assessment & Plan    Wound care        Elevated troponin- (present on admission)   Assessment & Plan    Improving, likely non cardiac        Elevated liver function tests- (present on admission)   Assessment & Plan    Improving, suspect related to rhabdo        Leukocytosis- (present on admission)   Assessment & Plan    resolved        Chronic atrial fibrillation (HCC)- (present on admission)   Assessment & Plan    Had been off Xarelto x several days, now with stroke symptoms  Restart metoprolol 25 bid if pressures sufficient  F/U MRI brain and will need to resume xarelto - ? In 1 week if not large territorial infarct          HIV (human immunodeficiency virus infection) (HCC)- (present on admission)   Assessment & Plan    Resume HAART meds        Essential hypertension- (present on admission)   Assessment & Plan    Restart home bp meds when clinically appropriate              VTE:  pending mri results  Ulcer: PPI  Lines: Ramirez Catheter  Ongoing indication addressed    I have performed a physical exam and reviewed and updated ROS and Plan today (10/10/2018). In review of yesterday's note (10/9/2018), there are no changes except as documented above.     Discussed patient condition and risk of morbidity and/or mortality with Hospitalist, RN, RT, Therapies, Pharmacy and Patient

## 2018-10-11 LAB
ALBUMIN SERPL BCP-MCNC: 2.6 G/DL (ref 3.2–4.9)
ALBUMIN/GLOB SERPL: 1 G/DL
ALP SERPL-CCNC: 58 U/L (ref 30–99)
ALT SERPL-CCNC: 74 U/L (ref 2–50)
ANION GAP SERPL CALC-SCNC: 5 MMOL/L (ref 0–11.9)
AST SERPL-CCNC: 60 U/L (ref 12–45)
BASOPHILS # BLD AUTO: 0.1 % (ref 0–1.8)
BASOPHILS # BLD: 0.01 K/UL (ref 0–0.12)
BILIRUB SERPL-MCNC: 0.4 MG/DL (ref 0.1–1.5)
BUN SERPL-MCNC: 20 MG/DL (ref 8–22)
CALCIUM SERPL-MCNC: 7.9 MG/DL (ref 8.5–10.5)
CHLORIDE SERPL-SCNC: 97 MMOL/L (ref 96–112)
CK SERPL-CCNC: 2451 U/L (ref 0–154)
CO2 SERPL-SCNC: 31 MMOL/L (ref 20–33)
CREAT SERPL-MCNC: 1.18 MG/DL (ref 0.5–1.4)
EOSINOPHIL # BLD AUTO: 0.12 K/UL (ref 0–0.51)
EOSINOPHIL NFR BLD: 1.2 % (ref 0–6.9)
ERYTHROCYTE [DISTWIDTH] IN BLOOD BY AUTOMATED COUNT: 49.3 FL (ref 35.9–50)
GLOBULIN SER CALC-MCNC: 2.6 G/DL (ref 1.9–3.5)
GLUCOSE SERPL-MCNC: 117 MG/DL (ref 65–99)
HCT VFR BLD AUTO: 30.2 % (ref 42–52)
HGB BLD-MCNC: 10.5 G/DL (ref 14–18)
IMM GRANULOCYTES # BLD AUTO: 0.04 K/UL (ref 0–0.11)
IMM GRANULOCYTES NFR BLD AUTO: 0.4 % (ref 0–0.9)
LYMPHOCYTES # BLD AUTO: 1.09 K/UL (ref 1–4.8)
LYMPHOCYTES NFR BLD: 10.8 % (ref 22–41)
MAGNESIUM SERPL-MCNC: 2 MG/DL (ref 1.5–2.5)
MCH RBC QN AUTO: 35.8 PG (ref 27–33)
MCHC RBC AUTO-ENTMCNC: 34.8 G/DL (ref 33.7–35.3)
MCV RBC AUTO: 103.1 FL (ref 81.4–97.8)
MONOCYTES # BLD AUTO: 1.34 K/UL (ref 0–0.85)
MONOCYTES NFR BLD AUTO: 13.2 % (ref 0–13.4)
NEUTROPHILS # BLD AUTO: 7.52 K/UL (ref 1.82–7.42)
NEUTROPHILS NFR BLD: 74.3 % (ref 44–72)
NRBC # BLD AUTO: 0 K/UL
NRBC BLD-RTO: 0 /100 WBC
PLATELET # BLD AUTO: 237 K/UL (ref 164–446)
PMV BLD AUTO: 9.9 FL (ref 9–12.9)
POTASSIUM SERPL-SCNC: 3.7 MMOL/L (ref 3.6–5.5)
PROT SERPL-MCNC: 5.2 G/DL (ref 6–8.2)
RBC # BLD AUTO: 2.93 M/UL (ref 4.7–6.1)
SODIUM SERPL-SCNC: 133 MMOL/L (ref 135–145)
WBC # BLD AUTO: 10.1 K/UL (ref 4.8–10.8)

## 2018-10-11 PROCEDURE — 700111 HCHG RX REV CODE 636 W/ 250 OVERRIDE (IP): Performed by: INTERNAL MEDICINE

## 2018-10-11 PROCEDURE — G8987 SELF CARE CURRENT STATUS: HCPCS | Mod: CL

## 2018-10-11 PROCEDURE — G8978 MOBILITY CURRENT STATUS: HCPCS | Mod: CM

## 2018-10-11 PROCEDURE — 700102 HCHG RX REV CODE 250 W/ 637 OVERRIDE(OP): Performed by: HOSPITALIST

## 2018-10-11 PROCEDURE — 80053 COMPREHEN METABOLIC PANEL: CPT

## 2018-10-11 PROCEDURE — A9270 NON-COVERED ITEM OR SERVICE: HCPCS | Performed by: HOSPITALIST

## 2018-10-11 PROCEDURE — 97165 OT EVAL LOW COMPLEX 30 MIN: CPT

## 2018-10-11 PROCEDURE — 85025 COMPLETE CBC W/AUTO DIFF WBC: CPT

## 2018-10-11 PROCEDURE — 82550 ASSAY OF CK (CPK): CPT

## 2018-10-11 PROCEDURE — 99233 SBSQ HOSP IP/OBS HIGH 50: CPT | Performed by: INTERNAL MEDICINE

## 2018-10-11 PROCEDURE — 770020 HCHG ROOM/CARE - TELE (206)

## 2018-10-11 PROCEDURE — A9270 NON-COVERED ITEM OR SERVICE: HCPCS | Performed by: INTERNAL MEDICINE

## 2018-10-11 PROCEDURE — 97163 PT EVAL HIGH COMPLEX 45 MIN: CPT

## 2018-10-11 PROCEDURE — 99233 SBSQ HOSP IP/OBS HIGH 50: CPT | Performed by: HOSPITALIST

## 2018-10-11 PROCEDURE — 700102 HCHG RX REV CODE 250 W/ 637 OVERRIDE(OP): Performed by: INTERNAL MEDICINE

## 2018-10-11 PROCEDURE — G8988 SELF CARE GOAL STATUS: HCPCS | Mod: CJ

## 2018-10-11 PROCEDURE — 83735 ASSAY OF MAGNESIUM: CPT

## 2018-10-11 PROCEDURE — G8979 MOBILITY GOAL STATUS: HCPCS | Mod: CI

## 2018-10-11 PROCEDURE — 700101 HCHG RX REV CODE 250: Performed by: HOSPITALIST

## 2018-10-11 PROCEDURE — 92526 ORAL FUNCTION THERAPY: CPT

## 2018-10-11 PROCEDURE — 99232 SBSQ HOSP IP/OBS MODERATE 35: CPT | Performed by: INTERNAL MEDICINE

## 2018-10-11 RX ORDER — HEPARIN SODIUM 5000 [USP'U]/ML
5000 INJECTION, SOLUTION INTRAVENOUS; SUBCUTANEOUS EVERY 8 HOURS
Status: DISCONTINUED | OUTPATIENT
Start: 2018-10-11 | End: 2018-10-13

## 2018-10-11 RX ORDER — POTASSIUM CHLORIDE 20 MEQ/1
20 TABLET, EXTENDED RELEASE ORAL ONCE
Status: COMPLETED | OUTPATIENT
Start: 2018-10-11 | End: 2018-10-11

## 2018-10-11 RX ADMIN — CLONAZEPAM 1 MG: 1 TABLET ORAL at 17:15

## 2018-10-11 RX ADMIN — POTASSIUM CHLORIDE 20 MEQ: 1500 TABLET, EXTENDED RELEASE ORAL at 11:08

## 2018-10-11 RX ADMIN — ALPRAZOLAM 1 MG: 0.5 TABLET ORAL at 20:03

## 2018-10-11 RX ADMIN — POTASSIUM CHLORIDE AND SODIUM CHLORIDE: 900; 150 INJECTION, SOLUTION INTRAVENOUS at 05:17

## 2018-10-11 RX ADMIN — GABAPENTIN 300 MG: 300 CAPSULE ORAL at 05:02

## 2018-10-11 RX ADMIN — MICONAZOLE NITRATE: 20 CREAM TOPICAL at 17:22

## 2018-10-11 RX ADMIN — POTASSIUM CHLORIDE AND SODIUM CHLORIDE: 900; 150 INJECTION, SOLUTION INTRAVENOUS at 22:52

## 2018-10-11 RX ADMIN — DOLUTEGRAVIR SODIUM 50 MG: 50 TABLET, FILM COATED ORAL at 05:03

## 2018-10-11 RX ADMIN — HEPARIN SODIUM 5000 UNITS: 5000 INJECTION, SOLUTION INTRAVENOUS; SUBCUTANEOUS at 21:44

## 2018-10-11 RX ADMIN — ABACAVIR 600 MG: 300 TABLET, FILM COATED ORAL at 05:03

## 2018-10-11 RX ADMIN — POTASSIUM CHLORIDE AND SODIUM CHLORIDE: 900; 150 INJECTION, SOLUTION INTRAVENOUS at 14:18

## 2018-10-11 RX ADMIN — MICONAZOLE NITRATE: 20 CREAM TOPICAL at 05:03

## 2018-10-11 RX ADMIN — GABAPENTIN 300 MG: 300 CAPSULE ORAL at 17:15

## 2018-10-11 RX ADMIN — HEPARIN SODIUM 5000 UNITS: 5000 INJECTION, SOLUTION INTRAVENOUS; SUBCUTANEOUS at 14:13

## 2018-10-11 RX ADMIN — ASPIRIN 324 MG: 81 TABLET, CHEWABLE ORAL at 05:02

## 2018-10-11 RX ADMIN — LAMIVUDINE 300 MG: 150 TABLET, FILM COATED ORAL at 06:02

## 2018-10-11 RX ADMIN — ATORVASTATIN CALCIUM 40 MG: 40 TABLET, FILM COATED ORAL at 17:15

## 2018-10-11 RX ADMIN — CLONAZEPAM 1 MG: 1 TABLET ORAL at 05:02

## 2018-10-11 ASSESSMENT — COGNITIVE AND FUNCTIONAL STATUS - GENERAL
MOVING TO AND FROM BED TO CHAIR: A LOT
STANDING UP FROM CHAIR USING ARMS: TOTAL
TOILETING: A LOT
MOBILITY SCORE: 8
TURNING FROM BACK TO SIDE WHILE IN FLAT BAD: UNABLE
TURNING FROM BACK TO SIDE WHILE IN FLAT BAD: UNABLE
SUGGESTED CMS G CODE MODIFIER DAILY ACTIVITY: CL
TURNING FROM BACK TO SIDE WHILE IN FLAT BAD: UNABLE
DRESSING REGULAR LOWER BODY CLOTHING: TOTAL
MOBILITY SCORE: 11
DRESSING REGULAR LOWER BODY CLOTHING: TOTAL
PERSONAL GROOMING: A LITTLE
TOILETING: TOTAL
WALKING IN HOSPITAL ROOM: A LOT
DRESSING REGULAR UPPER BODY CLOTHING: TOTAL
WALKING IN HOSPITAL ROOM: TOTAL
SUGGESTED CMS G CODE MODIFIER MOBILITY: CM
MOVING FROM LYING ON BACK TO SITTING ON SIDE OF FLAT BED: A LOT
DRESSING REGULAR UPPER BODY CLOTHING: A LOT
EATING MEALS: A LITTLE
DAILY ACTIVITIY SCORE: 6
SUGGESTED CMS G CODE MODIFIER MOBILITY: CL
HELP NEEDED FOR BATHING: A LOT
MOVING FROM LYING ON BACK TO SITTING ON SIDE OF FLAT BED: A LOT
HELP NEEDED FOR BATHING: TOTAL
CLIMB 3 TO 5 STEPS WITH RAILING: TOTAL
CLIMB 3 TO 5 STEPS WITH RAILING: A LOT
DAILY ACTIVITIY SCORE: 13
MOVING FROM LYING ON BACK TO SITTING ON SIDE OF FLAT BED: A LOT
STANDING UP FROM CHAIR USING ARMS: A LOT
MOVING TO AND FROM BED TO CHAIR: A LOT
EATING MEALS: TOTAL
PERSONAL GROOMING: TOTAL
SUGGESTED CMS G CODE MODIFIER DAILY ACTIVITY: CN

## 2018-10-11 ASSESSMENT — ENCOUNTER SYMPTOMS
SHORTNESS OF BREATH: 0
NAUSEA: 0
FATIGUE: 0
HEADACHES: 0
ROS GI COMMENTS: TOLERATING A DIET
CHEST TIGHTNESS: 0
CHILLS: 0
PALPITATIONS: 0
HEARTBURN: 0
HEMOPTYSIS: 0
MYALGIAS: 0
ORTHOPNEA: 0
BACK PAIN: 0
VOMITING: 0
WHEEZING: 0
FEVER: 0
SORE THROAT: 0
ABDOMINAL PAIN: 0
COUGH: 1
AGITATION: 0
COUGH: 0
WEAKNESS: 1

## 2018-10-11 ASSESSMENT — PAIN SCALES - GENERAL
PAINLEVEL_OUTOF10: 0
PAINLEVEL_OUTOF10: 0
PAINLEVEL_OUTOF10: 2
PAINLEVEL_OUTOF10: 0

## 2018-10-11 ASSESSMENT — GAIT ASSESSMENTS: GAIT LEVEL OF ASSIST: UNABLE TO PARTICIPATE

## 2018-10-11 ASSESSMENT — ACTIVITIES OF DAILY LIVING (ADL): TOILETING: INDEPENDENT

## 2018-10-11 NOTE — DISCHARGE PLANNING
PMR referral from Dr. Drew.       Punctate infarcts in the left posterior frontal and right posterior frontal cortices. Scattered small sized acute infarcts in the left parietal cortex and left parietal deep white matter. Chronic bilateral cerebellar infarcts. Chronic left centrum semiovale lacunar infarct. Ongoing medical management as well as therapy need. Current documentation indicates limited tolerance to IRF level of therapy. Dependent for mobility. Dependent for lower body self care. Current documentation does not support a reasonable and practicable amount of improvement to return home alone. Anticipate skilled nursing when medically cleared with continued PT/OT/SLP intervention. No physiatry consult ordered per protocol. In the even of interval improvement prior to medical clearance please consider a repeated PMR

## 2018-10-11 NOTE — THERAPY
"Speech Language Therapy dysphagia treatment completed.   Functional Status: Patient currently on NTFL diet and per RN, patient appears to be tolerating diet today, but yesterday had intermittent wet/gurgly vocal quality when consuming NTL. Patient appears improved today compared to initial evaluation and reports \"Yesterday was a terrible day for me. I was in pain and was so tired.\" Patient pleasant and cooperative during entire tx session. Patient consumed PO trials from NTFL lunch tray. Patient consumed approximately 50% of lunch tray with direct feeding from this clinician. Patient had wet/gurgly vocal quality x1 on NTL soup and wet/gurgly vocal quality x1 on NTL juice. Patient was instructed to utilize two swallows per bite/sip and no other overt s/sx of aspiration were noted when patient utilized swallow precautions for same. Patient self-monitored vocal quality for clear voice and checked approximately 90% of the time after PO trials. Initiation of swallow trigger was delayed 3-4 seconds with initiation of second swallow delayed 3-4 seconds after that. Laryngeal elevation palpated as weak. Patient was given packet of dysphagia exercises and instructed on proper technique of each. Patient completed each with \"fair\" accuracy d/t fatigue as session progressed. At this time, recommend patient continue NTFL diet with 1:1 feeding. No straws. Crush meds in applesauce/pudding. RN aware. SLP is following.     Recommendations:  At this time, recommend patient continue NTFL diet with 1:1 feeding. No straws. Crush meds in applesauce/pudding.  Plan of Care: Will benefit from Speech Therapy 5 times per week  Post-Acute Therapy: Discharge to an inpatient transitional care facility for continued speech therapy services.    See \"Rehab Therapy-Acute\" Patient Summary Report for complete documentation.     "

## 2018-10-11 NOTE — PROGRESS NOTES
Critical Care Progress Note    Date of admission  10/8/2018    Chief Complaint  63 y.o. male admitted 10/8/2018 with hypotension    Hospital Course    63 y.o. male with a past medical history of hypertension, chronic kidney disease, chronic atrial fibrillation, HIV who presented 10/8/2018 to the ER after being on the ground since Saturday, the patient was attempting to get out of bed on Saturday and subsequently fell as he was severely weak in his left arm and left leg.  After falling to the ground the patient could not lift himself up and laid there until today when his neighbor heard him and called 911.  He was found with left facial droop, left upper extremity weakness and left lower extremity weakness.  In the ER a CT head was obtained which did not demonstrate any acute findings, he was also found to have severe acute kidney injury with a creatinine of 4.05 and a BUN of 69 with otherwise normal electrolytes, his AST and ALT are elevated at 354 and 183 respectively, his troponin was elevated at 2.13 and he was persistently hypotensive in the ER despite multiple fluid boluses.  According the patient he recently stopped taking his Xarelto for a surgery and has not restarted.      Interval Problem Update  Reviewed last 24 hour events:  Tm 99.6  -27cc over last 24hr, +5.6L since admit  No cxr this am  K 3.7  Cr 2.4->1.4->1.1  C02 36->31  Cpk 8K->3.8K->2.4K    NS w 20K @ 125/hr    2L Nc  More alert and awake today  Still weak on the Left (upper > lower)  Ramirez in with good urine op    Review of Systems  Review of Systems   Constitutional: Negative for chills, fatigue and fever.   HENT: Negative for congestion and sore throat.    Eyes: Negative for visual disturbance.   Respiratory: Positive for cough. Negative for chest tightness, shortness of breath and wheezing.    Cardiovascular: Negative for chest pain.   Gastrointestinal: Negative for abdominal pain, nausea and vomiting.   Genitourinary: Negative for dysuria.    Neurological: Positive for weakness. Negative for headaches.   Psychiatric/Behavioral: Negative for agitation.   All other systems reviewed and are negative.       Vital Signs for last 24 hours   Temp:  [36.9 °C (98.4 °F)-37.6 °C (99.6 °F)] 37.4 °C (99.4 °F)  Pulse:  [75-94] 84  Resp:  [12-30] 30    Hemodynamic parameters for last 24 hours       Vent Settings for last 24 hours       Physical Exam   Physical Exam   Constitutional:   Acute on chronically ill appearing, malnurished   HENT:   Head: Normocephalic and atraumatic.   Eyes: Pupils are equal, round, and reactive to light. No scleral icterus.   Neck: No tracheal deviation present.   Cardiovascular: Normal rate and intact distal pulses.    Pulmonary/Chest: He has no wheezes. He has no rales.   Scattered rhonchi   Abdominal: Soft. He exhibits no distension. There is no tenderness. There is no rebound.   Musculoskeletal: He exhibits no edema.   Neurological:   3/5 strength on LUE, 4/5 LLE, 4+/5 on right   Skin: Skin is dry. He is not diaphoretic.   Numerous skin tears    Psychiatric: He has a normal mood and affect.   Nursing note and vitals reviewed.      Medications  Current Facility-Administered Medications   Medication Dose Route Frequency Provider Last Rate Last Dose   • atorvastatin (LIPITOR) tablet 40 mg  40 mg Oral Q EVENING Erasto Drew M.D.   40 mg at 10/10/18 1720   • 0.9 % NaCl with KCl 20 mEq infusion   Intravenous Continuous Erasto Drew M.D. 125 mL/hr at 10/11/18 0517     • miconazole 2%-zinc oxide (MADELINE) topical cream   Topical BID Chetan Haines M.D.       • abacavir (ZIAGEN) tablet 600 mg  600 mg Oral DAILY Erasto Drew M.D.   600 mg at 10/11/18 0503    And   • dolutegravir (TIVICAY) tablet 50 mg  50 mg Oral DAILY Erasto Drew M.D.   50 mg at 10/11/18 0503    And   • lamivudine (EPIVIR) tablet 300 mg  300 mg Oral DAILY Erasto Drew M.D.   300 mg at 10/11/18 0602   • ALPRAZolam (XANAX) tablet 1 mg  1 mg Oral TID PRN Chetan REEVES  MICHOACANO Haines   1 mg at 10/09/18 2208   • clonazePAM (KLONOPIN) tablet 1 mg  1 mg Oral BID Chetan Haines M.D.   1 mg at 10/11/18 0502   • gabapentin (NEURONTIN) capsule 300 mg  300 mg Oral BID Chetan Haines M.D.   300 mg at 10/11/18 0502   • hydrOXYzine pamoate (VISTARIL) capsule 50 mg  50 mg Oral TID PRN Chetan Haines M.D.       • loperamide (IMODIUM) capsule 2 mg  2 mg Oral TID PRN Chetan Haines M.D.   2 mg at 10/08/18 2011   • omeprazole (PRILOSEC) capsule 20 mg  20 mg Oral DAILY Chetan Haines M.D.   Stopped at 10/11/18 0600   • Respiratory Care per Protocol   Nebulization Continuous RT Chetan Haines M.D.       • acetaminophen (TYLENOL) tablet 650 mg  650 mg Oral Q6HRS PRN Chetan Haines M.D.   650 mg at 10/10/18 0341   • aspirin (ASA) tablet 325 mg  325 mg Oral DAILY Chetan Haines M.D.   325 mg at 10/10/18 0530    Or   • aspirin (ASA) chewable tab 324 mg  324 mg Oral DAILY Chetan Haines M.D.   324 mg at 10/11/18 0502    Or   • aspirin (ASA) suppository 300 mg  300 mg Rectal DAILY Chetan Haines M.D.       • ondansetron (ZOFRAN) syringe/vial injection 4 mg  4 mg Intravenous Q4HRS PRN Chetan Haines M.D.       • ondansetron (ZOFRAN ODT) dispertab 4 mg  4 mg Oral Q4HRS PRN Chetan Haines M.D.           Fluids    Intake/Output Summary (Last 24 hours) at 10/11/18 0757  Last data filed at 10/11/18 0600   Gross per 24 hour   Intake           3172.5 ml   Output             3200 ml   Net            -27.5 ml       Laboratory  Recent Results (from the past 48 hour(s))   Troponin-I    Collection Time: 10/09/18 11:50 AM   Result Value Ref Range    Troponin I 0.97 (H) 0.00 - 0.04 ng/mL   BASIC METABOLIC PANEL    Collection Time: 10/09/18  5:48 PM   Result Value Ref Range    Sodium 140 135 - 145 mmol/L    Potassium 3.6 3.6 - 5.5 mmol/L    Chloride 98 96 - 112 mmol/L    Co2 32 20 - 33 mmol/L    Glucose 82 65 - 99 mg/dL    Bun 38 (H)  8 - 22 mg/dL    Creatinine 1.80 (H) 0.50 - 1.40 mg/dL    Calcium 8.3 (L) 8.5 - 10.5 mg/dL    Anion Gap 10.0 0.0 - 11.9   CREATINE KINASE    Collection Time: 10/09/18  5:48 PM   Result Value Ref Range    CPK Total 5602 (HH) 0 - 154 U/L   ESTIMATED GFR    Collection Time: 10/09/18  5:48 PM   Result Value Ref Range    GFR If  46 (A) >60 mL/min/1.73 m 2    GFR If Non  38 (A) >60 mL/min/1.73 m 2   CBC WITH DIFFERENTIAL    Collection Time: 10/10/18  5:20 AM   Result Value Ref Range    WBC 9.0 4.8 - 10.8 K/uL    RBC 2.91 (L) 4.70 - 6.10 M/uL    Hemoglobin 10.0 (L) 14.0 - 18.0 g/dL    Hematocrit 30.2 (L) 42.0 - 52.0 %    .8 (H) 81.4 - 97.8 fL    MCH 34.4 (H) 27.0 - 33.0 pg    MCHC 33.1 (L) 33.7 - 35.3 g/dL    RDW 50.0 35.9 - 50.0 fL    Platelet Count 177 164 - 446 K/uL    MPV 9.7 9.0 - 12.9 fL    Neutrophils-Polys 81.00 (H) 44.00 - 72.00 %    Lymphocytes 7.70 (L) 22.00 - 41.00 %    Monocytes 10.80 0.00 - 13.40 %    Eosinophils 0.20 0.00 - 6.90 %    Basophils 0.10 0.00 - 1.80 %    Immature Granulocytes 0.20 0.00 - 0.90 %    Nucleated RBC 0.00 /100 WBC    Neutrophils (Absolute) 7.24 1.82 - 7.42 K/uL    Lymphs (Absolute) 0.69 (L) 1.00 - 4.80 K/uL    Monos (Absolute) 0.97 (H) 0.00 - 0.85 K/uL    Eos (Absolute) 0.02 0.00 - 0.51 K/uL    Baso (Absolute) 0.01 0.00 - 0.12 K/uL    Immature Granulocytes (abs) 0.02 0.00 - 0.11 K/uL    NRBC (Absolute) 0.00 K/uL   COMP METABOLIC PANEL    Collection Time: 10/10/18  5:20 AM   Result Value Ref Range    Sodium 137 135 - 145 mmol/L    Potassium 3.2 (L) 3.6 - 5.5 mmol/L    Chloride 94 (L) 96 - 112 mmol/L    Co2 36 (H) 20 - 33 mmol/L    Anion Gap 7.0 0.0 - 11.9    Glucose 131 (H) 65 - 99 mg/dL    Bun 26 (H) 8 - 22 mg/dL    Creatinine 1.44 (H) 0.50 - 1.40 mg/dL    Calcium 8.2 (L) 8.5 - 10.5 mg/dL    AST(SGOT) 95 (H) 12 - 45 U/L    ALT(SGPT) 92 (H) 2 - 50 U/L    Alkaline Phosphatase 57 30 - 99 U/L    Total Bilirubin 0.6 0.1 - 1.5 mg/dL    Albumin 2.6 (L) 3.2  - 4.9 g/dL    Total Protein 5.2 (L) 6.0 - 8.2 g/dL    Globulin 2.6 1.9 - 3.5 g/dL    A-G Ratio 1.0 g/dL   MAGNESIUM    Collection Time: 10/10/18  5:20 AM   Result Value Ref Range    Magnesium 1.7 1.5 - 2.5 mg/dL   CREATINE KINASE    Collection Time: 10/10/18  5:20 AM   Result Value Ref Range    CPK Total 3892 (HH) 0 - 154 U/L   ESTIMATED GFR    Collection Time: 10/10/18  5:20 AM   Result Value Ref Range    GFR If  59 (A) >60 mL/min/1.73 m 2    GFR If Non African American 49 (A) >60 mL/min/1.73 m 2   EC-ECHOCARDIOGRAM COMPLETE W/O CONT    Collection Time: 10/10/18  2:21 PM   Result Value Ref Range    Eject.Frac. MOD BP 59.46     Eject.Frac. MOD 4C 57.59     Eject.Frac. MOD 2C 60.25     Left Ventrical Ejection Fraction 65    CBC WITH DIFFERENTIAL    Collection Time: 10/11/18  4:43 AM   Result Value Ref Range    WBC 10.1 4.8 - 10.8 K/uL    RBC 2.93 (L) 4.70 - 6.10 M/uL    Hemoglobin 10.5 (L) 14.0 - 18.0 g/dL    Hematocrit 30.2 (L) 42.0 - 52.0 %    .1 (H) 81.4 - 97.8 fL    MCH 35.8 (H) 27.0 - 33.0 pg    MCHC 34.8 33.7 - 35.3 g/dL    RDW 49.3 35.9 - 50.0 fL    Platelet Count 237 164 - 446 K/uL    MPV 9.9 9.0 - 12.9 fL    Neutrophils-Polys 74.30 (H) 44.00 - 72.00 %    Lymphocytes 10.80 (L) 22.00 - 41.00 %    Monocytes 13.20 0.00 - 13.40 %    Eosinophils 1.20 0.00 - 6.90 %    Basophils 0.10 0.00 - 1.80 %    Immature Granulocytes 0.40 0.00 - 0.90 %    Nucleated RBC 0.00 /100 WBC    Neutrophils (Absolute) 7.52 (H) 1.82 - 7.42 K/uL    Lymphs (Absolute) 1.09 1.00 - 4.80 K/uL    Monos (Absolute) 1.34 (H) 0.00 - 0.85 K/uL    Eos (Absolute) 0.12 0.00 - 0.51 K/uL    Baso (Absolute) 0.01 0.00 - 0.12 K/uL    Immature Granulocytes (abs) 0.04 0.00 - 0.11 K/uL    NRBC (Absolute) 0.00 K/uL   COMP METABOLIC PANEL    Collection Time: 10/11/18  4:43 AM   Result Value Ref Range    Sodium 133 (L) 135 - 145 mmol/L    Potassium 3.7 3.6 - 5.5 mmol/L    Chloride 97 96 - 112 mmol/L    Co2 31 20 - 33 mmol/L    Anion Gap 5.0  0.0 - 11.9    Glucose 117 (H) 65 - 99 mg/dL    Bun 20 8 - 22 mg/dL    Creatinine 1.18 0.50 - 1.40 mg/dL    Calcium 7.9 (L) 8.5 - 10.5 mg/dL    AST(SGOT) 60 (H) 12 - 45 U/L    ALT(SGPT) 74 (H) 2 - 50 U/L    Alkaline Phosphatase 58 30 - 99 U/L    Total Bilirubin 0.4 0.1 - 1.5 mg/dL    Albumin 2.6 (L) 3.2 - 4.9 g/dL    Total Protein 5.2 (L) 6.0 - 8.2 g/dL    Globulin 2.6 1.9 - 3.5 g/dL    A-G Ratio 1.0 g/dL   MAGNESIUM    Collection Time: 10/11/18  4:43 AM   Result Value Ref Range    Magnesium 2.0 1.5 - 2.5 mg/dL   CREATINE KINASE    Collection Time: 10/11/18  4:43 AM   Result Value Ref Range    CPK Total 2451 (HH) 0 - 154 U/L   ESTIMATED GFR    Collection Time: 10/11/18  4:43 AM   Result Value Ref Range    GFR If African American >60 >60 mL/min/1.73 m 2    GFR If Non African American >60 >60 mL/min/1.73 m 2       Imaging  CT:    Reviewed    Assessment/Plan  Left-sided weakness- (present on admission)   Assessment & Plan    Was not tpa candidate  Ct head 10/9 unrevealing  MRI Brain with multifocal infarct - clinically related to A Fib off AC  Pt/ot/speechp  ASA/Statin  Will need rehab eventually         TENZIN (acute kidney injury) (HCC)- (present on admission)   Assessment & Plan    2ndary to Rhabdo  CPK continues trending down  Renally dose meds, minimize nephrotoxic substances  Follow UO  Continue ivf        Multiple wounds of skin- (present on admission)   Assessment & Plan    Wound care        Elevated troponin- (present on admission)   Assessment & Plan    Improving, likely non cardiac        Elevated liver function tests- (present on admission)   Assessment & Plan    Improving, suspect related to rhabdo        Leukocytosis- (present on admission)   Assessment & Plan    resolved        Rhabdomyolysis- (present on admission)   Assessment & Plan    Nontraumatic, secondary to laying on the floor for 2 days  Follow UO  CPK continues down trending        Chronic atrial fibrillation (HCC)- (present on admission)    Assessment & Plan    Had been off Xarelto x several days, now with stroke symptoms  metoprolol 25 bid if pressures sufficient  Start dvt prophy with heparin now  Restart full AC on 10/14 with xeralto.             HIV (human immunodeficiency virus infection) (HCC)- (present on admission)   Assessment & Plan    Resume HAART meds        Essential hypertension- (present on admission)   Assessment & Plan    Restart home bp meds when clinically appropriate              VTE:  Heparin  Ulcer: PPI  Lines: Ramirez Catheter  Ongoing indication addressed    I have performed a physical exam and reviewed and updated ROS and Plan today (10/11/2018). In review of yesterday's note (10/10/2018), there are no changes except as documented above.     Discussed patient condition and risk of morbidity and/or mortality with Hospitalist, RN, RT, Therapies, Pharmacy and Patient

## 2018-10-11 NOTE — PROGRESS NOTES
Nephrology Daily Progress Note    Date of Service  10/11/2018    Chief Complaint  63 y.o. male admitted 10/8/2018 with TENZIN, elevated CPK/rhabdo, severely depleted  With new onset of left sided weakness -found down in his appartement -states for 2 days.      Interval Problem Update  Doing better today  No acute events  With IVF good UOP  Creatinine improved from 4 to 2.4 -1.44 -improved to 1.18  CPK from> 96411 to 8.590 - 5.602-3.892 -2.451    Review of Systems  Review of Systems   Constitutional: Positive for malaise/fatigue. Negative for chills and fever.   HENT: Negative for congestion, nosebleeds and sore throat.    Respiratory: Negative for cough, hemoptysis, shortness of breath and wheezing.    Cardiovascular: Negative for chest pain, palpitations, orthopnea and leg swelling.   Gastrointestinal: Negative for abdominal pain, heartburn, nausea and vomiting.   Genitourinary:        Ramirez catheter     Musculoskeletal: Negative for back pain, joint pain and myalgias.   Skin: Negative.    Neurological: Positive for weakness.   All other systems reviewed and are negative.       Physical Exam  Temp:  [36.9 °C (98.4 °F)-37.6 °C (99.6 °F)] 36.9 °C (98.5 °F)  Pulse:  [76-94] 83  Resp:  [14-30] 22    Physical Exam   Constitutional: He is oriented to person, place, and time. He appears well-developed. No distress.   Thin male   HENT:   Head: Normocephalic and atraumatic.   Dry mucosa   Eyes: Pupils are equal, round, and reactive to light. Conjunctivae are normal. No scleral icterus.   Neck: Normal range of motion. Neck supple. No thyromegaly present.   Cardiovascular: Normal rate and regular rhythm.  Exam reveals no gallop and no friction rub.    Pulmonary/Chest: Effort normal and breath sounds normal. No respiratory distress. He has no wheezes.   Abdominal: Soft. Bowel sounds are normal. He exhibits no distension. There is no tenderness.   Musculoskeletal: He exhibits no edema.   Neurological: He is alert and oriented to  person, place, and time.   Left sided weakness   Skin: Skin is warm. No rash noted. No erythema.   Nursing note and vitals reviewed.      Fluids    Intake/Output Summary (Last 24 hours) at 10/11/18 1522  Last data filed at 10/11/18 1400   Gross per 24 hour   Intake           3232.5 ml   Output             2175 ml   Net           1057.5 ml       Laboratory  Recent Labs      10/09/18   0450  10/10/18   0520  10/11/18   0443   WBC  8.3  9.0  10.1   RBC  2.51*  2.91*  2.93*   HEMOGLOBIN  8.8*  10.0*  10.5*   HEMATOCRIT  26.0*  30.2*  30.2*   MCV  103.6*  103.8*  103.1*   MCH  35.1*  34.4*  35.8*   MCHC  33.8  33.1*  34.8   RDW  51.7*  50.0  49.3   PLATELETCT  167  177  237   MPV  10.2  9.7  9.9     Recent Labs      10/09/18   1748  10/10/18   0520  10/11/18   0443   SODIUM  140  137  133*   POTASSIUM  3.6  3.2*  3.7   CHLORIDE  98  94*  97   CO2  32  36*  31   GLUCOSE  82  131*  117*   BUN  38*  26*  20   CREATININE  1.80*  1.44*  1.18   CALCIUM  8.3*  8.2*  7.9*             Recent Labs      10/09/18   0450   TRIGLYCERIDE  144   HDL  22*   LDL  54       Imaging  Renal US, CT head - reviewed    Assessment/Plan  1.TENZIN/ATN/CKD II -resolved- good UOP  2.HTN: low BP under better control  3.Electrolytes: hypokalemia - corrected, hyponatremia mild  4.Anemia: Hb level better  5.Left sided weakness/CVA   6.Volume:depleted -continue IVF  7.Rhabdo -CPK improving     Recs; no need for emergent dialysis             D/c bicarb gtt, replace with NS             Daily BMP             Avoid hypotension             Avoid nephrotoxic agents             As kidney function normalized will sign off             Please call with questions    Thank you for the consult

## 2018-10-11 NOTE — THERAPY
"Occupational Therapy Evaluation completed.   Functional Status:  Max A x2 for supine>sit>stand and sit>supine; total A for LB dressing; min A for grooming while seated  Plan of Care: Will benefit from Occupational Therapy 3 times per week  Discharge Recommendations:  Equipment: Will Continue to Assess for Equipment Needs. Post-acute therapy Discharge to a transitional care facility for continued skilled therapy services.    See \"Rehab Therapy-Acute\" Patient Summary Report for complete documentation.    OT eval completed on 64 YO M admitted after GLF. PMHx includes hypertension, chronic kidney disease, chronic atrial fibrillation, HIV. Pt limited due to decreased functional use of LUE, decreased seated/standing balance, activity tolerance and generalized weakness. Pt demonstrated decreased AROM/strength/coordination of LUE and propioceptive awareness. Pt with slightly decreased strength and coordination with RUE however WFL with increased time for motor planning. Pt very motivated for therapy and mobilization. Pt reports he lives alone and was I with BADLs/IADLs as PLOF. Pt would require inpatient transitional care prior to d/c home for continued strengthening and LUE deficits. Will continue to follow for acute OT services while in-house.   "

## 2018-10-11 NOTE — PROGRESS NOTES
[facility] Hospitalist Progress Note    Date of Service: 10/11/2018    Chief Complaint  63 y.o. male admitted 10/8/2018 with left-sided weakness.    Interval Problem Update  Mr. Bolton has a history of atrial fibrillation, HTN, and HIV that woke up two days prior to presentation with left sided weakness and fell to the floor. He was on the floor for 2 days until a neighbor found him and called 911. He was found to have acute kidney failure and rhabdomyolysis. His Xarelto had been held due to possible left leg surgery.     MRI is pending today. His speech remains slurred and weak on the left side. Mr. Bolton is feeling better.   1,300 ml urine over night.    His left arm remains very weak. We discussed his very low weight which he attributes to HIV meds since .   I discussed his condition with Dr. Montaño, his HIV doctor.   Mr. Bolton is amenable to going to Rehab.   Consultants/Specialty  Critical Care. I discussed with Dr. Gillespie on ICU Hot Rounds.  Nephrology   Physiatry. I discussed with Dr. West.   Disposition  tele        Review of Systems   Constitutional: Negative for fever.   Respiratory: Negative for cough and shortness of breath.    Cardiovascular: Negative for chest pain.   Gastrointestinal: Negative for nausea.        Tolerating a diet   Neurological: Positive for weakness.        Left arm remains very weak     All other systems reviewed and are negative.     Physical Exam  Laboratory/Imaging   Hemodynamics  Temp (24hrs), Av.2 °C (98.9 °F), Min:36.9 °C (98.4 °F), Max:37.6 °C (99.6 °F)   Temperature: 37.4 °C (99.4 °F)  Pulse  Av.2  Min: 61  Max: 94 Heart Rate (Monitored): (!) 155  NIBP: 108/68      Respiratory      Respiration: (!) 30, Pulse Oximetry: 96 %        RUL Breath Sounds: Clear, RML Breath Sounds: Clear, RLL Breath Sounds: Diminished, DONI Breath Sounds: Clear, LLL Breath Sounds: Diminished    Fluids    Intake/Output Summary (Last 24 hours) at 10/11/18 0846  Last data filed at 10/11/18  0600   Gross per 24 hour   Intake           2572.5 ml   Output             3025 ml   Net           -452.5 ml       Nutrition  Orders Placed This Encounter   Procedures   • Diet Order Full Liquid (Crush meds in applesauce/pudding please)     Standing Status:   Standing     Number of Occurrences:   1     Order Specific Question:   Diet:     Answer:   Full Liquid [11]     Comments:   Crush meds in applesauce/pudding please     Order Specific Question:   Consistency/Fluid modifications:     Answer:   Nectar Thick [2]     Order Specific Question:   Miscellaneous modifications:     Answer:   SLP - 1:1 Supervision by Nursing [21]     Physical Exam   Constitutional: He is oriented to person, place, and time. No distress.   Very thin and chronically ill appearing   HENT:   Temporal wasting   Neck: Neck supple.   Cardiovascular: Normal rate.    No murmur heard.  Pulmonary/Chest: Effort normal. No respiratory distress. He has no wheezes.   Abdominal: Soft. He exhibits no distension. There is no tenderness.   Musculoskeletal: He exhibits no edema.   Poor muscle tone quads  No swelling   Neurological: He is alert and oriented to person, place, and time.   Awake and alert  Speech is intelligible   Weak 4/5 both legs but symmetric  He is not able to flex the left arm and get the hand off the bed. Left hand  2/5   Skin: Skin is warm and dry. He is not diaphoretic.   Psychiatric:   He is in good spirits   Nursing note and vitals reviewed.      Recent Labs      10/09/18   0450  10/10/18   0520  10/11/18   0443   WBC  8.3  9.0  10.1   RBC  2.51*  2.91*  2.93*   HEMOGLOBIN  8.8*  10.0*  10.5*   HEMATOCRIT  26.0*  30.2*  30.2*   MCV  103.6*  103.8*  103.1*   MCH  35.1*  34.4*  35.8*   MCHC  33.8  33.1*  34.8   RDW  51.7*  50.0  49.3   PLATELETCT  167  177  237   MPV  10.2  9.7  9.9     Recent Labs      10/09/18   1748  10/10/18   0520  10/11/18   0443   SODIUM  140  137  133*   POTASSIUM  3.6  3.2*  3.7   CHLORIDE  98  94*  97   CO2   32  36*  31   GLUCOSE  82  131*  117*   BUN  38*  26*  20   CREATININE  1.80*  1.44*  1.18   CALCIUM  8.3*  8.2*  7.9*     Recent Labs      10/08/18   1426   APTT  32.7   INR  1.40*         Recent Labs      10/09/18   0450   TRIGLYCERIDE  144   HDL  22*   LDL  54          Assessment/Plan     * Cerebrovascular accident (CVA) due to embolism (HCC)- (present on admission)   Assessment & Plan    Started 2 days prior to admit. CT head was negative.  MRI brain reveals bilateral acute strokes.  He was not a candidate for alteplase due to timing.   Echocardiogram negative for clot.   Therapies ordered.  Neurology consult due to MRI findings.   Likely due to afib off of Xarelto.  Statin  Rehab consulted.         Left-sided weakness- (present on admission)   Assessment & Plan    From acute CVA        TENZIN (acute kidney injury) (HCC)- (present on admission)   Assessment & Plan    Secondary to rhabdomyolysis.  Changed bicarb drip to NS + 20 mEq  K at 125 and keep until CPK is under 1,000  BMP ordered for the morning.         Protein-calorie malnutrition, severe (HCC)- (present on admission)   Assessment & Plan    This is a clinical diagnosis in the setting of a wasting syndrome either from HIV or his medications.  BMI is 19        Elevated troponin- (present on admission)   Assessment & Plan    Likely due to rhabdomyolysis, acute kidney failure, denies any chest pain, troponins went up to 2.13 and trended down        Elevated liver function tests- (present on admission)   Assessment & Plan    Likely due to rhabdomyolysis, continue trending liver function test, denies any right upper quadrant pain.  A statin was contraindicated due to transaminitis but can now be started.         Leukocytosis- (present on admission)   Assessment & Plan    Likely reactive, no signs of infection.  WBC was 13.8 on admit and normalized        Rhabdomyolysis- (present on admission)   Assessment & Plan    Secondary to being down for 2 days, nephrology  consulted and signed off.   S/p bicarb drip, continue trending CPK, and BMP in the morning.        Chronic atrial fibrillation (HCC)- (present on admission)   Assessment & Plan    Holding metoprolol due to hypotension, patient was on Xarelto but it was held due to surgery that he had plan for the last Friday, surgery was not done and patient did not start his Xarelto.  Continue holding Xarelto now for acute stroke and restart when okay with neurology.         HIV (human immunodeficiency virus infection) (HCC)- (present on admission)   Assessment & Plan    Restarted home regimen.   Dr. Montaño is aware that he has been admitted        Essential hypertension- (present on admission)   Assessment & Plan    Hold metoprolol due to low BP          Quality-Core Measures   Reviewed items::  Labs reviewed and Radiology images reviewed  DVT prophylaxis pharmacological::  Heparin  DVT prophylaxis - mechanical:  SCDs  Assessed for rehabilitation services:  Patient was assess for and/or received rehabilitation services during this hospitalization

## 2018-10-11 NOTE — ASSESSMENT & PLAN NOTE
Xarelto  PT/OT, SLP  Continue Lipitor  Patient had a friend bring in all of his HIV medications and he has a full 90-day supply; now awaiting bed availability at skilled nursing facilities

## 2018-10-11 NOTE — CONSULTS
"CC:  \"stroke\"    Date of Admission: 10/8/2018    Today's Date: 10/10/18    Consulting Physician: JERI Ho*      HPI:    Sukumar Bolton is a 63 y.o. male R handed, with HIV, chronic A FIB, HTN, who was brought from home after being found down for at least 2 days after having had generalized weakness worse in the Left  Patient still feels very weak; denies any similar events in the past  He was off his Xarelto because was having ortho surgery that got cancelled but patient did not restarted his Xarelto , neither took any ASA   He has some body soreness as well as some drowsiness  Denies any headaches, chest pain or SOB        ROS:   Pert pos: as in HPI  Pert neg: no chest pain, no SOB,       Past Medical History:   Past Medical History:   Diagnosis Date   • Atrial fibrillation (HCC) 2018   • Cataract     sx 1999   • HIV (human immunodeficiency virus infection) (HCC)    • Hypertension    • Kidney disease        Past Surgical History:   Past Surgical History:   Procedure Laterality Date   • IRRIGATION & DEBRIDEMENT ORTHO Right 6/4/2018    Procedure: IRRIGATION & DEBRIDEMENT ORTHO- ANKLE;  Surgeon: Ulices Berry M.D.;  Location: [facility];  Service: Orthopedics   • FIBULA ORIF Right 5/21/2018    Procedure: FIBULA ORIF-FOR NON UNION REPAIR WITH BONE GRAFT;  Surgeon: Ulices Berry M.D.;  Location: [facility];  Service: Orthopedics   • ANKLE ARTHROSCOPY Right 5/21/2018    Procedure: ANKLE ARTHROSCOPY;  Surgeon: Ulices Berry M.D.;  Location: [facility];  Service: Orthopedics   • OTHER ORTHOPEDIC SURGERY  2008    bilateral shoulder sx   • OTHER ORTHOPEDIC SURGERY  2003    left ankle sx       Social History:   Social History     Social History   • Marital status:      Spouse name: N/A   • Number of children: N/A   • Years of education: N/A     Occupational History   • Not on file.     Social History Main Topics   • Smoking status: Former Smoker     " Years: 2.00     Types: Cigarettes     Quit date: 2/14/2018   • Smokeless tobacco: Never Used      Comment: 1 cigarette per day for 2-3 years   • Alcohol use 0.0 oz/week      Comment: 4 vodka drinks per day   • Drug use: No      Comment: marijuana, last  use 5/2018   • Sexual activity: Not on file     Other Topics Concern   • Not on file     Social History Narrative   • No narrative on file       Family History:   Family History   Problem Relation Age of Onset   • Alzheimer's Disease Mother    • Heart Disease Father         pacemaker for heart block   • Hypertension Father    • Heart Disease Sister         ?   • Breast Cancer Sister         with double Mastectomy   • Hypertension Brother        Allergies:   Allergies   Allergen Reactions   • Sulfa Drugs Itching     Rxn - 1970's           Current Facility-Administered Medications:   •  magnesium sulfate IVPB premix 2 g, 2 g, Intravenous, Once, Barron Gillespie M.D., Last Rate: 25 mL/hr at 10/10/18 1633, 2 g at 10/10/18 1633  •  atorvastatin (LIPITOR) tablet 40 mg, 40 mg, Oral, Q EVENING, Erasto Drew M.D., 40 mg at 10/10/18 1720  •  0.9 % NaCl with KCl 20 mEq infusion, , Intravenous, Continuous, Erasto Drew M.D., Last Rate: 125 mL/hr at 10/10/18 1730  •  miconazole 2%-zinc oxide (MADELINE) topical cream, , Topical, BID, Chetan Haines M.D.  •  abacavir (ZIAGEN) tablet 600 mg, 600 mg, Oral, DAILY, 600 mg at 10/10/18 0530 **AND** dolutegravir (TIVICAY) tablet 50 mg, 50 mg, Oral, DAILY, 50 mg at 10/10/18 0530 **AND** lamivudine (EPIVIR) tablet 300 mg, 300 mg, Oral, DAILY, Erasto Drew M.D., 300 mg at 10/10/18 0530  •  ALPRAZolam (XANAX) tablet 1 mg, 1 mg, Oral, TID PRN, Chetan Haines M.D., 1 mg at 10/09/18 2208  •  clonazePAM (KLONOPIN) tablet 1 mg, 1 mg, Oral, BID, Chetan Haines M.D., 1 mg at 10/10/18 1713  •  gabapentin (NEURONTIN) capsule 300 mg, 300 mg, Oral, BID, Chetan Haines M.D., 300 mg at 10/10/18 1713  •  hydrOXYzine pamoate  (VISTARIL) capsule 50 mg, 50 mg, Oral, TID PRN, Chetan Haines M.D.  •  loperamide (IMODIUM) capsule 2 mg, 2 mg, Oral, TID PRN, Chetan Haines M.D., 2 mg at 10/08/18 2011  •  omeprazole (PRILOSEC) capsule 20 mg, 20 mg, Oral, DAILY, Chetan Haines M.D., 20 mg at 10/10/18 0530  •  Respiratory Care per Protocol, , Nebulization, Continuous RT, Chetan Haines M.D.  •  acetaminophen (TYLENOL) tablet 650 mg, 650 mg, Oral, Q6HRS PRN, Chetan Haines M.D., 650 mg at 10/10/18 0341  •  aspirin (ASA) tablet 325 mg, 325 mg, Oral, DAILY, 325 mg at 10/10/18 0530 **OR** aspirin (ASA) chewable tab 324 mg, 324 mg, Oral, DAILY **OR** aspirin (ASA) suppository 300 mg, 300 mg, Rectal, DAILY, Chetan Haines M.D.  •  ondansetron (ZOFRAN) syringe/vial injection 4 mg, 4 mg, Intravenous, Q4HRS PRN, Chetan Haines M.D.  •  ondansetron (ZOFRAN ODT) dispertab 4 mg, 4 mg, Oral, Q4HRS PRN, Chetan Haines M.D.      PHYSICAL EXAM    Vitals:    10/10/18 1300 10/10/18 1400 10/10/18 1500 10/10/18 1600   BP:       Pulse: 77 78 79 82   Resp: 13 14 17 16   Temp:    36.9 °C (98.4 °F)   SpO2: 96% 97% 100% 98%   Weight:       Height:           Constitutional:     Head/Neck: Cachectic, looks chronically sick    NCAT. no meningismus neg kernig neg brudzinski. No obvious mass or heard bruit. No tender arteries or lost pulses. No rash of head or neck.      Cardiovascular: Regular, rhythmic    Pulmonary: Normal breath sounds    Extremities: multiple sores in the extremities    Skin: bruised     Eyes/Funduscopic: no papilledema or atrophy. Norm post segments.    Psych: looks depressed vs lethargic    Mental Status: Awake, drowsy, oriented x 3. Name/repeat/fluent/; mild dysarthria ; command follows. No neglect/extinction. Attention and concentration, recent & remote memory decreased      Cranial Nerves: CN II-XII mild left lower facial weakness; Pupillary reflex + 4  No afferent pupillary defect. EOM full;  VF  full; No nystagmus.       Motor:  Quadriparesis 4/5 RUE, 3/5 B LE, and 0/5 LUE     Sensory: decreased to all in the Left UE  Coordination: dysmetria absent    DTR's: +2 ;  no clonus.    Babinski: neg    Gait/Station: Deferred       NIHSS:     1a: level of conciousness0  1b:month/age1  1c:open eyes/close hand0  2. Best gaze0   3:Visual fields0  4: facial palsy1  5: a motor left arm4  5 b: motor right arm1  6 a: motor left leg2  6 b : motor right leg2  7: limb ataxia0  8: sensory1  9: best language0  10: dysarthria1  11: extinction /neglect: 0    Total: 11    Labs:  Recent Labs      10/08/18   1426  10/09/18   0450  10/10/18   0520   WBC  13.8*  8.3  9.0   RBC  3.57*  2.51*  2.91*   HEMOGLOBIN  12.2*  8.8*  10.0*   HEMATOCRIT  36.6*  26.0*  30.2*   MCV  102.5*  103.6*  103.8*   MCH  34.2*  35.1*  34.4*   MCHC  33.3*  33.8  33.1*   RDW  51.4*  51.7*  50.0   PLATELETCT  213  167  177   MPV  9.8  10.2  9.7     Recent Labs      10/09/18   0450  10/09/18   1748  10/10/18   0520   SODIUM  142  140  137   POTASSIUM  3.9  3.6  3.2*   CHLORIDE  102  98  94*   CO2  25  32  36*   GLUCOSE  79  82  131*   BUN  51*  38*  26*   CREATININE  2.41*  1.80*  1.44*   CALCIUM  7.9*  8.3*  8.2*     Recent Labs      10/08/18   1426   APTT  32.7   INR  1.40*         Recent Labs      10/08/18   2139  10/09/18   0450  10/09/18   1150  10/09/18   1748  10/10/18   0520   CPKTOTAL   --   8590*   --   5602*  3892*   TROPONINI  1.64*  1.13*  0.97*   --    --      Recent Labs      10/09/18   0450   TRIGLYCERIDE  144   HDL  22*   LDL  54     Recent Labs      10/09/18   0450  10/09/18   1748  10/10/18   0520   SODIUM  142  140  137   POTASSIUM  3.9  3.6  3.2*   CHLORIDE  102  98  94*   CO2  25  32  36*   GLUCOSE  79  82  131*   BUN  51*  38*  26*   CPKTOTAL  8590*  5602*  3892*     Recent Labs      10/08/18   1426  10/09/18   0450  10/09/18   1748  10/10/18   0520   SODIUM  140  142  140  137   POTASSIUM  4.5  3.9  3.6  3.2*   CHLORIDE  102  102  98   94*   CO2  21  25  32  36*   BUN  69*  51*  38*  26*   CREATININE  4.05*  2.41*  1.80*  1.44*   MAGNESIUM  2.8*   --    --   1.7   CALCIUM  9.2  7.9*  8.3*  8.2*     Recent Labs      10/08/18   1426   APTT  32.7   INR  1.40*     Results for orders placed or performed during the hospital encounter of 04/03/18   ECHOCARDIOGRAM COMP W/O CONT   Result Value Ref Range    Eject.Frac. MOD BP 69.11     Eject.Frac. MOD 4C 68.49     Eject.Frac. MOD 2C 70.51     Left Ventrical Ejection Fraction 60               Imaging: neuroimaging reviewed and directly visualized by me  US-CAROTID DOPPLER BILAT         EC-ECHOCARDIOGRAM COMPLETE W/O CONT   Final Result      MR-BRAIN-W/O   Final Result      1.  Punctate infarcts in the left posterior frontal and right posterior frontal cortices.   2.  Scattered small sized acute infarcts in the left parietal cortex and left parietal deep white matter.   3.  Chronic bilateral cerebellar infarcts.   4.  Chronic left centrum semiovale lacunar infarct.   5.  Moderate diffuse cerebral substance loss.   6.  Mild microangiopathic ischemic change versus demyelination or gliosis.      CT-HEAD W/O   Final Result      1.  Cerebral atrophy.      2.  White matter lucencies most consistent with small vessel ischemic change versus demyelination or gliosis.      3.  Otherwise, Head CT without contrast with no acute findings. No evidence of acute cerebral  hemorrhage or mass lesion.      DX-CHEST-PORTABLE (1 VIEW)   Final Result      Lung base atelectasis.          Assessment/Plan:    Multifocal cardioembolic acute strokes: secondary to non therapeutic A FIB; patient can restart Xarelto Oct 14th  No surgery for a month  When if surgery is schedule he will need bridge with lovenox and to be restarted ASAP with the xarelto    ASA for now  Continue statins max dose.  ECHO no IV thrombus  Carotids no hemodynamically significant stenosis    Encephalopathy: post ischemia, No evidence clinically to suggest of  seizure activity     Neurology signs off.        Daniela Sanchez M.D.    Clinical Professor, [employer]  Diplomate, Neurology , Vascular Neurology, Neurophysiology

## 2018-10-11 NOTE — PROGRESS NOTES
Patient moved to Tele 8 accompanied by ACLS RN and CCT X 2. Patient on tele, nib, pulse ox with IVF at ordered rate. All belongings sent with patient. Patient moved over into new bed by staff, vitals signs stable during transport.

## 2018-10-11 NOTE — THERAPY
"Physical Therapy Evaluation completed.   Bed Mobility:  Supine to Sit: Maximal Assist (x2)  Transfers: Sit to Stand: Maximal Assist (x2)  Gait: Level Of Assist: Unable to Participate  Plan of Care: Will benefit from Physical Therapy 3 times per week  Discharge Recommendations: Equipment: Will Continue to Assess for Equipment Needs. Post-acute therapy Discharge to a transitional care facility for continued physical therapy services.    Patient is a pleasant 63 year old male with complex medical history including HIV, chronic Afib, HTN. Patient was admitted after he was found by neighbor after being down for two days due to generalized weakness and inability to get out of bed. Patient presented to PT with decreased activity tolerance and endurance, decreased strength, and impaired balance and functional mobility. Patient required max A x2 to move supine<>sitting EOB and for standing x2 with improved activation on second attempt. Standing duration limited due to fatigue. Patient is extremely motivated and will benefit from continued acute PT during hospital stay to improve strength, activity tolerance, and to progress functional mobility. Recommend post acute placement prior to DC home as patient was living alone independently prior to acute medical issues.    See \"Rehab Therapy-Acute\" Patient Summary Report for complete documentation.     "

## 2018-10-11 NOTE — PROGRESS NOTES
Report called to Tele 8, assessment, plan of care, labs, vitals reviewed with patient's new primary RN, Liset. All questions answered.

## 2018-10-12 ENCOUNTER — HOSPITAL ENCOUNTER (INPATIENT)
Facility: REHABILITATION | Age: 63
End: 2018-10-12
Attending: PHYSICAL MEDICINE & REHABILITATION | Admitting: PHYSICAL MEDICINE & REHABILITATION
Payer: MEDICARE

## 2018-10-12 ENCOUNTER — APPOINTMENT (OUTPATIENT)
Dept: RADIOLOGY | Facility: MEDICAL CENTER | Age: 63
DRG: 064 | End: 2018-10-12
Attending: INTERNAL MEDICINE
Payer: MEDICARE

## 2018-10-12 LAB
ANION GAP SERPL CALC-SCNC: 5 MMOL/L (ref 0–11.9)
ANION GAP SERPL CALC-SCNC: 6 MMOL/L (ref 0–11.9)
APPEARANCE UR: CLEAR
BASOPHILS # BLD AUTO: 0.1 % (ref 0–1.8)
BASOPHILS # BLD: 0.01 K/UL (ref 0–0.12)
BILIRUB UR QL STRIP.AUTO: NEGATIVE
BUN SERPL-MCNC: 13 MG/DL (ref 8–22)
BUN SERPL-MCNC: 13 MG/DL (ref 8–22)
CALCIUM SERPL-MCNC: 7.7 MG/DL (ref 8.5–10.5)
CALCIUM SERPL-MCNC: 8.5 MG/DL (ref 8.5–10.5)
CHLORIDE SERPL-SCNC: 104 MMOL/L (ref 96–112)
CHLORIDE SERPL-SCNC: 108 MMOL/L (ref 96–112)
CK SERPL-CCNC: 2412 U/L (ref 0–154)
CO2 SERPL-SCNC: 23 MMOL/L (ref 20–33)
CO2 SERPL-SCNC: 26 MMOL/L (ref 20–33)
COLOR UR: YELLOW
CREAT SERPL-MCNC: 0.92 MG/DL (ref 0.5–1.4)
CREAT SERPL-MCNC: 0.98 MG/DL (ref 0.5–1.4)
EOSINOPHIL # BLD AUTO: 0.14 K/UL (ref 0–0.51)
EOSINOPHIL NFR BLD: 1.6 % (ref 0–6.9)
ERYTHROCYTE [DISTWIDTH] IN BLOOD BY AUTOMATED COUNT: 47.4 FL (ref 35.9–50)
GLUCOSE SERPL-MCNC: 134 MG/DL (ref 65–99)
GLUCOSE SERPL-MCNC: 151 MG/DL (ref 65–99)
GLUCOSE UR STRIP.AUTO-MCNC: NEGATIVE MG/DL
HCT VFR BLD AUTO: 28.4 % (ref 42–52)
HGB BLD-MCNC: 9.5 G/DL (ref 14–18)
IMM GRANULOCYTES # BLD AUTO: 0.15 K/UL (ref 0–0.11)
IMM GRANULOCYTES NFR BLD AUTO: 1.7 % (ref 0–0.9)
KETONES UR STRIP.AUTO-MCNC: NEGATIVE MG/DL
LEUKOCYTE ESTERASE UR QL STRIP.AUTO: NEGATIVE
LYMPHOCYTES # BLD AUTO: 1.05 K/UL (ref 1–4.8)
LYMPHOCYTES NFR BLD: 11.9 % (ref 22–41)
MCH RBC QN AUTO: 34.2 PG (ref 27–33)
MCHC RBC AUTO-ENTMCNC: 33.5 G/DL (ref 33.7–35.3)
MCV RBC AUTO: 102.2 FL (ref 81.4–97.8)
MICRO URNS: NORMAL
MONOCYTES # BLD AUTO: 1.59 K/UL (ref 0–0.85)
MONOCYTES NFR BLD AUTO: 18 % (ref 0–13.4)
NEUTROPHILS # BLD AUTO: 5.89 K/UL (ref 1.82–7.42)
NEUTROPHILS NFR BLD: 66.7 % (ref 44–72)
NITRITE UR QL STRIP.AUTO: NEGATIVE
NRBC # BLD AUTO: 0 K/UL
NRBC BLD-RTO: 0 /100 WBC
PH UR STRIP.AUTO: 8 [PH]
PLATELET # BLD AUTO: 306 K/UL (ref 164–446)
PMV BLD AUTO: 9.3 FL (ref 9–12.9)
POTASSIUM SERPL-SCNC: 3.9 MMOL/L (ref 3.6–5.5)
POTASSIUM SERPL-SCNC: 4.3 MMOL/L (ref 3.6–5.5)
PROT UR QL STRIP: NEGATIVE MG/DL
RBC # BLD AUTO: 2.78 M/UL (ref 4.7–6.1)
RBC UR QL AUTO: NEGATIVE
SODIUM SERPL-SCNC: 136 MMOL/L (ref 135–145)
SODIUM SERPL-SCNC: 136 MMOL/L (ref 135–145)
SP GR UR STRIP.AUTO: 1.01
UROBILINOGEN UR STRIP.AUTO-MCNC: 0.2 MG/DL
WBC # BLD AUTO: 8.8 K/UL (ref 4.8–10.8)

## 2018-10-12 PROCEDURE — 81003 URINALYSIS AUTO W/O SCOPE: CPT

## 2018-10-12 PROCEDURE — 82550 ASSAY OF CK (CPK): CPT

## 2018-10-12 PROCEDURE — 71045 X-RAY EXAM CHEST 1 VIEW: CPT

## 2018-10-12 PROCEDURE — 80048 BASIC METABOLIC PNL TOTAL CA: CPT

## 2018-10-12 PROCEDURE — 99232 SBSQ HOSP IP/OBS MODERATE 35: CPT | Performed by: INTERNAL MEDICINE

## 2018-10-12 PROCEDURE — A9270 NON-COVERED ITEM OR SERVICE: HCPCS | Performed by: HOSPITALIST

## 2018-10-12 PROCEDURE — 92526 ORAL FUNCTION THERAPY: CPT

## 2018-10-12 PROCEDURE — 700102 HCHG RX REV CODE 250 W/ 637 OVERRIDE(OP): Performed by: HOSPITALIST

## 2018-10-12 PROCEDURE — 700105 HCHG RX REV CODE 258: Performed by: INTERNAL MEDICINE

## 2018-10-12 PROCEDURE — 700101 HCHG RX REV CODE 250: Performed by: HOSPITALIST

## 2018-10-12 PROCEDURE — 770020 HCHG ROOM/CARE - TELE (206)

## 2018-10-12 PROCEDURE — 99223 1ST HOSP IP/OBS HIGH 75: CPT | Performed by: PHYSICAL MEDICINE & REHABILITATION

## 2018-10-12 PROCEDURE — 36415 COLL VENOUS BLD VENIPUNCTURE: CPT

## 2018-10-12 PROCEDURE — 85025 COMPLETE CBC W/AUTO DIFF WBC: CPT

## 2018-10-12 PROCEDURE — 700111 HCHG RX REV CODE 636 W/ 250 OVERRIDE (IP): Performed by: INTERNAL MEDICINE

## 2018-10-12 RX ORDER — SODIUM CHLORIDE 9 MG/ML
500 INJECTION, SOLUTION INTRAVENOUS ONCE
Status: COMPLETED | OUTPATIENT
Start: 2018-10-12 | End: 2018-10-12

## 2018-10-12 RX ADMIN — ASPIRIN 325 MG: 325 TABLET, COATED ORAL at 06:14

## 2018-10-12 RX ADMIN — DOLUTEGRAVIR SODIUM 50 MG: 50 TABLET, FILM COATED ORAL at 06:14

## 2018-10-12 RX ADMIN — HEPARIN SODIUM 5000 UNITS: 5000 INJECTION, SOLUTION INTRAVENOUS; SUBCUTANEOUS at 06:14

## 2018-10-12 RX ADMIN — MICONAZOLE NITRATE: 20 CREAM TOPICAL at 06:14

## 2018-10-12 RX ADMIN — POTASSIUM CHLORIDE AND SODIUM CHLORIDE: 900; 150 INJECTION, SOLUTION INTRAVENOUS at 15:38

## 2018-10-12 RX ADMIN — ABACAVIR 600 MG: 300 TABLET, FILM COATED ORAL at 06:13

## 2018-10-12 RX ADMIN — CLONAZEPAM 1 MG: 1 TABLET ORAL at 06:14

## 2018-10-12 RX ADMIN — HEPARIN SODIUM 5000 UNITS: 5000 INJECTION, SOLUTION INTRAVENOUS; SUBCUTANEOUS at 13:13

## 2018-10-12 RX ADMIN — GABAPENTIN 300 MG: 300 CAPSULE ORAL at 18:04

## 2018-10-12 RX ADMIN — LAMIVUDINE 300 MG: 150 TABLET, FILM COATED ORAL at 06:14

## 2018-10-12 RX ADMIN — POTASSIUM CHLORIDE AND SODIUM CHLORIDE: 900; 150 INJECTION, SOLUTION INTRAVENOUS at 06:46

## 2018-10-12 RX ADMIN — OMEPRAZOLE 20 MG: 20 CAPSULE, DELAYED RELEASE ORAL at 06:14

## 2018-10-12 RX ADMIN — SODIUM CHLORIDE 500 ML: 9 INJECTION, SOLUTION INTRAVENOUS at 18:03

## 2018-10-12 RX ADMIN — MICONAZOLE NITRATE: 20 CREAM TOPICAL at 18:04

## 2018-10-12 RX ADMIN — POTASSIUM CHLORIDE AND SODIUM CHLORIDE: 900; 150 INJECTION, SOLUTION INTRAVENOUS at 23:48

## 2018-10-12 RX ADMIN — GABAPENTIN 300 MG: 300 CAPSULE ORAL at 06:14

## 2018-10-12 RX ADMIN — ACETAMINOPHEN 650 MG: 325 TABLET, FILM COATED ORAL at 13:13

## 2018-10-12 RX ADMIN — HEPARIN SODIUM 5000 UNITS: 5000 INJECTION, SOLUTION INTRAVENOUS; SUBCUTANEOUS at 22:29

## 2018-10-12 RX ADMIN — ATORVASTATIN CALCIUM 40 MG: 40 TABLET, FILM COATED ORAL at 18:04

## 2018-10-12 RX ADMIN — ALPRAZOLAM 1 MG: 0.5 TABLET ORAL at 19:39

## 2018-10-12 ASSESSMENT — ENCOUNTER SYMPTOMS
ORTHOPNEA: 0
NAUSEA: 0
MYALGIAS: 0
PALPITATIONS: 0
BRUISES/BLEEDS EASILY: 0
WEIGHT LOSS: 0
VOMITING: 0
DOUBLE VISION: 0
SHORTNESS OF BREATH: 0
BACK PAIN: 0
WEAKNESS: 1
HEARTBURN: 0
NECK PAIN: 0
POLYDIPSIA: 0
HEMOPTYSIS: 0
SPUTUM PRODUCTION: 0
FEVER: 0
COUGH: 0
ROS GI COMMENTS: TOLERATING A DIET
CHILLS: 0
BLURRED VISION: 0
FOCAL WEAKNESS: 1
DEPRESSION: 0

## 2018-10-12 ASSESSMENT — PAIN SCALES - GENERAL
PAINLEVEL_OUTOF10: 4
PAINLEVEL_OUTOF10: 0
PAINLEVEL_OUTOF10: 4
PAINLEVEL_OUTOF10: 0

## 2018-10-12 ASSESSMENT — LIFESTYLE VARIABLES: SUBSTANCE_ABUSE: 0

## 2018-10-12 NOTE — PROGRESS NOTES
Patient transferred from ICU.  Patient A+Ox4.  Slight L facial droop. L sided weakness.  Unable to move LUE.  Tele placed on patient.  VS WNL.  Patient oriented to unit and room.  No family at bedside but spoke with sister out of town

## 2018-10-12 NOTE — THERAPY
"Speech Language Therapy dysphagia treatment completed.   Functional Status:  Patient seen this date for dysphagia tx session. Patient currently on NTFL diet. Patient consumed PO trials from NTFL breakfast tray. Patient consumed approximately 70% of tray with direct supervision from this clinician. Patient had no episodes of wet/gurgly vocal and completed double swallow strategies >80% of the time with min cues. Patient was given PO trials of D2 solids which he consumed with appearance of adequate mastication and no overt oral dysphagia or pocketing. Thin liquid trials resulted in wet vocal quality and delayed throat clearing. At this time, recommend patient advance to D2/NTL with continued direct supervision during PO due to impulsivity at times.     Recommendations: 1) advance to D2/NTL with continued direct supervision during PO due to impulsivity at times.     Plan of Care: Will benefit from Speech Therapy 5 times per week  Post-Acute Therapy: Discharge to a  Inpatient transitional care facility for continued speech therapy services.    See \"Rehab Therapy-Acute\" Patient Summary Report for complete documentation.     "

## 2018-10-12 NOTE — PROGRESS NOTES
Bedside report received from KYE Dutta.  POC discussed, questions answered.  Patient c/o neck and L arm pain.  Repositioned patient, will continue to monitor.  All needs met while in room.

## 2018-10-12 NOTE — CARE PLAN
Problem: Safety  Goal: Will remain free from injury  Outcome: PROGRESSING SLOWER THAN EXPECTED  Call light within reach. Patient educated on using call light for assistance.  No attempt out of bed.  Will continue to redirect patient.     Problem: Respiratory:  Goal: Respiratory status will improve  Outcome: PROGRESSING AS EXPECTED  Patient denies any SOB.  LS clear.  Patient not O2 dependent trial room air.      Problem: Skin Integrity  Goal: Risk for impaired skin integrity will decrease  Outcome: PROGRESSING SLOWER THAN EXPECTED  Multiple DTI and abrasions due to fall at home.  Q2 T&R.

## 2018-10-12 NOTE — CARE PLAN
Problem: Communication  Goal: The ability to communicate needs accurately and effectively will improve  Outcome: PROGRESSING AS EXPECTED      Problem: Mobility  Goal: Risk for activity intolerance will decrease  Outcome: PROGRESSING SLOWER THAN EXPECTED

## 2018-10-12 NOTE — CONSULTS
Physical Medicine and Rehabilitation Consultation         Initial Consult      Date of Consultation: 10/12/2018  Consulting provider: Tunde West D.O.  Reason for consultation: assess for acute inpatient rehab appropriateness  Consulting physician Chetan Antonio    Chief complaint: left sided weakness    HPI: Patient is a 63 y.o. Right handed male with pmHx of HIV, AFib, HTN, CKD stage 2, ETOH abuse, who was admitted on 10/8 after being found down for 2 days with left sided weakness from multifocal cardioembolic stroke 2/2 A fib.   His neighbor found him down at his house 2 days after onset of weakness and inability to walk. In ER Cr was up to 4.05 and CPK 95751. Cr has improved to 0.98    MRI showed left posterior frontal and right posterior frontal cortices, scattered small acute infarcts in the left parietal cortex and left deep white matter, old bilateral cerebellar infarcts.     He was not a candidate for TPA because of 2 days of symptoms.     He was dx'd with HIV in 1997, associates low weight with this. He is being followed by Dr Montaño. BMI 19. He has had mutliple ER visits recently    He complains of Slurred speech, left sided weakness arm greater than leg, generalized weakness  He complains of Pain in his left arm, described as 6-7/10 burning tingling sensation throughout the entire arm, constant, worse with movement and touching it.     Urine: timed voiding  Bowel: LBM 10/11, denies incont        ROS:  Gen: No fatigue, confusion, significant weight loss  Eyes: no blurry vision, double vision or pain in eyes  ENT: no changes in hearing, runny nose, nose bleeds, sinus pain  Endo: no episodes of low blood sugar  CV: No CP, palpitations  Lungs: no shortness of breath, changes in secretions, changes in cough, pain with coughing  Abd: No abd pain, nausea, vomiting, pain with eating  : no blood in urine, suprapubic pain  Ext/MSK: No swelling in legs, asymmetric atrophy  Neuro: no changes in strength  or sensation  Skin: no new ulcers/skin breakdown appreciated by patient  Mood: No changes in mood today, increase in depression or anxiety  Heme: no bruising, or bleeding  Allergy: No recent allergies    PMH:  Past Medical History:   Diagnosis Date   • Atrial fibrillation (HCC) 2018   • Cataract     sx 1999   • HIV (human immunodeficiency virus infection) ([facility])    • Hypertension    • Kidney disease        PSH:  Past Surgical History:   Procedure Laterality Date   • IRRIGATION & DEBRIDEMENT ORTHO Right 6/4/2018    Procedure: IRRIGATION & DEBRIDEMENT ORTHO- ANKLE;  Surgeon: Ulices Berry M.D.;  Location: SURGERY [facility];  Service: Orthopedics   • FIBULA ORIF Right 5/21/2018    Procedure: FIBULA ORIF-FOR NON UNION REPAIR WITH BONE GRAFT;  Surgeon: Ulices Berry M.D.;  Location: SURGERY [facility];  Service: Orthopedics   • ANKLE ARTHROSCOPY Right 5/21/2018    Procedure: ANKLE ARTHROSCOPY;  Surgeon: Ulices Berry M.D.;  Location: SURGERY [facility];  Service: Orthopedics   • OTHER ORTHOPEDIC SURGERY  2008    bilateral shoulder sx   • OTHER ORTHOPEDIC SURGERY  2003    left ankle sx       FHX:  Family History   Problem Relation Age of Onset   • Alzheimer's Disease Mother    • Heart Disease Father         pacemaker for heart block   • Hypertension Father    • Heart Disease Sister         ?   • Breast Cancer Sister         with double Mastectomy   • Hypertension Brother        Medications:  Current Facility-Administered Medications   Medication Dose   • heparin injection 5,000 Units  5,000 Units   • atorvastatin (LIPITOR) tablet 40 mg  40 mg   • 0.9 % NaCl with KCl 20 mEq infusion     • miconazole 2%-zinc oxide (MADELINE) topical cream     • abacavir (ZIAGEN) tablet 600 mg  600 mg    And   • dolutegravir (TIVICAY) tablet 50 mg  50 mg    And   • lamivudine (EPIVIR) tablet 300 mg  300 mg   • ALPRAZolam (XANAX) tablet 1 mg  1 mg   • clonazePAM (KLONOPIN) tablet 1 mg  1 mg   • gabapentin (NEURONTIN)  capsule 300 mg  300 mg   • hydrOXYzine pamoate (VISTARIL) capsule 50 mg  50 mg   • loperamide (IMODIUM) capsule 2 mg  2 mg   • omeprazole (PRILOSEC) capsule 20 mg  20 mg   • Respiratory Care per Protocol     • acetaminophen (TYLENOL) tablet 650 mg  650 mg   • aspirin (ASA) tablet 325 mg  325 mg    Or   • aspirin (ASA) chewable tab 324 mg  324 mg    Or   • aspirin (ASA) suppository 300 mg  300 mg   • ondansetron (ZOFRAN) syringe/vial injection 4 mg  4 mg   • ondansetron (ZOFRAN ODT) dispertab 4 mg  4 mg       Allergies:  Allergies   Allergen Reactions   • Sulfa Drugs Itching     Rxn - 1970's         Social Hx:  Pre-morbidly, this patient lived in a single level home with Four steps to enter,rail on either side, alone and able to care for self.   Tobacco: quite smoking 7 months ago, 2 years of use  Alcohol: + use and abuse  Drugs: none    Prior level of function:   Independent    Current level of function:  Max A X2, will discuss with PT/OT today      Physical Exam:  Vitals: Blood pressure 132/91, pulse 85, temperature 36.8 °C (98.2 °F), resp. rate 18, height 1.829 m (6'), weight 70.8 kg (156 lb 1.4 oz), SpO2 93 %.  Gen: NAD, left sided facial droop  HEENT: NC/AT, PERRLA, moist mucous membranes  Cardio: RRR, no mumurs  Pulm: CTAB, with normal respiratory effort  Abd: Soft NTND, active bowel sounds,   Ext: No peripheral edema. No calf tenderness. No clubbing/cyanosis. +dorsal pedalis pulses bilaterally.    Mental status:  A&Ox4 (person, place, date, situation) answers questions appropriately follows commands  Speech: dysarthria    CRANIAL NERVES:  2,3: visual acuity grossly intact, PERRL  3,4,6: EOMI bilaterally, no nystagmus or diplopia  5: sensation intact to light touch bilaterally and symmetric  7: Left facial asymmetry  8: hearing grossly intact  9,10: symmetric palate elevation  11: SCM/Trapezius strength 5/5 bilaterally  12: tongue protrudes midline    Motor:      Shoulder flexors:  Bilateral -  4/5  Elbow  flexors:  Right -  5/5, Left -  1/5  Wrist extensors:  Right -  5/5, Left -  0/5  Elbow extensors:  Right -  5/5, Left -  2/5  Finger flexors:  Right -  5/5, Left -  1/5  Finger abductors:  Right -  5/5, Left -  1/5  Hip flexors:  Right -  5/5, Left -  3/5  Knee ext:  Right -  5/5, Left -  4/5  Dorsiflexors:  Right -  5/5, Left -  4/5  EHL:  Right -  5/5, Left -  4/5  Plantar flexors:  Right -  5/5, Left -  4/5   Negative Pronator drift bilaterally    Sensory:   intact to light touch through out  intact to proprioception at bilateral great toes 2/2      DTRs: 2+ in bilateral biceps, triceps, brachioradialis, 2+ in bilateral patellar and achilles tendons  No clonus at bilateral ankles  Negative babinski b/l  + on left Linder     Tone: no spasticity noted, no cogwheeling noted      Labs:  Recent Labs      10/10/18   0520  10/11/18   0443   RBC  2.91*  2.93*   HEMOGLOBIN  10.0*  10.5*   HEMATOCRIT  30.2*  30.2*   PLATELETCT  177  237     Recent Labs      10/10/18   0520  10/11/18   0443  10/12/18   0242   SODIUM  137  133*  136   POTASSIUM  3.2*  3.7  3.9   CHLORIDE  94*  97  104   CO2  36*  31  26   GLUCOSE  131*  117*  151*   BUN  26*  20  13   CREATININE  1.44*  1.18  0.98   CALCIUM  8.2*  7.9*  8.5     Recent Results (from the past 24 hour(s))   CREATINE KINASE    Collection Time: 10/12/18  2:42 AM   Result Value Ref Range    CPK Total 2412 (HH) 0 - 154 U/L   BASIC METABOLIC PANEL    Collection Time: 10/12/18  2:42 AM   Result Value Ref Range    Sodium 136 135 - 145 mmol/L    Potassium 3.9 3.6 - 5.5 mmol/L    Chloride 104 96 - 112 mmol/L    Co2 26 20 - 33 mmol/L    Glucose 151 (H) 65 - 99 mg/dL    Bun 13 8 - 22 mg/dL    Creatinine 0.98 0.50 - 1.40 mg/dL    Calcium 8.5 8.5 - 10.5 mg/dL    Anion Gap 6.0 0.0 - 11.9   ESTIMATED GFR    Collection Time: 10/12/18  2:42 AM   Result Value Ref Range    GFR If African American >60 >60 mL/min/1.73 m 2    GFR If Non African American >60 >60 mL/min/1.73 m 2     MRI brain  10/10:  Impression       1.  Punctate infarcts in the left posterior frontal and right posterior frontal cortices.  2.  Scattered small sized acute infarcts in the left parietal cortex and left parietal deep white matter.  3.  Chronic bilateral cerebellar infarcts.  4.  Chronic left centrum semiovale lacunar infarct.  5.  Moderate diffuse cerebral substance loss.  6.  Mild microangiopathic ischemic change versus demyelination or gliosis.     Echo 10/10:  CONCLUSIONS  Prior study done on 04/06/18; compared to the report of the study done   - there has been no significant change.   No obvious intracardiac thrombus noted.  Normal left ventricular systolic function.  Left ventricular ejection fraction is visually estimated to be 65%.  Grade I diastolic dysfunction.  No significant valve abnormalities.   Unable to estimate pulmonary artery pressure due to an inadequate   tricuspid regurgitant jet.    Carotid doppler 10/10:   CONCLUSIONS   Antegrade flow, bilateral vertebral arteries.    Flow within both subclavian arteries appears to be within normal limits.    Mild bilateral cervical internal carotid artery stenosis (<50%).     ASSESSMENT:    Patient is a 63 y.o. Right handed male with pmHx of HIV, AFib, HTN, CKD stage 2, who was admitted on 10/8 after being found down for 2 days with left sided weakness from multifocal cardioembolic stroke 2/2 A fib.     Neuro: punctate left and right frontal infarcts, Left parietal infarct  MRI personally reviewed showing multiple areas of acute infarct with old cerebellar infarcts  - ASA   - Statin max dose  - Neuro recommended restarting xarelto on 10/14  - left facial droop, left weakness arm>leg, apraxia  - continue with PT/OT  - consult SLP for cog evaluation  - consider starting prozac 20mg daily for total of 3 months, been shown to improve motor recovery after stroke    Note: Haldol, clonidine and benzodiazepines  have been associated with poor outcomes after stroke, so  recommend other agents if possible for blood pressure (hydralazine, lisinopril, amlodipine) and for mood control (agree with Seroquel and Depakote, trazodone is good for mood/sleep cycle regulation - start 50 mg at night, titrate up    AFib:  Restart Xarelto on 10/14 as per neurology note  - holding metoprolol because of low BP    Orhtostatic hypotension:   - please eval BP when sitting and standing  - encourage PO fluid intake, goal of >2L  - consider starting midodrine 5mg 30 min prior to getting out of bed, to limit hypoperfusion of brain    Neuropathic pain: left arm, central pain, limiting movement of left arm and participation with therapy  - please increase gabapentin to 300mg tid, or 400mg bid  - consider using lidocaine jelly 4-5% for left arm for localized pain relief  - tylenol 650mg tid scheduled     CKD stage 2: base line Cr 0.95, returned to 0.98 10/12  - being followed by nephrology    Rehab: Impaired adl's  - currently he is requiring significant 2 person assist, I duncan discuss with therapy team, determine it is from apraxia, motor planning or fatigue. He has on static evaluation good strength in the LE, movement in hand. His recovery may be prolonged but I will continue to follow him with goal of transitioning him to most effective next step     Discussed with pt and family, summarized hospitalization and care, options for next step of care    Discussed with team about recommendations         Tunde West D.O.  Physical Medicine and Rehabilitation

## 2018-10-12 NOTE — PROGRESS NOTES
2 RN skin check done with KYE Camacho.  Pictures taken and uploaded 10/11/18.  New skin break down, nonblanching  on R cheek.  Redness and slowly blanching on L cheek.  Silicone NC placed on patient.  Tubing repositioned under skin breakdown     Other wounds include wounds include:  -R temporal DTI   -R ear DTI  -R elbow unstageable   -R hip unstageable  -R waist DTI  -Lateral R ankle and 5th toe DTI  -Sacrococcygeal DTI  -L waist DTI  -L medial ankle, foot, heel DTI

## 2018-10-12 NOTE — PROGRESS NOTES
2 RN skin check did with KYE Sanchez.  Pictures taken and uploaded.      Assessed:   -Right Temporal DTI    -Right hip unstagable   -Right waist DTI   -lateral right ankle DTI   -Right ear DTI    -Sacrococcygeal DTI  -Right elbow unstagable  -R medial ankle DTI      R hip has Mepilex in place  Sacrum has Mepilex in place

## 2018-10-12 NOTE — DOCUMENTATION QUERY
DOCUMENTATION QUERY    PROVIDERS: Please select “Cosign w/ note”to reply to query.    To better represent the severity of illness of your patient, please review the following information and exercise your independent professional judgment in responding to this query.     Multiple deep tissue injuries and pressure ulcers are noted in the Wound Team Notes. Based upon the clinical findings, risk factors, and treatment, can a diagnosis be provided to support these findings?    Per Wound Team Notes:     1.  Right Temporal DTI POA    2.  Right hip unstagable POA    3.  Right waist DTI POA    4.  Lateral right ankle DTI POA    5.  Right 5th MTH DTI POA    6.  Right 5th toe DTI POA    7.  Right ear DTI POA    8.  Sacrococcygeal DTI POA    9.  Right chest DTI POA  10.  Right elbow unstagable POA  11.  Left waist DTI POA  12.  Left medial ankle DTI  13.  Left foot DTI  14.  Left heel DTI    • Agree with Wound Care RN assessment  • Disagree with Wound Care RN assessment (document your clinical findings)  • Other explanation of clinical findings  • Unable to determine    The medical record reflects the following:   Clinical Findings Wound Team Notes: As outlined above   Treatment Wound Team Consultation, turn q2hrs, NILDA bed, barrier wipes, heels floated off pillows, silicone sacral dressing, pad O2 mask with foam, & Dietitian Consultation    Risk Factors Age, found down after 2 days, CVA, TENZIN, rhabdomyolysis, severe protein calorie malnutrition, HIV, & chronic atrial fibrillation    Location within medical record History & Physical, Progress Notes, & Wound Team Notes      Thank you,   Virgie Musa RN  Clinical   Phone [phone]

## 2018-10-12 NOTE — DISCHARGE PLANNING
Medical Social Work    LSW left message for [place] with acute rehab requesting call back to discuss PMR consult.

## 2018-10-13 ENCOUNTER — APPOINTMENT (OUTPATIENT)
Dept: RADIOLOGY | Facility: MEDICAL CENTER | Age: 63
DRG: 064 | End: 2018-10-13
Attending: HOSPITALIST
Payer: MEDICARE

## 2018-10-13 PROBLEM — M79.602 PAIN OF LEFT UPPER EXTREMITY: Status: ACTIVE | Noted: 2018-10-13

## 2018-10-13 PROBLEM — I82.A12 DVT OF AXILLARY VEIN, ACUTE LEFT (HCC): Status: ACTIVE | Noted: 2018-10-13

## 2018-10-13 LAB
ANION GAP SERPL CALC-SCNC: 5 MMOL/L (ref 0–11.9)
BASOPHILS # BLD AUTO: 0.2 % (ref 0–1.8)
BASOPHILS # BLD: 0.02 K/UL (ref 0–0.12)
BUN SERPL-MCNC: 12 MG/DL (ref 8–22)
CALCIUM SERPL-MCNC: 8 MG/DL (ref 8.5–10.5)
CHLORIDE SERPL-SCNC: 108 MMOL/L (ref 96–112)
CK SERPL-CCNC: 1414 U/L (ref 0–154)
CO2 SERPL-SCNC: 23 MMOL/L (ref 20–33)
CREAT SERPL-MCNC: 0.93 MG/DL (ref 0.5–1.4)
EKG IMPRESSION: NORMAL
EOSINOPHIL # BLD AUTO: 0.18 K/UL (ref 0–0.51)
EOSINOPHIL NFR BLD: 2.1 % (ref 0–6.9)
ERYTHROCYTE [DISTWIDTH] IN BLOOD BY AUTOMATED COUNT: 47.3 FL (ref 35.9–50)
GLUCOSE SERPL-MCNC: 122 MG/DL (ref 65–99)
HCT VFR BLD AUTO: 29.7 % (ref 42–52)
HGB BLD-MCNC: 9.9 G/DL (ref 14–18)
IMM GRANULOCYTES # BLD AUTO: 0.18 K/UL (ref 0–0.11)
IMM GRANULOCYTES NFR BLD AUTO: 2.1 % (ref 0–0.9)
LYMPHOCYTES # BLD AUTO: 1.19 K/UL (ref 1–4.8)
LYMPHOCYTES NFR BLD: 14 % (ref 22–41)
MCH RBC QN AUTO: 34 PG (ref 27–33)
MCHC RBC AUTO-ENTMCNC: 33.3 G/DL (ref 33.7–35.3)
MCV RBC AUTO: 102.1 FL (ref 81.4–97.8)
MONOCYTES # BLD AUTO: 1.49 K/UL (ref 0–0.85)
MONOCYTES NFR BLD AUTO: 17.5 % (ref 0–13.4)
NEUTROPHILS # BLD AUTO: 5.46 K/UL (ref 1.82–7.42)
NEUTROPHILS NFR BLD: 64.1 % (ref 44–72)
NRBC # BLD AUTO: 0 K/UL
NRBC BLD-RTO: 0 /100 WBC
PLATELET # BLD AUTO: 307 K/UL (ref 164–446)
PMV BLD AUTO: 9.3 FL (ref 9–12.9)
POTASSIUM SERPL-SCNC: 4 MMOL/L (ref 3.6–5.5)
RBC # BLD AUTO: 2.91 M/UL (ref 4.7–6.1)
SODIUM SERPL-SCNC: 136 MMOL/L (ref 135–145)
WBC # BLD AUTO: 8.5 K/UL (ref 4.8–10.8)

## 2018-10-13 PROCEDURE — 93971 EXTREMITY STUDY: CPT | Mod: LT

## 2018-10-13 PROCEDURE — 36415 COLL VENOUS BLD VENIPUNCTURE: CPT

## 2018-10-13 PROCEDURE — 700111 HCHG RX REV CODE 636 W/ 250 OVERRIDE (IP): Performed by: INTERNAL MEDICINE

## 2018-10-13 PROCEDURE — 85025 COMPLETE CBC W/AUTO DIFF WBC: CPT

## 2018-10-13 PROCEDURE — A9270 NON-COVERED ITEM OR SERVICE: HCPCS | Performed by: HOSPITALIST

## 2018-10-13 PROCEDURE — 700102 HCHG RX REV CODE 250 W/ 637 OVERRIDE(OP): Performed by: HOSPITALIST

## 2018-10-13 PROCEDURE — 700101 HCHG RX REV CODE 250: Performed by: HOSPITALIST

## 2018-10-13 PROCEDURE — 80048 BASIC METABOLIC PNL TOTAL CA: CPT

## 2018-10-13 PROCEDURE — 770020 HCHG ROOM/CARE - TELE (206)

## 2018-10-13 PROCEDURE — 93010 ELECTROCARDIOGRAM REPORT: CPT | Performed by: INTERNAL MEDICINE

## 2018-10-13 PROCEDURE — 93005 ELECTROCARDIOGRAM TRACING: CPT | Performed by: HOSPITALIST

## 2018-10-13 PROCEDURE — 82550 ASSAY OF CK (CPK): CPT

## 2018-10-13 PROCEDURE — 73080 X-RAY EXAM OF ELBOW: CPT | Mod: LT

## 2018-10-13 PROCEDURE — 700111 HCHG RX REV CODE 636 W/ 250 OVERRIDE (IP): Performed by: HOSPITALIST

## 2018-10-13 PROCEDURE — 99232 SBSQ HOSP IP/OBS MODERATE 35: CPT | Performed by: HOSPITALIST

## 2018-10-13 RX ORDER — FLUOXETINE HYDROCHLORIDE 20 MG/1
20 CAPSULE ORAL DAILY
Status: DISCONTINUED | OUTPATIENT
Start: 2018-10-13 | End: 2018-10-28 | Stop reason: HOSPADM

## 2018-10-13 RX ORDER — TRAMADOL HYDROCHLORIDE 50 MG/1
50 TABLET ORAL EVERY 12 HOURS PRN
Status: DISCONTINUED | OUTPATIENT
Start: 2018-10-13 | End: 2018-10-18

## 2018-10-13 RX ADMIN — HEPARIN SODIUM 5000 UNITS: 5000 INJECTION, SOLUTION INTRAVENOUS; SUBCUTANEOUS at 06:16

## 2018-10-13 RX ADMIN — CLONAZEPAM 1 MG: 1 TABLET ORAL at 17:53

## 2018-10-13 RX ADMIN — TRAMADOL HYDROCHLORIDE 50 MG: 50 TABLET, FILM COATED ORAL at 17:59

## 2018-10-13 RX ADMIN — ASPIRIN 325 MG: 325 TABLET, COATED ORAL at 06:16

## 2018-10-13 RX ADMIN — ONDANSETRON 4 MG: 2 INJECTION INTRAMUSCULAR; INTRAVENOUS at 21:55

## 2018-10-13 RX ADMIN — ALPRAZOLAM 1 MG: 0.5 TABLET ORAL at 21:55

## 2018-10-13 RX ADMIN — CLONAZEPAM 1 MG: 1 TABLET ORAL at 07:48

## 2018-10-13 RX ADMIN — POTASSIUM CHLORIDE AND SODIUM CHLORIDE: 900; 150 INJECTION, SOLUTION INTRAVENOUS at 17:52

## 2018-10-13 RX ADMIN — POTASSIUM CHLORIDE AND SODIUM CHLORIDE: 900; 150 INJECTION, SOLUTION INTRAVENOUS at 07:47

## 2018-10-13 RX ADMIN — DOLUTEGRAVIR SODIUM 50 MG: 50 TABLET, FILM COATED ORAL at 06:15

## 2018-10-13 RX ADMIN — GABAPENTIN 300 MG: 300 CAPSULE ORAL at 06:16

## 2018-10-13 RX ADMIN — OMEPRAZOLE 20 MG: 20 CAPSULE, DELAYED RELEASE ORAL at 06:16

## 2018-10-13 RX ADMIN — MICONAZOLE NITRATE: 20 CREAM TOPICAL at 17:53

## 2018-10-13 RX ADMIN — LAMIVUDINE 300 MG: 150 TABLET, FILM COATED ORAL at 06:15

## 2018-10-13 RX ADMIN — ALPRAZOLAM 1 MG: 0.5 TABLET ORAL at 02:36

## 2018-10-13 RX ADMIN — FLUOXETINE HYDROCHLORIDE 20 MG: 20 CAPSULE ORAL at 09:41

## 2018-10-13 RX ADMIN — GABAPENTIN 300 MG: 300 CAPSULE ORAL at 17:53

## 2018-10-13 RX ADMIN — TRAMADOL HYDROCHLORIDE 50 MG: 50 TABLET, FILM COATED ORAL at 04:16

## 2018-10-13 RX ADMIN — RIVAROXABAN 15 MG: 15 TABLET, FILM COATED ORAL at 14:12

## 2018-10-13 RX ADMIN — ACETAMINOPHEN 650 MG: 325 TABLET, FILM COATED ORAL at 01:34

## 2018-10-13 RX ADMIN — ABACAVIR 600 MG: 300 TABLET, FILM COATED ORAL at 06:15

## 2018-10-13 ASSESSMENT — ENCOUNTER SYMPTOMS
NECK PAIN: 0
DOUBLE VISION: 0
DEPRESSION: 0
BLURRED VISION: 0
BRUISES/BLEEDS EASILY: 0
ROS GI COMMENTS: TOLERATING A DIET
HEARTBURN: 0
WEAKNESS: 1
NAUSEA: 0
FEVER: 0
POLYDIPSIA: 0
BACK PAIN: 0
VOMITING: 0
SPUTUM PRODUCTION: 0
WEIGHT LOSS: 0
FOCAL WEAKNESS: 1
ORTHOPNEA: 0
COUGH: 0
HEMOPTYSIS: 0
SHORTNESS OF BREATH: 0
PALPITATIONS: 0
CHILLS: 0
MYALGIAS: 0

## 2018-10-13 ASSESSMENT — PAIN SCALES - GENERAL
PAINLEVEL_OUTOF10: 7
PAINLEVEL_OUTOF10: 9
PAINLEVEL_OUTOF10: 6
PAINLEVEL_OUTOF10: 0
PAINLEVEL_OUTOF10: 4
PAINLEVEL_OUTOF10: 6
PAINLEVEL_OUTOF10: 4
PAINLEVEL_OUTOF10: 0

## 2018-10-13 ASSESSMENT — LIFESTYLE VARIABLES: SUBSTANCE_ABUSE: 0

## 2018-10-13 NOTE — PROGRESS NOTES
[facility] Hospitalist Progress Note    Date of Service: 10/12/2018    Chief Complaint  63 y.o. male admitted 10/8/2018 with left-sided weakness.    Interval Problem Update  Mr. Bolton has a history of atrial fibrillation, HTN, and HIV that woke up two days prior to presentation with left sided weakness and fell to the floor. He was on the floor for 2 days until a neighbor found him and called 911. He was found to have acute kidney failure and rhabdomyolysis. His Xarelto had been held due to possible left leg surgery.     Patient has flat affect.  He denies any significant change in his condition.  He denies currently any pain left arm remains weak without improvement.  Rehab consult received.  Currently unclear if patient is good candidate for acute rehab.  The recovery will be very slow given all his comorbidities.  I placed referral for skilled nursing facility  I discussed CODE STATUS with the patient and he wants to remain full code for now    Consultants/Specialty  Critical Care  Nephrology   Physiatry.   Disposition  tele        Review of Systems   Constitutional: Negative for chills, fever and weight loss.   HENT: Negative for ear pain, hearing loss and tinnitus.    Eyes: Negative for blurred vision and double vision.   Respiratory: Negative for cough, hemoptysis, sputum production and shortness of breath.    Cardiovascular: Negative for chest pain, palpitations and orthopnea.   Gastrointestinal: Negative for heartburn, nausea and vomiting.        Tolerating a diet   Genitourinary: Negative for dysuria, frequency and urgency.   Musculoskeletal: Negative for back pain, myalgias and neck pain.   Neurological: Positive for focal weakness and weakness.        Left arm remains very weak     Endo/Heme/Allergies: Negative for environmental allergies and polydipsia. Does not bruise/bleed easily.   Psychiatric/Behavioral: Negative for depression, substance abuse and suicidal ideas.   All other systems reviewed and are  negative.     Physical Exam  Laboratory/Imaging   Hemodynamics  Temp (24hrs), Av °C (98.6 °F), Min:36.6 °C (97.8 °F), Max:37.4 °C (99.3 °F)   Temperature: 37.3 °C (99.1 °F)  Pulse  Av.7  Min: 61  Max: 94    Blood Pressure : 137/82      Respiratory      Respiration: 18, Pulse Oximetry: 95 %        RUL Breath Sounds: Clear, RML Breath Sounds: Clear, RLL Breath Sounds: Clear, DONI Breath Sounds: Clear, LLL Breath Sounds: Clear    Fluids    Intake/Output Summary (Last 24 hours) at 10/12/18 2011  Last data filed at 10/12/18 1612   Gross per 24 hour   Intake          2778.33 ml   Output                0 ml   Net          2778.33 ml       Nutrition  Orders Placed This Encounter   Procedures   • Diet Order Regular (Crush meds in applesauce/pudding please)     Standing Status:   Standing     Number of Occurrences:   1     Order Specific Question:   Diet:     Answer:   Regular [1]     Comments:   Crush meds in applesauce/pudding please     Order Specific Question:   Texture/Fiber modifications:     Answer:   Dysphagia 2(Pureed/Chopped)specify fluid consistency(question 6) [2]     Order Specific Question:   Consistency/Fluid modifications:     Answer:   Nectar Thick [2]     Order Specific Question:   Miscellaneous modifications:     Answer:   SLP - 1:1 Supervision by Nursing [21]     Physical Exam   Constitutional: He is oriented to person, place, and time. He appears cachectic. He has a sickly appearance. He appears ill. No distress.   Neck: Neck supple.   Cardiovascular: Normal rate.    No murmur heard.  Pulmonary/Chest: Effort normal. No respiratory distress. He has no wheezes.   Abdominal: Soft. He exhibits no distension. There is no tenderness.   Musculoskeletal: He exhibits no edema.   Poor muscle tone quads  No swelling   Neurological: He is alert and oriented to person, place, and time.   Left arm weakness  Bilateral lower extremity weakness   Skin: Skin is warm and dry. He is not diaphoretic.   Psychiatric:  His affect is blunt. He is slowed.   Nursing note and vitals reviewed.      Recent Labs      10/10/18   0520  10/11/18   0443  10/12/18   1834   WBC  9.0  10.1  8.8   RBC  2.91*  2.93*  2.78*   HEMOGLOBIN  10.0*  10.5*  9.5*   HEMATOCRIT  30.2*  30.2*  28.4*   MCV  103.8*  103.1*  102.2*   MCH  34.4*  35.8*  34.2*   MCHC  33.1*  34.8  33.5*   RDW  50.0  49.3  47.4   PLATELETCT  177  237  306   MPV  9.7  9.9  9.3     Recent Labs      10/11/18   0443  10/12/18   0242  10/12/18   1834   SODIUM  133*  136  136   POTASSIUM  3.7  3.9  4.3   CHLORIDE  97  104  108   CO2  31  26  23   GLUCOSE  117*  151*  134*   BUN  20  13  13   CREATININE  1.18  0.98  0.92   CALCIUM  7.9*  8.5  7.7*                      Assessment/Plan     * Cerebrovascular accident (CVA) due to embolism (HCC)- (present on admission)   Assessment & Plan    Started 2 days prior to admit. CT head was negative.  MRI brain reveals bilateral acute strokes.  He was not a candidate for alteplase due to timing.   Echocardiogram negative for clot.   Therapies ordered.  Neurology consult due to MRI findings.   Likely due to afib off of Xarelto.  Restart Xarelto on 10/14 per neurology recommendation  Statin on hold due to rhabdomyolysis  Rehab consulted.         Left-sided weakness- (present on admission)   Assessment & Plan    From acute CVA        TENZIN (acute kidney injury) (HCC)- (present on admission)   Assessment & Plan    Secondary to rhabdomyolysis.  Changed bicarb drip to NS + 20 mEq  K at 125 and keep until CPK is under 1,000  Repeat BMP and CPK tomorrow        Protein-calorie malnutrition, severe (HCC)- (present on admission)   Assessment & Plan    This is a clinical diagnosis  BMI is 19        Elevated troponin- (present on admission)   Assessment & Plan    Likely due to rhabdomyolysis, acute kidney failure,   denies any chest pain        Elevated liver function tests- (present on admission)   Assessment & Plan    Likely due to rhabdomyolysis,  A statin is  held        Leukocytosis- (present on admission)   Assessment & Plan    Resolved        Rhabdomyolysis- (present on admission)   Assessment & Plan    Secondary to being down for 2 days, nephrology consulted and signed off.   S/p bicarb drip,   continue trending CPK, and BMP in the morning.        Chronic atrial fibrillation (HCC)- (present on admission)   Assessment & Plan    Holding metoprolol due to hypotension  Currently heart rate is well controlled    , patient was on Xarelto but it was held due to surgery that he had plan for the last Friday, surgery was not done and patient did not start his Xarelto.  Continue holding Xarelto now restart on 10/14 per neurology        HIV (human immunodeficiency virus infection) (HCC)- (present on admission)   Assessment & Plan    Restarted home regimen.   Dr. Montaño is aware that he has been admitted        Essential hypertension- (present on admission)   Assessment & Plan    Held metoprolol due to low BP          Quality-Core Measures   Reviewed items::  Labs reviewed and Radiology images reviewed  DVT prophylaxis pharmacological::  Heparin  DVT prophylaxis - mechanical:  SCDs  Assessed for rehabilitation services:  Patient was assess for and/or received rehabilitation services during this hospitalization

## 2018-10-13 NOTE — PROGRESS NOTES
Assumed care of pt. He is alert and oriented/denies pain. Discussed POC, no questions at this time. Discussed safety, pt verbalizes understanding. Bed is locked in lowest position and call light/personal belongings are within reach.

## 2018-10-13 NOTE — PROGRESS NOTES
[facility]ist Progress Note    Date of Service: 10/13/2018    Chief Complaint  63 y.o. male admitted 10/8/2018 with left-sided weakness. Mr. Bolton has a history of atrial fibrillation, HTN, and HIV that woke up two days prior to presentation with left sided weakness and fell to the floor. He was on the floor for 2 days until a neighbor found him and called 911. He was found to have acute kidney failure and rhabdomyolysis. His Xarelto had been held due to possible left leg surgery.     Interval Problem Update  Started on prozac per physiatry recs  CPK trending down, cont IVF  Hypotensive yesterday evening, improved with IV fluids.  Urinalysis and chest x-ray within normal limits  Having left arm pain, DVT ultrasound with DVT. Will start xarelto today.    Consultants/Specialty  Critical Care  Nephrology   Physiatry.     Disposition  Rehab vs SNF        Review of Systems   Constitutional: Negative for chills, fever and weight loss.   HENT: Negative for ear pain, hearing loss and tinnitus.    Eyes: Negative for blurred vision and double vision.   Respiratory: Negative for cough, hemoptysis, sputum production and shortness of breath.    Cardiovascular: Negative for chest pain, palpitations and orthopnea.   Gastrointestinal: Negative for heartburn, nausea and vomiting.        Tolerating a diet   Genitourinary: Negative for dysuria, frequency and urgency.   Musculoskeletal: Negative for back pain, myalgias and neck pain.        Left arm pain   Neurological: Positive for focal weakness and weakness.        Left arm remains very weak     Endo/Heme/Allergies: Negative for environmental allergies and polydipsia. Does not bruise/bleed easily.   Psychiatric/Behavioral: Negative for depression, substance abuse and suicidal ideas.   All other systems reviewed and are negative.     Physical Exam  Laboratory/Imaging   Hemodynamics  Temp (24hrs), Av.1 °C (98.8 °F), Min:36.6 °C (97.8 °F), Max:37.6 °C (99.7 °F)   Temperature:  36.8 °C (98.2 °F)  Pulse  Av.9  Min: 61  Max: 94    Blood Pressure : 138/89      Respiratory      Respiration: 17, Pulse Oximetry: 95 %        RUL Breath Sounds: Clear, RML Breath Sounds: Clear, RLL Breath Sounds: Clear, DONI Breath Sounds: Clear, LLL Breath Sounds: Clear    Fluids    Intake/Output Summary (Last 24 hours) at 10/13/18 1349  Last data filed at 10/13/18 1200   Gross per 24 hour   Intake             1020 ml   Output             3075 ml   Net            -2055 ml       Nutrition  Orders Placed This Encounter   Procedures   • Diet Order Regular (Crush meds in applesauce/pudding please)     Standing Status:   Standing     Number of Occurrences:   1     Order Specific Question:   Diet:     Answer:   Regular [1]     Comments:   Crush meds in applesauce/pudding please     Order Specific Question:   Texture/Fiber modifications:     Answer:   Dysphagia 2(Pureed/Chopped)specify fluid consistency(question 6) [2]     Order Specific Question:   Consistency/Fluid modifications:     Answer:   Nectar Thick [2]     Order Specific Question:   Miscellaneous modifications:     Answer:   SLP - 1:1 Supervision by Nursing [21]     Physical Exam   Constitutional: He is oriented to person, place, and time. He appears cachectic. He has a sickly appearance. He appears ill. No distress.   Neck: Neck supple.   Cardiovascular: Normal rate.    No murmur heard.  Pulmonary/Chest: Effort normal. No respiratory distress. He has no wheezes.   Abdominal: Soft. He exhibits no distension. There is no tenderness.   Musculoskeletal: He exhibits no edema.   Poor muscle tone quads  Left arm warm to touch. Appears to have some thermal dysregulation secondary to stroke   Neurological: He is alert and oriented to person, place, and time.   Left arm weakness (1/5 strength)  Bilateral lower extremity weakness(2/5 strength)   Skin: Skin is warm and dry. He is not diaphoretic.   Psychiatric: His affect is blunt. He is slowed.   Nursing note and  vitals reviewed.      Recent Labs      10/11/18   0443  10/12/18   1834  10/13/18   0249   WBC  10.1  8.8  8.5   RBC  2.93*  2.78*  2.91*   HEMOGLOBIN  10.5*  9.5*  9.9*   HEMATOCRIT  30.2*  28.4*  29.7*   MCV  103.1*  102.2*  102.1*   MCH  35.8*  34.2*  34.0*   MCHC  34.8  33.5*  33.3*   RDW  49.3  47.4  47.3   PLATELETCT  237  306  307   MPV  9.9  9.3  9.3     Recent Labs      10/12/18   0242  10/12/18   1834  10/13/18   0249   SODIUM  136  136  136   POTASSIUM  3.9  4.3  4.0   CHLORIDE  104  108  108   CO2  26  23  23   GLUCOSE  151*  134*  122*   BUN  13  13  12   CREATININE  0.98  0.92  0.93   CALCIUM  8.5  7.7*  8.0*                      Assessment/Plan     * Cerebrovascular accident (CVA) due to embolism (HCC)- (present on admission)   Assessment & Plan    Started 2 days prior to admit. CT head was negative.  MRI brain reveals bilateral acute strokes.  He was not a candidate for alteplase due to timing.   Echocardiogram negative for clot.   Therapies ordered.  Neurology consult due to MRI findings.   Likely due to afib off of Xarelto.  Restart Xarelto on 10/14 per neurology recommendation  Statin on hold due to rhabdomyolysis  Rehab consulted and will reassess on Monday for acceptance        DVT of axillary vein, acute left (HCC)   Assessment & Plan    Noted on dvt ultrasound  Will resume xarelto one day early for acute dvt        Left-sided weakness- (present on admission)   Assessment & Plan    From acute CVA        TENZIN (acute kidney injury) (HCC)- (present on admission)   Assessment & Plan    Secondary to rhabdomyolysis.  Changed bicarb drip to NS + 20 mEq  K at 125 and keep until CPK is under 1,000  Repeat BMP and CPK tomorrow  AK I resolved        Protein-calorie malnutrition, severe (HCC)- (present on admission)   Assessment & Plan    This is a clinical diagnosis  BMI is 19        Elevated troponin- (present on admission)   Assessment & Plan    Likely due to rhabdomyolysis, acute kidney failure,    denies any chest pain        Elevated liver function tests- (present on admission)   Assessment & Plan    Likely due to rhabdomyolysis,  A statin is held        Leukocytosis- (present on admission)   Assessment & Plan    Resolved        Rhabdomyolysis- (present on admission)   Assessment & Plan    Secondary to being down for 2 days, nephrology consulted and signed off.   S/p bicarb drip,   CPK has been trending down  continue trending CPK, and BMP in the morning.          Chronic atrial fibrillation (HCC)- (present on admission)   Assessment & Plan    Holding metoprolol due to hypotension  Currently heart rate is well controlled    , patient was on Xarelto but it was held due to surgery that he had plan for the last Friday, surgery was not done and patient did not start his Xarelto.  Continue holding Xarelto now restart on 10/14 per neurology        HIV (human immunodeficiency virus infection) (HCC)- (present on admission)   Assessment & Plan    Restarted home regimen.   Dr. Montaño is aware that he has been admitted        Essential hypertension- (present on admission)   Assessment & Plan    Held metoprolol due to low BP          Quality-Core Measures   Reviewed items::  Labs reviewed and Radiology images reviewed  DVT: xarelto.  DVT prophylaxis - mechanical:  SCDs  Assessed for rehabilitation services:  Patient was assess for and/or received rehabilitation services during this hospitalization

## 2018-10-13 NOTE — PROGRESS NOTES
BP-98/72   HR-83  Low grade temp-99.3F    Patient alert and oriented x4. Denies feeling lightheaded or dizzy.     Dr. Burgess made aware.  500cc bolus, urinanalysis, CXR ordered.

## 2018-10-13 NOTE — PROGRESS NOTES
2 RN skin check done     Pictures updated 10/11/2018.  Waffle mattress and heel float boots applied 10/12/2018     Redness improved/ blanching L cheek  Redness/scabbing R ear (Silicone NC)  R temporal excoriation/DTI  R elbow scabbing/pink unstageable (foam applied)  R hip excoriation (mepilex applied)  R lateral ankle & 5th toe DTI (mepilex to ankle)  Coccyx red and blanching (mepilex applied)  L waist excoriation/DTI  L medial ankle/foot DTI (mepilex applied)  L heel boggy (Heel float boot applied)

## 2018-10-13 NOTE — DISCHARGE PLANNING
Rehab monitoring. Pt not currently at a level appropriate for acute inpatient rehab. Physiatry Dr. West is following with goal of transitioning to most effective next step. Anticipate Physiatry update after weekend.

## 2018-10-13 NOTE — CARE PLAN
Problem: Mobility  Goal: Risk for activity intolerance will decrease  Outcome: PROGRESSING SLOWER THAN EXPECTED      Problem: Urinary Elimination:  Goal: Ability to reestablish a normal urinary elimination pattern will improve  Outcome: PROGRESSING SLOWER THAN EXPECTED

## 2018-10-13 NOTE — PROGRESS NOTES
2 RN skin check with Yesi RN    Pictures updated 10/11/2018    Redness/ blanching L cheek  Redness/scabbing R ear (Silicone NC)  R temporal excoriation/DTI  R elbow scabbing/pink unstageable (foam applied)  R hip excoriation (mepilex applied)  R lateral ankle & 5th toe DTI (mepilex to ankle)  Coccyx red and blanching (mepilex applied)  L waist excoriation/DTI  L medial ankle/foot DTI (mepilex applied)  L heel boggy

## 2018-10-13 NOTE — CARE PLAN
Problem: Safety  Goal: Will remain free from injury  Outcome: PROGRESSING AS EXPECTED  No attempt OOB.  Reinforced importance of calling.  All needs met while in room.  Non slip socks on.      Problem: Pain Management  Goal: Pain level will decrease to patient's comfort goal  Outcome: PROGRESSING SLOWER THAN EXPECTED  Patient reports 6/10 L arm pain.  XR ordered.  Duplex US ordered.  Arm elevated.  Will continue to monitor.     Problem: Respiratory:  Goal: Respiratory status will improve  Outcome: PROGRESSING AS EXPECTED  LS clear.  Denies any cough or SOB.  Patient remains on 2L NC.  Not O2 dependent.     Problem: Mobility  Goal: Risk for activity intolerance will decrease  Outcome: PROGRESSING SLOWER THAN EXPECTED  PT ordered 3x/wk.  ROM done with patient.

## 2018-10-13 NOTE — PROGRESS NOTES
Bedside report received from KYE Antunez.  POC discussed with patient.  All questions answered.  Patient A+Ox4 however forgetful and confused about situation at times.  C/o L upper arm pain.  Arm slightly edematous +radial pulse.  XR and duplex of L arm ordered.  Will continue to monitor.

## 2018-10-14 PROBLEM — I82.729: Status: ACTIVE | Noted: 2018-10-13

## 2018-10-14 LAB
ALBUMIN SERPL BCP-MCNC: 2.5 G/DL (ref 3.2–4.9)
ALBUMIN/GLOB SERPL: 0.9 G/DL
ALP SERPL-CCNC: 59 U/L (ref 30–99)
ALT SERPL-CCNC: 50 U/L (ref 2–50)
ANION GAP SERPL CALC-SCNC: 6 MMOL/L (ref 0–11.9)
AST SERPL-CCNC: 34 U/L (ref 12–45)
BILIRUB SERPL-MCNC: 0.3 MG/DL (ref 0.1–1.5)
BUN SERPL-MCNC: 11 MG/DL (ref 8–22)
CALCIUM SERPL-MCNC: 8.5 MG/DL (ref 8.5–10.5)
CHLORIDE SERPL-SCNC: 108 MMOL/L (ref 96–112)
CO2 SERPL-SCNC: 24 MMOL/L (ref 20–33)
CREAT SERPL-MCNC: 0.7 MG/DL (ref 0.5–1.4)
GLOBULIN SER CALC-MCNC: 2.8 G/DL (ref 1.9–3.5)
GLUCOSE SERPL-MCNC: 96 MG/DL (ref 65–99)
POTASSIUM SERPL-SCNC: 4.3 MMOL/L (ref 3.6–5.5)
PROT SERPL-MCNC: 5.3 G/DL (ref 6–8.2)
SODIUM SERPL-SCNC: 138 MMOL/L (ref 135–145)

## 2018-10-14 PROCEDURE — A9270 NON-COVERED ITEM OR SERVICE: HCPCS | Performed by: HOSPITALIST

## 2018-10-14 PROCEDURE — 700102 HCHG RX REV CODE 250 W/ 637 OVERRIDE(OP): Performed by: HOSPITALIST

## 2018-10-14 PROCEDURE — 36415 COLL VENOUS BLD VENIPUNCTURE: CPT

## 2018-10-14 PROCEDURE — 700101 HCHG RX REV CODE 250: Performed by: HOSPITALIST

## 2018-10-14 PROCEDURE — 99232 SBSQ HOSP IP/OBS MODERATE 35: CPT | Performed by: HOSPITALIST

## 2018-10-14 PROCEDURE — 770020 HCHG ROOM/CARE - TELE (206)

## 2018-10-14 PROCEDURE — 80053 COMPREHEN METABOLIC PANEL: CPT

## 2018-10-14 RX ADMIN — ABACAVIR 600 MG: 300 TABLET, FILM COATED ORAL at 06:03

## 2018-10-14 RX ADMIN — MICONAZOLE NITRATE: 20 CREAM TOPICAL at 18:06

## 2018-10-14 RX ADMIN — RIVAROXABAN 15 MG: 15 TABLET, FILM COATED ORAL at 17:59

## 2018-10-14 RX ADMIN — LOPERAMIDE HYDROCHLORIDE 2 MG: 2 CAPSULE ORAL at 06:02

## 2018-10-14 RX ADMIN — CLONAZEPAM 1 MG: 1 TABLET ORAL at 17:58

## 2018-10-14 RX ADMIN — CLONAZEPAM 1 MG: 1 TABLET ORAL at 05:58

## 2018-10-14 RX ADMIN — GABAPENTIN 300 MG: 300 CAPSULE ORAL at 05:58

## 2018-10-14 RX ADMIN — FLUOXETINE HYDROCHLORIDE 20 MG: 20 CAPSULE ORAL at 05:57

## 2018-10-14 RX ADMIN — LAMIVUDINE 300 MG: 150 TABLET, FILM COATED ORAL at 06:03

## 2018-10-14 RX ADMIN — ALPRAZOLAM 1 MG: 0.5 TABLET ORAL at 22:47

## 2018-10-14 RX ADMIN — POTASSIUM CHLORIDE AND SODIUM CHLORIDE: 900; 150 INJECTION, SOLUTION INTRAVENOUS at 19:04

## 2018-10-14 RX ADMIN — OMEPRAZOLE 20 MG: 20 CAPSULE, DELAYED RELEASE ORAL at 05:58

## 2018-10-14 RX ADMIN — GABAPENTIN 300 MG: 300 CAPSULE ORAL at 17:58

## 2018-10-14 RX ADMIN — RIVAROXABAN 15 MG: 15 TABLET, FILM COATED ORAL at 05:58

## 2018-10-14 RX ADMIN — DOLUTEGRAVIR SODIUM 50 MG: 50 TABLET, FILM COATED ORAL at 06:02

## 2018-10-14 RX ADMIN — ASPIRIN 325 MG: 325 TABLET, COATED ORAL at 05:57

## 2018-10-14 RX ADMIN — TRAMADOL HYDROCHLORIDE 50 MG: 50 TABLET, FILM COATED ORAL at 07:53

## 2018-10-14 RX ADMIN — MICONAZOLE NITRATE: 20 CREAM TOPICAL at 03:58

## 2018-10-14 RX ADMIN — ACETAMINOPHEN 650 MG: 325 TABLET, FILM COATED ORAL at 16:17

## 2018-10-14 RX ADMIN — POTASSIUM CHLORIDE AND SODIUM CHLORIDE: 900; 150 INJECTION, SOLUTION INTRAVENOUS at 10:13

## 2018-10-14 RX ADMIN — TRAMADOL HYDROCHLORIDE 50 MG: 50 TABLET, FILM COATED ORAL at 22:48

## 2018-10-14 ASSESSMENT — ENCOUNTER SYMPTOMS
SPUTUM PRODUCTION: 0
FOCAL WEAKNESS: 1
VOMITING: 0
PALPITATIONS: 0
WEAKNESS: 1
ORTHOPNEA: 0
HEMOPTYSIS: 0
SHORTNESS OF BREATH: 0
CHILLS: 0
BRUISES/BLEEDS EASILY: 0
FEVER: 0
POLYDIPSIA: 0
COUGH: 0
BACK PAIN: 0
NAUSEA: 0
NECK PAIN: 0
BLURRED VISION: 0
DEPRESSION: 0
HEARTBURN: 0
WEIGHT LOSS: 0
ROS GI COMMENTS: TOLERATING A DIET
MYALGIAS: 0
DOUBLE VISION: 0

## 2018-10-14 ASSESSMENT — PATIENT HEALTH QUESTIONNAIRE - PHQ9
SUM OF ALL RESPONSES TO PHQ9 QUESTIONS 1 AND 2: 0
2. FEELING DOWN, DEPRESSED, IRRITABLE, OR HOPELESS: NOT AT ALL
1. LITTLE INTEREST OR PLEASURE IN DOING THINGS: NOT AT ALL

## 2018-10-14 ASSESSMENT — PAIN SCALES - GENERAL
PAINLEVEL_OUTOF10: 3
PAINLEVEL_OUTOF10: 7
PAINLEVEL_OUTOF10: 7
PAINLEVEL_OUTOF10: 0
PAINLEVEL_OUTOF10: 7
PAINLEVEL_OUTOF10: 0

## 2018-10-14 ASSESSMENT — LIFESTYLE VARIABLES: SUBSTANCE_ABUSE: 0

## 2018-10-14 NOTE — CARE PLAN
Problem: Communication  Goal: The ability to communicate needs accurately and effectively will improve  Outcome: PROGRESSING SLOWER THAN EXPECTED      Problem: Safety  Goal: Will remain free from injury  Outcome: PROGRESSING AS EXPECTED      Problem: Bowel/Gastric:  Goal: Normal bowel function is maintained or improved  Outcome: PROGRESSING SLOWER THAN EXPECTED

## 2018-10-14 NOTE — PROGRESS NOTES
Assumed care of patient, pt found awake, in pain. Tramadol given. Pt q2 hour turns, waffle mat in place, prevalon boots. Condom cath used for incontinence, BM + this AM. IVF restarted, order from Md Ramírez. No set DC plans at this time.

## 2018-10-14 NOTE — PROGRESS NOTES
2 RN skin check done with Harmony RN  Waffle mattress and heel float boots applied 10/12/2018     Redness improved/ blanching L cheek  Redness/scabbing R ear (Silicone NC)  R temporal excoriation/DTI  R elbow scabbing/pink unstageable (foam present)  R hip excoriation (mepilex present)  R lateral ankle & 5th toe DTI (mepilex to ankle)  Coccyx red and blanching (mepilex present)  L waist excoriation/DTI  L medial ankle/foot DTI (mepilex present)  L heel boggy (Heel float boot on)

## 2018-10-14 NOTE — PROGRESS NOTES
Assumed care of patient from KYE Hutchins. Updated on POC. Call light within reach and other fall precautions in place such as bed locked in lowest position. Patient instructed to call staff before getting out of bed. All questions answered, no other needs at this time.

## 2018-10-14 NOTE — PROGRESS NOTES
[facility] Hospitalist Progress Note    Date of Service: 10/14/2018    Chief Complaint  63 y.o. male admitted 10/8/2018 with left-sided weakness. Mr. Bolton has a history of atrial fibrillation, HTN, and HIV that woke up two days prior to presentation with left sided weakness and fell to the floor. He was on the floor for 2 days until a neighbor found him and called 911. He was found to have acute kidney failure and rhabdomyolysis. His Xarelto had been held due to possible left leg surgery.     Interval Problem Update  Doing well. Resting  No new complaints  Discussed with pt about DVT diagnosis     Consultants/Specialty  Critical Care  Nephrology   Physiatry.     Disposition  Rehab vs SNF        Review of Systems   Constitutional: Negative for chills, fever and weight loss.   HENT: Negative for ear pain, hearing loss and tinnitus.    Eyes: Negative for blurred vision and double vision.   Respiratory: Negative for cough, hemoptysis, sputum production and shortness of breath.    Cardiovascular: Negative for chest pain, palpitations and orthopnea.   Gastrointestinal: Negative for heartburn, nausea and vomiting.        Tolerating a diet   Genitourinary: Negative for dysuria, frequency and urgency.   Musculoskeletal: Negative for back pain, myalgias and neck pain.        Left arm pain   Neurological: Positive for focal weakness and weakness.        Left arm remains very weak     Endo/Heme/Allergies: Negative for environmental allergies and polydipsia. Does not bruise/bleed easily.   Psychiatric/Behavioral: Negative for depression, substance abuse and suicidal ideas.   All other systems reviewed and are negative.     Physical Exam  Laboratory/Imaging   Hemodynamics  Temp (24hrs), Av.3 °C (99.2 °F), Min:37.1 °C (98.8 °F), Max:37.7 °C (99.8 °F)   Temperature: 37.2 °C (98.9 °F)  Pulse  Av.2  Min: 61  Max: 94    Blood Pressure : 129/81      Respiratory      Respiration: 18, Pulse Oximetry: 94 %        RUL Breath Sounds:  Clear, RML Breath Sounds: Clear, RLL Breath Sounds: Clear, DONI Breath Sounds: Clear, LLL Breath Sounds: Clear    Fluids    Intake/Output Summary (Last 24 hours) at 10/14/18 1820  Last data filed at 10/14/18 1803   Gross per 24 hour   Intake             1480 ml   Output             2950 ml   Net            -1470 ml       Nutrition  Orders Placed This Encounter   Procedures   • Diet Order Regular (Crush meds in applesauce/pudding please)     Standing Status:   Standing     Number of Occurrences:   1     Order Specific Question:   Diet:     Answer:   Regular [1]     Comments:   Crush meds in applesauce/pudding please     Order Specific Question:   Texture/Fiber modifications:     Answer:   Dysphagia 2(Pureed/Chopped)specify fluid consistency(question 6) [2]     Order Specific Question:   Consistency/Fluid modifications:     Answer:   Nectar Thick [2]     Order Specific Question:   Miscellaneous modifications:     Answer:   SLP - 1:1 Supervision by Nursing [21]     Physical Exam   Constitutional: He is oriented to person, place, and time. He appears cachectic. He has a sickly appearance. He appears ill. No distress.   Neck: Neck supple.   Cardiovascular: Normal rate.    No murmur heard.  Pulmonary/Chest: Effort normal. No respiratory distress. He has no wheezes.   Abdominal: Soft. He exhibits no distension. There is no tenderness.   Musculoskeletal: He exhibits no edema.   Poor muscle tone quads  Left arm warm to touch. Appears to have some thermal dysregulation secondary to stroke   Neurological: He is alert and oriented to person, place, and time.   Left arm weakness (1/5 strength)  Bilateral lower extremity weakness(2/5 strength)   Skin: Skin is warm and dry. He is not diaphoretic.   Psychiatric: His affect is blunt. He is slowed.   Nursing note and vitals reviewed.      Recent Labs      10/12/18   1834  10/13/18   0249   WBC  8.8  8.5   RBC  2.78*  2.91*   HEMOGLOBIN  9.5*  9.9*   HEMATOCRIT  28.4*  29.7*   MCV   102.2*  102.1*   MCH  34.2*  34.0*   MCHC  33.5*  33.3*   RDW  47.4  47.3   PLATELETCT  306  307   MPV  9.3  9.3     Recent Labs      10/12/18   1834  10/13/18   0249  10/14/18   0216   SODIUM  136  136  138   POTASSIUM  4.3  4.0  4.3   CHLORIDE  108  108  108   CO2  23  23  24   GLUCOSE  134*  122*  96   BUN  13  12  11   CREATININE  0.92  0.93  0.70   CALCIUM  7.7*  8.0*  8.5                      Assessment/Plan     * Cerebrovascular accident (CVA) due to embolism (HCC)- (present on admission)   Assessment & Plan    Started 2 days prior to admit. CT head was negative.  MRI brain reveals bilateral acute strokes.  He was not a candidate for alteplase due to timing.   Echocardiogram negative for clot.   Therapies ordered.  Neurology consult due to MRI findings.   Likely due to afib off of Xarelto.  xarelto resumed  Statin on hold due to rhabdomyolysis  Rehab consulted and will reassess on Monday for acceptance        Chronic deep vein thrombosis (DVT) of brachial vein (HCC)   Assessment & Plan    Noted on dvt ultrasound- subacute to chronic  On xarelto        Left-sided weakness- (present on admission)   Assessment & Plan    From acute CVA        TENZIN (acute kidney injury) (HCC)- (present on admission)   Assessment & Plan    Secondary to rhabdomyolysis.  Changed bicarb drip to NS + 20 mEq  K at 125 and keep until CPK is under 1,000  Repeat BMP and CPK tomorrow  AK I resolved        Protein-calorie malnutrition, severe (HCC)- (present on admission)   Assessment & Plan    This is a clinical diagnosis  BMI is 19        Elevated troponin- (present on admission)   Assessment & Plan    Likely due to rhabdomyolysis, acute kidney failure,   denies any chest pain        Elevated liver function tests- (present on admission)   Assessment & Plan    Likely due to rhabdomyolysis,  A statin is held        Leukocytosis- (present on admission)   Assessment & Plan    Resolved        Rhabdomyolysis- (present on admission)   Assessment &  Plan    Secondary to being down for 2 days, nephrology consulted and signed off.   S/p bicarb drip,   CPK has been trending down  continue trending CPK, and BMP in the morning.          Chronic atrial fibrillation (HCC)- (present on admission)   Assessment & Plan    Holding metoprolol due to hypotension  Currently heart rate is well controlled    xarelto resumed        HIV (human immunodeficiency virus infection) (HCC)- (present on admission)   Assessment & Plan    Restarted home regimen.   Dr. Montaño is aware that he has been admitted        Essential hypertension- (present on admission)   Assessment & Plan    Held metoprolol due to low BP          Quality-Core Measures   Reviewed items::  Labs reviewed and Radiology images reviewed  DVT: xarelto.  DVT prophylaxis - mechanical:  SCDs  Assessed for rehabilitation services:  Patient was assess for and/or received rehabilitation services during this hospitalization

## 2018-10-14 NOTE — PROGRESS NOTES
2 RN skin check done with KYE Dejesus  Waffle mattress and heel float boots on     Redness improved to  L cheek  Redness/scabbing R ear (Silicone NC)  R temporal excoriation/DTI improving  R elbow scabbing/pink unstageable (dressing changed with foam dressing)  R hip excoriation (dressing changed with foam dressing)  R lateral ankle & 5th toe DTI (mepilex to ankle)  Coccyx red and blanching, excoriation, MADELINE cream applied due to incontinence  L waist excoriation/DTI (mepilex in place)  L medial ankle/foot DTI (mepilex present)  L heel boggy (Heel float boot on)

## 2018-10-15 LAB
ANION GAP SERPL CALC-SCNC: -3 MMOL/L (ref 0–11.9)
BUN SERPL-MCNC: 11 MG/DL (ref 8–22)
CALCIUM SERPL-MCNC: 8.9 MG/DL (ref 8.5–10.5)
CHLORIDE SERPL-SCNC: 105 MMOL/L (ref 96–112)
CK SERPL-CCNC: 979 U/L (ref 0–154)
CO2 SERPL-SCNC: 33 MMOL/L (ref 20–33)
CREAT SERPL-MCNC: 0.77 MG/DL (ref 0.5–1.4)
GLUCOSE SERPL-MCNC: 99 MG/DL (ref 65–99)
POTASSIUM SERPL-SCNC: 4.3 MMOL/L (ref 3.6–5.5)
SODIUM SERPL-SCNC: 135 MMOL/L (ref 135–145)

## 2018-10-15 PROCEDURE — 700101 HCHG RX REV CODE 250: Performed by: HOSPITALIST

## 2018-10-15 PROCEDURE — A9270 NON-COVERED ITEM OR SERVICE: HCPCS | Performed by: HOSPITALIST

## 2018-10-15 PROCEDURE — 82550 ASSAY OF CK (CPK): CPT

## 2018-10-15 PROCEDURE — 51798 US URINE CAPACITY MEASURE: CPT

## 2018-10-15 PROCEDURE — 36415 COLL VENOUS BLD VENIPUNCTURE: CPT

## 2018-10-15 PROCEDURE — 770006 HCHG ROOM/CARE - MED/SURG/GYN SEMI*

## 2018-10-15 PROCEDURE — 700111 HCHG RX REV CODE 636 W/ 250 OVERRIDE (IP): Performed by: HOSPITALIST

## 2018-10-15 PROCEDURE — L4398 FOOT DROP SPLINT PRE OTS: HCPCS

## 2018-10-15 PROCEDURE — 700102 HCHG RX REV CODE 250 W/ 637 OVERRIDE(OP): Performed by: HOSPITALIST

## 2018-10-15 PROCEDURE — 99232 SBSQ HOSP IP/OBS MODERATE 35: CPT | Performed by: PHYSICAL MEDICINE & REHABILITATION

## 2018-10-15 PROCEDURE — 99232 SBSQ HOSP IP/OBS MODERATE 35: CPT | Performed by: HOSPITALIST

## 2018-10-15 PROCEDURE — 80048 BASIC METABOLIC PNL TOTAL CA: CPT

## 2018-10-15 RX ORDER — MORPHINE SULFATE 4 MG/ML
4 INJECTION, SOLUTION INTRAMUSCULAR; INTRAVENOUS ONCE
Status: COMPLETED | OUTPATIENT
Start: 2018-10-15 | End: 2018-10-15

## 2018-10-15 RX ADMIN — LAMIVUDINE 300 MG: 150 TABLET, FILM COATED ORAL at 06:08

## 2018-10-15 RX ADMIN — GABAPENTIN 300 MG: 300 CAPSULE ORAL at 17:21

## 2018-10-15 RX ADMIN — GABAPENTIN 300 MG: 300 CAPSULE ORAL at 06:08

## 2018-10-15 RX ADMIN — CLONAZEPAM 1 MG: 1 TABLET ORAL at 06:08

## 2018-10-15 RX ADMIN — MICONAZOLE NITRATE: 20 CREAM TOPICAL at 17:22

## 2018-10-15 RX ADMIN — RIVAROXABAN 20 MG: 20 TABLET, FILM COATED ORAL at 17:21

## 2018-10-15 RX ADMIN — MORPHINE SULFATE 4 MG: 4 INJECTION INTRAVENOUS at 04:33

## 2018-10-15 RX ADMIN — OMEPRAZOLE 20 MG: 20 CAPSULE, DELAYED RELEASE ORAL at 06:08

## 2018-10-15 RX ADMIN — ALPRAZOLAM 1 MG: 0.5 TABLET ORAL at 06:08

## 2018-10-15 RX ADMIN — ACETAMINOPHEN 650 MG: 325 TABLET, FILM COATED ORAL at 03:36

## 2018-10-15 RX ADMIN — ACETAMINOPHEN 650 MG: 325 TABLET, FILM COATED ORAL at 09:27

## 2018-10-15 RX ADMIN — FLUOXETINE HYDROCHLORIDE 20 MG: 20 CAPSULE ORAL at 06:08

## 2018-10-15 RX ADMIN — TRAMADOL HYDROCHLORIDE 50 MG: 50 TABLET, FILM COATED ORAL at 23:44

## 2018-10-15 RX ADMIN — DOLUTEGRAVIR SODIUM 50 MG: 50 TABLET, FILM COATED ORAL at 06:08

## 2018-10-15 RX ADMIN — RIVAROXABAN 15 MG: 15 TABLET, FILM COATED ORAL at 06:08

## 2018-10-15 RX ADMIN — ABACAVIR 600 MG: 300 TABLET, FILM COATED ORAL at 06:08

## 2018-10-15 RX ADMIN — ASPIRIN 325 MG: 325 TABLET, COATED ORAL at 06:08

## 2018-10-15 RX ADMIN — POTASSIUM CHLORIDE AND SODIUM CHLORIDE: 900; 150 INJECTION, SOLUTION INTRAVENOUS at 03:36

## 2018-10-15 RX ADMIN — MICONAZOLE NITRATE: 20 CREAM TOPICAL at 06:00

## 2018-10-15 ASSESSMENT — ENCOUNTER SYMPTOMS
NAUSEA: 0
FOCAL WEAKNESS: 1
VOMITING: 0
COUGH: 0
ORTHOPNEA: 0
BACK PAIN: 0
MYALGIAS: 0
SPUTUM PRODUCTION: 0
HEARTBURN: 0
DEPRESSION: 0
NECK PAIN: 0
ROS GI COMMENTS: TOLERATING A DIET
PALPITATIONS: 0
FEVER: 0
BRUISES/BLEEDS EASILY: 0
HEMOPTYSIS: 0
CHILLS: 0
DOUBLE VISION: 0
WEAKNESS: 1
WEIGHT LOSS: 0
SHORTNESS OF BREATH: 0
POLYDIPSIA: 0
BLURRED VISION: 0

## 2018-10-15 ASSESSMENT — CHA2DS2 SCORE
SEX: MALE
CHF OR LEFT VENTRICULAR DYSFUNCTION: NO
DIABETES: NO
HYPERTENSION: YES
AGE 65 TO 74: YES
CHA2DS2 VASC SCORE: 4
VASCULAR DISEASE: NO
AGE 75 OR GREATER: NO
PRIOR STROKE OR TIA OR THROMBOEMBOLISM: YES

## 2018-10-15 ASSESSMENT — PAIN SCALES - GENERAL
PAINLEVEL_OUTOF10: 0
PAINLEVEL_OUTOF10: 9
PAINLEVEL_OUTOF10: 7
PAINLEVEL_OUTOF10: 9
PAINLEVEL_OUTOF10: 7
PAINLEVEL_OUTOF10: 0

## 2018-10-15 ASSESSMENT — LIFESTYLE VARIABLES: SUBSTANCE_ABUSE: 0

## 2018-10-15 NOTE — PROGRESS NOTES
[facility] Hospitalist Progress Note    Date of Service: 10/15/2018    Chief Complaint  63 y.o. male admitted 10/8/2018 with left-sided weakness. Mr. Bolton has a history of atrial fibrillation, HTN, and HIV that woke up two days prior to presentation with left sided weakness and fell to the floor. He was on the floor for 2 days until a neighbor found him and called 911. He was found to have acute kidney failure and rhabdomyolysis. His Xarelto had been held due to possible left leg surgery.     Interval Problem Update  CPK below 1000, will d/c IVF  Pending rehab acceptance    Consultants/Specialty  Critical Care  Nephrology   Physiatry.     Disposition  Rehab vs SNF        Review of Systems   Constitutional: Negative for chills, fever and weight loss.   HENT: Negative for ear pain, hearing loss and tinnitus.    Eyes: Negative for blurred vision and double vision.   Respiratory: Negative for cough, hemoptysis, sputum production and shortness of breath.    Cardiovascular: Negative for chest pain, palpitations and orthopnea.   Gastrointestinal: Negative for heartburn, nausea and vomiting.        Tolerating a diet   Genitourinary: Negative for dysuria, frequency and urgency.   Musculoskeletal: Negative for back pain, myalgias and neck pain.        Left arm pain   Neurological: Positive for focal weakness and weakness.        Left arm remains very weak     Endo/Heme/Allergies: Negative for environmental allergies and polydipsia. Does not bruise/bleed easily.   Psychiatric/Behavioral: Negative for depression, substance abuse and suicidal ideas.   All other systems reviewed and are negative.     Physical Exam  Laboratory/Imaging   Hemodynamics  Temp (24hrs), Av.7 °C (98 °F), Min:36.4 °C (97.5 °F), Max:37.2 °C (98.9 °F)   Temperature: 37.2 °C (98.9 °F)  Pulse  Av.3  Min: 61  Max: 94    Blood Pressure : 144/86      Respiratory      Respiration: 16, Pulse Oximetry: 94 %        RUL Breath Sounds: Clear, RML Breath Sounds:  Clear;Diminished, RLL Breath Sounds: Clear;Diminished, DONI Breath Sounds: Clear, LLL Breath Sounds: Clear;Diminished    Fluids    Intake/Output Summary (Last 24 hours) at 10/15/18 1810  Last data filed at 10/15/18 0800   Gross per 24 hour   Intake             1740 ml   Output             1300 ml   Net              440 ml       Nutrition  Orders Placed This Encounter   Procedures   • Diet Order Regular (Crush meds in applesauce/pudding please)     Standing Status:   Standing     Number of Occurrences:   1     Order Specific Question:   Diet:     Answer:   Regular [1]     Comments:   Crush meds in applesauce/pudding please     Order Specific Question:   Texture/Fiber modifications:     Answer:   Dysphagia 2(Pureed/Chopped)specify fluid consistency(question 6) [2]     Order Specific Question:   Consistency/Fluid modifications:     Answer:   Nectar Thick [2]     Order Specific Question:   Miscellaneous modifications:     Answer:   SLP - 1:1 Supervision by Nursing [21]     Physical Exam   Constitutional: He is oriented to person, place, and time. He appears cachectic. He has a sickly appearance. He appears ill. No distress.   Neck: Neck supple.   Cardiovascular: Normal rate.    No murmur heard.  Pulmonary/Chest: Effort normal. No respiratory distress. He has no wheezes.   Abdominal: Soft. He exhibits no distension. There is no tenderness.   Musculoskeletal: He exhibits no edema.   Poor muscle tone quads  Left arm warm to touch. Appears to have some thermal dysregulation secondary to stroke   Neurological: He is alert and oriented to person, place, and time.   Left arm weakness (1/5 strength)  Bilateral lower extremity weakness(2/5 strength)   Skin: Skin is warm and dry. He is not diaphoretic.   Psychiatric: His affect is blunt. He is slowed.   Nursing note and vitals reviewed.      Recent Labs      10/12/18   1834  10/13/18   0249   WBC  8.8  8.5   RBC  2.78*  2.91*   HEMOGLOBIN  9.5*  9.9*   HEMATOCRIT  28.4*  29.7*    MCV  102.2*  102.1*   MCH  34.2*  34.0*   MCHC  33.5*  33.3*   RDW  47.4  47.3   PLATELETCT  306  307   MPV  9.3  9.3     Recent Labs      10/13/18   0249  10/14/18   0216  10/15/18   0050   SODIUM  136  138  135   POTASSIUM  4.0  4.3  4.3   CHLORIDE  108  108  105   CO2  23  24  33   GLUCOSE  122*  96  99   BUN  12  11  11   CREATININE  0.93  0.70  0.77   CALCIUM  8.0*  8.5  8.9                      Assessment/Plan     * Cerebrovascular accident (CVA) due to embolism (HCC)- (present on admission)   Assessment & Plan    Started 2 days prior to admit. CT head was negative.  MRI brain reveals bilateral acute strokes.  He was not a candidate for alteplase due to timing.   Echocardiogram negative for clot.   Therapies ordered.  Neurology consult due to MRI findings.   Likely due to afib off of Xarelto.  xarelto resumed  Statin on hold due to rhabdomyolysis  Rehab consulted pending acceptance        Chronic deep vein thrombosis (DVT) of brachial vein (HCC)   Assessment & Plan    Noted on dvt ultrasound- subacute to chronic  On xarelto        Left-sided weakness- (present on admission)   Assessment & Plan    From acute CVA        TENZIN (acute kidney injury) (HCC)- (present on admission)   Assessment & Plan    Secondary to rhabdomyolysis.  Changed bicarb drip to NS + 20 mEq  K at 125 and keep until CPK is under 1,000  Repeat BMP and CPK tomorrow  AK I resolved        Protein-calorie malnutrition, severe (HCC)- (present on admission)   Assessment & Plan    This is a clinical diagnosis  BMI is 19        Elevated troponin- (present on admission)   Assessment & Plan    Likely due to rhabdomyolysis, acute kidney failure,   denies any chest pain        Elevated liver function tests- (present on admission)   Assessment & Plan    Likely due to rhabdomyolysis,  A statin is held        Leukocytosis- (present on admission)   Assessment & Plan    Resolved        Rhabdomyolysis- (present on admission)   Assessment & Plan    Secondary  to being down for 2 days, nephrology consulted and signed off.   S/p bicarb drip,   CPK has been trending down  Can recheck in several days and resume statin once levels less than 500          Chronic atrial fibrillation (HCC)- (present on admission)   Assessment & Plan    Holding metoprolol due to hypotension  Currently heart rate is well controlled    xarelto resumed        HIV (human immunodeficiency virus infection) (HCC)- (present on admission)   Assessment & Plan    Restarted home regimen.   Dr. Montaño is aware that he has been admitted        Essential hypertension- (present on admission)   Assessment & Plan    Held metoprolol due to low BP          Quality-Core Measures   Reviewed items::  Labs reviewed and Radiology images reviewed  DVT: xarelto.  DVT prophylaxis - mechanical:  SCDs  Assessed for rehabilitation services:  Patient was assess for and/or received rehabilitation services during this hospitalization

## 2018-10-15 NOTE — PROGRESS NOTES
TWO RN SKIN check with KYE Mckeon.    Pt with multiple wounds secondary to fall prior to admission.    Dressing changed yesterday 10/14/18 for R hip (hydrofiber and mepilex). Dressing remains intact, no drainage noted.    R face DTI, MILAGROS, no discoloration noted. Improving    Right lateral ankle, MTH and 5th toe MILAGROS, remain in boots.    Left medial ankle DTI, MILAGROS, remain in boots.    Right chest DTI, MILAGROS, improving.    Right elbow DTI, scabbed over, MILAGROS. No dressing applied.     [facility], we are now alternating between prevalon boots and prafo boots q2 hours.    Pt turned q2 hours.    Mepilex remains in place for sacrococcygeal wound, as well as barrier cream  (MADELINE) and miconazole cream to perianal area.

## 2018-10-15 NOTE — PROGRESS NOTES
Assumed care of patient, pt found awake, sleepy. Nodding off into sleep frequently. Pt with c/o discomfort from bed, repositioned q2 hours. Waffle mat in place, skin with multiple breakdowns from fall. Pt flaccid RUE. Pt is feeder, tolerating diet with assist. No set DC plans at this time. Medicated with tylenol and tramadol for pain.

## 2018-10-15 NOTE — PROGRESS NOTES
Physical Medicine and Rehabilitation Consultation         Follow up Consult      Date of Consultation: 10/15/2018  Consulting provider: Tunde West D.O.  Reason for consultation: bilateral CVA, assess for acute inpatient rehab appropriateness      Chief complaint: left sided weakness    S: difficulty with interview because of lethargy and difficulty waking up, unable to talk about the increase spasticity or the neuropathic pain.     RN reports she just gave XANAX, didn't sleep well last night.        ROS:   unable to be obtained due to cognitive status.      Medications:  Current Facility-Administered Medications   Medication Dose   • rivaroxaban (XARELTO) tablet 20 mg  20 mg   • tramadol (ULTRAM) 50 MG tablet 50 mg  50 mg   • FLUoxetine (PROZAC) capsule 20 mg  20 mg   • miconazole 2%-zinc oxide (MADELINE) topical cream     • abacavir (ZIAGEN) tablet 600 mg  600 mg    And   • dolutegravir (TIVICAY) tablet 50 mg  50 mg    And   • lamivudine (EPIVIR) tablet 300 mg  300 mg   • ALPRAZolam (XANAX) tablet 1 mg  1 mg   • clonazePAM (KLONOPIN) tablet 1 mg  1 mg   • gabapentin (NEURONTIN) capsule 300 mg  300 mg   • hydrOXYzine pamoate (VISTARIL) capsule 50 mg  50 mg   • loperamide (IMODIUM) capsule 2 mg  2 mg   • omeprazole (PRILOSEC) capsule 20 mg  20 mg   • Respiratory Care per Protocol     • acetaminophen (TYLENOL) tablet 650 mg  650 mg   • ondansetron (ZOFRAN) syringe/vial injection 4 mg  4 mg   • ondansetron (ZOFRAN ODT) dispertab 4 mg  4 mg       Current level of function:  PT/OT pending      Physical Exam:  Vitals: Blood pressure 142/89, pulse 79, temperature 36.6 °C (97.9 °F), resp. rate 16, height 1.829 m (6'), weight 74.9 kg (165 lb 2 oz), SpO2 94 %.  Gen: NAD, Body mass index is 22.39 kg/m².  HEENT: NC/AT, PERRLA, moist mucous membranes  Cardio: irreg irreg, no mumurs  Pulm: CTAB, with normal respiratory effort  Abd: Soft NTND, active bowel sounds,  Ext: No peripheral edema. No calf  tenderness. No clubbing/cyanosis. +dorsal pedalis pulses bilaterally.    Mental status, lethargic  Speech expressive aphagia        Motor:      R L  Shoulder flexors:   4 1  Elbow flexors:    5 1  Wrist extensors:   5 1  Elbow extensors:    4 2  Finger flexors:   4 1  Hip flexors:    4 4  Knee ext:    4 4  Dorsiflexors:    4 1  EHL:     4 3  Plantar flexors:    4 4      Sensory:   intact to light touch through out    DTRs: 2+ in bilateral biceps, triceps, brachioradialis, 3+ in bilateral patellar tendons  + babinski right, negative on left  Negative Linder b/l     Tone: increase tone in left ankle and LUE    Coordination:   Difficult following commands today    Labs:  Recent Labs      10/12/18   1834  10/13/18   0249   RBC  2.78*  2.91*   HEMOGLOBIN  9.5*  9.9*   HEMATOCRIT  28.4*  29.7*   PLATELETCT  306  307     Recent Labs      10/13/18   0249  10/14/18   0216  10/15/18   0050   SODIUM  136  138  135   POTASSIUM  4.0  4.3  4.3   CHLORIDE  108  108  105   CO2  23  24  33   GLUCOSE  122*  96  99   BUN  12  11  11   CREATININE  0.93  0.70  0.77   CALCIUM  8.0*  8.5  8.9     Recent Results (from the past 24 hour(s))   CREATINE KINASE    Collection Time: 10/15/18 12:50 AM   Result Value Ref Range    CPK Total 979 (H) 0 - 154 U/L   BASIC METABOLIC PANEL    Collection Time: 10/15/18 12:50 AM   Result Value Ref Range    Sodium 135 135 - 145 mmol/L    Potassium 4.3 3.6 - 5.5 mmol/L    Chloride 105 96 - 112 mmol/L    Co2 33 20 - 33 mmol/L    Glucose 99 65 - 99 mg/dL    Bun 11 8 - 22 mg/dL    Creatinine 0.77 0.50 - 1.40 mg/dL    Calcium 8.9 8.5 - 10.5 mg/dL    Anion Gap -3.0 (L) 0.0 - 11.9   ESTIMATED GFR    Collection Time: 10/15/18 12:50 AM   Result Value Ref Range    GFR If African American >60 >60 mL/min/1.73 m 2    GFR If Non African American >60 >60 mL/min/1.73 m 2         ASSESSMENT:  Bilateral frontal CVA:  - continue with Prozac as this has been shown to help with motor recovery after ischemic CVA for total of 3  months.     Anxiety:   - just received XANAX as per nurse, sig fatigue and lethargy making full exam difficult  - consider slowly weaning as tolerated as Benzos have been shown to decrease neuro recovery.   - consider using Seroquel for management of anxiety  - He also has significant history of ETOH as per notes so monitor for ETOH withdrawal during this time.     Spasticity/Tone:   - Increasing spasticity in LLE and foot drop  - start PRAFO's bilaterally on for 2 hours and then switch back to prevalon boots for 2 hours to help protect skin  - if spasticity continues to increase he will be a candidate for starting baclfoen 10mg tid      Rehab: impaired adl's/Transitional pt  - he will be followed by PT/OT today  - he may benefit from short stay in SNF to improve strength and endurance and decrease need form MAX A X2 to Max - Mod A X1 at taht time he would be a good candidate to come to acute rehab with goal of getting pt home.       Discussed with team about recommendations         Tunde West D.O.

## 2018-10-15 NOTE — DISCHARGE PLANNING
LSW spoke with patient at bedside regarding d/c plan. Patient was lethargic and asked LSW speak with patient's sister Karli. Karli reports that she believes the patient is a good candidate for SNF, but is unsure of which facility as she is from out of town. She expressed concerns that he lives alone.   LSW spoke with Keyla with [facility] who will be reviewing patient later today.

## 2018-10-15 NOTE — PROGRESS NOTES
12hr cc completed    NSR rates 78-90, no ectopy noted  0.18/0.10/0.32    Pt difficultly with sleeping overnight and unable to get comfortable, complaining of LUE pain and back pain. MD Fofana notified and 1x order for breakthrough medication received.

## 2018-10-15 NOTE — DISCHARGE PLANNING
Physiatry Dr. West recommending skilled nursing with potential to transition to inpatient rehab, pending progress at [facility]. EDGAR Dorsey updated.

## 2018-10-15 NOTE — DOCUMENTATION QUERY
DOCUMENTATION QUERY    PROVIDERS: Please select “Cosign w/ note”to reply to query.    To better represent the severity of illness of your patient, please review the following information and exercise your independent professional judgment in responding to this query.     Per the medical record there appears to be conflicting information regarding the documentation of a DVT.  The Progress Notes of 10/13 document acute left axillary DVT. On 10/14 a chronic DVT of brachial vein is documented. Based upon the clinical findings, risk factors, and treatment, can this diagnosis be further clarified?    • Chronic DVT of brachial vein, please specify left or right  • Acute left axillary DVT  • Both acute left axillary DVT and chronic DVT of brachial vein, please specify left or right  • Other explanation of clinical findings  • Unable to determine    The medical record reflects the following:   Clinical Findings Results Review:     US Left Upper Extremity 10/13  No acute thrombosis is identified.     Loosely attached, slightly echogenic material consistent with subacute-chronic deep venous thrombosis fills the brachial vein from the distal bicep through the antecubital fossa. Thrombus appears non-occlusive.   Treatment US left upper extremity & Xarelto    Risk Factors Acute CVA, chronic atrial fibrillation, HTN, & HIV   Location within medical record History & Physical, Progress Notes, & Radiology Results     Thank you,   Virgie Musa RN  Clinical   Phone [phone]

## 2018-10-16 PROCEDURE — A9270 NON-COVERED ITEM OR SERVICE: HCPCS | Performed by: HOSPITALIST

## 2018-10-16 PROCEDURE — 700102 HCHG RX REV CODE 250 W/ 637 OVERRIDE(OP): Performed by: HOSPITALIST

## 2018-10-16 PROCEDURE — 99232 SBSQ HOSP IP/OBS MODERATE 35: CPT | Performed by: INTERNAL MEDICINE

## 2018-10-16 PROCEDURE — A9270 NON-COVERED ITEM OR SERVICE: HCPCS | Performed by: FAMILY MEDICINE

## 2018-10-16 PROCEDURE — 770006 HCHG ROOM/CARE - MED/SURG/GYN SEMI*

## 2018-10-16 PROCEDURE — 700102 HCHG RX REV CODE 250 W/ 637 OVERRIDE(OP): Performed by: FAMILY MEDICINE

## 2018-10-16 RX ORDER — ZOLPIDEM TARTRATE 5 MG/1
5 TABLET ORAL ONCE
Status: COMPLETED | OUTPATIENT
Start: 2018-10-16 | End: 2018-10-16

## 2018-10-16 RX ADMIN — MICONAZOLE NITRATE: 20 CREAM TOPICAL at 17:23

## 2018-10-16 RX ADMIN — LAMIVUDINE 300 MG: 150 TABLET, FILM COATED ORAL at 05:28

## 2018-10-16 RX ADMIN — CLONAZEPAM 1 MG: 1 TABLET ORAL at 05:28

## 2018-10-16 RX ADMIN — ZOLPIDEM TARTRATE 5 MG: 5 TABLET ORAL at 22:31

## 2018-10-16 RX ADMIN — CLONAZEPAM 1 MG: 1 TABLET ORAL at 17:22

## 2018-10-16 RX ADMIN — ALPRAZOLAM 1 MG: 0.5 TABLET ORAL at 01:40

## 2018-10-16 RX ADMIN — RIVAROXABAN 20 MG: 20 TABLET, FILM COATED ORAL at 17:22

## 2018-10-16 RX ADMIN — GABAPENTIN 300 MG: 300 CAPSULE ORAL at 17:22

## 2018-10-16 RX ADMIN — FLUOXETINE HYDROCHLORIDE 20 MG: 20 CAPSULE ORAL at 05:28

## 2018-10-16 RX ADMIN — GABAPENTIN 300 MG: 300 CAPSULE ORAL at 05:28

## 2018-10-16 RX ADMIN — DOLUTEGRAVIR SODIUM 50 MG: 50 TABLET, FILM COATED ORAL at 05:28

## 2018-10-16 RX ADMIN — ABACAVIR 600 MG: 300 TABLET, FILM COATED ORAL at 05:28

## 2018-10-16 RX ADMIN — MICONAZOLE NITRATE: 20 CREAM TOPICAL at 05:28

## 2018-10-16 RX ADMIN — ACETAMINOPHEN 650 MG: 325 TABLET, FILM COATED ORAL at 09:29

## 2018-10-16 RX ADMIN — OMEPRAZOLE 20 MG: 20 CAPSULE, DELAYED RELEASE ORAL at 05:28

## 2018-10-16 ASSESSMENT — ENCOUNTER SYMPTOMS
WEIGHT LOSS: 0
BLURRED VISION: 0
BRUISES/BLEEDS EASILY: 0
BACK PAIN: 0
MYALGIAS: 0
NECK PAIN: 0
FOCAL WEAKNESS: 1
HEARTBURN: 0
SHORTNESS OF BREATH: 0
COUGH: 0
CHILLS: 0
ROS GI COMMENTS: TOLERATING A DIET
ORTHOPNEA: 0
NAUSEA: 0
EYE PAIN: 0
DOUBLE VISION: 0
SPUTUM PRODUCTION: 0
EYE DISCHARGE: 0
BLOOD IN STOOL: 0
POLYDIPSIA: 0
HEMOPTYSIS: 0
WEAKNESS: 1
CONSTIPATION: 0
DEPRESSION: 0
FEVER: 0
PALPITATIONS: 0
VOMITING: 0

## 2018-10-16 ASSESSMENT — PAIN SCALES - GENERAL
PAINLEVEL_OUTOF10: 9
PAINLEVEL_OUTOF10: 0
PAINLEVEL_OUTOF10: 4
PAINLEVEL_OUTOF10: 9

## 2018-10-16 ASSESSMENT — LIFESTYLE VARIABLES: SUBSTANCE_ABUSE: 0

## 2018-10-16 NOTE — PROGRESS NOTES
Pt remained lethargic for most of day. Sedatives held. VSS. Pt denies pain. Skin remains fragle (see skin assessment note), turned q2 hours. Luis Miguel cream applied as ordered to perianal/coccyx area. Pt now medical.

## 2018-10-16 NOTE — CARE PLAN
Problem: Safety  Goal: Will remain free from falls  Outcome: PROGRESSING SLOWER THAN EXPECTED  Bed locked in lowest position and call light is within reach.

## 2018-10-16 NOTE — CARE PLAN
Problem: Pain Management  Goal: Pain level will decrease to patient's comfort goal  Outcome: PROGRESSING AS EXPECTED  Non-pharmacologic comfort measures in place. Frequent positional changes and distraction. Medications per MAR    Problem: Skin Integrity  Goal: Risk for impaired skin integrity will decrease  Outcome: PROGRESSING SLOWER THAN EXPECTED  Skin assessment complete. Q2 turns in place. Waffle mattress in use. Pillows in use for postioning and to float heels. Frequent linen changes to ensure patient stays dry. Hourly rounding in place.

## 2018-10-16 NOTE — PROGRESS NOTES
Bedside report received. No overnight events per night shift RN. Patient A&Ox 4. Complaints of pain. Will medicate per MAR. POC discussed with patient. Patient verbalizes understanding. Call light and belongings within reach. Bed locked and in lowest position, alarm and fall precautions in place. Q2 turns and boot exchanges in place.

## 2018-10-16 NOTE — DISCHARGE PLANNING
Anticipated Discharge Disposition: SNF    Action: LSW attempted to meet with pt to obtain choice for SNF. Pt was sleeping soundly and was not able to be awoken for choice    Barriers to Discharge: None    Plan: LSW to meet with pt tomorrow for SNF choice.

## 2018-10-16 NOTE — PROGRESS NOTES
Guilherme score = 12.    2 RN skin assessment completed with KYE Gonzalez;    Right wound wound; dressing present and is C/D/I.  Right face DTI ; MILAGROS  Right lateral ankle 5th MTH and 5th toe; MILAGROS  Left ankle; MILAGROS  Multiple wound BLE; dressings intact C/D/I  Scattered scabs BLE  Right temporal DTI; MILAGROS  Right hip; dressing in place; C/D/I  Right waist DTI; MILAGROS  Right ear DTI;  MILAGROS  Sacrum/ Coccyx DTI ; orly cream   Right chest DTI; MILAGROS  Right elbow; MILAGROS  Left waist DTI; MILAGROS  Left heel; MILAGROS  Bilateral ears red and blanchable; gray foam applied to oxygen tubing.  Waffle Mattress in place.  Alternating every 2 hours between prevaln and prafo boots.

## 2018-10-16 NOTE — THERAPY
Speech Therapy Contact Note:  Patient lethargic and sleeping hard. RN reporting patient is up all night but most wakeful around breakfast time. Lunch tray was held by staff today due to poor GEOVANY for safe Po intake. RN reports patient doing well with modified diet when he is awake. SLP will continue to follow for dysphagia exercises and trial upgrades as/when appropriate.

## 2018-10-17 LAB — CK SERPL-CCNC: 484 U/L (ref 0–154)

## 2018-10-17 PROCEDURE — A9270 NON-COVERED ITEM OR SERVICE: HCPCS | Performed by: HOSPITALIST

## 2018-10-17 PROCEDURE — 97530 THERAPEUTIC ACTIVITIES: CPT

## 2018-10-17 PROCEDURE — 36415 COLL VENOUS BLD VENIPUNCTURE: CPT

## 2018-10-17 PROCEDURE — A9270 NON-COVERED ITEM OR SERVICE: HCPCS | Performed by: INTERNAL MEDICINE

## 2018-10-17 PROCEDURE — 99232 SBSQ HOSP IP/OBS MODERATE 35: CPT | Performed by: INTERNAL MEDICINE

## 2018-10-17 PROCEDURE — 700102 HCHG RX REV CODE 250 W/ 637 OVERRIDE(OP): Performed by: HOSPITALIST

## 2018-10-17 PROCEDURE — 97535 SELF CARE MNGMENT TRAINING: CPT

## 2018-10-17 PROCEDURE — 700102 HCHG RX REV CODE 250 W/ 637 OVERRIDE(OP): Performed by: INTERNAL MEDICINE

## 2018-10-17 PROCEDURE — 82550 ASSAY OF CK (CPK): CPT

## 2018-10-17 PROCEDURE — 770006 HCHG ROOM/CARE - MED/SURG/GYN SEMI*

## 2018-10-17 PROCEDURE — 92526 ORAL FUNCTION THERAPY: CPT

## 2018-10-17 RX ORDER — ZOLPIDEM TARTRATE 5 MG/1
10 TABLET ORAL NIGHTLY PRN
Status: DISCONTINUED | OUTPATIENT
Start: 2018-10-17 | End: 2018-10-28 | Stop reason: HOSPADM

## 2018-10-17 RX ADMIN — ACETAMINOPHEN 650 MG: 325 TABLET, FILM COATED ORAL at 08:48

## 2018-10-17 RX ADMIN — MICONAZOLE NITRATE: 20 CREAM TOPICAL at 06:42

## 2018-10-17 RX ADMIN — TRAMADOL HYDROCHLORIDE 50 MG: 50 TABLET, FILM COATED ORAL at 15:52

## 2018-10-17 RX ADMIN — GABAPENTIN 300 MG: 300 CAPSULE ORAL at 17:21

## 2018-10-17 RX ADMIN — ZOLPIDEM TARTRATE 10 MG: 5 TABLET ORAL at 21:02

## 2018-10-17 RX ADMIN — GABAPENTIN 300 MG: 300 CAPSULE ORAL at 06:39

## 2018-10-17 RX ADMIN — CLONAZEPAM 1 MG: 1 TABLET ORAL at 17:23

## 2018-10-17 RX ADMIN — OMEPRAZOLE 20 MG: 20 CAPSULE, DELAYED RELEASE ORAL at 06:41

## 2018-10-17 RX ADMIN — MICONAZOLE NITRATE: 20 CREAM TOPICAL at 17:25

## 2018-10-17 RX ADMIN — RIVAROXABAN 20 MG: 20 TABLET, FILM COATED ORAL at 17:22

## 2018-10-17 RX ADMIN — FLUOXETINE HYDROCHLORIDE 20 MG: 20 CAPSULE ORAL at 06:42

## 2018-10-17 RX ADMIN — CLONAZEPAM 1 MG: 1 TABLET ORAL at 06:34

## 2018-10-17 RX ADMIN — LAMIVUDINE 300 MG: 150 TABLET, FILM COATED ORAL at 06:38

## 2018-10-17 RX ADMIN — ACETAMINOPHEN 650 MG: 325 TABLET, FILM COATED ORAL at 21:24

## 2018-10-17 RX ADMIN — ABACAVIR 600 MG: 300 TABLET, FILM COATED ORAL at 06:36

## 2018-10-17 RX ADMIN — DOLUTEGRAVIR SODIUM 50 MG: 50 TABLET, FILM COATED ORAL at 06:37

## 2018-10-17 RX ADMIN — TRAMADOL HYDROCHLORIDE 50 MG: 50 TABLET, FILM COATED ORAL at 02:08

## 2018-10-17 ASSESSMENT — ENCOUNTER SYMPTOMS
BLURRED VISION: 0
FEVER: 0
PALPITATIONS: 0
SHORTNESS OF BREATH: 0
WEAKNESS: 1
BLOOD IN STOOL: 0
PHOTOPHOBIA: 0
FOCAL WEAKNESS: 1
ORTHOPNEA: 0
HEARTBURN: 0
ROS GI COMMENTS: TOLERATING A DIET
COUGH: 0
MYALGIAS: 0
WEIGHT LOSS: 0
BRUISES/BLEEDS EASILY: 0
VOMITING: 0
SPUTUM PRODUCTION: 0
NECK PAIN: 0
DEPRESSION: 0
EYE DISCHARGE: 0
DOUBLE VISION: 0
BACK PAIN: 0
ABDOMINAL PAIN: 0
POLYDIPSIA: 0

## 2018-10-17 ASSESSMENT — LIFESTYLE VARIABLES: SUBSTANCE_ABUSE: 0

## 2018-10-17 ASSESSMENT — PAIN SCALES - GENERAL
PAINLEVEL_OUTOF10: 2
PAINLEVEL_OUTOF10: 3
PAINLEVEL_OUTOF10: 7
PAINLEVEL_OUTOF10: 9
PAINLEVEL_OUTOF10: 6
PAINLEVEL_OUTOF10: 6

## 2018-10-17 ASSESSMENT — COGNITIVE AND FUNCTIONAL STATUS - GENERAL
DRESSING REGULAR LOWER BODY CLOTHING: A LOT
PERSONAL GROOMING: A LITTLE
DRESSING REGULAR UPPER BODY CLOTHING: A LOT
SUGGESTED CMS G CODE MODIFIER DAILY ACTIVITY: CL
HELP NEEDED FOR BATHING: A LOT
DAILY ACTIVITIY SCORE: 13
TOILETING: TOTAL
EATING MEALS: A LITTLE

## 2018-10-17 NOTE — DISCHARGE PLANNING
Anticipated Discharge Disposition: SNF    Action: LSW met with pt at bedside and provided SNF choice form. [facility]. LSW [facility]ed [facility] to [facility].     Barriers to Discharge: None    Plan: LSW to monitor for acceptance from SNF.

## 2018-10-17 NOTE — THERAPY
"Occupational Therapy Evaluation completed.   Functional Status:  Mod A x2 supine<>sit EOB, pt able to maintain midline w/ CGA/SBA pending fatigue, Pt performed oral care w/ suction toothbrush and washed face w/ R UE while L UE was placed in WBing position. Total A toileting ADL, Max A LB dressing.   Plan of Care: Will benefit from Occupational Therapy 3 times per week  Discharge Recommendations:  Equipment: Will Continue to Assess for Equipment Needs. Post-acute therapy Discharge to a transitional care facility for continued skilled therapy services.    See \"Rehab Therapy-Acute\" Patient Summary Report for complete documentation.    Pt seen for Acute OT services today. Pt was very motivated to participate in therapy and return to PLOF. L shoulder appears to have subluxation present, facilitated WBing through L UE throughout session. Pt leaned laterally and propped on elbow/forearm w/ CGA to increase sensory input/ stretch obliques. Pt performed 10 horizontal abduction table slides w/ L UE w/ support at shoulder and elbow. Pt is making progress towards acute OT goals, POC has been updated to reflect patients current level of function. Will continue to follow for Acute OT services.     "

## 2018-10-17 NOTE — PROGRESS NOTES
Assumed care at 0715. Bedside report received from Night RN Lashell. Patient's chart and MAR reviewed. 12 hour chart check complete. Patient was updated on plan of care for the day. Questions answered and concerns addressed.  Pt denies any additional needs at this time. White board updated. Call light, phone and personal belongings within reach. Bed alarm on and working appropriately. Vital signs stable

## 2018-10-17 NOTE — CARE PLAN
Problem: Safety  Goal: Will remain free from falls  Outcome: PROGRESSING SLOWER THAN EXPECTED  Bed locked in lowest position and call light is within reach; patient does not always utilize call light appropriately.

## 2018-10-17 NOTE — PROGRESS NOTES
[facility] Hospitalist Progress Note    Date of Service: 10/16/2018    Chief Complaint  63 y.o. male admitted 10/8/2018 with left-sided weakness. Mr. Bolton has a history of atrial fibrillation, HTN, and HIV that woke up two days prior to presentation with left sided weakness and fell to the floor. He was on the floor for 2 days until a neighbor found him and called 911. He was found to have acute kidney failure and rhabdomyolysis. His Xarelto had been held due to possible left leg surgery.     Interval Problem Update  10/16 patient has been stable, declined by rehab, nursing care facility referral sent.  Patient otherwise is sleeping and pleasant.    Consultants/Specialty  Critical Care  Nephrology   Physiatry.     Disposition  SNF        Review of Systems   Constitutional: Negative for chills, fever and weight loss.   HENT: Negative for congestion, ear pain, hearing loss and tinnitus.    Eyes: Negative for blurred vision, double vision, pain and discharge.   Respiratory: Negative for cough, hemoptysis, sputum production and shortness of breath.    Cardiovascular: Negative for chest pain, palpitations and orthopnea.   Gastrointestinal: Negative for blood in stool, constipation, heartburn, nausea and vomiting.        Tolerating a diet   Genitourinary: Negative for dysuria, frequency and urgency.   Musculoskeletal: Negative for back pain, myalgias and neck pain.        Left arm pain   Neurological: Positive for focal weakness and weakness.        Left arm remains very weak     Endo/Heme/Allergies: Negative for environmental allergies and polydipsia. Does not bruise/bleed easily.   Psychiatric/Behavioral: Negative for depression, substance abuse and suicidal ideas.   All other systems reviewed and are negative.     Physical Exam  Laboratory/Imaging   Hemodynamics  Temp (24hrs), Av.2 °C (99 °F), Min:36.4 °C (97.6 °F), Max:38.3 °C (100.9 °F)   Temperature: (!) 38.3 °C (100.9 °F)  Pulse  Av.3  Min: 61  Max: 94     Blood Pressure : 133/89      Respiratory      Respiration: 16, Pulse Oximetry: 90 %        RUL Breath Sounds: Clear, RML Breath Sounds: Diminished, RLL Breath Sounds: Diminished, DONI Breath Sounds: Clear, LLL Breath Sounds: Diminished    Fluids    Intake/Output Summary (Last 24 hours) at 10/16/18 1823  Last data filed at 10/16/18 0635   Gross per 24 hour   Intake                0 ml   Output             2800 ml   Net            -2800 ml       Nutrition  Orders Placed This Encounter   Procedures   • Diet Order Regular (Crush meds in applesauce/pudding please)     Standing Status:   Standing     Number of Occurrences:   1     Order Specific Question:   Diet:     Answer:   Regular [1]     Comments:   Crush meds in applesauce/pudding please     Order Specific Question:   Texture/Fiber modifications:     Answer:   Dysphagia 2(Pureed/Chopped)specify fluid consistency(question 6) [2]     Order Specific Question:   Consistency/Fluid modifications:     Answer:   Nectar Thick [2]     Order Specific Question:   Miscellaneous modifications:     Answer:   SLP - 1:1 Supervision by Nursing [21]     Physical Exam   Constitutional: He is oriented to person, place, and time. He appears well-developed and well-nourished. He appears cachectic. He has a sickly appearance. He appears ill. No distress.   HENT:   Right Ear: External ear normal.   Left Ear: External ear normal.   Neck: Neck supple. No tracheal deviation present.   Cardiovascular: Normal rate.  Exam reveals no friction rub.    No murmur heard.  Pulmonary/Chest: Effort normal. No respiratory distress. He has no wheezes.   Abdominal: Soft. He exhibits no distension and no mass. There is no tenderness. There is no guarding.   Musculoskeletal: He exhibits no edema.   Poor muscle tone quads  Left arm warm to touch. Appears to have some thermal dysregulation secondary to stroke   Neurological: He is alert and oriented to person, place, and time.   Left arm weakness (1/5  strength)  Bilateral lower extremity weakness(2/5 strength)   Skin: Skin is warm and dry. He is not diaphoretic.   Psychiatric: His affect is blunt. He is slowed.   Nursing note and vitals reviewed.          Recent Labs      10/14/18   0216  10/15/18   0050   SODIUM  138  135   POTASSIUM  4.3  4.3   CHLORIDE  108  105   CO2  24  33   GLUCOSE  96  99   BUN  11  11   CREATININE  0.70  0.77   CALCIUM  8.5  8.9                   US-EXTREMITY VENOUS UPPER UNILAT LEFT   Final Result      DX-ELBOW-COMPLETE 3+ LEFT   Final Result      No evidence of fracture or dislocation.      DX-CHEST-LIMITED (1 VIEW)   Final Result         Patchy bibasilar opacities, likely atelectasis.      US-CAROTID DOPPLER BILAT   Final Result      EC-ECHOCARDIOGRAM COMPLETE W/O CONT   Final Result      MR-BRAIN-W/O   Final Result      1.  Punctate infarcts in the left posterior frontal and right posterior frontal cortices.   2.  Scattered small sized acute infarcts in the left parietal cortex and left parietal deep white matter.   3.  Chronic bilateral cerebellar infarcts.   4.  Chronic left centrum semiovale lacunar infarct.   5.  Moderate diffuse cerebral substance loss.   6.  Mild microangiopathic ischemic change versus demyelination or gliosis.      CT-HEAD W/O   Final Result      1.  Cerebral atrophy.      2.  White matter lucencies most consistent with small vessel ischemic change versus demyelination or gliosis.      3.  Otherwise, Head CT without contrast with no acute findings. No evidence of acute cerebral  hemorrhage or mass lesion.      DX-CHEST-PORTABLE (1 VIEW)   Final Result      Lung base atelectasis.           Assessment/Plan     * Cerebrovascular accident (CVA) due to embolism (HCC)- (present on admission)   Assessment & Plan    Started 2 days prior to admit. CT head was negative.  MRI brain reveals bilateral acute strokes.  He was not a candidate for alteplase due to timing.   Echocardiogram negative for clot.   Therapies  ordered.  Neurology consult due to MRI findings.   Likely due to afib off of Xarelto.  xarelto resumed  Statin on hold due to rhabdomyolysis  Rehab consulted declined, pending nursing care facility acceptance        Chronic deep vein thrombosis (DVT) of brachial vein (HCC)   Assessment & Plan    Noted on dvt ultrasound- subacute to chronic  On xarelto  Close monitor        Left-sided weakness- (present on admission)   Assessment & Plan    From acute CVA        TENZIN (acute kidney injury) (HCC)- (present on admission)   Assessment & Plan    Secondary to rhabdomyolysis.  Changed bicarb drip to NS + 20 mEq  K at 125 and keep until CPK is under 1,000  Discontinue IV fluid  AK I resolved        Protein-calorie malnutrition, severe (HCC)- (present on admission)   Assessment & Plan    This is a clinical diagnosis  BMI is 19        Elevated troponin- (present on admission)   Assessment & Plan    Likely due to rhabdomyolysis, acute kidney failure,   denies any chest pain        Elevated liver function tests- (present on admission)   Assessment & Plan    Likely due to rhabdomyolysis,  A statin is held        Leukocytosis- (present on admission)   Assessment & Plan    Resolved        Rhabdomyolysis- (present on admission)   Assessment & Plan    Secondary to being down for 2 days, nephrology consulted and signed off.   S/p bicarb drip,   CPK has been trending down  Can recheck in several days and resume statin once levels less than 500          Chronic atrial fibrillation (HCC)- (present on admission)   Assessment & Plan    Holding metoprolol due to hypotension  Currently heart rate is well controlled    xarelto resumed        HIV (human immunodeficiency virus infection) (HCC)- (present on admission)   Assessment & Plan    Restarted home regimen.   Dr. Montaño is aware that he has been admitted        Essential hypertension- (present on admission)   Assessment & Plan    Held metoprolol due to low BP          Quality-Core Measures    Reviewed items::  Labs reviewed and Radiology images reviewed  Ramirez catheter::  No Ramirez  DVT: xarelto.  DVT prophylaxis - mechanical:  SCDs  Ulcer Prophylaxis::  Not indicated  Assessed for rehabilitation services:  Patient was assess for and/or received rehabilitation services during this hospitalization   For complexity-based billing, please refer to the history, exam, and decison making above. I spent >35 minutes caring for the patient today.    I have discussed with RN and EDGAR and SW and other consultants about patient's plan.

## 2018-10-17 NOTE — THERAPY
"Speech Language Therapy dysphagia treatment completed.   Functional Status:  Patient seen this date for dysphagia tx session. Patient currently on D2/NTL diet with no reported difficulty aside from periods of increased lethargy in which trays are held completely. Patient consumed PO trials of D2/NTL trials as well as PO trials of ice chips, thins and soft solids. Patient had no episodes of wet/gurgly vocal but x2 delayed throat clearing when he did not complete double swallow. Patient did complete double swallow strategies >80% of the time with min cues. At this time, recommend patient advance to D3/thin liquid meal items with continued monitoring due to need for cues for swallow strategies and impulsivity at times. Hold PO with any difficulty or change. Thank you.     Recommendations: 1) advance to D3/thin liquid meal items with continued monitoring due to need for cues for swallow strategies and impulsivity at times.    Plan of Care: Will benefit from Speech Therapy 5 times per week.    Post-Acute Therapy: Discharge to a inpatient transitional care facility for continued Speech therapy services.    See \"Rehab Therapy-Acute\" Patient Summary Report for complete documentation.     "

## 2018-10-17 NOTE — PROGRESS NOTES
2 RN skin check completed with KYE Guzman     R ear: Scabbing/wound  R temporal: DTI   R elbow: wound/scabbing   L elbow: scab/redness   R Lower chest: DTI   R Lower back: small open tear   R hip: Wound, dressing changed   L hip: Wound, dressing changed   Sacrum/coccyx: Excoriated/red/DTI  Bilat LE: generalized scabbing   R ankle: 2 scabs  L inner ankle: DTI, scab   R foot: 2 blisters, scabs    R heel: DTI

## 2018-10-17 NOTE — PROGRESS NOTES
Guilherme score = 13.     2 RN skin assessment completed with KYE Shelton;       Right face DTI ; MILAGROS  Right lateral ankle 5th MTH and 5th toe; MILAGROS  Left ankle; MILAGROS  Multiple wound BLE; dressings intact C/D/I  Scattered scabs BLE  Right temporal DTI; MILAGROS  Right hip; dressing in place; C/D/I  Right waist DTI; MILAGROS  Right ear DTI;  MILAGROS  Sacrum/ Coccyx DTI ; orly cream   Right chest DTI; MILAGROS  Right elbow; MILAGROS  Left waist DTI; MILAGROS  Left heel; MILAGROS  Bilateral ears red and blanchable; gray foam applied to oxygen tubing.  Waffle Mattress in place.  Alternating every 2 hours between prevaln and prafo boots.

## 2018-10-18 PROBLEM — R13.10 DYSPHAGIA: Status: ACTIVE | Noted: 2018-10-18

## 2018-10-18 PROCEDURE — 770006 HCHG ROOM/CARE - MED/SURG/GYN SEMI*

## 2018-10-18 PROCEDURE — 700102 HCHG RX REV CODE 250 W/ 637 OVERRIDE(OP): Performed by: INTERNAL MEDICINE

## 2018-10-18 PROCEDURE — 700102 HCHG RX REV CODE 250 W/ 637 OVERRIDE(OP): Performed by: HOSPITALIST

## 2018-10-18 PROCEDURE — A9270 NON-COVERED ITEM OR SERVICE: HCPCS | Performed by: FAMILY MEDICINE

## 2018-10-18 PROCEDURE — 92526 ORAL FUNCTION THERAPY: CPT

## 2018-10-18 PROCEDURE — A9270 NON-COVERED ITEM OR SERVICE: HCPCS | Performed by: INTERNAL MEDICINE

## 2018-10-18 PROCEDURE — 99232 SBSQ HOSP IP/OBS MODERATE 35: CPT | Performed by: FAMILY MEDICINE

## 2018-10-18 PROCEDURE — 700102 HCHG RX REV CODE 250 W/ 637 OVERRIDE(OP): Performed by: FAMILY MEDICINE

## 2018-10-18 PROCEDURE — A9270 NON-COVERED ITEM OR SERVICE: HCPCS | Performed by: HOSPITALIST

## 2018-10-18 RX ORDER — TRAMADOL HYDROCHLORIDE 50 MG/1
50 TABLET ORAL EVERY 8 HOURS PRN
Status: DISCONTINUED | OUTPATIENT
Start: 2018-10-18 | End: 2018-10-28 | Stop reason: HOSPADM

## 2018-10-18 RX ADMIN — TRAMADOL HYDROCHLORIDE 50 MG: 50 TABLET, FILM COATED ORAL at 18:22

## 2018-10-18 RX ADMIN — OMEPRAZOLE 20 MG: 20 CAPSULE, DELAYED RELEASE ORAL at 06:02

## 2018-10-18 RX ADMIN — ZOLPIDEM TARTRATE 10 MG: 5 TABLET ORAL at 21:03

## 2018-10-18 RX ADMIN — MICONAZOLE NITRATE: 20 CREAM TOPICAL at 06:03

## 2018-10-18 RX ADMIN — MICONAZOLE NITRATE: 20 CREAM TOPICAL at 18:23

## 2018-10-18 RX ADMIN — FLUOXETINE HYDROCHLORIDE 20 MG: 20 CAPSULE ORAL at 06:02

## 2018-10-18 RX ADMIN — RIVAROXABAN 20 MG: 20 TABLET, FILM COATED ORAL at 18:23

## 2018-10-18 RX ADMIN — LAMIVUDINE 300 MG: 150 TABLET, FILM COATED ORAL at 08:05

## 2018-10-18 RX ADMIN — CLONAZEPAM 1 MG: 1 TABLET ORAL at 18:22

## 2018-10-18 RX ADMIN — ABACAVIR 600 MG: 300 TABLET, FILM COATED ORAL at 08:05

## 2018-10-18 RX ADMIN — GABAPENTIN 300 MG: 300 CAPSULE ORAL at 06:02

## 2018-10-18 RX ADMIN — METOPROLOL TARTRATE 25 MG: 25 TABLET, FILM COATED ORAL at 18:26

## 2018-10-18 RX ADMIN — TRAMADOL HYDROCHLORIDE 50 MG: 50 TABLET, FILM COATED ORAL at 11:04

## 2018-10-18 RX ADMIN — GABAPENTIN 300 MG: 300 CAPSULE ORAL at 18:23

## 2018-10-18 RX ADMIN — CLONAZEPAM 1 MG: 1 TABLET ORAL at 06:02

## 2018-10-18 RX ADMIN — TRAMADOL HYDROCHLORIDE 50 MG: 50 TABLET, FILM COATED ORAL at 02:56

## 2018-10-18 RX ADMIN — DOLUTEGRAVIR SODIUM 50 MG: 50 TABLET, FILM COATED ORAL at 08:04

## 2018-10-18 ASSESSMENT — ENCOUNTER SYMPTOMS
FOCAL WEAKNESS: 1
DIZZINESS: 0
BLURRED VISION: 0
WHEEZING: 0
SORE THROAT: 0
FEVER: 0
NERVOUS/ANXIOUS: 0
CHILLS: 0
NAUSEA: 0
BACK PAIN: 0
NECK PAIN: 0
VOMITING: 0
HEADACHES: 0
ABDOMINAL PAIN: 0
SHORTNESS OF BREATH: 0
HEARTBURN: 0
DIARRHEA: 0
WEAKNESS: 1
COUGH: 0

## 2018-10-18 ASSESSMENT — PAIN SCALES - GENERAL
PAINLEVEL_OUTOF10: 7
PAINLEVEL_OUTOF10: 7
PAINLEVEL_OUTOF10: 9
PAINLEVEL_OUTOF10: 0
PAINLEVEL_OUTOF10: 3

## 2018-10-18 NOTE — CARE PLAN
Problem: Pain Management  Goal: Pain level will decrease to patient's comfort goal  Outcome: PROGRESSING SLOWER THAN EXPECTED  Patient reporting mild back pain; PRN Tylenol administered per MAR;     Problem: Skin Integrity  Goal: Risk for impaired skin integrity will decrease  Outcome: PROGRESSING SLOWER THAN EXPECTED  Multiple wounds noted upon skin assessment; New pictures taken and uploaded to chart. Wound following patient; Q2 hour turns in place;

## 2018-10-18 NOTE — PROGRESS NOTES
"Pt alert and oriented x 4. Seizure precautions and  in place. Heel float boot on L foot and foot drop boot placed on R foot. SCD machine ordered. 1:1 feed. Swallow eval completed.     2 RN skin check completed.     R ear - 2 black scabs/sores   R Face dry and peeling, L face dry and peeling  Left pinky abrasion   Lt elbow abrasion, blanching   Rt knee multiple abrasions   Rt foot swollen, bruised, \"needs surgery\" per pt.   DTI to right inner foot - dressing in place   DTI to R otter foot   Foam dressing to R outer foot with DTI   DTI to right thumb  Healing multiple abrasions to R elbow  Rt hand abrasion   Lt knee multiple abrasions   Left inner ankle with DTI - Dressing CDI   Left inner foot with DTI- Dressing CDI   Left and R hip with pressure injuries - Mepilex in place CDI   Sacrum red and excoriated - mepilex applied   Abrasion to low left back - mepilex in place,   "

## 2018-10-18 NOTE — PROGRESS NOTES
[facility] Hospitalist Progress Note    Date of Service: 10/17/2018    Chief Complaint  63 y.o. male admitted 10/8/2018 with left-sided weakness. Mr. Bolton has a history of atrial fibrillation, HTN, and HIV that woke up two days prior to presentation with left sided weakness and fell to the floor. He was on the floor for 2 days until a neighbor found him and called 911. He was found to have acute kidney failure and rhabdomyolysis. His Xarelto had been held due to possible left leg surgery.     Interval Problem Update  10/16 patient has been stable, declined by rehab, nursing care facility referral sent.  Patient otherwise is sleeping and pleasant.  10/17 patient is a very pleasant today.  Pending rehab.  Complains of insomnia.  Added Ambien which patient has been taking at home at 10 mg as needed at night.    Consultants/Specialty  Critical Care  Nephrology   Physiatry.     Disposition  SNF        Review of Systems   Constitutional: Negative for fever and weight loss.   HENT: Negative for congestion, ear pain and hearing loss.    Eyes: Negative for blurred vision, double vision, photophobia and discharge.   Respiratory: Negative for cough, sputum production and shortness of breath.    Cardiovascular: Negative for chest pain, palpitations and orthopnea.   Gastrointestinal: Negative for abdominal pain, blood in stool, heartburn and vomiting.        Tolerating a diet   Genitourinary: Negative for dysuria, frequency and urgency.   Musculoskeletal: Negative for back pain, myalgias and neck pain.        Left arm pain   Neurological: Positive for focal weakness and weakness.        Left arm remains very weak     Endo/Heme/Allergies: Negative for environmental allergies and polydipsia. Does not bruise/bleed easily.   Psychiatric/Behavioral: Negative for depression, substance abuse and suicidal ideas.   All other systems reviewed and are negative.     Physical Exam  Laboratory/Imaging   Hemodynamics  Temp (24hrs), Av.8 °C  (98.2 °F), Min:36.6 °C (97.8 °F), Max:36.9 °C (98.5 °F)   Temperature: 36.9 °C (98.4 °F)  Pulse  Av.6  Min: 61  Max: 94    Blood Pressure : 128/81      Respiratory      Respiration: 18, Pulse Oximetry: 92 %        RUL Breath Sounds: Clear, RML Breath Sounds: Diminished, RLL Breath Sounds: Diminished, DONI Breath Sounds: Clear, LLL Breath Sounds: Diminished    Fluids    Intake/Output Summary (Last 24 hours) at 10/17/18 1713  Last data filed at 10/17/18 1655   Gross per 24 hour   Intake                0 ml   Output             1400 ml   Net            -1400 ml       Nutrition  Orders Placed This Encounter   Procedures   • Diet Order Regular (Crush meds in applesauce/pudding please)     Standing Status:   Standing     Number of Occurrences:   1     Order Specific Question:   Diet:     Answer:   Regular [1]     Comments:   Crush meds in applesauce/pudding please     Order Specific Question:   Texture/Fiber modifications:     Answer:   Dysphagia 3(Mechanical Soft)specify fluid consistency(question 6) [3]     Order Specific Question:   Consistency/Fluid modifications:     Answer:   Thin Liquids [3]     Order Specific Question:   Miscellaneous modifications:     Answer:   SLP - 1:1 Supervision by Nursing [21]     Physical Exam   Constitutional: He is oriented to person, place, and time. He appears well-developed and well-nourished. He appears cachectic. He has a sickly appearance. He appears ill. No distress.   HENT:   Right Ear: External ear normal.   Left Ear: External ear normal.   Neck: Neck supple. No tracheal deviation present.   Cardiovascular: Normal rate and regular rhythm.  Exam reveals no friction rub.    No murmur heard.  Pulmonary/Chest: Effort normal. No respiratory distress. He has no wheezes. He exhibits no tenderness.   Abdominal: Soft. Bowel sounds are normal. He exhibits no mass. There is no tenderness. There is no guarding.   Musculoskeletal: He exhibits no edema.   Poor muscle tone quads  Left arm  warm to touch. Appears to have some thermal dysregulation secondary to stroke   Neurological: He is alert and oriented to person, place, and time.   Left arm weakness (1/5 strength)  Bilateral lower extremity weakness(2/5 strength)   Skin: Skin is warm and dry. He is not diaphoretic.   Psychiatric: His affect is blunt. He is slowed.   Nursing note and vitals reviewed.          Recent Labs      10/15/18   0050   SODIUM  135   POTASSIUM  4.3   CHLORIDE  105   CO2  33   GLUCOSE  99   BUN  11   CREATININE  0.77   CALCIUM  8.9                   US-EXTREMITY VENOUS UPPER UNILAT LEFT   Final Result      DX-ELBOW-COMPLETE 3+ LEFT   Final Result      No evidence of fracture or dislocation.      DX-CHEST-LIMITED (1 VIEW)   Final Result         Patchy bibasilar opacities, likely atelectasis.      US-CAROTID DOPPLER BILAT   Final Result      EC-ECHOCARDIOGRAM COMPLETE W/O CONT   Final Result      MR-BRAIN-W/O   Final Result      1.  Punctate infarcts in the left posterior frontal and right posterior frontal cortices.   2.  Scattered small sized acute infarcts in the left parietal cortex and left parietal deep white matter.   3.  Chronic bilateral cerebellar infarcts.   4.  Chronic left centrum semiovale lacunar infarct.   5.  Moderate diffuse cerebral substance loss.   6.  Mild microangiopathic ischemic change versus demyelination or gliosis.      CT-HEAD W/O   Final Result      1.  Cerebral atrophy.      2.  White matter lucencies most consistent with small vessel ischemic change versus demyelination or gliosis.      3.  Otherwise, Head CT without contrast with no acute findings. No evidence of acute cerebral  hemorrhage or mass lesion.      DX-CHEST-PORTABLE (1 VIEW)   Final Result      Lung base atelectasis.           Assessment/Plan          * Cerebrovascular accident (CVA) due to embolism (HCC)- (present on admission)   Assessment & Plan    Started 2 days prior to admit. CT head was negative.  MRI brain reveals bilateral  acute strokes.  He was not a candidate for alteplase due to timing.   Echocardiogram negative for clot.   Therapies ordered.  Neurology consult due to MRI findings.   Likely due to afib off of Xarelto.  xarelto resumed  Statin on hold due to rhabdomyolysis  Rehab consulted declined, pending nursing care facility acceptance  Complaint of insomnia start Ambien        Chronic deep vein thrombosis (DVT) of brachial vein (HCC)   Assessment & Plan    Noted on dvt ultrasound- subacute to chronic  On xarelto        Left-sided weakness- (present on admission)   Assessment & Plan    From acute CVA        TENZIN (acute kidney injury) (HCC)- (present on admission)   Assessment & Plan    Secondary to rhabdomyolysis.  Changed bicarb drip to NS + 20 mEq  K at 125 and keep until CPK is under 1,000  Repeat BMP and CPK tomorrow  AK I resolved  monitor        Protein-calorie malnutrition, severe (HCC)- (present on admission)   Assessment & Plan    This is a clinical diagnosis  BMI is 19        Elevated troponin- (present on admission)   Assessment & Plan    Likely due to rhabdomyolysis, acute kidney failure,   denies any chest pain  stable        Elevated liver function tests- (present on admission)   Assessment & Plan     Likely due to rhabdomyolysis,  A statin is held        Leukocytosis- (present on admission)   Assessment & Plan     Resolved        Rhabdomyolysis- (present on admission)   Assessment & Plan     Secondary to being down for 2 days, nephrology consulted and signed off.   S/p bicarb drip,   CPK has been trending down  Can recheck in several days and resume statin once levels less than 500  monitor        Chronic atrial fibrillation (HCC)- (present on admission)   Assessment & Plan    Holding metoprolol due to hypotension  Currently heart rate is well controlled    xarelto resumed        HIV (human immunodeficiency virus infection) ([facility])- (present on admission)   Assessment & Plan    Restarted home regimen.   Dr. Montaño  is aware that he has been admitted        Essential hypertension- (present on admission)   Assessment & Plan    Held metoprolol due to low BP            Quality-Core Measures   Reviewed items::  Labs reviewed and Radiology images reviewed  Ramirez catheter::  No Ramirez  DVT: xarelto.  DVT prophylaxis - mechanical:  SCDs  Ulcer Prophylaxis::  Not indicated  Assessed for rehabilitation services:  Patient was assess for and/or received rehabilitation services during this hospitalization   For complexity-based billing, please refer to the history, exam, and decison making above. I spent >35 minutes caring for the patient today.    I have discussed with RN and EDGAR and SW and other consultants about patient's plan.

## 2018-10-18 NOTE — PROGRESS NOTES
Patient transported to [facility]- from T834-2  All personal belongings, chart and medications with patient   NIH completed with Raven FISHMAN

## 2018-10-18 NOTE — PROGRESS NOTES
2 RN skin check complete;  Face dry with some peeling noted;   Scabbing noted to right ear;   Scab noted to right thumb;   Abrasions noted to left and right elbows;   Left and right hip wounds noted; Mepiilex in place on both sides;   Multiple generalized scabs noted on BLE:   Right outer ankle wound noted;  Right foot wound noted; Dressing in place;   Left ankle wound noted; Dressing in place;   Sacrum red and excoriated; Mepilex in place;  Abrasion to low left back; Mepilex in place,     Pictures taken on wounds and uploaded to chart;

## 2018-10-18 NOTE — THERAPY
"Speech Language Therapy dysphagia treatment completed.     Functional Status: Upon entry, patient with <25% of sandwich consumed and declined further PO due to strong distaste for it. He was agreeable to consuming remainder of Boost and coke via straw and fruit cocktail. Swallow trigger achieved within 2-3 seconds. No overt s/sx of aspiration across all consistencies and textures. He required min-mod A for swallow strategies. Mastication slightly prolonged but appeared functional for mechanicals. Patient performed 10 reps each of falsetto, effortful swallow, and CTAR with good accuracy given min A.     Recommendations: At this time, patient is presenting with s/sx consistent with minimal oropharyngeal and appears at the level to continue a D3/thin liquids with adherence to posted swallow strategies and assistance with feeding as needed.     Plan of Care: Will benefit from Speech Therapy 5 times per week    Post-Acute Therapy: Discharge to a transitional care facility for continued skilled therapy services.    See \"Rehab Therapy-Acute\" Patient Summary Report for complete documentation.     "

## 2018-10-18 NOTE — DISCHARGE PLANNING
Agency/Facility Name: [facility]  Spoke To: Nisha  Outcome: Referral being reviewed. Nisha will call this [facility] back with update on status of referral.    Agency/Facility Name: [facility]  Outcome: Referral resent as fax failed.

## 2018-10-18 NOTE — PROGRESS NOTES
Hospital Medicine Daily Progress Note    Date of Service  10/18/2018    Chief Complaint  63 y.o. male admitted 10/8/2018 with Stroke    Hospital Course  Admitted with stroke, rhabdomyolysis, acute kidney injury, known history of chronic atrial fibrillation, Eliquis was held for scheduled right ankle surgery    Interval Problem Update  Stroke -persistent left-sided weakness  A. Fib-currently sinus rhythm  Hypertension -blood pressures has come up  Rhabdomyolysis -CPK has trended down  TENZIN -creatinine has trended down  Dysphagia -swallow reevaluation done    Consultants/Specialty  Neurology signed off  Critical care signed off    Code Status  Full    Disposition  Rehab vs SNF    Review of Systems  Review of Systems   Constitutional: Positive for malaise/fatigue. Negative for chills and fever.   HENT: Negative for hearing loss and sore throat.    Eyes: Negative for blurred vision.   Respiratory: Negative for cough, shortness of breath and wheezing.    Cardiovascular: Negative for chest pain and leg swelling.   Gastrointestinal: Negative for abdominal pain, diarrhea, heartburn, nausea and vomiting.   Genitourinary: Negative for dysuria.   Musculoskeletal: Negative for back pain and neck pain.   Skin: Negative for rash.   Neurological: Positive for focal weakness and weakness. Negative for dizziness and headaches.   Psychiatric/Behavioral: The patient is not nervous/anxious.         Physical Exam  Temp:  [36.4 °C (97.5 °F)-37.8 °C (100 °F)] 37.4 °C (99.3 °F)  Pulse:  [] 86  Resp:  [15-18] 15  BP: (128-147)/(65-87) 147/87    Physical Exam   Constitutional: He is oriented to person, place, and time. He appears well-developed.   HENT:   Head: Normocephalic and atraumatic.   Eyes: Pupils are equal, round, and reactive to light. Conjunctivae are normal.   Neck: No tracheal deviation present. No thyromegaly present.   Cardiovascular: Normal rate and regular rhythm.    Pulmonary/Chest: Effort normal and breath sounds  normal.   Abdominal: Soft. Bowel sounds are normal. He exhibits no distension. There is no tenderness.   Musculoskeletal: He exhibits no edema.   Lymphadenopathy:     He has no cervical adenopathy.   Neurological: He is alert and oriented to person, place, and time.   Shallow left nasolabial fold  RUE 4+.5, RLE 3-4/5, LUE 0-1/5, LLE 3/5   Skin: Skin is warm and dry.   Nursing note and vitals reviewed.      Fluids    Intake/Output Summary (Last 24 hours) at 10/18/18 1210  Last data filed at 10/18/18 1030   Gross per 24 hour   Intake              400 ml   Output             3700 ml   Net            -3300 ml       Laboratory                        Imaging  US-EXTREMITY VENOUS UPPER UNILAT LEFT   Final Result      DX-ELBOW-COMPLETE 3+ LEFT   Final Result      No evidence of fracture or dislocation.      DX-CHEST-LIMITED (1 VIEW)   Final Result         Patchy bibasilar opacities, likely atelectasis.      US-CAROTID DOPPLER BILAT   Final Result      EC-ECHOCARDIOGRAM COMPLETE W/O CONT   Final Result      MR-BRAIN-W/O   Final Result      1.  Punctate infarcts in the left posterior frontal and right posterior frontal cortices.   2.  Scattered small sized acute infarcts in the left parietal cortex and left parietal deep white matter.   3.  Chronic bilateral cerebellar infarcts.   4.  Chronic left centrum semiovale lacunar infarct.   5.  Moderate diffuse cerebral substance loss.   6.  Mild microangiopathic ischemic change versus demyelination or gliosis.      CT-HEAD W/O   Final Result      1.  Cerebral atrophy.      2.  White matter lucencies most consistent with small vessel ischemic change versus demyelination or gliosis.      3.  Otherwise, Head CT without contrast with no acute findings. No evidence of acute cerebral  hemorrhage or mass lesion.      DX-CHEST-PORTABLE (1 VIEW)   Final Result      Lung base atelectasis.           Assessment/Plan  * Cerebrovascular accident (CVA) due to embolism (HCC)- (present on  admission)   Assessment & Plan    Xarelto  PT/OT, SLP        Rhabdomyolysis- (present on admission)   Assessment & Plan    Follow cpk        TENZIN (acute kidney injury) (HCC)- (present on admission)   Assessment & Plan    Follow bmp        Dysphagia- (present on admission)   Assessment & Plan    Dysphagia 3  SLP to follow        Chronic deep vein thrombosis (DVT) of brachial vein (HCC)- (present on admission)   Assessment & Plan    Xarelto        Elevated troponin- (present on admission)   Assessment & Plan    Likely secondary to rhabdomyolysis, TENZIN          Elevated liver function tests- (present on admission)   Assessment & Plan    Follow cmp        Leukocytosis- (present on admission)   Assessment & Plan    likely reactive  follow cbc        Chronic atrial fibrillation (HCC)- (present on admission)   Assessment & Plan    Xarelto  Resume Metoprolol          Essential hypertension- (present on admission)   Assessment & Plan    Resume Metoprolol        Protein-calorie malnutrition, severe (HCC)- (present on admission)   Assessment & Plan    Nutrition consult        Multiple wounds of skin- (present on admission)   Assessment & Plan    Wound care        HIV (human immunodeficiency virus infection) (HCC)- (present on admission)   Assessment & Plan    Continue regimen        Macrocytic anemia- (present on admission)   Assessment & Plan    Follow cbc             VTE prophylaxis: Xarelto

## 2018-10-18 NOTE — PROGRESS NOTES
Two RN skin check performed. Pt with multiple abrasions and ulcers. See chart assessment and wound photos for review.

## 2018-10-18 NOTE — DISCHARGE PLANNING
Agency/Facility Name: [facility]  Spoke To: Nisha  Outcome: Patient accepted pending bed availability.

## 2018-10-19 PROBLEM — E87.6 HYPOKALEMIA: Status: ACTIVE | Noted: 2018-10-19

## 2018-10-19 LAB
ALBUMIN SERPL BCP-MCNC: 3 G/DL (ref 3.2–4.9)
ALBUMIN/GLOB SERPL: 1 G/DL
ALP SERPL-CCNC: 70 U/L (ref 30–99)
ALT SERPL-CCNC: 32 U/L (ref 2–50)
ANION GAP SERPL CALC-SCNC: 7 MMOL/L (ref 0–11.9)
AST SERPL-CCNC: 23 U/L (ref 12–45)
BASOPHILS # BLD AUTO: 0.3 % (ref 0–1.8)
BASOPHILS # BLD: 0.05 K/UL (ref 0–0.12)
BILIRUB SERPL-MCNC: 0.4 MG/DL (ref 0.1–1.5)
BUN SERPL-MCNC: 13 MG/DL (ref 8–22)
CALCIUM SERPL-MCNC: 9.4 MG/DL (ref 8.5–10.5)
CHLORIDE SERPL-SCNC: 102 MMOL/L (ref 96–112)
CO2 SERPL-SCNC: 26 MMOL/L (ref 20–33)
CREAT SERPL-MCNC: 0.8 MG/DL (ref 0.5–1.4)
EOSINOPHIL # BLD AUTO: 0.06 K/UL (ref 0–0.51)
EOSINOPHIL NFR BLD: 0.4 % (ref 0–6.9)
ERYTHROCYTE [DISTWIDTH] IN BLOOD BY AUTOMATED COUNT: 46.5 FL (ref 35.9–50)
GLOBULIN SER CALC-MCNC: 3.1 G/DL (ref 1.9–3.5)
GLUCOSE SERPL-MCNC: 139 MG/DL (ref 65–99)
HCT VFR BLD AUTO: 31.3 % (ref 42–52)
HGB BLD-MCNC: 10.3 G/DL (ref 14–18)
IMM GRANULOCYTES # BLD AUTO: 0.11 K/UL (ref 0–0.11)
IMM GRANULOCYTES NFR BLD AUTO: 0.8 % (ref 0–0.9)
LYMPHOCYTES # BLD AUTO: 1.7 K/UL (ref 1–4.8)
LYMPHOCYTES NFR BLD: 11.7 % (ref 22–41)
MCH RBC QN AUTO: 32.3 PG (ref 27–33)
MCHC RBC AUTO-ENTMCNC: 32.9 G/DL (ref 33.7–35.3)
MCV RBC AUTO: 98.1 FL (ref 81.4–97.8)
MONOCYTES # BLD AUTO: 1.3 K/UL (ref 0–0.85)
MONOCYTES NFR BLD AUTO: 8.9 % (ref 0–13.4)
NEUTROPHILS # BLD AUTO: 11.35 K/UL (ref 1.82–7.42)
NEUTROPHILS NFR BLD: 77.9 % (ref 44–72)
NRBC # BLD AUTO: 0 K/UL
NRBC BLD-RTO: 0 /100 WBC
PLATELET # BLD AUTO: 477 K/UL (ref 164–446)
PMV BLD AUTO: 8.3 FL (ref 9–12.9)
POTASSIUM SERPL-SCNC: 3.5 MMOL/L (ref 3.6–5.5)
PROT SERPL-MCNC: 6.1 G/DL (ref 6–8.2)
RBC # BLD AUTO: 3.19 M/UL (ref 4.7–6.1)
SODIUM SERPL-SCNC: 135 MMOL/L (ref 135–145)
WBC # BLD AUTO: 14.6 K/UL (ref 4.8–10.8)

## 2018-10-19 PROCEDURE — 700102 HCHG RX REV CODE 250 W/ 637 OVERRIDE(OP): Performed by: INTERNAL MEDICINE

## 2018-10-19 PROCEDURE — 97530 THERAPEUTIC ACTIVITIES: CPT

## 2018-10-19 PROCEDURE — 700102 HCHG RX REV CODE 250 W/ 637 OVERRIDE(OP): Performed by: FAMILY MEDICINE

## 2018-10-19 PROCEDURE — 99232 SBSQ HOSP IP/OBS MODERATE 35: CPT | Performed by: FAMILY MEDICINE

## 2018-10-19 PROCEDURE — A9270 NON-COVERED ITEM OR SERVICE: HCPCS | Performed by: INTERNAL MEDICINE

## 2018-10-19 PROCEDURE — 85025 COMPLETE CBC W/AUTO DIFF WBC: CPT

## 2018-10-19 PROCEDURE — 97112 NEUROMUSCULAR REEDUCATION: CPT

## 2018-10-19 PROCEDURE — 97110 THERAPEUTIC EXERCISES: CPT

## 2018-10-19 PROCEDURE — 80053 COMPREHEN METABOLIC PANEL: CPT

## 2018-10-19 PROCEDURE — A9270 NON-COVERED ITEM OR SERVICE: HCPCS | Performed by: FAMILY MEDICINE

## 2018-10-19 PROCEDURE — A9270 NON-COVERED ITEM OR SERVICE: HCPCS | Performed by: HOSPITALIST

## 2018-10-19 PROCEDURE — 770006 HCHG ROOM/CARE - MED/SURG/GYN SEMI*

## 2018-10-19 PROCEDURE — 36415 COLL VENOUS BLD VENIPUNCTURE: CPT

## 2018-10-19 PROCEDURE — 700102 HCHG RX REV CODE 250 W/ 637 OVERRIDE(OP): Performed by: HOSPITALIST

## 2018-10-19 RX ORDER — POTASSIUM CHLORIDE 20 MEQ/1
20 TABLET, EXTENDED RELEASE ORAL DAILY
Status: DISCONTINUED | OUTPATIENT
Start: 2018-10-19 | End: 2018-10-20

## 2018-10-19 RX ADMIN — GABAPENTIN 300 MG: 300 CAPSULE ORAL at 04:19

## 2018-10-19 RX ADMIN — POTASSIUM CHLORIDE 20 MEQ: 1500 TABLET, EXTENDED RELEASE ORAL at 14:00

## 2018-10-19 RX ADMIN — MICONAZOLE NITRATE: 20 CREAM TOPICAL at 04:19

## 2018-10-19 RX ADMIN — FLUOXETINE HYDROCHLORIDE 20 MG: 20 CAPSULE ORAL at 04:19

## 2018-10-19 RX ADMIN — METOPROLOL TARTRATE 25 MG: 25 TABLET, FILM COATED ORAL at 17:31

## 2018-10-19 RX ADMIN — LAMIVUDINE 300 MG: 150 TABLET, FILM COATED ORAL at 04:19

## 2018-10-19 RX ADMIN — MICONAZOLE NITRATE: 20 CREAM TOPICAL at 17:32

## 2018-10-19 RX ADMIN — CLONAZEPAM 1 MG: 1 TABLET ORAL at 17:31

## 2018-10-19 RX ADMIN — METOPROLOL TARTRATE 25 MG: 25 TABLET, FILM COATED ORAL at 04:20

## 2018-10-19 RX ADMIN — ACETAMINOPHEN 650 MG: 325 TABLET, FILM COATED ORAL at 08:26

## 2018-10-19 RX ADMIN — TRAMADOL HYDROCHLORIDE 50 MG: 50 TABLET, FILM COATED ORAL at 22:40

## 2018-10-19 RX ADMIN — ZOLPIDEM TARTRATE 10 MG: 5 TABLET ORAL at 22:40

## 2018-10-19 RX ADMIN — ABACAVIR 600 MG: 300 TABLET, FILM COATED ORAL at 04:19

## 2018-10-19 RX ADMIN — GABAPENTIN 300 MG: 300 CAPSULE ORAL at 17:31

## 2018-10-19 RX ADMIN — TRAMADOL HYDROCHLORIDE 50 MG: 50 TABLET, FILM COATED ORAL at 03:39

## 2018-10-19 RX ADMIN — CLONAZEPAM 1 MG: 1 TABLET ORAL at 04:20

## 2018-10-19 RX ADMIN — RIVAROXABAN 20 MG: 20 TABLET, FILM COATED ORAL at 17:31

## 2018-10-19 RX ADMIN — DOLUTEGRAVIR SODIUM 50 MG: 50 TABLET, FILM COATED ORAL at 04:19

## 2018-10-19 RX ADMIN — OMEPRAZOLE 20 MG: 20 CAPSULE, DELAYED RELEASE ORAL at 04:19

## 2018-10-19 ASSESSMENT — ENCOUNTER SYMPTOMS
VOMITING: 0
SHORTNESS OF BREATH: 0
FEVER: 0
WHEEZING: 0
FOCAL WEAKNESS: 1
WEAKNESS: 1
BLURRED VISION: 0
CHILLS: 0
NERVOUS/ANXIOUS: 0
SORE THROAT: 0
HEARTBURN: 0
DIZZINESS: 0
HEADACHES: 0
ABDOMINAL PAIN: 0
DIARRHEA: 0
NECK PAIN: 0
NAUSEA: 0
COUGH: 0
BACK PAIN: 0

## 2018-10-19 ASSESSMENT — COGNITIVE AND FUNCTIONAL STATUS - GENERAL
MOVING TO AND FROM BED TO CHAIR: UNABLE
STANDING UP FROM CHAIR USING ARMS: A LOT
MOBILITY SCORE: 8
WALKING IN HOSPITAL ROOM: A LOT
SUGGESTED CMS G CODE MODIFIER MOBILITY: CM
MOVING FROM LYING ON BACK TO SITTING ON SIDE OF FLAT BED: UNABLE
CLIMB 3 TO 5 STEPS WITH RAILING: TOTAL
TURNING FROM BACK TO SIDE WHILE IN FLAT BAD: UNABLE

## 2018-10-19 ASSESSMENT — PAIN SCALES - GENERAL
PAINLEVEL_OUTOF10: 10
PAINLEVEL_OUTOF10: 8
PAINLEVEL_OUTOF10: 5
PAINLEVEL_OUTOF10: 0

## 2018-10-19 ASSESSMENT — GAIT ASSESSMENTS
DEVIATION: ATAXIC
DISTANCE (FEET): 3
ASSISTIVE DEVICE: HAND HELD ASSIST
GAIT LEVEL OF ASSIST: MINIMAL ASSIST

## 2018-10-19 NOTE — CARE PLAN
Problem: Pain Management  Goal: Pain level will decrease to patient's comfort goal  Patient c/o back and shoulder pain. Tylenol given with moderate relief. Turning q2 hours.

## 2018-10-19 NOTE — THERAPY
"Physical Therapy Treatment completed.   Bed Mobility:  Supine to Sit: Moderate Assist  Transfers: Sit to Stand: Moderate Assist  Gait: Level Of Assist: Minimal Assist with hand held assist       Plan of Care: Will benefit from Physical Therapy 4 times per week  Discharge Recommendations: Equipment: Will Continue to Assess for Equipment Needs. Post-acute therapy Recommend inpatient transitional care services for continued physical therapy services. Please consider physiatry (PM&R) consult.     Significant improvements in L LE strength and control. Pt has full ankle dorsiflexion and quad agaist gravity. Tends to have significant posterior lean, however with verbal and tactile cues was able to correct. Was able to take steps to pivot over to a chair. Pt feeling frustrated at not getting to a rehab facility, is very motivated to progress. Discussed HEP to improve his strength and mobility. Pt expressed understanding. Verbalized to RN how to transfer to patient out of the chair. Acute PT to continue to follow while in house. Continue to recommend post acute placement.    See \"Rehab Therapy-Acute\" Patient Summary Report for complete documentation.       "

## 2018-10-19 NOTE — PROGRESS NOTES
Aaox4.fatigue and pleasant.LUE almost flaccid.turn and reposition every 2 hours.patient able to sleep.continuing hourly rounding.

## 2018-10-19 NOTE — DISCHARGE PLANNING
Anticipated Discharge Disposition: SNF    Action: LSW requested Kiana MCCAULEY f/u with [facility] for bed availability and [facility] for acceptance.     LSW notified by Kiana MCCAULEY that [facility] does not anticipate an open bed until Tuesday (10/23) and they need to know where pt obtains his HIV medications.  [facility] wants to ensure the pt can provide 90 days of HIV medication before they will accept pt.     Pt discussed during rounds. Pt will dc with HIV medication called Triume[facility].      Barriers to Discharge: Medical Clearance; Accepting Facility; HIV Medication Supply    Plan: LSW to f/u with pharmacy RX price

## 2018-10-19 NOTE — DISCHARGE PLANNING
Agency/Facility Name: [facility]  Spoke To: Nisha  Outcome: No bed available today, also inquired about where patient receives his HIV medication from.    Agency/Facility Name: [facility]  Spoke To: Anjelica  Outcome: Referral received, under review. Facility inquired if patient will be able to provide the next 90 days of HIV meds to further proceed.     MILTON Rivas notified.

## 2018-10-19 NOTE — CARE PLAN
Problem: Skin Integrity  Goal: Risk for impaired skin integrity will decrease  Multiple wounds noted. Turn q 2 hours. 2 RN skin check completed.

## 2018-10-19 NOTE — PROGRESS NOTES
2 RN skin check competed with Cindy. Face dry with 3 small scabs and peeling skin, scab to right ear, upper lower lobe, scab noted to right thumb, left hand 5th finger has a small scab, eschar noted to left elbow, scabs noted to right elbow, scabs to mare knees, right outer and inner foot with drsg in place, left foot with drsg, sacrum/coccyx red and excoriated, clean mepilex in place. mare hip drsg dry and intact.

## 2018-10-19 NOTE — PROGRESS NOTES
Hospital Medicine Daily Progress Note    Date of Service  10/19/2018    Chief Complaint  63 y.o. male admitted 10/8/2018 with Stroke    Hospital Course  Admitted with stroke, rhabdomyolysis, acute kidney injury, known history of chronic atrial fibrillation, Eliquis was held for scheduled right ankle surgery    Interval Problem Update  Stroke -persistent left-sided weakness  A. Fib-currently sinus rhythm  Hypertension -better controlled  Rhabdomyolysis -CPK has trended down  TENZIN - creatinine has trended down  Leukocytosis -WBC trended up to 14, T-max 99.7  Low potassium    Consultants/Specialty  Neurology signed off  Critical care signed off    Code Status  Full    Disposition  Rehab vs SNF    Review of Systems  Review of Systems   Constitutional: Positive for malaise/fatigue. Negative for chills and fever.   HENT: Negative for hearing loss and sore throat.    Eyes: Negative for blurred vision.   Respiratory: Negative for cough, shortness of breath and wheezing.    Cardiovascular: Negative for chest pain and leg swelling.   Gastrointestinal: Negative for abdominal pain, diarrhea, heartburn, nausea and vomiting.   Genitourinary: Negative for dysuria.   Musculoskeletal: Negative for back pain and neck pain.   Skin: Negative for rash.   Neurological: Positive for focal weakness and weakness. Negative for dizziness and headaches.   Psychiatric/Behavioral: The patient is not nervous/anxious.         Physical Exam  Temp:  [36.9 °C (98.5 °F)-37.6 °C (99.7 °F)] 36.9 °C (98.5 °F)  Pulse:  [75-88] 88  Resp:  [15-20] 16  BP: (131-143)/(83-99) 131/99    Physical Exam   Constitutional: He is oriented to person, place, and time. He appears well-developed.   HENT:   Head: Normocephalic and atraumatic.   Eyes: Pupils are equal, round, and reactive to light. Conjunctivae are normal.   Neck: No tracheal deviation present. No thyromegaly present.   Cardiovascular: Normal rate and regular rhythm.    Pulmonary/Chest: Effort normal and  breath sounds normal.   Abdominal: Soft. Bowel sounds are normal. He exhibits no distension. There is no tenderness.   Musculoskeletal: He exhibits no edema.   Lymphadenopathy:     He has no cervical adenopathy.   Neurological: He is alert and oriented to person, place, and time.   Shallow left nasolabial fold  RUE 4+.5, RLE 3-4/5, LUE 0-1/5, LLE 3/5   Skin: Skin is warm and dry.   Nursing note and vitals reviewed.      Fluids    Intake/Output Summary (Last 24 hours) at 10/19/18 1341  Last data filed at 10/19/18 0400   Gross per 24 hour   Intake                0 ml   Output             2600 ml   Net            -2600 ml       Laboratory  Recent Labs      10/19/18   0905   WBC  14.6*   RBC  3.19*   HEMOGLOBIN  10.3*   HEMATOCRIT  31.3*   MCV  98.1*   MCH  32.3   MCHC  32.9*   RDW  46.5   PLATELETCT  477*   MPV  8.3*     Recent Labs      10/19/18   0905   SODIUM  135   POTASSIUM  3.5*   CHLORIDE  102   CO2  26   GLUCOSE  139*   BUN  13   CREATININE  0.80   CALCIUM  9.4                   Imaging  US-EXTREMITY VENOUS UPPER UNILAT LEFT   Final Result      DX-ELBOW-COMPLETE 3+ LEFT   Final Result      No evidence of fracture or dislocation.      DX-CHEST-LIMITED (1 VIEW)   Final Result         Patchy bibasilar opacities, likely atelectasis.      US-CAROTID DOPPLER BILAT   Final Result      EC-ECHOCARDIOGRAM COMPLETE W/O CONT   Final Result      MR-BRAIN-W/O   Final Result      1.  Punctate infarcts in the left posterior frontal and right posterior frontal cortices.   2.  Scattered small sized acute infarcts in the left parietal cortex and left parietal deep white matter.   3.  Chronic bilateral cerebellar infarcts.   4.  Chronic left centrum semiovale lacunar infarct.   5.  Moderate diffuse cerebral substance loss.   6.  Mild microangiopathic ischemic change versus demyelination or gliosis.      CT-HEAD W/O   Final Result      1.  Cerebral atrophy.      2.  White matter lucencies most consistent with small vessel ischemic  change versus demyelination or gliosis.      3.  Otherwise, Head CT without contrast with no acute findings. No evidence of acute cerebral  hemorrhage or mass lesion.      DX-CHEST-PORTABLE (1 VIEW)   Final Result      Lung base atelectasis.           Assessment/Plan  * Cerebrovascular accident (CVA) due to embolism (HCC)- (present on admission)   Assessment & Plan    Xarelto  PT/OT, SLP        Rhabdomyolysis- (present on admission)   Assessment & Plan    Follow cpk        TENZIN (acute kidney injury) (HCC)- (present on admission)   Assessment & Plan    Follow bmp        Hypokalemia   Assessment & Plan    Start Kdur, follow bmp, Mg and Ph        Dysphagia- (present on admission)   Assessment & Plan    Dysphagia 3  SLP to follow        Chronic deep vein thrombosis (DVT) of brachial vein (HCC)- (present on admission)   Assessment & Plan    Xarelto        Elevated troponin- (present on admission)   Assessment & Plan    Likely secondary to rhabdomyolysis, TENZIN          Elevated liver function tests- (present on admission)   Assessment & Plan    Follow cmp        Leukocytosis- (present on admission)   Assessment & Plan    Check CXR  follow cbc        Chronic atrial fibrillation (HCC)- (present on admission)   Assessment & Plan    Xarelto, Metoprolol          Essential hypertension- (present on admission)   Assessment & Plan    Metoprolol        Protein-calorie malnutrition, severe (HCC)- (present on admission)   Assessment & Plan    Nutrition consult        Multiple wounds of skin- (present on admission)   Assessment & Plan    Wound care        HIV (human immunodeficiency virus infection) (HCC)- (present on admission)   Assessment & Plan    Continue regimen        Macrocytic anemia- (present on admission)   Assessment & Plan    Follow cbc             VTE prophylaxis: Xarelto

## 2018-10-20 ENCOUNTER — APPOINTMENT (OUTPATIENT)
Dept: RADIOLOGY | Facility: MEDICAL CENTER | Age: 63
DRG: 064 | End: 2018-10-20
Attending: FAMILY MEDICINE
Payer: MEDICARE

## 2018-10-20 LAB
ALBUMIN SERPL BCP-MCNC: 3 G/DL (ref 3.2–4.9)
ALBUMIN/GLOB SERPL: 0.8 G/DL
ALP SERPL-CCNC: 74 U/L (ref 30–99)
ALT SERPL-CCNC: 31 U/L (ref 2–50)
ANION GAP SERPL CALC-SCNC: 8 MMOL/L (ref 0–11.9)
AST SERPL-CCNC: 23 U/L (ref 12–45)
BILIRUB SERPL-MCNC: 0.4 MG/DL (ref 0.1–1.5)
BUN SERPL-MCNC: 15 MG/DL (ref 8–22)
CALCIUM SERPL-MCNC: 9.7 MG/DL (ref 8.5–10.5)
CHLORIDE SERPL-SCNC: 104 MMOL/L (ref 96–112)
CK SERPL-CCNC: 261 U/L (ref 0–154)
CO2 SERPL-SCNC: 24 MMOL/L (ref 20–33)
CREAT SERPL-MCNC: 0.89 MG/DL (ref 0.5–1.4)
GLOBULIN SER CALC-MCNC: 3.6 G/DL (ref 1.9–3.5)
GLUCOSE SERPL-MCNC: 101 MG/DL (ref 65–99)
MAGNESIUM SERPL-MCNC: 2.1 MG/DL (ref 1.5–2.5)
PHOSPHATE SERPL-MCNC: 4.2 MG/DL (ref 2.5–4.5)
POTASSIUM SERPL-SCNC: 4.5 MMOL/L (ref 3.6–5.5)
PROT SERPL-MCNC: 6.6 G/DL (ref 6–8.2)
SODIUM SERPL-SCNC: 136 MMOL/L (ref 135–145)

## 2018-10-20 PROCEDURE — 700102 HCHG RX REV CODE 250 W/ 637 OVERRIDE(OP): Performed by: FAMILY MEDICINE

## 2018-10-20 PROCEDURE — A9270 NON-COVERED ITEM OR SERVICE: HCPCS | Performed by: HOSPITALIST

## 2018-10-20 PROCEDURE — 80053 COMPREHEN METABOLIC PANEL: CPT

## 2018-10-20 PROCEDURE — 700102 HCHG RX REV CODE 250 W/ 637 OVERRIDE(OP): Performed by: INTERNAL MEDICINE

## 2018-10-20 PROCEDURE — A9270 NON-COVERED ITEM OR SERVICE: HCPCS | Performed by: FAMILY MEDICINE

## 2018-10-20 PROCEDURE — 84100 ASSAY OF PHOSPHORUS: CPT

## 2018-10-20 PROCEDURE — 83735 ASSAY OF MAGNESIUM: CPT

## 2018-10-20 PROCEDURE — 770006 HCHG ROOM/CARE - MED/SURG/GYN SEMI*

## 2018-10-20 PROCEDURE — 700102 HCHG RX REV CODE 250 W/ 637 OVERRIDE(OP): Performed by: HOSPITALIST

## 2018-10-20 PROCEDURE — 99232 SBSQ HOSP IP/OBS MODERATE 35: CPT | Performed by: FAMILY MEDICINE

## 2018-10-20 PROCEDURE — 71045 X-RAY EXAM CHEST 1 VIEW: CPT

## 2018-10-20 PROCEDURE — 36415 COLL VENOUS BLD VENIPUNCTURE: CPT

## 2018-10-20 PROCEDURE — A9270 NON-COVERED ITEM OR SERVICE: HCPCS | Performed by: INTERNAL MEDICINE

## 2018-10-20 PROCEDURE — 82550 ASSAY OF CK (CPK): CPT

## 2018-10-20 RX ADMIN — RIVAROXABAN 20 MG: 20 TABLET, FILM COATED ORAL at 16:38

## 2018-10-20 RX ADMIN — LAMIVUDINE 300 MG: 150 TABLET, FILM COATED ORAL at 04:04

## 2018-10-20 RX ADMIN — METOPROLOL TARTRATE 25 MG: 25 TABLET, FILM COATED ORAL at 16:38

## 2018-10-20 RX ADMIN — TRAMADOL HYDROCHLORIDE 50 MG: 50 TABLET, FILM COATED ORAL at 16:38

## 2018-10-20 RX ADMIN — ABACAVIR 600 MG: 300 TABLET, FILM COATED ORAL at 04:04

## 2018-10-20 RX ADMIN — ACETAMINOPHEN 650 MG: 325 TABLET, FILM COATED ORAL at 22:09

## 2018-10-20 RX ADMIN — FLUOXETINE HYDROCHLORIDE 20 MG: 20 CAPSULE ORAL at 04:04

## 2018-10-20 RX ADMIN — DOLUTEGRAVIR SODIUM 50 MG: 50 TABLET, FILM COATED ORAL at 04:04

## 2018-10-20 RX ADMIN — MICONAZOLE NITRATE: 20 CREAM TOPICAL at 18:00

## 2018-10-20 RX ADMIN — MICONAZOLE NITRATE: 20 CREAM TOPICAL at 04:03

## 2018-10-20 RX ADMIN — CLONAZEPAM 1 MG: 1 TABLET ORAL at 16:39

## 2018-10-20 RX ADMIN — ZOLPIDEM TARTRATE 10 MG: 5 TABLET ORAL at 22:09

## 2018-10-20 RX ADMIN — GABAPENTIN 300 MG: 300 CAPSULE ORAL at 16:38

## 2018-10-20 RX ADMIN — POTASSIUM CHLORIDE 20 MEQ: 1500 TABLET, EXTENDED RELEASE ORAL at 04:05

## 2018-10-20 RX ADMIN — CLONAZEPAM 1 MG: 1 TABLET ORAL at 04:05

## 2018-10-20 RX ADMIN — GABAPENTIN 300 MG: 300 CAPSULE ORAL at 04:04

## 2018-10-20 RX ADMIN — OMEPRAZOLE 20 MG: 20 CAPSULE, DELAYED RELEASE ORAL at 04:04

## 2018-10-20 RX ADMIN — METOPROLOL TARTRATE 25 MG: 25 TABLET, FILM COATED ORAL at 04:05

## 2018-10-20 ASSESSMENT — ENCOUNTER SYMPTOMS
ABDOMINAL PAIN: 0
BLURRED VISION: 0
WHEEZING: 0
COUGH: 0
HEARTBURN: 0
DIZZINESS: 0
FOCAL WEAKNESS: 1
NAUSEA: 0
FEVER: 0
CHILLS: 0
BACK PAIN: 0
DIARRHEA: 0
NECK PAIN: 0
WEAKNESS: 1
SORE THROAT: 0
VOMITING: 0
HEADACHES: 0
NERVOUS/ANXIOUS: 0
SHORTNESS OF BREATH: 0

## 2018-10-20 ASSESSMENT — PAIN SCALES - GENERAL
PAINLEVEL_OUTOF10: 0
PAINLEVEL_OUTOF10: 5

## 2018-10-20 NOTE — CARE PLAN
Problem: Safety  Goal: Will remain free from falls  Outcome: PROGRESSING AS EXPECTED  Patient free of falls and fall precautions in place.    Problem: Knowledge Deficit  Goal: Knowledge of the prescribed therapeutic regimen will improve  Outcome: PROGRESSING AS EXPECTED  Patient verbalize understanding of illness process and POC discussed.

## 2018-10-20 NOTE — PROGRESS NOTES
2 RN skin check done;    Face dry with 3 small scabs and peeling skin, scab or mole- unsure of which noted to right ear. Scattered scabs on forearms, legs and hands noted. Most evidently right thumb, left hand 5th finger. Eschar noted to left elbow, scabs to right elbow, scabs to bilateral knees, right outer and inner foot and left foot all with dressings in place, Patients sacrum/coccyx red and excoriated with clean mapilex in place, bilateral hip dressing to hip wounds noted and changed.

## 2018-10-20 NOTE — PROGRESS NOTES
Hospital Medicine Daily Progress Note    Date of Service  10/20/2018    Chief Complaint  63 y.o. male admitted 10/8/2018 with Stroke    Hospital Course  Admitted with stroke, rhabdomyolysis, acute kidney injury, known history of chronic atrial fibrillation, Eliquis was held for scheduled right ankle surgery    Interval Problem Update  Stroke - persistent left-sided weakness  A. Fib - currently sinus rhythm  Hypertension - controlled  Rhabdomyolysis -CPK 200s  TENZIN - creatinine has trended down  Leukocytosis - T-max 99.2    Consultants/Specialty  Neurology signed off  Critical care signed off    Code Status  Full    Disposition  Rehab vs SNF    Review of Systems  Review of Systems   Constitutional: Positive for malaise/fatigue. Negative for chills and fever.   HENT: Negative for hearing loss and sore throat.    Eyes: Negative for blurred vision.   Respiratory: Negative for cough, shortness of breath and wheezing.    Cardiovascular: Negative for chest pain and leg swelling.   Gastrointestinal: Negative for abdominal pain, diarrhea, heartburn, nausea and vomiting.   Genitourinary: Negative for dysuria.   Musculoskeletal: Negative for back pain and neck pain.   Skin: Negative for rash.   Neurological: Positive for focal weakness and weakness. Negative for dizziness and headaches.   Psychiatric/Behavioral: The patient is not nervous/anxious.         Physical Exam  Temp:  [36.9 °C (98.5 °F)-37.3 °C (99.2 °F)] 36.9 °C (98.5 °F)  Pulse:  [73-76] 76  Resp:  [16-20] 20  BP: (118-138)/(65-80) 135/66    Physical Exam   Constitutional: He is oriented to person, place, and time. He appears well-developed.   HENT:   Head: Normocephalic and atraumatic.   Eyes: Pupils are equal, round, and reactive to light. Conjunctivae are normal.   Neck: No tracheal deviation present. No thyromegaly present.   Cardiovascular: Normal rate and regular rhythm.    Pulmonary/Chest: Effort normal and breath sounds normal.   Abdominal: Soft. Bowel sounds  are normal. He exhibits no distension. There is no tenderness.   Musculoskeletal: He exhibits no edema.   Lymphadenopathy:     He has no cervical adenopathy.   Neurological: He is alert and oriented to person, place, and time.   Shallow left nasolabial fold  RUE 4+.5, RLE 3-4/5, LUE 0-1/5, LLE 3/5   Skin: Skin is warm and dry.   Nursing note and vitals reviewed.      Fluids    Intake/Output Summary (Last 24 hours) at 10/20/18 1249  Last data filed at 10/20/18 0900   Gross per 24 hour   Intake              300 ml   Output             2000 ml   Net            -1700 ml       Laboratory  Recent Labs      10/19/18   0905   WBC  14.6*   RBC  3.19*   HEMOGLOBIN  10.3*   HEMATOCRIT  31.3*   MCV  98.1*   MCH  32.3   MCHC  32.9*   RDW  46.5   PLATELETCT  477*   MPV  8.3*     Recent Labs      10/19/18   0905  10/20/18   0846   SODIUM  135  136   POTASSIUM  3.5*  4.5   CHLORIDE  102  104   CO2  26  24   GLUCOSE  139*  101*   BUN  13  15   CREATININE  0.80  0.89   CALCIUM  9.4  9.7                   Imaging  US-EXTREMITY VENOUS UPPER UNILAT LEFT   Final Result      DX-ELBOW-COMPLETE 3+ LEFT   Final Result      No evidence of fracture or dislocation.      DX-CHEST-LIMITED (1 VIEW)   Final Result         Patchy bibasilar opacities, likely atelectasis.      US-CAROTID DOPPLER BILAT   Final Result      EC-ECHOCARDIOGRAM COMPLETE W/O CONT   Final Result      MR-BRAIN-W/O   Final Result      1.  Punctate infarcts in the left posterior frontal and right posterior frontal cortices.   2.  Scattered small sized acute infarcts in the left parietal cortex and left parietal deep white matter.   3.  Chronic bilateral cerebellar infarcts.   4.  Chronic left centrum semiovale lacunar infarct.   5.  Moderate diffuse cerebral substance loss.   6.  Mild microangiopathic ischemic change versus demyelination or gliosis.      CT-HEAD W/O   Final Result      1.  Cerebral atrophy.      2.  White matter lucencies most consistent with small vessel  ischemic change versus demyelination or gliosis.      3.  Otherwise, Head CT without contrast with no acute findings. No evidence of acute cerebral  hemorrhage or mass lesion.      DX-CHEST-PORTABLE (1 VIEW)   Final Result      Lung base atelectasis.      DX-CHEST-PORTABLE (1 VIEW)    (Results Pending)        Assessment/Plan  * Cerebrovascular accident (CVA) due to embolism (HCC)- (present on admission)   Assessment & Plan    Xarelto  PT/OT, SLP  Start Lipitor once CPK has trended down        Rhabdomyolysis- (present on admission)   Assessment & Plan    Follow cpk        TENZIN (acute kidney injury) (HCC)- (present on admission)   Assessment & Plan    Follow bmp        Hypokalemia   Assessment & Plan    Stop Kdur, follow bmp        Dysphagia- (present on admission)   Assessment & Plan    Dysphagia 3  SLP to follow        Chronic deep vein thrombosis (DVT) of brachial vein (HCC)- (present on admission)   Assessment & Plan    Xarelto        Elevated troponin- (present on admission)   Assessment & Plan    Likely secondary to rhabdomyolysis, TENZIN          Elevated liver function tests- (present on admission)   Assessment & Plan    Follow cmp        Leukocytosis- (present on admission)   Assessment & Plan    CXR pending  follow cbc        Chronic atrial fibrillation (HCC)- (present on admission)   Assessment & Plan    Xarelto, Metoprolol          Essential hypertension- (present on admission)   Assessment & Plan    Metoprolol        Protein-calorie malnutrition, severe (HCC)- (present on admission)   Assessment & Plan    Nutrition consult        Multiple wounds of skin- (present on admission)   Assessment & Plan    Wound care        HIV (human immunodeficiency virus infection) (HCC)- (present on admission)   Assessment & Plan    Continue regimen        Macrocytic anemia- (present on admission)   Assessment & Plan    Follow cbc             VTE prophylaxis: Xarelto

## 2018-10-20 NOTE — PROGRESS NOTES
2 RN skin check done.face dry with 3 small scabs & peeling skin,scab to right ear,upper lower lobe,scab noted to right thumb,left hand 5th finger has small scab,eschar noted to left elbow,sacbs to right elbow,scabs to mare knees,right outer and inner foot with dressing in place,left foot with dressing,sacrum/coccyx red and excoriated ,clean mapilex in place,bilateral hip dressing dry & intact.

## 2018-10-20 NOTE — PROGRESS NOTES
Aaox4.pleasant.shoulder and back pain medicated with tramadol.turn and reposition every 2 hours.patient able to sleep.continuing hourly rounding.

## 2018-10-21 LAB
ALBUMIN SERPL BCP-MCNC: 3 G/DL (ref 3.2–4.9)
ALBUMIN/GLOB SERPL: 1 G/DL
ALP SERPL-CCNC: 74 U/L (ref 30–99)
ALT SERPL-CCNC: 26 U/L (ref 2–50)
ANION GAP SERPL CALC-SCNC: 7 MMOL/L (ref 0–11.9)
AST SERPL-CCNC: 18 U/L (ref 12–45)
BASOPHILS # BLD AUTO: 0.4 % (ref 0–1.8)
BASOPHILS # BLD: 0.04 K/UL (ref 0–0.12)
BILIRUB SERPL-MCNC: 0.3 MG/DL (ref 0.1–1.5)
BUN SERPL-MCNC: 17 MG/DL (ref 8–22)
CALCIUM SERPL-MCNC: 9.4 MG/DL (ref 8.5–10.5)
CHLORIDE SERPL-SCNC: 103 MMOL/L (ref 96–112)
CO2 SERPL-SCNC: 25 MMOL/L (ref 20–33)
CREAT SERPL-MCNC: 0.79 MG/DL (ref 0.5–1.4)
EOSINOPHIL # BLD AUTO: 0.07 K/UL (ref 0–0.51)
EOSINOPHIL NFR BLD: 0.6 % (ref 0–6.9)
ERYTHROCYTE [DISTWIDTH] IN BLOOD BY AUTOMATED COUNT: 46.4 FL (ref 35.9–50)
GLOBULIN SER CALC-MCNC: 3.1 G/DL (ref 1.9–3.5)
GLUCOSE SERPL-MCNC: 102 MG/DL (ref 65–99)
HCT VFR BLD AUTO: 31.2 % (ref 42–52)
HGB BLD-MCNC: 10.5 G/DL (ref 14–18)
IMM GRANULOCYTES # BLD AUTO: 0.08 K/UL (ref 0–0.11)
IMM GRANULOCYTES NFR BLD AUTO: 0.7 % (ref 0–0.9)
LYMPHOCYTES # BLD AUTO: 1.61 K/UL (ref 1–4.8)
LYMPHOCYTES NFR BLD: 14.5 % (ref 22–41)
MCH RBC QN AUTO: 33.1 PG (ref 27–33)
MCHC RBC AUTO-ENTMCNC: 33.7 G/DL (ref 33.7–35.3)
MCV RBC AUTO: 98.4 FL (ref 81.4–97.8)
MONOCYTES # BLD AUTO: 1.26 K/UL (ref 0–0.85)
MONOCYTES NFR BLD AUTO: 11.4 % (ref 0–13.4)
NEUTROPHILS # BLD AUTO: 8.04 K/UL (ref 1.82–7.42)
NEUTROPHILS NFR BLD: 72.4 % (ref 44–72)
NRBC # BLD AUTO: 0 K/UL
NRBC BLD-RTO: 0 /100 WBC
PLATELET # BLD AUTO: 491 K/UL (ref 164–446)
PMV BLD AUTO: 8.6 FL (ref 9–12.9)
POTASSIUM SERPL-SCNC: 3.5 MMOL/L (ref 3.6–5.5)
PROT SERPL-MCNC: 6.1 G/DL (ref 6–8.2)
RBC # BLD AUTO: 3.17 M/UL (ref 4.7–6.1)
SODIUM SERPL-SCNC: 135 MMOL/L (ref 135–145)
WBC # BLD AUTO: 11.1 K/UL (ref 4.8–10.8)

## 2018-10-21 PROCEDURE — 80053 COMPREHEN METABOLIC PANEL: CPT

## 2018-10-21 PROCEDURE — 99232 SBSQ HOSP IP/OBS MODERATE 35: CPT | Performed by: FAMILY MEDICINE

## 2018-10-21 PROCEDURE — A9270 NON-COVERED ITEM OR SERVICE: HCPCS | Performed by: INTERNAL MEDICINE

## 2018-10-21 PROCEDURE — 700102 HCHG RX REV CODE 250 W/ 637 OVERRIDE(OP): Performed by: INTERNAL MEDICINE

## 2018-10-21 PROCEDURE — A9270 NON-COVERED ITEM OR SERVICE: HCPCS | Performed by: HOSPITALIST

## 2018-10-21 PROCEDURE — 85025 COMPLETE CBC W/AUTO DIFF WBC: CPT

## 2018-10-21 PROCEDURE — 700102 HCHG RX REV CODE 250 W/ 637 OVERRIDE(OP): Performed by: HOSPITALIST

## 2018-10-21 PROCEDURE — 700102 HCHG RX REV CODE 250 W/ 637 OVERRIDE(OP): Performed by: FAMILY MEDICINE

## 2018-10-21 PROCEDURE — A9270 NON-COVERED ITEM OR SERVICE: HCPCS | Performed by: FAMILY MEDICINE

## 2018-10-21 PROCEDURE — 36415 COLL VENOUS BLD VENIPUNCTURE: CPT

## 2018-10-21 PROCEDURE — 770006 HCHG ROOM/CARE - MED/SURG/GYN SEMI*

## 2018-10-21 RX ORDER — OXYCODONE HYDROCHLORIDE 5 MG/1
5-10 TABLET ORAL EVERY 4 HOURS PRN
Status: DISCONTINUED | OUTPATIENT
Start: 2018-10-21 | End: 2018-10-28 | Stop reason: HOSPADM

## 2018-10-21 RX ORDER — POTASSIUM CHLORIDE 20 MEQ/1
20 TABLET, EXTENDED RELEASE ORAL DAILY
Status: DISCONTINUED | OUTPATIENT
Start: 2018-10-21 | End: 2018-10-28 | Stop reason: HOSPADM

## 2018-10-21 RX ADMIN — ALPRAZOLAM 1 MG: 0.5 TABLET ORAL at 23:19

## 2018-10-21 RX ADMIN — ACETAMINOPHEN 650 MG: 325 TABLET, FILM COATED ORAL at 18:35

## 2018-10-21 RX ADMIN — ZOLPIDEM TARTRATE 10 MG: 5 TABLET ORAL at 20:59

## 2018-10-21 RX ADMIN — LAMIVUDINE 300 MG: 150 TABLET, FILM COATED ORAL at 04:48

## 2018-10-21 RX ADMIN — MICONAZOLE NITRATE: 20 CREAM TOPICAL at 17:12

## 2018-10-21 RX ADMIN — METOPROLOL TARTRATE 25 MG: 25 TABLET, FILM COATED ORAL at 04:49

## 2018-10-21 RX ADMIN — GABAPENTIN 300 MG: 300 CAPSULE ORAL at 04:49

## 2018-10-21 RX ADMIN — OMEPRAZOLE 20 MG: 20 CAPSULE, DELAYED RELEASE ORAL at 04:49

## 2018-10-21 RX ADMIN — MICONAZOLE NITRATE: 20 CREAM TOPICAL at 04:49

## 2018-10-21 RX ADMIN — TRAMADOL HYDROCHLORIDE 50 MG: 50 TABLET, FILM COATED ORAL at 02:53

## 2018-10-21 RX ADMIN — CLONAZEPAM 1 MG: 1 TABLET ORAL at 17:07

## 2018-10-21 RX ADMIN — TRAMADOL HYDROCHLORIDE 50 MG: 50 TABLET, FILM COATED ORAL at 23:19

## 2018-10-21 RX ADMIN — FLUOXETINE HYDROCHLORIDE 20 MG: 20 CAPSULE ORAL at 04:49

## 2018-10-21 RX ADMIN — TRAMADOL HYDROCHLORIDE 50 MG: 50 TABLET, FILM COATED ORAL at 11:55

## 2018-10-21 RX ADMIN — GABAPENTIN 300 MG: 300 CAPSULE ORAL at 17:07

## 2018-10-21 RX ADMIN — METOPROLOL TARTRATE 25 MG: 25 TABLET, FILM COATED ORAL at 17:06

## 2018-10-21 RX ADMIN — POTASSIUM CHLORIDE 20 MEQ: 1500 TABLET, EXTENDED RELEASE ORAL at 13:51

## 2018-10-21 RX ADMIN — OXYCODONE HYDROCHLORIDE 10 MG: 5 TABLET ORAL at 13:51

## 2018-10-21 RX ADMIN — ACETAMINOPHEN 650 MG: 325 TABLET, FILM COATED ORAL at 04:49

## 2018-10-21 RX ADMIN — ABACAVIR 600 MG: 300 TABLET, FILM COATED ORAL at 04:49

## 2018-10-21 RX ADMIN — DOLUTEGRAVIR SODIUM 50 MG: 50 TABLET, FILM COATED ORAL at 04:48

## 2018-10-21 RX ADMIN — CLONAZEPAM 1 MG: 1 TABLET ORAL at 04:49

## 2018-10-21 RX ADMIN — RIVAROXABAN 20 MG: 20 TABLET, FILM COATED ORAL at 17:07

## 2018-10-21 ASSESSMENT — PAIN SCALES - GENERAL
PAINLEVEL_OUTOF10: 7
PAINLEVEL_OUTOF10: 7
PAINLEVEL_OUTOF10: 9
PAINLEVEL_OUTOF10: 7
PAINLEVEL_OUTOF10: 9
PAINLEVEL_OUTOF10: 5
PAINLEVEL_OUTOF10: 0
PAINLEVEL_OUTOF10: 9
PAINLEVEL_OUTOF10: 4
PAINLEVEL_OUTOF10: 5
PAINLEVEL_OUTOF10: 0

## 2018-10-21 ASSESSMENT — ENCOUNTER SYMPTOMS
HEARTBURN: 0
HEADACHES: 0
BACK PAIN: 0
CHILLS: 0
NAUSEA: 0
BLURRED VISION: 0
VOMITING: 0
NECK PAIN: 0
DIZZINESS: 0
FEVER: 0
WHEEZING: 0
COUGH: 0
FOCAL WEAKNESS: 1
NERVOUS/ANXIOUS: 0
WEAKNESS: 1
ABDOMINAL PAIN: 0
SORE THROAT: 0
SHORTNESS OF BREATH: 0
DIARRHEA: 0

## 2018-10-21 NOTE — PROGRESS NOTES
2 RN skin check. Scabs to bilateral elbows. DTI to bilateral ankles. DTI to R hip. Excoriated sacrum. Mepilex and foam to DTI. Scabs on bilateral ears.

## 2018-10-21 NOTE — PROGRESS NOTES
Pt is A&Ox4, lethargic with decreased energy. No changes in neuro status. VSS. Medication given for pain as charted, pt verbalizes relief. Potassium replaced as charted. Repositioned. R Hip Dressing changed. Tolerating diet, fed with assist. Incontinent of bowel and bladder. Stable in room with call light in reach and bed alarm in use.

## 2018-10-21 NOTE — PROGRESS NOTES
Pt A&Ox4 and vitals signs stable. Pt having pain during shift and was medicated per eMAR. Attempted to turn patient Q2 but is refusing at times. Pt educated on importance of turns and how he is at high risk for skin breakdown. Pt has condom cath on. No BM during shift.

## 2018-10-21 NOTE — PROGRESS NOTES
Hospital Medicine Daily Progress Note    Date of Service  10/21/2018    Chief Complaint  63 y.o. male admitted 10/8/2018 with Stroke    Hospital Course  Admitted with stroke, rhabdomyolysis, acute kidney injury, known history of chronic atrial fibrillation, Eliquis was held for scheduled right ankle surgery    Interval Problem Update  Stroke - persistent left-sided weakness  A. Fib - currently sinus rhythm  Hypertension - controlled  Rhabdomyolysis -CPK 200s  TENZIN - creatinine has trended down  Leukocytosis - T-max 98, CXR decreasing infiltrate    Consultants/Specialty  Neurology signed off  Critical care signed off    Code Status  Full    Disposition  Rehab vs SNF    Review of Systems  Review of Systems   Constitutional: Positive for malaise/fatigue. Negative for chills and fever.   HENT: Negative for hearing loss and sore throat.    Eyes: Negative for blurred vision.   Respiratory: Negative for cough, shortness of breath and wheezing.    Cardiovascular: Negative for chest pain and leg swelling.   Gastrointestinal: Negative for abdominal pain, diarrhea, heartburn, nausea and vomiting.   Genitourinary: Negative for dysuria.   Musculoskeletal: Negative for back pain and neck pain.   Skin: Negative for rash.   Neurological: Positive for focal weakness and weakness. Negative for dizziness and headaches.   Psychiatric/Behavioral: The patient is not nervous/anxious.         Physical Exam  Temp:  [36.3 °C (97.3 °F)-37.1 °C (98.8 °F)] 36.4 °C (97.5 °F)  Pulse:  [63-76] 63  Resp:  [16-20] 16  BP: (120-131)/(72-78) 125/77    Physical Exam   Constitutional: He is oriented to person, place, and time. He appears well-developed.   HENT:   Head: Normocephalic and atraumatic.   Eyes: Pupils are equal, round, and reactive to light. Conjunctivae are normal.   Neck: No tracheal deviation present. No thyromegaly present.   Cardiovascular: Normal rate and regular rhythm.    Pulmonary/Chest: Effort normal and breath sounds normal.    Abdominal: Soft. Bowel sounds are normal. He exhibits no distension. There is no tenderness.   Musculoskeletal: He exhibits no edema.   Lymphadenopathy:     He has no cervical adenopathy.   Neurological: He is alert and oriented to person, place, and time.   Shallow left nasolabial fold  RUE 4+.5, RLE 3-4/5, LUE 0-1/5, LLE 3/5   Skin: Skin is warm and dry.   Nursing note and vitals reviewed.      Fluids    Intake/Output Summary (Last 24 hours) at 10/21/18 1335  Last data filed at 10/21/18 0446   Gross per 24 hour   Intake                0 ml   Output             2100 ml   Net            -2100 ml       Laboratory  Recent Labs      10/19/18   0905  10/21/18   0802   WBC  14.6*  11.1*   RBC  3.19*  3.17*   HEMOGLOBIN  10.3*  10.5*   HEMATOCRIT  31.3*  31.2*   MCV  98.1*  98.4*   MCH  32.3  33.1*   MCHC  32.9*  33.7   RDW  46.5  46.4   PLATELETCT  477*  491*   MPV  8.3*  8.6*     Recent Labs      10/19/18   0905  10/20/18   0846  10/21/18   0803   SODIUM  135  136  135   POTASSIUM  3.5*  4.5  3.5*   CHLORIDE  102  104  103   CO2  26  24  25   GLUCOSE  139*  101*  102*   BUN  13  15  17   CREATININE  0.80  0.89  0.79   CALCIUM  9.4  9.7  9.4                   Imaging  DX-CHEST-PORTABLE (1 VIEW)   Final Result         1.  Hazy right infrahilar density, decreased since prior, appearance compatible with improving infiltrate.   2.  Atherosclerosis      US-EXTREMITY VENOUS UPPER UNILAT LEFT   Final Result      DX-ELBOW-COMPLETE 3+ LEFT   Final Result      No evidence of fracture or dislocation.      DX-CHEST-LIMITED (1 VIEW)   Final Result         Patchy bibasilar opacities, likely atelectasis.      US-CAROTID DOPPLER BILAT   Final Result      EC-ECHOCARDIOGRAM COMPLETE W/O CONT   Final Result      MR-BRAIN-W/O   Final Result      1.  Punctate infarcts in the left posterior frontal and right posterior frontal cortices.   2.  Scattered small sized acute infarcts in the left parietal cortex and left parietal deep white  matter.   3.  Chronic bilateral cerebellar infarcts.   4.  Chronic left centrum semiovale lacunar infarct.   5.  Moderate diffuse cerebral substance loss.   6.  Mild microangiopathic ischemic change versus demyelination or gliosis.      CT-HEAD W/O   Final Result      1.  Cerebral atrophy.      2.  White matter lucencies most consistent with small vessel ischemic change versus demyelination or gliosis.      3.  Otherwise, Head CT without contrast with no acute findings. No evidence of acute cerebral  hemorrhage or mass lesion.      DX-CHEST-PORTABLE (1 VIEW)   Final Result      Lung base atelectasis.           Assessment/Plan  * Cerebrovascular accident (CVA) due to embolism (HCC)- (present on admission)   Assessment & Plan    Xarelto  PT/OT, SLP  Start Lipitor once CPK has trended down        Rhabdomyolysis- (present on admission)   Assessment & Plan    Follow cpk        TENZIN (acute kidney injury) (HCC)- (present on admission)   Assessment & Plan    Follow bmp        Hypokalemia   Assessment & Plan    start Kdur, follow bmp        Dysphagia- (present on admission)   Assessment & Plan    Dysphagia 3  SLP to follow        Chronic deep vein thrombosis (DVT) of brachial vein (HCC)- (present on admission)   Assessment & Plan    Xarelto        Elevated troponin- (present on admission)   Assessment & Plan    Likely secondary to rhabdomyolysis, TENZIN          Elevated liver function tests- (present on admission)   Assessment & Plan    Follow cmp        Leukocytosis- (present on admission)   Assessment & Plan    CXR pending  follow cbc        Chronic atrial fibrillation (HCC)- (present on admission)   Assessment & Plan    Xarelto, Metoprolol          Essential hypertension- (present on admission)   Assessment & Plan    Metoprolol        Protein-calorie malnutrition, severe (HCC)- (present on admission)   Assessment & Plan    Nutrition consult        Multiple wounds of skin- (present on admission)   Assessment & Plan    Wound  care        HIV (human immunodeficiency virus infection) (HCC)- (present on admission)   Assessment & Plan    Continue regimen        Macrocytic anemia- (present on admission)   Assessment & Plan    Follow cbc             VTE prophylaxis: Xarelto

## 2018-10-21 NOTE — PROGRESS NOTES
2 RN skin check completed. Scabs noted to face and R ear. Right thumb and left hand 5th finger has small scab, scabs on elbows with eschar to left elbow, scabs to bilateral knees. Dressing intact on right outer and inner foot. Left foot with dressing,sacrum/coccyx red and excoriated. Mepliex intact on coccyx and bilateral hips.

## 2018-10-21 NOTE — CARE PLAN
Problem: Pain Management  Goal: Pain level will decrease to patient's comfort goal  Outcome: PROGRESSING AS EXPECTED  Pt assessed for pain per policy. Pt given Tylenol once during shift per eMAR. Pt resting comfortably most of shift. Repositioned for comfort. Lotion applied to back.     Problem: Skin Integrity  Goal: Risk for impaired skin integrity will decrease  Outcome: PROGRESSING AS EXPECTED  Pt skin is assessed per policy. Pt skin's kept clean and dry with use of condom cath. Pt turned Q2 and is refusing at times. Pt educated on risk of breakdown.

## 2018-10-22 LAB
ALBUMIN SERPL BCP-MCNC: 3.1 G/DL (ref 3.2–4.9)
ALBUMIN/GLOB SERPL: 0.9 G/DL
ALP SERPL-CCNC: 70 U/L (ref 30–99)
ALT SERPL-CCNC: 25 U/L (ref 2–50)
ANION GAP SERPL CALC-SCNC: 7 MMOL/L (ref 0–11.9)
AST SERPL-CCNC: 16 U/L (ref 12–45)
BILIRUB SERPL-MCNC: 0.4 MG/DL (ref 0.1–1.5)
BUN SERPL-MCNC: 17 MG/DL (ref 8–22)
CALCIUM SERPL-MCNC: 9.7 MG/DL (ref 8.5–10.5)
CHLORIDE SERPL-SCNC: 102 MMOL/L (ref 96–112)
CK SERPL-CCNC: 221 U/L (ref 0–154)
CO2 SERPL-SCNC: 26 MMOL/L (ref 20–33)
CREAT SERPL-MCNC: 0.85 MG/DL (ref 0.5–1.4)
GLOBULIN SER CALC-MCNC: 3.3 G/DL (ref 1.9–3.5)
GLUCOSE SERPL-MCNC: 89 MG/DL (ref 65–99)
POTASSIUM SERPL-SCNC: 4 MMOL/L (ref 3.6–5.5)
PROT SERPL-MCNC: 6.4 G/DL (ref 6–8.2)
SODIUM SERPL-SCNC: 135 MMOL/L (ref 135–145)

## 2018-10-22 PROCEDURE — 700102 HCHG RX REV CODE 250 W/ 637 OVERRIDE(OP): Performed by: HOSPITALIST

## 2018-10-22 PROCEDURE — 97535 SELF CARE MNGMENT TRAINING: CPT

## 2018-10-22 PROCEDURE — 97530 THERAPEUTIC ACTIVITIES: CPT

## 2018-10-22 PROCEDURE — A9270 NON-COVERED ITEM OR SERVICE: HCPCS | Performed by: HOSPITALIST

## 2018-10-22 PROCEDURE — 770006 HCHG ROOM/CARE - MED/SURG/GYN SEMI*

## 2018-10-22 PROCEDURE — 36415 COLL VENOUS BLD VENIPUNCTURE: CPT

## 2018-10-22 PROCEDURE — 82550 ASSAY OF CK (CPK): CPT

## 2018-10-22 PROCEDURE — A9270 NON-COVERED ITEM OR SERVICE: HCPCS | Performed by: FAMILY MEDICINE

## 2018-10-22 PROCEDURE — 99232 SBSQ HOSP IP/OBS MODERATE 35: CPT | Performed by: FAMILY MEDICINE

## 2018-10-22 PROCEDURE — 700102 HCHG RX REV CODE 250 W/ 637 OVERRIDE(OP): Performed by: FAMILY MEDICINE

## 2018-10-22 PROCEDURE — 80053 COMPREHEN METABOLIC PANEL: CPT

## 2018-10-22 RX ORDER — ATORVASTATIN CALCIUM 40 MG/1
40 TABLET, FILM COATED ORAL
Status: DISCONTINUED | OUTPATIENT
Start: 2018-10-22 | End: 2018-10-28 | Stop reason: HOSPADM

## 2018-10-22 RX ADMIN — RIVAROXABAN 20 MG: 20 TABLET, FILM COATED ORAL at 17:14

## 2018-10-22 RX ADMIN — MICONAZOLE NITRATE: 20 CREAM TOPICAL at 06:06

## 2018-10-22 RX ADMIN — OXYCODONE HYDROCHLORIDE 10 MG: 5 TABLET ORAL at 06:09

## 2018-10-22 RX ADMIN — GABAPENTIN 300 MG: 300 CAPSULE ORAL at 18:00

## 2018-10-22 RX ADMIN — LAMIVUDINE 300 MG: 150 TABLET, FILM COATED ORAL at 06:05

## 2018-10-22 RX ADMIN — METOPROLOL TARTRATE 25 MG: 25 TABLET, FILM COATED ORAL at 17:13

## 2018-10-22 RX ADMIN — CLONAZEPAM 1 MG: 1 TABLET ORAL at 16:17

## 2018-10-22 RX ADMIN — OXYCODONE HYDROCHLORIDE 10 MG: 5 TABLET ORAL at 21:57

## 2018-10-22 RX ADMIN — ABACAVIR 600 MG: 300 TABLET, FILM COATED ORAL at 06:05

## 2018-10-22 RX ADMIN — FLUOXETINE HYDROCHLORIDE 20 MG: 20 CAPSULE ORAL at 06:04

## 2018-10-22 RX ADMIN — GABAPENTIN 300 MG: 300 CAPSULE ORAL at 06:04

## 2018-10-22 RX ADMIN — CLONAZEPAM 1 MG: 1 TABLET ORAL at 06:04

## 2018-10-22 RX ADMIN — ATORVASTATIN CALCIUM 40 MG: 40 TABLET, FILM COATED ORAL at 20:12

## 2018-10-22 RX ADMIN — OXYCODONE HYDROCHLORIDE 10 MG: 5 TABLET ORAL at 00:54

## 2018-10-22 RX ADMIN — OMEPRAZOLE 20 MG: 20 CAPSULE, DELAYED RELEASE ORAL at 06:05

## 2018-10-22 RX ADMIN — OXYCODONE HYDROCHLORIDE 5 MG: 5 TABLET ORAL at 16:17

## 2018-10-22 RX ADMIN — METOPROLOL TARTRATE 25 MG: 25 TABLET, FILM COATED ORAL at 06:04

## 2018-10-22 RX ADMIN — POTASSIUM CHLORIDE 20 MEQ: 1500 TABLET, EXTENDED RELEASE ORAL at 06:05

## 2018-10-22 RX ADMIN — MICONAZOLE NITRATE: 20 CREAM TOPICAL at 17:14

## 2018-10-22 RX ADMIN — DOLUTEGRAVIR SODIUM 50 MG: 50 TABLET, FILM COATED ORAL at 06:05

## 2018-10-22 ASSESSMENT — ENCOUNTER SYMPTOMS
NECK PAIN: 0
FEVER: 0
ABDOMINAL PAIN: 0
SORE THROAT: 0
NERVOUS/ANXIOUS: 0
WHEEZING: 0
WEAKNESS: 1
FOCAL WEAKNESS: 1
BLURRED VISION: 0
NAUSEA: 0
COUGH: 0
HEARTBURN: 0
HEADACHES: 0
CHILLS: 0
DIARRHEA: 0
BACK PAIN: 0
SHORTNESS OF BREATH: 0
VOMITING: 0
DIZZINESS: 0

## 2018-10-22 ASSESSMENT — COGNITIVE AND FUNCTIONAL STATUS - GENERAL
PERSONAL GROOMING: A LITTLE
HELP NEEDED FOR BATHING: A LOT
DAILY ACTIVITIY SCORE: 13
DRESSING REGULAR LOWER BODY CLOTHING: A LOT
DRESSING REGULAR UPPER BODY CLOTHING: A LOT
TOILETING: TOTAL
EATING MEALS: A LITTLE
SUGGESTED CMS G CODE MODIFIER DAILY ACTIVITY: CL

## 2018-10-22 ASSESSMENT — PAIN SCALES - GENERAL
PAINLEVEL_OUTOF10: 6
PAINLEVEL_OUTOF10: 2
PAINLEVEL_OUTOF10: 3
PAINLEVEL_OUTOF10: 5
PAINLEVEL_OUTOF10: 2
PAINLEVEL_OUTOF10: 5
PAINLEVEL_OUTOF10: 3
PAINLEVEL_OUTOF10: 8
PAINLEVEL_OUTOF10: 1
PAINLEVEL_OUTOF10: 5
PAINLEVEL_OUTOF10: 3

## 2018-10-22 NOTE — PROGRESS NOTES
2 RN skin check completed:  Scattered scabs on the face, ears, torso and all four extremities. All intact. Dressing intact on right outer and inner foot. Left foot with dressing,sacrum/coccyx red and excoriated. Mepliex intact on coccyx and bilateral hips.

## 2018-10-22 NOTE — PROGRESS NOTES
Pt with complaint of HA after taking oxycodone. No changes in vision or neuro status. Tylenol given, will continue to monitor.

## 2018-10-22 NOTE — DISCHARGE PLANNING
Agency/Facility Name: [facility]    Outcome: Left message for return call, on status of beds.

## 2018-10-22 NOTE — PROGRESS NOTES
Hospital Medicine Daily Progress Note    Date of Service  10/22/2018    Chief Complaint  63 y.o. male admitted 10/8/2018 with Stroke    Hospital Course  Admitted with stroke, rhabdomyolysis, acute kidney injury, known history of chronic atrial fibrillation, Eliquis was held for scheduled right ankle surgery    Interval Problem Update  Stroke - persistent left-sided weakness  A. Fib - currently sinus rhythm  Hypertension - controlled  Rhabdomyolysis - CPK 200s  TENZIN - creatinine has trended down  Leukocytosis - T-max 98.5    Consultants/Specialty  Neurology signed off  Critical care signed off    Code Status  Full    Disposition  Rehab vs SNF    Review of Systems  Review of Systems   Constitutional: Positive for malaise/fatigue. Negative for chills and fever.   HENT: Negative for hearing loss and sore throat.    Eyes: Negative for blurred vision.   Respiratory: Negative for cough, shortness of breath and wheezing.    Cardiovascular: Negative for chest pain and leg swelling.   Gastrointestinal: Negative for abdominal pain, diarrhea, heartburn, nausea and vomiting.   Genitourinary: Negative for dysuria.   Musculoskeletal: Negative for back pain and neck pain.   Skin: Negative for rash.   Neurological: Positive for focal weakness and weakness. Negative for dizziness and headaches.   Psychiatric/Behavioral: The patient is not nervous/anxious.         Physical Exam  Temp:  [36.6 °C (97.8 °F)-36.9 °C (98.5 °F)] 36.9 °C (98.5 °F)  Pulse:  [67-76] 76  Resp:  [16-17] 17  BP: (120-141)/(75-86) 141/86    Physical Exam   Constitutional: He is oriented to person, place, and time. He appears well-developed.   HENT:   Head: Normocephalic and atraumatic.   Eyes: Pupils are equal, round, and reactive to light. Conjunctivae are normal.   Neck: No tracheal deviation present. No thyromegaly present.   Cardiovascular: Normal rate and regular rhythm.    Pulmonary/Chest: Effort normal and breath sounds normal.   Abdominal: Soft. Bowel  sounds are normal. He exhibits no distension. There is no tenderness.   Musculoskeletal: He exhibits no edema.   Lymphadenopathy:     He has no cervical adenopathy.   Neurological: He is alert and oriented to person, place, and time.   Shallow left nasolabial fold  RUE 4+.5, RLE 3-4/5, LUE 0-1/5, LLE 3/5   Skin: Skin is warm and dry.   Nursing note and vitals reviewed.      Fluids    Intake/Output Summary (Last 24 hours) at 10/22/18 1120  Last data filed at 10/22/18 0800   Gross per 24 hour   Intake              300 ml   Output             1850 ml   Net            -1550 ml       Laboratory  Recent Labs      10/21/18   0802   WBC  11.1*   RBC  3.17*   HEMOGLOBIN  10.5*   HEMATOCRIT  31.2*   MCV  98.4*   MCH  33.1*   MCHC  33.7   RDW  46.4   PLATELETCT  491*   MPV  8.6*     Recent Labs      10/20/18   0846  10/21/18   0803  10/22/18   0408   SODIUM  136  135  135   POTASSIUM  4.5  3.5*  4.0   CHLORIDE  104  103  102   CO2  24  25  26   GLUCOSE  101*  102*  89   BUN  15  17  17   CREATININE  0.89  0.79  0.85   CALCIUM  9.7  9.4  9.7                   Imaging  DX-CHEST-PORTABLE (1 VIEW)   Final Result         1.  Hazy right infrahilar density, decreased since prior, appearance compatible with improving infiltrate.   2.  Atherosclerosis      US-EXTREMITY VENOUS UPPER UNILAT LEFT   Final Result      DX-ELBOW-COMPLETE 3+ LEFT   Final Result      No evidence of fracture or dislocation.      DX-CHEST-LIMITED (1 VIEW)   Final Result         Patchy bibasilar opacities, likely atelectasis.      US-CAROTID DOPPLER BILAT   Final Result      EC-ECHOCARDIOGRAM COMPLETE W/O CONT   Final Result      MR-BRAIN-W/O   Final Result      1.  Punctate infarcts in the left posterior frontal and right posterior frontal cortices.   2.  Scattered small sized acute infarcts in the left parietal cortex and left parietal deep white matter.   3.  Chronic bilateral cerebellar infarcts.   4.  Chronic left centrum semiovale lacunar infarct.   5.   Moderate diffuse cerebral substance loss.   6.  Mild microangiopathic ischemic change versus demyelination or gliosis.      CT-HEAD W/O   Final Result      1.  Cerebral atrophy.      2.  White matter lucencies most consistent with small vessel ischemic change versus demyelination or gliosis.      3.  Otherwise, Head CT without contrast with no acute findings. No evidence of acute cerebral  hemorrhage or mass lesion.      DX-CHEST-PORTABLE (1 VIEW)   Final Result      Lung base atelectasis.           Assessment/Plan  * Cerebrovascular accident (CVA) due to embolism (HCC)- (present on admission)   Assessment & Plan    Xarelto  PT/OT, SLP  Start Lipitor, monitor CPK        Rhabdomyolysis- (present on admission)   Assessment & Plan    Follow CPK        TENZIN (acute kidney injury) (HCC)- (present on admission)   Assessment & Plan    Follow bmp        Hypokalemia   Assessment & Plan    Kdur, follow bmp        Dysphagia- (present on admission)   Assessment & Plan    Dysphagia 3  SLP to follow        Chronic deep vein thrombosis (DVT) of brachial vein (HCC)- (present on admission)   Assessment & Plan    Xarelto        Elevated troponin- (present on admission)   Assessment & Plan    Likely secondary to Rhabdomyolysis, TENZIN          Elevated liver function tests- (present on admission)   Assessment & Plan    Follow cmp        Leukocytosis- (present on admission)   Assessment & Plan    follow cbc  Encourage IS        Chronic atrial fibrillation (HCC)- (present on admission)   Assessment & Plan    Xarelto, Metoprolol          Essential hypertension- (present on admission)   Assessment & Plan    Metoprolol        Protein-calorie malnutrition, severe (HCC)- (present on admission)   Assessment & Plan    Nutrition consult        Multiple wounds of skin- (present on admission)   Assessment & Plan    Wound care        HIV (human immunodeficiency virus infection) (HCC)- (present on admission)   Assessment & Plan    Continue regimen         Macrocytic anemia- (present on admission)   Assessment & Plan    Follow cbc             VTE prophylaxis: Xarelto

## 2018-10-22 NOTE — PROGRESS NOTES
Pt is A&Ox4, lethargic but easy to arouse. VSS. No changes in neuro status. No current complaints of pain this shift, will continue to monitor. Up with assist of 2 to chair. Incontinent of bowel and bladder, condom cath in use. Tolerating diet, eats 50% of meals. Dressings intact. Slight rash on back, encouraged patient to sit up longer in the chair for his skin. Stable in room with call light in reach and able to make needs known. Bed alarm in use.

## 2018-10-22 NOTE — CARE PLAN
"Problem: Pain Management  Goal: Pain level will decrease to patient's comfort goal  Outcome: PROGRESSING SLOWER THAN EXPECTED  Patient will repeatedly state \"I forgot to ask if I can have pain medication\"      "

## 2018-10-22 NOTE — PROGRESS NOTES
2 RN skin check: Both elbows pink and blanchable Flaky skin and healing scabs on R elbow, flaky skin on Left elbow. Bruising on abdomen. Excoriated and blanchable sacrum, mepilex in place. Mepilex in place to DTI on R hip. Scabbed abrasion on left side. Scattered scabs and scrapes on BLE. Foam dressing intact to bilateral DTI on ankles. Heels pink and blanching

## 2018-10-22 NOTE — THERAPY
"Occupational Therapy Treatment completed with focus on ADLs, ADL transfers and patient education.  Functional Status:  Pt seen for OT tx. Min A supine > sit, once seated EOB pt demonstrated ability to complete ADLs w/ R hand and setup. Min A for donning gown for L UE support. Min A sit > stand. Pt does fatigue w/ OOB activity but demonstrated improved activity tolerance and strength. L UE increased strength and ROM from previous session. Min A to return back to supine.   Plan of Care: Will benefit from Occupational Therapy 3 times per week  Discharge Recommendations:  Equipment Will Continue to Assess for Equipment Needs.     See \"Rehab Therapy-Acute\" Patient Summary Report for complete documentation.   "

## 2018-10-22 NOTE — PROGRESS NOTES
Report received, assumed pt care, assessment complete. VSS, A&O X4, pain controlled with PRNs. Discussed POC, pt verbalizes understanding. No further concerns at this time. Bed in low position, bed alarm on, call light in reach.

## 2018-10-23 PROBLEM — N17.9 AKI (ACUTE KIDNEY INJURY) (HCC): Status: RESOLVED | Noted: 2018-04-03 | Resolved: 2018-10-23

## 2018-10-23 PROBLEM — R79.89 ELEVATED TROPONIN: Status: RESOLVED | Noted: 2018-10-08 | Resolved: 2018-10-23

## 2018-10-23 PROBLEM — D72.829 LEUKOCYTOSIS: Status: RESOLVED | Noted: 2018-10-08 | Resolved: 2018-10-23

## 2018-10-23 PROBLEM — E87.6 HYPOKALEMIA: Status: RESOLVED | Noted: 2018-10-19 | Resolved: 2018-10-23

## 2018-10-23 PROBLEM — M62.82 RHABDOMYOLYSIS: Status: RESOLVED | Noted: 2018-10-08 | Resolved: 2018-10-23

## 2018-10-23 LAB
ANION GAP SERPL CALC-SCNC: 9 MMOL/L (ref 0–11.9)
BASOPHILS # BLD AUTO: 0.5 % (ref 0–1.8)
BASOPHILS # BLD: 0.05 K/UL (ref 0–0.12)
BUN SERPL-MCNC: 18 MG/DL (ref 8–22)
CALCIUM SERPL-MCNC: 10.1 MG/DL (ref 8.5–10.5)
CHLORIDE SERPL-SCNC: 101 MMOL/L (ref 96–112)
CK SERPL-CCNC: 195 U/L (ref 0–154)
CO2 SERPL-SCNC: 25 MMOL/L (ref 20–33)
CREAT SERPL-MCNC: 0.9 MG/DL (ref 0.5–1.4)
EOSINOPHIL # BLD AUTO: 0.05 K/UL (ref 0–0.51)
EOSINOPHIL NFR BLD: 0.5 % (ref 0–6.9)
ERYTHROCYTE [DISTWIDTH] IN BLOOD BY AUTOMATED COUNT: 46.8 FL (ref 35.9–50)
GLUCOSE SERPL-MCNC: 91 MG/DL (ref 65–99)
HCT VFR BLD AUTO: 33.5 % (ref 42–52)
HGB BLD-MCNC: 11.2 G/DL (ref 14–18)
IMM GRANULOCYTES # BLD AUTO: 0.06 K/UL (ref 0–0.11)
IMM GRANULOCYTES NFR BLD AUTO: 0.6 % (ref 0–0.9)
LYMPHOCYTES # BLD AUTO: 2.17 K/UL (ref 1–4.8)
LYMPHOCYTES NFR BLD: 23 % (ref 22–41)
MCH RBC QN AUTO: 33 PG (ref 27–33)
MCHC RBC AUTO-ENTMCNC: 33.4 G/DL (ref 33.7–35.3)
MCV RBC AUTO: 98.8 FL (ref 81.4–97.8)
MONOCYTES # BLD AUTO: 1.22 K/UL (ref 0–0.85)
MONOCYTES NFR BLD AUTO: 12.9 % (ref 0–13.4)
NEUTROPHILS # BLD AUTO: 5.9 K/UL (ref 1.82–7.42)
NEUTROPHILS NFR BLD: 62.5 % (ref 44–72)
NRBC # BLD AUTO: 0 K/UL
NRBC BLD-RTO: 0 /100 WBC
PLATELET # BLD AUTO: 489 K/UL (ref 164–446)
PMV BLD AUTO: 8.6 FL (ref 9–12.9)
POTASSIUM SERPL-SCNC: 4.2 MMOL/L (ref 3.6–5.5)
RBC # BLD AUTO: 3.39 M/UL (ref 4.7–6.1)
SODIUM SERPL-SCNC: 135 MMOL/L (ref 135–145)
WBC # BLD AUTO: 9.5 K/UL (ref 4.8–10.8)

## 2018-10-23 PROCEDURE — A9270 NON-COVERED ITEM OR SERVICE: HCPCS | Performed by: HOSPITALIST

## 2018-10-23 PROCEDURE — 85025 COMPLETE CBC W/AUTO DIFF WBC: CPT

## 2018-10-23 PROCEDURE — 700102 HCHG RX REV CODE 250 W/ 637 OVERRIDE(OP): Performed by: HOSPITALIST

## 2018-10-23 PROCEDURE — 97530 THERAPEUTIC ACTIVITIES: CPT

## 2018-10-23 PROCEDURE — 97110 THERAPEUTIC EXERCISES: CPT

## 2018-10-23 PROCEDURE — 700102 HCHG RX REV CODE 250 W/ 637 OVERRIDE(OP): Performed by: INTERNAL MEDICINE

## 2018-10-23 PROCEDURE — 82550 ASSAY OF CK (CPK): CPT

## 2018-10-23 PROCEDURE — 97112 NEUROMUSCULAR REEDUCATION: CPT

## 2018-10-23 PROCEDURE — 80048 BASIC METABOLIC PNL TOTAL CA: CPT

## 2018-10-23 PROCEDURE — A9270 NON-COVERED ITEM OR SERVICE: HCPCS | Performed by: FAMILY MEDICINE

## 2018-10-23 PROCEDURE — A9270 NON-COVERED ITEM OR SERVICE: HCPCS | Performed by: INTERNAL MEDICINE

## 2018-10-23 PROCEDURE — 99232 SBSQ HOSP IP/OBS MODERATE 35: CPT | Performed by: HOSPITALIST

## 2018-10-23 PROCEDURE — 770006 HCHG ROOM/CARE - MED/SURG/GYN SEMI*

## 2018-10-23 PROCEDURE — 700102 HCHG RX REV CODE 250 W/ 637 OVERRIDE(OP): Performed by: FAMILY MEDICINE

## 2018-10-23 PROCEDURE — 36415 COLL VENOUS BLD VENIPUNCTURE: CPT

## 2018-10-23 PROCEDURE — 92526 ORAL FUNCTION THERAPY: CPT

## 2018-10-23 RX ORDER — DIAPER,BRIEF,INFANT-TODD,DISP
EACH MISCELLANEOUS 2 TIMES DAILY
Status: COMPLETED | OUTPATIENT
Start: 2018-10-23 | End: 2018-10-25

## 2018-10-23 RX ADMIN — ATORVASTATIN CALCIUM 40 MG: 40 TABLET, FILM COATED ORAL at 20:07

## 2018-10-23 RX ADMIN — ZOLPIDEM TARTRATE 10 MG: 5 TABLET ORAL at 20:07

## 2018-10-23 RX ADMIN — OXYCODONE HYDROCHLORIDE 5 MG: 5 TABLET ORAL at 17:33

## 2018-10-23 RX ADMIN — POTASSIUM CHLORIDE 20 MEQ: 1500 TABLET, EXTENDED RELEASE ORAL at 05:50

## 2018-10-23 RX ADMIN — GABAPENTIN 300 MG: 300 CAPSULE ORAL at 17:32

## 2018-10-23 RX ADMIN — LAMIVUDINE 300 MG: 150 TABLET, FILM COATED ORAL at 05:50

## 2018-10-23 RX ADMIN — OMEPRAZOLE 20 MG: 20 CAPSULE, DELAYED RELEASE ORAL at 05:50

## 2018-10-23 RX ADMIN — FLUOXETINE HYDROCHLORIDE 20 MG: 20 CAPSULE ORAL at 05:50

## 2018-10-23 RX ADMIN — ABACAVIR 600 MG: 300 TABLET, FILM COATED ORAL at 05:50

## 2018-10-23 RX ADMIN — OXYCODONE HYDROCHLORIDE 5 MG: 5 TABLET ORAL at 22:47

## 2018-10-23 RX ADMIN — METOPROLOL TARTRATE 25 MG: 25 TABLET, FILM COATED ORAL at 17:32

## 2018-10-23 RX ADMIN — RIVAROXABAN 20 MG: 20 TABLET, FILM COATED ORAL at 17:32

## 2018-10-23 RX ADMIN — GABAPENTIN 300 MG: 300 CAPSULE ORAL at 05:50

## 2018-10-23 RX ADMIN — HYDROCORTISONE: 1 OINTMENT TOPICAL at 18:00

## 2018-10-23 RX ADMIN — CLONAZEPAM 1 MG: 1 TABLET ORAL at 17:32

## 2018-10-23 RX ADMIN — METOPROLOL TARTRATE 25 MG: 25 TABLET, FILM COATED ORAL at 05:50

## 2018-10-23 RX ADMIN — DOLUTEGRAVIR SODIUM 50 MG: 50 TABLET, FILM COATED ORAL at 05:50

## 2018-10-23 RX ADMIN — MICONAZOLE NITRATE: 20 CREAM TOPICAL at 06:00

## 2018-10-23 RX ADMIN — OXYCODONE HYDROCHLORIDE 10 MG: 5 TABLET ORAL at 07:11

## 2018-10-23 RX ADMIN — CLONAZEPAM 1 MG: 1 TABLET ORAL at 05:50

## 2018-10-23 RX ADMIN — OXYCODONE HYDROCHLORIDE 10 MG: 5 TABLET ORAL at 02:36

## 2018-10-23 RX ADMIN — MICONAZOLE NITRATE: 20 CREAM TOPICAL at 18:00

## 2018-10-23 ASSESSMENT — ENCOUNTER SYMPTOMS
PALPITATIONS: 0
NERVOUS/ANXIOUS: 0
WHEEZING: 0
MYALGIAS: 0
SHORTNESS OF BREATH: 0
BACK PAIN: 0
FEVER: 0
SORE THROAT: 0
CHILLS: 0
HEADACHES: 0
FOCAL WEAKNESS: 1
WEAKNESS: 1
VOMITING: 0
DIZZINESS: 0
DOUBLE VISION: 0
COUGH: 0
NAUSEA: 0
BLURRED VISION: 0

## 2018-10-23 ASSESSMENT — PAIN SCALES - GENERAL: PAINLEVEL_OUTOF10: 5

## 2018-10-23 NOTE — THERAPY
"Speech Language Therapy dysphagia treatment completed.   Functional Status:  Pt sitting up in a chair for the lunch meal. Intermittent assist for feeding required. Pt with min increase in time to masticate spaghetti and chopped peaches. Pt taking continuous sips, using a straw, for thin water, juice, and Boost. Pt with coughing post swallow on his last continuous sip of thin apple juice. Pt re-educ regarding taking one sip of thins at a time. 10/20 chest xray \"hazy right infrahilar density decreased since prior image=improving infiltrate.\"  Recommendations: D3 thins with posted swallow strategies and assist/superv  Plan of Care: Will benefit from Speech Therapy 5 times per week  Post-Acute Therapy: dysphagia. Thanks, Josh    See \"Rehab Therapy-Acute\" Patient Summary Report for complete documentation.     "

## 2018-10-23 NOTE — PROGRESS NOTES
Hospital Medicine Daily Progress Note    Date of Service  10/23/2018    Chief Complaint  63 y.o. male admitted 10/8/2018 with Stroke    Hospital Course  Admitted with stroke, rhabdomyolysis, acute kidney injury, known history of chronic atrial fibrillation, Eliquis was held for scheduled right ankle surgery    Interval Problem Update  Doing well  No issues overnight  Still with left-sided weakness  Blood pressure well controlled  Rhabdomyolysis has resolved  Complains of having a rash on his back  In sinus rhythm    Consultants/Specialty  Neurology signed off  Critical care signed off    Code Status  Full    Disposition  Rehab vs SNF    Review of Systems  Review of Systems   Constitutional: Positive for malaise/fatigue. Negative for chills and fever.   HENT: Negative for hearing loss and sore throat.    Eyes: Negative for blurred vision and double vision.   Respiratory: Negative for cough, shortness of breath and wheezing.    Cardiovascular: Negative for chest pain and palpitations.   Gastrointestinal: Negative for nausea and vomiting.   Genitourinary: Negative for dysuria and frequency.   Musculoskeletal: Negative for back pain and myalgias.   Skin: Positive for itching and rash (Rash and itching on back).   Neurological: Positive for focal weakness and weakness. Negative for dizziness and headaches.   Psychiatric/Behavioral: The patient is not nervous/anxious.         Physical Exam  Temp:  [36.7 °C (98 °F)-37.1 °C (98.7 °F)] 36.7 °C (98.1 °F)  Pulse:  [64-70] 68  Resp:  [15-16] 15  BP: (105-132)/(67-82) 105/67    Physical Exam   Constitutional: He is oriented to person, place, and time. He appears well-developed and well-nourished.   HENT:   Head: Normocephalic and atraumatic.   Mouth/Throat: No oropharyngeal exudate.   Eyes: Pupils are equal, round, and reactive to light. Conjunctivae and EOM are normal.   Neck: Normal range of motion. Neck supple. No thyromegaly present.   Cardiovascular: Normal rate and regular  rhythm.    Pulmonary/Chest: Effort normal and breath sounds normal. No respiratory distress.   Abdominal: Soft. Bowel sounds are normal. He exhibits no distension.   Musculoskeletal: He exhibits no edema.   Neurological: He is alert and oriented to person, place, and time.   Shallow left nasolabial fold  RUE 4+.5, RLE 3-4/5, LUE 0-1/5, LLE 3/5   Skin: Skin is warm and dry. Rash (Heat rash on back) noted.   Nursing note and vitals reviewed.      Fluids    Intake/Output Summary (Last 24 hours) at 10/23/18 1344  Last data filed at 10/23/18 1100   Gross per 24 hour   Intake              200 ml   Output             1900 ml   Net            -1700 ml       Laboratory  Recent Labs      10/21/18   0802  10/23/18   0322   WBC  11.1*  9.5   RBC  3.17*  3.39*   HEMOGLOBIN  10.5*  11.2*   HEMATOCRIT  31.2*  33.5*   MCV  98.4*  98.8*   MCH  33.1*  33.0   MCHC  33.7  33.4*   RDW  46.4  46.8   PLATELETCT  491*  489*   MPV  8.6*  8.6*     Recent Labs      10/21/18   0803  10/22/18   0408  10/23/18   0322   SODIUM  135  135  135   POTASSIUM  3.5*  4.0  4.2   CHLORIDE  103  102  101   CO2  25  26  25   GLUCOSE  102*  89  91   BUN  17  17  18   CREATININE  0.79  0.85  0.90   CALCIUM  9.4  9.7  10.1                   Imaging  DX-CHEST-PORTABLE (1 VIEW)   Final Result         1.  Hazy right infrahilar density, decreased since prior, appearance compatible with improving infiltrate.   2.  Atherosclerosis      US-EXTREMITY VENOUS UPPER UNILAT LEFT   Final Result      DX-ELBOW-COMPLETE 3+ LEFT   Final Result      No evidence of fracture or dislocation.      DX-CHEST-LIMITED (1 VIEW)   Final Result         Patchy bibasilar opacities, likely atelectasis.      US-CAROTID DOPPLER BILAT   Final Result      EC-ECHOCARDIOGRAM COMPLETE W/O CONT   Final Result      MR-BRAIN-W/O   Final Result      1.  Punctate infarcts in the left posterior frontal and right posterior frontal cortices.   2.  Scattered small sized acute infarcts in the left parietal  cortex and left parietal deep white matter.   3.  Chronic bilateral cerebellar infarcts.   4.  Chronic left centrum semiovale lacunar infarct.   5.  Moderate diffuse cerebral substance loss.   6.  Mild microangiopathic ischemic change versus demyelination or gliosis.      CT-HEAD W/O   Final Result      1.  Cerebral atrophy.      2.  White matter lucencies most consistent with small vessel ischemic change versus demyelination or gliosis.      3.  Otherwise, Head CT without contrast with no acute findings. No evidence of acute cerebral  hemorrhage or mass lesion.      DX-CHEST-PORTABLE (1 VIEW)   Final Result      Lung base atelectasis.           Assessment/Plan  * Cerebrovascular accident (CVA) due to embolism (HCC)- (present on admission)   Assessment & Plan    Xarelto  PT/OT, SLP  Continue Lipitor  Consults placed to skilled nursing facility and rehabs        Dysphagia- (present on admission)   Assessment & Plan    Dysphagia 3  SLP to follow        Chronic deep vein thrombosis (DVT) of brachial vein (HCC)- (present on admission)   Assessment & Plan    Xarelto        Elevated liver function tests- (present on admission)   Assessment & Plan    Follow cmp        Chronic atrial fibrillation (HCC)- (present on admission)   Assessment & Plan    Xarelto, Metoprolol          Essential hypertension- (present on admission)   Assessment & Plan    Metoprolol        Protein-calorie malnutrition, severe (HCC)- (present on admission)   Assessment & Plan    Nutrition consult        Multiple wounds of skin- (present on admission)   Assessment & Plan    Wound care        HIV (human immunodeficiency virus infection) (HCC)- (present on admission)   Assessment & Plan    Continue regimen        Macrocytic anemia- (present on admission)   Assessment & Plan    Follow cbc             VTE prophylaxis: Xarelto

## 2018-10-23 NOTE — DISCHARGE PLANNING
Agency/Facility Name: [facility]  Spoke To: Nisha(Admissions)  Outcome: No beds available today.    Agency/Facility Name: [facility]  Spoke To: Anjelica(Admissions)  Outcome: Anjelica wanting to verify if rectal tube with be DC'd prior to discharge. Evelyn(LSW) notified.

## 2018-10-23 NOTE — DISCHARGE PLANNING
Agency/Facility Name: [facility]  Spoke To: Anjelica  Outcome: Patient accepted, however, patient will need 90 days worth of HIV meds. Evelyn(LSW) notified.

## 2018-10-23 NOTE — PROGRESS NOTES
2 RN skin check     1. R ear - lobe + lateral cartilage w./ scab. CDI   2. R Lateral foot, Plantar surface w/ DTI. Intact. L  Lateral foot w/ DTI. Intact dressings CDI  3. R foot, medial, arch w/ intact vesicular blister   4. R greater trochanter area w/ multiple pressure ulcers. Wound care has already been consulted. Black/brown wound base w/ circumferential slough present. Brown/serous non odorous drainage. Dressing changed.   5. L medial malleolus w/ partially scabbed over wound, visible wound bed pink/red. DSG in place    6. L heel boggy, w/ blanchable redness   7. Back w/ scattered raised/flat pinpoint erythemic rash, puritic.

## 2018-10-23 NOTE — DISCHARGE PLANNING
Agency/Facility Name: [facility]  Outcome: Attempted to contact [facility] regarding bed availability, however, no answer. Voicemail left for Nisha(Admissions).

## 2018-10-24 PROBLEM — R79.89 ELEVATED LIVER FUNCTION TESTS: Status: RESOLVED | Noted: 2018-10-08 | Resolved: 2018-10-24

## 2018-10-24 PROCEDURE — 700102 HCHG RX REV CODE 250 W/ 637 OVERRIDE(OP): Performed by: HOSPITALIST

## 2018-10-24 PROCEDURE — 97535 SELF CARE MNGMENT TRAINING: CPT

## 2018-10-24 PROCEDURE — A9270 NON-COVERED ITEM OR SERVICE: HCPCS | Performed by: HOSPITALIST

## 2018-10-24 PROCEDURE — 770006 HCHG ROOM/CARE - MED/SURG/GYN SEMI*

## 2018-10-24 PROCEDURE — 99232 SBSQ HOSP IP/OBS MODERATE 35: CPT | Performed by: HOSPITALIST

## 2018-10-24 PROCEDURE — 700102 HCHG RX REV CODE 250 W/ 637 OVERRIDE(OP): Performed by: FAMILY MEDICINE

## 2018-10-24 PROCEDURE — A9270 NON-COVERED ITEM OR SERVICE: HCPCS | Performed by: FAMILY MEDICINE

## 2018-10-24 RX ADMIN — RIVAROXABAN 20 MG: 20 TABLET, FILM COATED ORAL at 17:43

## 2018-10-24 RX ADMIN — OXYCODONE HYDROCHLORIDE 5 MG: 5 TABLET ORAL at 02:29

## 2018-10-24 RX ADMIN — MICONAZOLE NITRATE: 20 CREAM TOPICAL at 05:19

## 2018-10-24 RX ADMIN — ATORVASTATIN CALCIUM 40 MG: 40 TABLET, FILM COATED ORAL at 20:54

## 2018-10-24 RX ADMIN — LAMIVUDINE 300 MG: 150 TABLET, FILM COATED ORAL at 05:11

## 2018-10-24 RX ADMIN — OXYCODONE HYDROCHLORIDE 10 MG: 5 TABLET ORAL at 16:15

## 2018-10-24 RX ADMIN — OXYCODONE HYDROCHLORIDE 10 MG: 5 TABLET ORAL at 20:54

## 2018-10-24 RX ADMIN — METOPROLOL TARTRATE 25 MG: 25 TABLET, FILM COATED ORAL at 05:13

## 2018-10-24 RX ADMIN — GABAPENTIN 300 MG: 300 CAPSULE ORAL at 05:12

## 2018-10-24 RX ADMIN — HYDROCORTISONE: 1 OINTMENT TOPICAL at 05:19

## 2018-10-24 RX ADMIN — DOLUTEGRAVIR SODIUM 50 MG: 50 TABLET, FILM COATED ORAL at 05:11

## 2018-10-24 RX ADMIN — CLONAZEPAM 1 MG: 1 TABLET ORAL at 05:13

## 2018-10-24 RX ADMIN — MICONAZOLE NITRATE: 20 CREAM TOPICAL at 17:43

## 2018-10-24 RX ADMIN — HYDROCORTISONE: 1 OINTMENT TOPICAL at 17:43

## 2018-10-24 RX ADMIN — METOPROLOL TARTRATE 25 MG: 25 TABLET, FILM COATED ORAL at 17:43

## 2018-10-24 RX ADMIN — FLUOXETINE HYDROCHLORIDE 20 MG: 20 CAPSULE ORAL at 05:12

## 2018-10-24 RX ADMIN — GABAPENTIN 300 MG: 300 CAPSULE ORAL at 17:43

## 2018-10-24 RX ADMIN — OMEPRAZOLE 20 MG: 20 CAPSULE, DELAYED RELEASE ORAL at 05:13

## 2018-10-24 RX ADMIN — ABACAVIR 600 MG: 300 TABLET, FILM COATED ORAL at 05:12

## 2018-10-24 RX ADMIN — POTASSIUM CHLORIDE 20 MEQ: 1500 TABLET, EXTENDED RELEASE ORAL at 05:13

## 2018-10-24 RX ADMIN — CLONAZEPAM 1 MG: 1 TABLET ORAL at 17:43

## 2018-10-24 ASSESSMENT — ENCOUNTER SYMPTOMS
DIZZINESS: 0
WEAKNESS: 1
NERVOUS/ANXIOUS: 0
BLURRED VISION: 0
CONSTIPATION: 0
SHORTNESS OF BREATH: 0
BACK PAIN: 0
HEADACHES: 0
PALPITATIONS: 0
DIARRHEA: 0
COUGH: 0
SORE THROAT: 0
FEVER: 0
FOCAL WEAKNESS: 1
WHEEZING: 0
DOUBLE VISION: 0
MYALGIAS: 0

## 2018-10-24 ASSESSMENT — COGNITIVE AND FUNCTIONAL STATUS - GENERAL
PERSONAL GROOMING: A LITTLE
DRESSING REGULAR LOWER BODY CLOTHING: A LOT
TOILETING: TOTAL
SUGGESTED CMS G CODE MODIFIER DAILY ACTIVITY: CL
DAILY ACTIVITIY SCORE: 13
EATING MEALS: A LITTLE
DRESSING REGULAR UPPER BODY CLOTHING: A LOT
HELP NEEDED FOR BATHING: A LOT

## 2018-10-24 ASSESSMENT — PAIN SCALES - GENERAL
PAINLEVEL_OUTOF10: 7
PAINLEVEL_OUTOF10: 9

## 2018-10-24 NOTE — DISCHARGE PLANNING
Agency/Facility Name: [facility]  Spoke To: Nisha(Admissions)  Outcome: Nisha to verify if they are able to accommodate meds and will call this CCA back. Evelyn(LSW) notified.

## 2018-10-24 NOTE — PROGRESS NOTES
Two RN skin check done at bedside. Right ear lobe with two scabs CDI. Right foot with DTI on lateral, plantar surface. Left lateral foot with DTI. Right foot medial arch with blister, intact and mepilex. Right greater trochanter with pressure sores, open to air. Slough present, and no drainage at this time, will keep MILAGROS for now. Left medial malleolus with scabbed over wound and dressing in place. Left heel with blanchable erythema. Back noted to have small pink rash-like spots, CDI and open to air.     Wound RN consulted. Pt educated on importance of turning and repositioning in bed. Will continue to monitor and turn Q2hrs.

## 2018-10-24 NOTE — PROGRESS NOTES
Hospital Medicine Daily Progress Note    Date of Service  10/24/2018    Chief Complaint  63 y.o. male admitted 10/8/2018 with Stroke    Hospital Course  Admitted with stroke, rhabdomyolysis, acute kidney injury, known history of chronic atrial fibrillation, Eliquis was held for scheduled right ankle surgery    Interval Problem Update  No new issues overnight  Rash on his back has nearly resolved  Pressures well controlled  Participating with speech therapy    Consultants/Specialty  Neurology signed off  Critical care signed off    Code Status  Full    Disposition  Awaiting outpatient HIV medications to be arranged prior to being transferred to skilled nursing facility    Review of Systems  Review of Systems   Constitutional: Positive for malaise/fatigue. Negative for fever.   HENT: Negative for hearing loss and sore throat.    Eyes: Negative for blurred vision and double vision.   Respiratory: Negative for cough, shortness of breath and wheezing.    Cardiovascular: Negative for chest pain and palpitations.   Gastrointestinal: Negative for constipation and diarrhea.   Genitourinary: Negative for dysuria and urgency.   Musculoskeletal: Negative for back pain and myalgias.   Skin: Positive for rash (Rash and itching on back much improved). Negative for itching.   Neurological: Positive for focal weakness and weakness. Negative for dizziness and headaches.   Psychiatric/Behavioral: The patient is not nervous/anxious.         Physical Exam  Temp:  [36.8 °C (98.2 °F)-37.1 °C (98.7 °F)] 36.8 °C (98.3 °F)  Pulse:  [71-82] 74  Resp:  [16-18] 16  BP: (106-126)/(61-71) 108/69    Physical Exam   Constitutional: He is oriented to person, place, and time. No distress.   Appears chronically ill   HENT:   Head: Normocephalic and atraumatic.   Mouth/Throat: No oropharyngeal exudate.   Eyes: Pupils are equal, round, and reactive to light. EOM are normal. No scleral icterus.   Neck: Normal range of motion. Neck supple. No thyromegaly  present.   Cardiovascular: Normal rate and regular rhythm.    No murmur heard.  Pulmonary/Chest: Effort normal and breath sounds normal. No respiratory distress.   Abdominal: Soft. Bowel sounds are normal. He exhibits no distension.   Musculoskeletal: He exhibits no edema or tenderness.   Neurological: He is alert and oriented to person, place, and time.   Shallow left nasolabial fold  RUE 4+.5, RLE 3-4/5, LUE 0-1/5, LLE 3/5   Skin: Skin is warm and dry. Rash (Heat rash on back) noted. He is not diaphoretic.   Nursing note and vitals reviewed.      Fluids    Intake/Output Summary (Last 24 hours) at 10/24/18 1436  Last data filed at 10/24/18 0759   Gross per 24 hour   Intake              730 ml   Output             1600 ml   Net             -870 ml       Laboratory  Recent Labs      10/23/18   0322   WBC  9.5   RBC  3.39*   HEMOGLOBIN  11.2*   HEMATOCRIT  33.5*   MCV  98.8*   MCH  33.0   MCHC  33.4*   RDW  46.8   PLATELETCT  489*   MPV  8.6*     Recent Labs      10/22/18   0408  10/23/18   0322   SODIUM  135  135   POTASSIUM  4.0  4.2   CHLORIDE  102  101   CO2  26  25   GLUCOSE  89  91   BUN  17  18   CREATININE  0.85  0.90   CALCIUM  9.7  10.1                   Imaging  DX-CHEST-PORTABLE (1 VIEW)   Final Result         1.  Hazy right infrahilar density, decreased since prior, appearance compatible with improving infiltrate.   2.  Atherosclerosis      US-EXTREMITY VENOUS UPPER UNILAT LEFT   Final Result      DX-ELBOW-COMPLETE 3+ LEFT   Final Result      No evidence of fracture or dislocation.      DX-CHEST-LIMITED (1 VIEW)   Final Result         Patchy bibasilar opacities, likely atelectasis.      US-CAROTID DOPPLER BILAT   Final Result      EC-ECHOCARDIOGRAM COMPLETE W/O CONT   Final Result      MR-BRAIN-W/O   Final Result      1.  Punctate infarcts in the left posterior frontal and right posterior frontal cortices.   2.  Scattered small sized acute infarcts in the left parietal cortex and left parietal deep white  matter.   3.  Chronic bilateral cerebellar infarcts.   4.  Chronic left centrum semiovale lacunar infarct.   5.  Moderate diffuse cerebral substance loss.   6.  Mild microangiopathic ischemic change versus demyelination or gliosis.      CT-HEAD W/O   Final Result      1.  Cerebral atrophy.      2.  White matter lucencies most consistent with small vessel ischemic change versus demyelination or gliosis.      3.  Otherwise, Head CT without contrast with no acute findings. No evidence of acute cerebral  hemorrhage or mass lesion.      DX-CHEST-PORTABLE (1 VIEW)   Final Result      Lung base atelectasis.           Assessment/Plan  * Cerebrovascular accident (CVA) due to embolism (HCC)- (present on admission)   Assessment & Plan    Xarelto  PT/OT, SLP  Continue Lipitor  Consults placed to skilled nursing facility; has approvals we are awaiting HIV medications to be arranged prior to transfer        Dysphagia- (present on admission)   Assessment & Plan    Dysphagia 3  Speech therapy following alongside        Chronic deep vein thrombosis (DVT) of brachial vein (HCC)- (present on admission)   Assessment & Plan    Xarelto        Chronic atrial fibrillation (HCC)- (present on admission)   Assessment & Plan    Xarelto, Metoprolol          Essential hypertension- (present on admission)   Assessment & Plan    Metoprolol        Protein-calorie malnutrition, severe (HCC)- (present on admission)   Assessment & Plan    Nutrition consult        Multiple wounds of skin- (present on admission)   Assessment & Plan    Wound care        HIV (human immunodeficiency virus infection) (HCC)- (present on admission)   Assessment & Plan    Continue regimen  Social work assisting in arranging medications upon discharge        Macrocytic anemia- (present on admission)   Assessment & Plan    Follow cbc             VTE prophylaxis: Xarelto

## 2018-10-24 NOTE — PROGRESS NOTES
A/Ox4. Pt very pleasant and able to communicate needs. Uses call light frequently. Pt with condom cath on draining clear yellow urine. Wounds assessed-see shift skin check note. Oxycodone administered for 5/10 pain.Pt had one BM for shift.  Will continue to monitor and medicate as needed. /61   Pulse 71   Temp 37.1 °C (98.7 °F)   Resp 18   Ht 1.829 m (6')   Wt 64.2 kg (141 lb 8.6 oz)   SpO2 92%   BMI 19.20 kg/m²

## 2018-10-24 NOTE — THERAPY
"Occupational Therapy Treatment completed with focus on ADLs, ADL transfers and patient education.  Functional Status:  Pt seen for OT tx today.  Pt was pleasant and cooperative during the session.  Continues to be limited by endurance, L side deficits, transfers, and self care.  Pt is able to tolerate 30 minutes of activity with frequent therapeutic rests.  Pt completed LB dressing with max A seated EOB needing min A to maintain upright posture, UB dressing with mod A using arun-dressing tech, and seated EOB gr/hy with mod A as he has frequent R posteriors LOB needing recovery.  Needing occasional cues to initiate, sequence, follow through, and problem solve during ADL tasks.  Pt sat EOB for 20 minutes needing min A using x1 UE for support.  Pt completed supine to sit with mod A and sit to stand with min A but unable to stand and pull up pants.  Pt then laid back down and continued to need max A to pull up pants but was able to bridge.  Pt was educated on AAROM HEP which pt was able to return demo.  Pt is motivated to complete exercises when in bed.  Pt would benefit from continued inpt transitional care OT as they are continuing to need assistance with ADLs.       Plan of Care: Will benefit from Occupational Therapy 3 times per week  Discharge Recommendations:  Equipment Will Continue to Assess for Equipment Needs.     See \"Rehab Therapy-Acute\" Patient Summary Report for complete documentation.   "

## 2018-10-24 NOTE — DISCHARGE PLANNING
Anticipated Discharge Disposition: SNF    Action: LSW requested that Avril MCCAULEY f/u with [facility] about bed availability.     LSW met with pt, Sukumar bedside. He was lethargic but able to communicate with this worker.  Sukumar lives alone in a 1 bedroom available. His family lives in [place]. Pt has a hx of ETOH abuse last drink was 10/7/18, prior to admission.  Lan PCP is Dr. Lenora Markham.    This worker discussed dc plan with Sukumar. He is agreeable to go to SNF. LSW explained that [facility] has accepted but needs to have 90 days of HIV medication and that [facility] accepted but currently does not have an open bed.  Sukumar gave this worker permission to call [facility] to obtain his HIV RX ([employer]). Sukumar gave this worker permission to call his sister, Karli.    LSW made tc to Sukumar's sister, Karli ([phone]). Sukumar lives alone in a double-wide mobile home.([employer]; [employer]) His mailing address is a [employer]  At [address] [place] [address] [address]; [place], [place]. Karli has talked with Sukumar about moving into an Assisted Living or Group Home and stevie continue to have that conversation with him. Family has also had conversations with Sukumar about his drinking problem and going into an inpatient treatment program to no avail.  Karli stated that Sukumar has repeatly said he can do it on his own. Karli is not certain what Sukumar receives income patrick just knows that he gets SSI/SSD and money from his fathers death.    MILTON made tc to [facility] ([phone]) Spoke with Loraine HERNÁNDEZ. The last time Sukumar spoke with their case management was in January 2018. Loraine transferred this worker to [facility] for help with RX. LSW left message for a call back.    Barriers to Discharge: Medical Clearance; 90 Day HIV RX    Plan: LSW to    Care Transition Team Assessment    Information Source  Orientation : Oriented x 4    Elopement Risk  Legal Hold: No  Ambulatory or Self Mobile in Wheelchair: No-Not an Elopement Risk  Elopement  Risk: Not at Risk for Elopement    Interdisciplinary Discharge Planning  Does Admitting Nurse Feel This Could be a Complex Discharge?: Yes  Primary Care Physician: [facility] [place] [facility]  Lives with - Patient's Self Care Capacity: Alone and Able to Care For Self  Patient or legal guardian wants to designate a caregiver (see row info): No  Support Systems: Family Member(s)  Housing / Facility: 1 Story Apartment / Condo  Do You Take your Prescribed Medications Regularly: Yes  Able to Return to Previous ADL's: Future Time w/Therapy  Mobility Issues: No  Prior Services: Skilled Home Health Services  Patient Expects to be Discharged to: Rehab vs. SNF  Assistance Needed: Yes  Durable Medical Equipment: Not Applicable    Discharge Preparedness  What is your plan after discharge?: Skilled nursing facility  What are your discharge supports?: Sibling  Prior Functional Level: Ambulatory, Independent with Activities of Daily Living    Functional Assesment  Prior Functional Level: Ambulatory, Independent with Activities of Daily Living     Advance Directive  Advance Directive?: None    Domestic Abuse  Have you ever been the victim of abuse or violence?: No    Psychological Assessment  History of Substance Abuse: Alcohol  Date Last Used - Alcohol: 10/7/2018  History of Psychiatric Problems: Yes    Discharge Risks or Barriers  Discharge risks or barriers?: Substance abuse, Complex medical needs  Patient risk factors: Complex medical needs, Substance abuse    Anticipated Discharge Information  Anticipated discharge disposition: SNF

## 2018-10-24 NOTE — PROGRESS NOTES
2 RN skin check      1. R ear - lobe + lateral cartilage w./ scab. CDI   2. R Lateral foot, Plantar surface w/ DTI. Intact.   3. R foot, medial, arch w/ intact vesicular blister  4. Scabbed wound R Lateral malleolus   5. R greater trochanter area w/ multiple pressure ulcers. Wound care has already been consulted. Black/brown wound base w/ circumferential slough present. Brown/serous non odorous drainage. Dressing changed.   6. L medial malleolus w/ partially scabbed over wound, visible wound bed pink/red. DSG in place    7. L heel boggy, w/ blanchable redness   8. Back w/ scattered raised/flat pinpoint erythemic rash, puritic.

## 2018-10-25 LAB
ANION GAP SERPL CALC-SCNC: 8 MMOL/L (ref 0–11.9)
BASOPHILS # BLD AUTO: 0.4 % (ref 0–1.8)
BASOPHILS # BLD: 0.03 K/UL (ref 0–0.12)
BUN SERPL-MCNC: 15 MG/DL (ref 8–22)
CALCIUM SERPL-MCNC: 10.1 MG/DL (ref 8.5–10.5)
CHLORIDE SERPL-SCNC: 99 MMOL/L (ref 96–112)
CO2 SERPL-SCNC: 27 MMOL/L (ref 20–33)
CREAT SERPL-MCNC: 0.86 MG/DL (ref 0.5–1.4)
EOSINOPHIL # BLD AUTO: 0.05 K/UL (ref 0–0.51)
EOSINOPHIL NFR BLD: 0.7 % (ref 0–6.9)
ERYTHROCYTE [DISTWIDTH] IN BLOOD BY AUTOMATED COUNT: 46.5 FL (ref 35.9–50)
GLUCOSE SERPL-MCNC: 95 MG/DL (ref 65–99)
HCT VFR BLD AUTO: 33.3 % (ref 42–52)
HGB BLD-MCNC: 10.9 G/DL (ref 14–18)
IMM GRANULOCYTES # BLD AUTO: 0.04 K/UL (ref 0–0.11)
IMM GRANULOCYTES NFR BLD AUTO: 0.6 % (ref 0–0.9)
LYMPHOCYTES # BLD AUTO: 1.73 K/UL (ref 1–4.8)
LYMPHOCYTES NFR BLD: 24.1 % (ref 22–41)
MCH RBC QN AUTO: 32.2 PG (ref 27–33)
MCHC RBC AUTO-ENTMCNC: 32.7 G/DL (ref 33.7–35.3)
MCV RBC AUTO: 98.2 FL (ref 81.4–97.8)
MONOCYTES # BLD AUTO: 1.11 K/UL (ref 0–0.85)
MONOCYTES NFR BLD AUTO: 15.5 % (ref 0–13.4)
NEUTROPHILS # BLD AUTO: 4.21 K/UL (ref 1.82–7.42)
NEUTROPHILS NFR BLD: 58.7 % (ref 44–72)
NRBC # BLD AUTO: 0 K/UL
NRBC BLD-RTO: 0 /100 WBC
PLATELET # BLD AUTO: 465 K/UL (ref 164–446)
PMV BLD AUTO: 8.4 FL (ref 9–12.9)
POTASSIUM SERPL-SCNC: 3.8 MMOL/L (ref 3.6–5.5)
RBC # BLD AUTO: 3.39 M/UL (ref 4.7–6.1)
SODIUM SERPL-SCNC: 134 MMOL/L (ref 135–145)
WBC # BLD AUTO: 7.2 K/UL (ref 4.8–10.8)

## 2018-10-25 PROCEDURE — 97535 SELF CARE MNGMENT TRAINING: CPT

## 2018-10-25 PROCEDURE — 770006 HCHG ROOM/CARE - MED/SURG/GYN SEMI*

## 2018-10-25 PROCEDURE — 700102 HCHG RX REV CODE 250 W/ 637 OVERRIDE(OP): Performed by: HOSPITALIST

## 2018-10-25 PROCEDURE — 99232 SBSQ HOSP IP/OBS MODERATE 35: CPT | Performed by: HOSPITALIST

## 2018-10-25 PROCEDURE — A9270 NON-COVERED ITEM OR SERVICE: HCPCS | Performed by: HOSPITALIST

## 2018-10-25 PROCEDURE — A9270 NON-COVERED ITEM OR SERVICE: HCPCS | Performed by: INTERNAL MEDICINE

## 2018-10-25 PROCEDURE — 700102 HCHG RX REV CODE 250 W/ 637 OVERRIDE(OP): Performed by: INTERNAL MEDICINE

## 2018-10-25 PROCEDURE — 80048 BASIC METABOLIC PNL TOTAL CA: CPT

## 2018-10-25 PROCEDURE — 36415 COLL VENOUS BLD VENIPUNCTURE: CPT

## 2018-10-25 PROCEDURE — 97110 THERAPEUTIC EXERCISES: CPT

## 2018-10-25 PROCEDURE — 85025 COMPLETE CBC W/AUTO DIFF WBC: CPT

## 2018-10-25 PROCEDURE — 97530 THERAPEUTIC ACTIVITIES: CPT

## 2018-10-25 PROCEDURE — A9270 NON-COVERED ITEM OR SERVICE: HCPCS | Performed by: FAMILY MEDICINE

## 2018-10-25 PROCEDURE — 700102 HCHG RX REV CODE 250 W/ 637 OVERRIDE(OP): Performed by: FAMILY MEDICINE

## 2018-10-25 RX ADMIN — MICONAZOLE NITRATE: 20 CREAM TOPICAL at 05:09

## 2018-10-25 RX ADMIN — HYDROCORTISONE: 1 OINTMENT TOPICAL at 05:09

## 2018-10-25 RX ADMIN — CLONAZEPAM 1 MG: 1 TABLET ORAL at 16:53

## 2018-10-25 RX ADMIN — ABACAVIR 600 MG: 300 TABLET, FILM COATED ORAL at 05:05

## 2018-10-25 RX ADMIN — TRAMADOL HYDROCHLORIDE 50 MG: 50 TABLET, FILM COATED ORAL at 16:53

## 2018-10-25 RX ADMIN — FLUOXETINE HYDROCHLORIDE 20 MG: 20 CAPSULE ORAL at 05:04

## 2018-10-25 RX ADMIN — OXYCODONE HYDROCHLORIDE 5 MG: 5 TABLET ORAL at 23:38

## 2018-10-25 RX ADMIN — CLONAZEPAM 1 MG: 1 TABLET ORAL at 05:04

## 2018-10-25 RX ADMIN — METOPROLOL TARTRATE 25 MG: 25 TABLET, FILM COATED ORAL at 05:04

## 2018-10-25 RX ADMIN — RIVAROXABAN 20 MG: 20 TABLET, FILM COATED ORAL at 16:52

## 2018-10-25 RX ADMIN — OXYCODONE HYDROCHLORIDE 10 MG: 5 TABLET ORAL at 00:55

## 2018-10-25 RX ADMIN — OMEPRAZOLE 20 MG: 20 CAPSULE, DELAYED RELEASE ORAL at 05:04

## 2018-10-25 RX ADMIN — MICONAZOLE NITRATE: 20 CREAM TOPICAL at 16:54

## 2018-10-25 RX ADMIN — GABAPENTIN 300 MG: 300 CAPSULE ORAL at 16:53

## 2018-10-25 RX ADMIN — ATORVASTATIN CALCIUM 40 MG: 40 TABLET, FILM COATED ORAL at 16:54

## 2018-10-25 RX ADMIN — METOPROLOL TARTRATE 25 MG: 25 TABLET, FILM COATED ORAL at 16:52

## 2018-10-25 RX ADMIN — POTASSIUM CHLORIDE 20 MEQ: 1500 TABLET, EXTENDED RELEASE ORAL at 05:04

## 2018-10-25 RX ADMIN — DOLUTEGRAVIR SODIUM 50 MG: 50 TABLET, FILM COATED ORAL at 05:04

## 2018-10-25 RX ADMIN — GABAPENTIN 300 MG: 300 CAPSULE ORAL at 05:04

## 2018-10-25 RX ADMIN — LAMIVUDINE 300 MG: 150 TABLET, FILM COATED ORAL at 05:04

## 2018-10-25 ASSESSMENT — ENCOUNTER SYMPTOMS
NAUSEA: 0
HEADACHES: 0
SORE THROAT: 0
BACK PAIN: 0
COUGH: 0
FEVER: 0
CHILLS: 0
MYALGIAS: 0
PALPITATIONS: 0
DIZZINESS: 0
DOUBLE VISION: 0
BLURRED VISION: 0
VOMITING: 0
WEAKNESS: 1
DIAPHORESIS: 0
FOCAL WEAKNESS: 1
DEPRESSION: 0
NERVOUS/ANXIOUS: 0
SHORTNESS OF BREATH: 0

## 2018-10-25 ASSESSMENT — COGNITIVE AND FUNCTIONAL STATUS - GENERAL
MOVING TO AND FROM BED TO CHAIR: UNABLE
MOBILITY SCORE: 8
HELP NEEDED FOR BATHING: A LOT
SUGGESTED CMS G CODE MODIFIER MOBILITY: CM
DRESSING REGULAR UPPER BODY CLOTHING: A LOT
MOVING FROM LYING ON BACK TO SITTING ON SIDE OF FLAT BED: UNABLE
TOILETING: TOTAL
DRESSING REGULAR LOWER BODY CLOTHING: A LOT
SUGGESTED CMS G CODE MODIFIER DAILY ACTIVITY: CL
EATING MEALS: A LITTLE
WALKING IN HOSPITAL ROOM: A LOT
DAILY ACTIVITIY SCORE: 13
TURNING FROM BACK TO SIDE WHILE IN FLAT BAD: UNABLE
CLIMB 3 TO 5 STEPS WITH RAILING: TOTAL
STANDING UP FROM CHAIR USING ARMS: A LOT
PERSONAL GROOMING: A LITTLE

## 2018-10-25 ASSESSMENT — GAIT ASSESSMENTS
ASSISTIVE DEVICE: HAND HELD ASSIST
DISTANCE (FEET): 3
GAIT LEVEL OF ASSIST: MINIMAL ASSIST
DEVIATION: ATAXIC

## 2018-10-25 ASSESSMENT — PAIN SCALES - GENERAL
PAINLEVEL_OUTOF10: 6
PAINLEVEL_OUTOF10: 5
PAINLEVEL_OUTOF10: 9
PAINLEVEL_OUTOF10: 8
PAINLEVEL_OUTOF10: 10
PAINLEVEL_OUTOF10: 5

## 2018-10-25 NOTE — PROGRESS NOTES
Scabs x 2 to right ear. Blanching redness to sacrum. Full thickness wound to right hip. Slough covering wound bed.Mepilex replaced. Excoriation MILAGROS to left hip. Redness to lateral superior thigh padded. Eschars to bilat ankles and right lateral foot padded.

## 2018-10-25 NOTE — DISCHARGE PLANNING
Anticipated Discharge Disposition: SNF    Action: LSW requested that Avril MCCAULEY f/u with [facility] for bed availability    LSW made tc to [facility] ([phone])  This worker spoke with Inna in the pharmacy.  Pt's insurance will not cover a 90 day supply. Pt will also have co-pays.  She will need a RX to run it through insurance.  Inna is also going to have Torie, Eligibility Coordinator call this worker for the [facility] to see if he can get assistance with his co-pays.      Pt discussed during rounds. Pt is now medically clear to dc to SNF. LSW requested a written script of HIV med to fax to [facility].    Barriers to Discharge: 90 Day HIV RX    Plan: LSW to f/u with [facility]

## 2018-10-25 NOTE — CARE PLAN
Problem: Pain Management  Goal: Pain level will decrease to patient's comfort goal  Outcome: PROGRESSING AS EXPECTED  Pain assessed, medication given per MAR.     Problem: Skin Integrity  Goal: Risk for impaired skin integrity will decrease  Outcome: PROGRESSING AS EXPECTED  Waffle cushion overlay in place. Q2hr turning in place.

## 2018-10-25 NOTE — THERAPY
"Physical Therapy Treatment completed.   Bed Mobility:  Supine to Sit: Moderate Assist  Transfers: Sit to Stand: Minimal Assist  Gait: Level Of Assist: Minimal Assist with Hand held assist.       Plan of Care: Will benefit from Physical Therapy 4 times per week  Discharge Recommendations: Equipment: Will Continue to Assess for Equipment Needs .    See \"Rehab Therapy-Acute\" Patient Summary Report for complete documentation.     Mr. Bolton is improving in ability to tolerate exercise and functional activities. He also demonstrates improved ability to sustain Trace + contraction of left finger flexors and elbow extensors. These contractions were apparently not possible yesterday, according to the [facility]. He also demonstrated improved balance with reaching tasks and passive left shoulder range of motion which also was apparently not possible yesterday. He was not able contract flexors or abductors however was able to demonstrate shoulder shrug and maintain against light resistance. He is motivated to continue with therapy. He would benefit from continued skilled physical therapy while in house and will benefit from post acute rehab prior to returning home.   "

## 2018-10-25 NOTE — PROGRESS NOTES
Patient is resting in bed. Patient is A&Ox4. Patient c/o of 9/10 lower back pain, medication given per MAR. Condom cath in place. Q2hr turning and waffle cushion overlay in place. Soft touch and call light within reach, bed in lowest position, bed alarm on. Hourly rounding in place.

## 2018-10-25 NOTE — DISCHARGE PLANNING
[facility]ency[facility]Facility Name: [facility]  Spoke To: Nisha  Outcome: Still in process of verifying if they are able to accommodate meds. Evelyn(ISMAELW) notified.

## 2018-10-25 NOTE — PROGRESS NOTES
Hospital Medicine Daily Progress Note    Date of Service  10/25/2018    Chief Complaint  63 y.o. male admitted 10/8/2018 with Stroke    Hospital Course  Admitted with stroke, rhabdomyolysis, acute kidney injury, known history of chronic atrial fibrillation, Eliquis was held for scheduled right ankle surgery    Interval Problem Update  Doing well today  No issues overnight  He remains confused at times  Participating with therapies  Blood pressure adequately controlled    Consultants/Specialty  Neurology signed off  Critical care signed off    Code Status  Full    Disposition  Awaiting outpatient HIV medications to be arranged prior to being transferred to skilled nursing facility    Review of Systems  Review of Systems   Constitutional: Positive for malaise/fatigue. Negative for chills, diaphoresis and fever.   HENT: Negative for hearing loss and sore throat.    Eyes: Negative for blurred vision and double vision.   Respiratory: Negative for cough and shortness of breath.    Cardiovascular: Negative for chest pain and palpitations.   Gastrointestinal: Negative for nausea and vomiting.   Genitourinary: Negative for dysuria and frequency.   Musculoskeletal: Negative for back pain and myalgias.   Skin: Negative for itching and rash.   Neurological: Positive for focal weakness and weakness. Negative for dizziness and headaches.   Psychiatric/Behavioral: Negative for depression. The patient is not nervous/anxious.         Physical Exam  Temp:  [36.6 °C (97.8 °F)-37.3 °C (99.1 °F)] 37 °C (98.6 °F)  Pulse:  [63-83] 63  Resp:  [16-19] 16  BP: (107-122)/(60-70) 107/60    Physical Exam   Constitutional: No distress.   Appears chronically ill   HENT:   Head: Normocephalic and atraumatic.   Mouth/Throat: No oropharyngeal exudate.   Eyes: Pupils are equal, round, and reactive to light. Conjunctivae and EOM are normal.   Neck: Normal range of motion. Neck supple. No thyromegaly present.   Cardiovascular: Normal rate and regular  rhythm.    No murmur heard.  Pulmonary/Chest: Effort normal and breath sounds normal. No respiratory distress.   Abdominal: Soft. Bowel sounds are normal. He exhibits no distension.   Musculoskeletal: He exhibits no edema or tenderness.   Neurological: He is alert. He is disoriented.   Shallow left nasolabial fold  RUE 4+.5, RLE 3-4/5, LUE 0-1/5, LLE 3/5   Skin: Skin is warm and dry. No rash noted. He is not diaphoretic.   Nursing note and vitals reviewed.      Fluids    Intake/Output Summary (Last 24 hours) at 10/25/18 1529  Last data filed at 10/25/18 1200   Gross per 24 hour   Intake             1080 ml   Output             1700 ml   Net             -620 ml       Laboratory  Recent Labs      10/23/18   0322  10/25/18   0409   WBC  9.5  7.2   RBC  3.39*  3.39*   HEMOGLOBIN  11.2*  10.9*   HEMATOCRIT  33.5*  33.3*   MCV  98.8*  98.2*   MCH  33.0  32.2   MCHC  33.4*  32.7*   RDW  46.8  46.5   PLATELETCT  489*  465*   MPV  8.6*  8.4*     Recent Labs      10/23/18   0322  10/25/18   0409   SODIUM  135  134*   POTASSIUM  4.2  3.8   CHLORIDE  101  99   CO2  25  27   GLUCOSE  91  95   BUN  18  15   CREATININE  0.90  0.86   CALCIUM  10.1  10.1                   Imaging  DX-CHEST-PORTABLE (1 VIEW)   Final Result         1.  Hazy right infrahilar density, decreased since prior, appearance compatible with improving infiltrate.   2.  Atherosclerosis      US-EXTREMITY VENOUS UPPER UNILAT LEFT   Final Result      DX-ELBOW-COMPLETE 3+ LEFT   Final Result      No evidence of fracture or dislocation.      DX-CHEST-LIMITED (1 VIEW)   Final Result         Patchy bibasilar opacities, likely atelectasis.      US-CAROTID DOPPLER BILAT   Final Result      EC-ECHOCARDIOGRAM COMPLETE W/O CONT   Final Result      MR-BRAIN-W/O   Final Result      1.  Punctate infarcts in the left posterior frontal and right posterior frontal cortices.   2.  Scattered small sized acute infarcts in the left parietal cortex and left parietal deep white matter.    3.  Chronic bilateral cerebellar infarcts.   4.  Chronic left centrum semiovale lacunar infarct.   5.  Moderate diffuse cerebral substance loss.   6.  Mild microangiopathic ischemic change versus demyelination or gliosis.      CT-HEAD W/O   Final Result      1.  Cerebral atrophy.      2.  White matter lucencies most consistent with small vessel ischemic change versus demyelination or gliosis.      3.  Otherwise, Head CT without contrast with no acute findings. No evidence of acute cerebral  hemorrhage or mass lesion.      DX-CHEST-PORTABLE (1 VIEW)   Final Result      Lung base atelectasis.           Assessment/Plan  * Cerebrovascular accident (CVA) due to embolism (HCC)- (present on admission)   Assessment & Plan    Xarelto  PT/OT, SLP  Continue Lipitor  Consults placed to skilled nursing facility; has approvals we are awaiting HIV medications to be arranged prior to transfer        Dysphagia- (present on admission)   Assessment & Plan    Dysphagia 3  Speech therapy following alongside        Chronic deep vein thrombosis (DVT) of brachial vein (HCC)- (present on admission)   Assessment & Plan    Xarelto        Chronic atrial fibrillation (HCC)- (present on admission)   Assessment & Plan    Xarelto, Metoprolol          Essential hypertension- (present on admission)   Assessment & Plan    Metoprolol        Protein-calorie malnutrition, severe (HCC)- (present on admission)   Assessment & Plan    Nutrition consult        Multiple wounds of skin- (present on admission)   Assessment & Plan    Wound care        HIV (human immunodeficiency virus infection) (HCC)- (present on admission)   Assessment & Plan    Continue regimen  Social work assisting in arranging medications upon discharge        Macrocytic anemia- (present on admission)   Assessment & Plan    Follow cbc             VTE prophylaxis: Xarelto

## 2018-10-26 LAB — CK SERPL-CCNC: 127 U/L (ref 0–154)

## 2018-10-26 PROCEDURE — 92526 ORAL FUNCTION THERAPY: CPT

## 2018-10-26 PROCEDURE — 770006 HCHG ROOM/CARE - MED/SURG/GYN SEMI*

## 2018-10-26 PROCEDURE — 700102 HCHG RX REV CODE 250 W/ 637 OVERRIDE(OP): Performed by: HOSPITALIST

## 2018-10-26 PROCEDURE — A9270 NON-COVERED ITEM OR SERVICE: HCPCS | Performed by: INTERNAL MEDICINE

## 2018-10-26 PROCEDURE — 82550 ASSAY OF CK (CPK): CPT

## 2018-10-26 PROCEDURE — 97535 SELF CARE MNGMENT TRAINING: CPT

## 2018-10-26 PROCEDURE — 700102 HCHG RX REV CODE 250 W/ 637 OVERRIDE(OP): Performed by: INTERNAL MEDICINE

## 2018-10-26 PROCEDURE — A9270 NON-COVERED ITEM OR SERVICE: HCPCS | Performed by: FAMILY MEDICINE

## 2018-10-26 PROCEDURE — 700102 HCHG RX REV CODE 250 W/ 637 OVERRIDE(OP): Performed by: FAMILY MEDICINE

## 2018-10-26 PROCEDURE — A9270 NON-COVERED ITEM OR SERVICE: HCPCS | Performed by: HOSPITALIST

## 2018-10-26 PROCEDURE — 99232 SBSQ HOSP IP/OBS MODERATE 35: CPT | Performed by: HOSPITALIST

## 2018-10-26 PROCEDURE — 36415 COLL VENOUS BLD VENIPUNCTURE: CPT

## 2018-10-26 RX ADMIN — LAMIVUDINE 300 MG: 150 TABLET, FILM COATED ORAL at 04:55

## 2018-10-26 RX ADMIN — OMEPRAZOLE 20 MG: 20 CAPSULE, DELAYED RELEASE ORAL at 04:55

## 2018-10-26 RX ADMIN — METOPROLOL TARTRATE 25 MG: 25 TABLET, FILM COATED ORAL at 04:55

## 2018-10-26 RX ADMIN — METOPROLOL TARTRATE 25 MG: 25 TABLET, FILM COATED ORAL at 16:13

## 2018-10-26 RX ADMIN — CLONAZEPAM 1 MG: 1 TABLET ORAL at 04:55

## 2018-10-26 RX ADMIN — ZOLPIDEM TARTRATE 10 MG: 5 TABLET ORAL at 00:54

## 2018-10-26 RX ADMIN — GABAPENTIN 300 MG: 300 CAPSULE ORAL at 04:55

## 2018-10-26 RX ADMIN — OXYCODONE HYDROCHLORIDE 5 MG: 5 TABLET ORAL at 16:14

## 2018-10-26 RX ADMIN — CLONAZEPAM 1 MG: 1 TABLET ORAL at 16:13

## 2018-10-26 RX ADMIN — DOLUTEGRAVIR SODIUM 50 MG: 50 TABLET, FILM COATED ORAL at 04:55

## 2018-10-26 RX ADMIN — MICONAZOLE NITRATE: 20 CREAM TOPICAL at 05:00

## 2018-10-26 RX ADMIN — RIVAROXABAN 20 MG: 20 TABLET, FILM COATED ORAL at 16:13

## 2018-10-26 RX ADMIN — POTASSIUM CHLORIDE 20 MEQ: 1500 TABLET, EXTENDED RELEASE ORAL at 04:55

## 2018-10-26 RX ADMIN — ABACAVIR 600 MG: 300 TABLET, FILM COATED ORAL at 04:55

## 2018-10-26 RX ADMIN — GABAPENTIN 300 MG: 300 CAPSULE ORAL at 16:13

## 2018-10-26 RX ADMIN — ATORVASTATIN CALCIUM 40 MG: 40 TABLET, FILM COATED ORAL at 16:17

## 2018-10-26 RX ADMIN — MICONAZOLE NITRATE: 20 CREAM TOPICAL at 16:19

## 2018-10-26 RX ADMIN — FLUOXETINE HYDROCHLORIDE 20 MG: 20 CAPSULE ORAL at 04:55

## 2018-10-26 ASSESSMENT — ENCOUNTER SYMPTOMS
HEADACHES: 0
DIZZINESS: 0
EYE DISCHARGE: 0
WEAKNESS: 1
BACK PAIN: 0
ABDOMINAL PAIN: 0
COUGH: 0
FOCAL WEAKNESS: 1
PALPITATIONS: 0
NERVOUS/ANXIOUS: 0
CONSTIPATION: 0
SHORTNESS OF BREATH: 0
DIARRHEA: 0
DEPRESSION: 0
BLURRED VISION: 0
MYALGIAS: 0
FEVER: 0
SORE THROAT: 0

## 2018-10-26 ASSESSMENT — PAIN SCALES - GENERAL
PAINLEVEL_OUTOF10: 10
PAINLEVEL_OUTOF10: 0
PAINLEVEL_OUTOF10: 0

## 2018-10-26 ASSESSMENT — COGNITIVE AND FUNCTIONAL STATUS - GENERAL
DAILY ACTIVITIY SCORE: 13
DRESSING REGULAR UPPER BODY CLOTHING: A LOT
PERSONAL GROOMING: A LITTLE
EATING MEALS: A LITTLE
HELP NEEDED FOR BATHING: A LOT
DRESSING REGULAR LOWER BODY CLOTHING: A LOT
TOILETING: TOTAL
SUGGESTED CMS G CODE MODIFIER DAILY ACTIVITY: CL

## 2018-10-26 NOTE — THERAPY
"Occupational Therapy Treatment completed with focus on ADLs, ADL transfers and patient education.  Functional Status:  Pt seen for OT tx today.  Pt was pleasant and cooperative during the session.  Continues to be limited by endurance, weak gross grasp in R UE (dominate), L side deficits, cognition, transfers, and self care.  Pt is able to tolerate 45 minutes of activity with frequent therapeutic rests.  Pt completed self feeding with CGA using AE, max A using sock aide, UB dressing with mod A, and seated gr/hy with mod A.  Needing frequent cues to initiate, sequence, focus, follow through, and problem solve during ADL tasks.  Pt sat EOB for 2 minutes needing CGA using x1 UE for support with no LOB.  Pt completed supine to sit, sit to stand, and stand pivot with mod A.  Pt would benefit from continued inpt transitional care OT as they are continuing to need assistance with ADLs.   Plan of Care: Will benefit from Occupational Therapy 5 times per week  Discharge Recommendations:  Equipment Will Continue to Assess for Equipment Needs.     See \"Rehab Therapy-Acute\" Patient Summary Report for complete documentation.   "

## 2018-10-26 NOTE — DISCHARGE PLANNING
Anticipated Discharge Disposition: SNF    Action: LSW spoke with Bedside RN. Pt has a 90 day supply of his medication at home. He will call his friend to see if he can bring it the hospital for Pharmacy to verify meds.    Barriers to Discharge: 90 Day HIV RX    Plan: LSW to f/u with [facility] and pt

## 2018-10-26 NOTE — THERAPY
"Speech Language Therapy dysphagia treatment completed.   Functional Status:  Patient seen during noon meal with dysphagia III/thins tray.  Affect was flat and the patient stated frustrations regarding prolonged admission.  He required tray set up but fed himself with minimal assistance. The patient stated swallowing strategies to single straw sips but required min cues to reduce bolus size.  He presented with prolonged mastication with large bites but had timely initiation of swallow trigger.  Patient maintained clear vocal quality throughout the session and had no overt s/sx of aspiration with any consistency consumed.    Recommendations: Continue Dysphagia III/thins diet with DIRECT supervision and feeding assistance as needed.  SLP following    Plan of Care: Will benefit from Speech Therapy 5 times per week  Post-Acute Therapy: Discharge to a transitional care facility for continued speech therapy services.    See \"Rehab Therapy-Acute\" Patient Summary Report for complete documentation.     "

## 2018-10-26 NOTE — PROGRESS NOTES
2 RN skin check complete:  Scabs on right ear.   Rash on back.   DTI on right hip, slough present., mepilex in place.   Redness on left hip , blanching, mepilex in place.   Abrasion on left hip.   DTI on right lateral foot, dressing in place.   Blister on right medial foot, dressing in place.   Scab on left medial malleolus, dressing in place.

## 2018-10-26 NOTE — PROGRESS NOTES
Patient stable except wanting to discharge tomorrow. Explained to patient that he has to wait for the  and Care Coordinator to arrange for his insurance to provide a 90 day supply of HIV meds for him. Patient still fixated on leaving ASAP.

## 2018-10-26 NOTE — CARE PLAN
Problem: Mobility  Goal: Risk for activity intolerance will decrease    Intervention: Encourage patient to increase activity level in collaboration with Interdisciplinary Team  Got patient up to chair for each meal today.      Problem: Pain  Goal: Alleviation of Pain or a reduction in pain to the patient's comfort goal    Intervention: Pain Management--Medications  Patient only required pain management once today. See MAR.

## 2018-10-26 NOTE — CARE PLAN
Problem: Mobility  Goal: Risk for activity intolerance will decrease    Intervention: Encourage patient to increase activity level in collaboration with Interdisciplinary Team  Patient stood for about 2 minutes with x 1 assist when getting up to chair for lunch. Strong effort, but poor balance.      Problem: Skin Integrity  Goal: Risk for impaired skin integrity will decrease    Intervention: Implement precautions to protect skin integrity in collaboration with the interdisciplinary team  Encouraging patient to eat more, so he can heal his skin faster. Some understanding demonstrated. Will reinforce rationale.

## 2018-10-26 NOTE — DISCHARGE PLANNING
Agency/Facility Name: [facility]  Outcome: Attempted to follow up, however, no answer. Voicemail left.

## 2018-10-26 NOTE — CARE PLAN
Problem: Safety  Goal: Will remain free from injury  Outcome: PROGRESSING AS EXPECTED  Bed alarm on, call light within reach, bed in lowest position.     Problem: Venous Thromboembolism (VTW)/Deep Vein Thrombosis (DVT) Prevention:  Goal: Patient will participate in Venous Thrombosis (VTE)/Deep Vein Thrombosis (DVT)Prevention Measures  Outcome: PROGRESSING AS EXPECTED  SCDs on.

## 2018-10-26 NOTE — PROGRESS NOTES
Hospital Medicine Daily Progress Note    Date of Service  10/26/2018    Chief Complaint  63 y.o. male admitted 10/8/2018 with Stroke    Hospital Course  Admitted with stroke, rhabdomyolysis, acute kidney injury, known history of chronic atrial fibrillation, Eliquis was held for scheduled right ankle surgery    Interval Problem Update  Patient still with memory deficits and intermittent confusion  Participating with therapies  Overall steadily improving  No issues overnight    Consultants/Specialty  Neurology signed off  Critical care signed off    Code Status  Full    Disposition  Awaiting outpatient HIV medications to be arranged prior to being transferred to skilled nursing facility    Review of Systems  Review of Systems   Constitutional: Positive for malaise/fatigue. Negative for fever.   HENT: Negative for hearing loss and sore throat.    Eyes: Negative for blurred vision and discharge.   Respiratory: Negative for cough and shortness of breath.    Cardiovascular: Negative for chest pain and palpitations.   Gastrointestinal: Negative for abdominal pain, constipation and diarrhea.   Genitourinary: Negative for dysuria and frequency.   Musculoskeletal: Negative for back pain and myalgias.   Skin: Negative for itching and rash.   Neurological: Positive for focal weakness and weakness. Negative for dizziness and headaches.   Psychiatric/Behavioral: Negative for depression. The patient is not nervous/anxious.         Physical Exam  Temp:  [36.1 °C (97 °F)-37.8 °C (100 °F)] 36.1 °C (97 °F)  Pulse:  [51-76] 51  Resp:  [15-16] 15  BP: (107-121)/(70-79) 109/71    Physical Exam   Constitutional: No distress.   Appears chronically ill   HENT:   Head: Normocephalic and atraumatic.   Mouth/Throat: No oropharyngeal exudate.   Eyes: Pupils are equal, round, and reactive to light. EOM are normal. No scleral icterus.   Neck: Normal range of motion. Neck supple. No thyromegaly present.   Cardiovascular: Normal rate and regular  rhythm.    No murmur heard.  Pulmonary/Chest: Effort normal and breath sounds normal. No respiratory distress.   Abdominal: Soft. Bowel sounds are normal. He exhibits no distension.   Musculoskeletal: He exhibits no edema or tenderness.   Neurological: He is alert. He is disoriented.   Shallow left nasolabial fold  RUE 4+.5, RLE 3-4/5, LUE 0-1/5, LLE 3/5  Oriented to person and place; disoriented to time and situation   Skin: Skin is warm and dry. He is not diaphoretic. No erythema.   Nursing note and vitals reviewed.      Fluids    Intake/Output Summary (Last 24 hours) at 10/26/18 1128  Last data filed at 10/26/18 0400   Gross per 24 hour   Intake              240 ml   Output             1300 ml   Net            -1060 ml       Laboratory  Recent Labs      10/25/18   0409   WBC  7.2   RBC  3.39*   HEMOGLOBIN  10.9*   HEMATOCRIT  33.3*   MCV  98.2*   MCH  32.2   MCHC  32.7*   RDW  46.5   PLATELETCT  465*   MPV  8.4*     Recent Labs      10/25/18   0409   SODIUM  134*   POTASSIUM  3.8   CHLORIDE  99   CO2  27   GLUCOSE  95   BUN  15   CREATININE  0.86   CALCIUM  10.1                   Imaging  DX-CHEST-PORTABLE (1 VIEW)   Final Result         1.  Hazy right infrahilar density, decreased since prior, appearance compatible with improving infiltrate.   2.  Atherosclerosis      US-EXTREMITY VENOUS UPPER UNILAT LEFT   Final Result      DX-ELBOW-COMPLETE 3+ LEFT   Final Result      No evidence of fracture or dislocation.      DX-CHEST-LIMITED (1 VIEW)   Final Result         Patchy bibasilar opacities, likely atelectasis.      US-CAROTID DOPPLER BILAT   Final Result      EC-ECHOCARDIOGRAM COMPLETE W/O CONT   Final Result      MR-BRAIN-W/O   Final Result      1.  Punctate infarcts in the left posterior frontal and right posterior frontal cortices.   2.  Scattered small sized acute infarcts in the left parietal cortex and left parietal deep white matter.   3.  Chronic bilateral cerebellar infarcts.   4.  Chronic left centrum  semiovale lacunar infarct.   5.  Moderate diffuse cerebral substance loss.   6.  Mild microangiopathic ischemic change versus demyelination or gliosis.      CT-HEAD W/O   Final Result      1.  Cerebral atrophy.      2.  White matter lucencies most consistent with small vessel ischemic change versus demyelination or gliosis.      3.  Otherwise, Head CT without contrast with no acute findings. No evidence of acute cerebral  hemorrhage or mass lesion.      DX-CHEST-PORTABLE (1 VIEW)   Final Result      Lung base atelectasis.           Assessment/Plan  * Cerebrovascular accident (CVA) due to embolism (HCC)- (present on admission)   Assessment & Plan    Xarelto  PT/OT, SLP  Continue Lipitor  Consults placed to skilled nursing facility; has approvals we are awaiting HIV medications to be arranged prior to transfer        Dysphagia- (present on admission)   Assessment & Plan    Dysphagia 3  Speech therapy following alongside        Chronic deep vein thrombosis (DVT) of brachial vein (HCC)- (present on admission)   Assessment & Plan    Xarelto        Chronic atrial fibrillation (HCC)- (present on admission)   Assessment & Plan    Xarelto, Metoprolol          Essential hypertension- (present on admission)   Assessment & Plan    Well-controlled  Continue metoprolol        Protein-calorie malnutrition, severe (HCC)- (present on admission)   Assessment & Plan    Nutrition consult        Multiple wounds of skin- (present on admission)   Assessment & Plan    Wound care        HIV (human immunodeficiency virus infection) (HCC)- (present on admission)   Assessment & Plan    Continue regimen  Social work assisting in arranging medications upon discharge        No significant change in assessment or plan; still awaiting placement    VTE prophylaxis: Xarelto

## 2018-10-26 NOTE — PROGRESS NOTES
Scab x 1 to right ear, right elbow, and right thumb. Excoriation and redness to left hip. Evolved deep tissue injury to right hip; slough in wound bed. Mepilex replaced. Scabs to bilat ankles and right foot. Left heel pressure injury also present. Yeast rash also noted to buttocks.

## 2018-10-27 PROCEDURE — A9270 NON-COVERED ITEM OR SERVICE: HCPCS | Performed by: HOSPITALIST

## 2018-10-27 PROCEDURE — 700102 HCHG RX REV CODE 250 W/ 637 OVERRIDE(OP): Performed by: FAMILY MEDICINE

## 2018-10-27 PROCEDURE — 700102 HCHG RX REV CODE 250 W/ 637 OVERRIDE(OP): Performed by: HOSPITALIST

## 2018-10-27 PROCEDURE — A9270 NON-COVERED ITEM OR SERVICE: HCPCS | Performed by: FAMILY MEDICINE

## 2018-10-27 PROCEDURE — 770006 HCHG ROOM/CARE - MED/SURG/GYN SEMI*

## 2018-10-27 PROCEDURE — 99232 SBSQ HOSP IP/OBS MODERATE 35: CPT | Performed by: HOSPITALIST

## 2018-10-27 PROCEDURE — A9270 NON-COVERED ITEM OR SERVICE: HCPCS | Performed by: INTERNAL MEDICINE

## 2018-10-27 PROCEDURE — 700102 HCHG RX REV CODE 250 W/ 637 OVERRIDE(OP): Performed by: INTERNAL MEDICINE

## 2018-10-27 RX ADMIN — TRAMADOL HYDROCHLORIDE 50 MG: 50 TABLET, FILM COATED ORAL at 03:20

## 2018-10-27 RX ADMIN — MICONAZOLE NITRATE: 20 CREAM TOPICAL at 05:58

## 2018-10-27 RX ADMIN — METOPROLOL TARTRATE 25 MG: 25 TABLET, FILM COATED ORAL at 17:08

## 2018-10-27 RX ADMIN — ABACAVIR 600 MG: 300 TABLET, FILM COATED ORAL at 05:49

## 2018-10-27 RX ADMIN — FLUOXETINE HYDROCHLORIDE 20 MG: 20 CAPSULE ORAL at 05:48

## 2018-10-27 RX ADMIN — OXYCODONE HYDROCHLORIDE 5 MG: 5 TABLET ORAL at 00:18

## 2018-10-27 RX ADMIN — GABAPENTIN 300 MG: 300 CAPSULE ORAL at 05:50

## 2018-10-27 RX ADMIN — MICONAZOLE NITRATE: 20 CREAM TOPICAL at 17:11

## 2018-10-27 RX ADMIN — CLONAZEPAM 1 MG: 1 TABLET ORAL at 05:48

## 2018-10-27 RX ADMIN — RIVAROXABAN 20 MG: 20 TABLET, FILM COATED ORAL at 17:08

## 2018-10-27 RX ADMIN — DOLUTEGRAVIR SODIUM 50 MG: 50 TABLET, FILM COATED ORAL at 05:49

## 2018-10-27 RX ADMIN — LAMIVUDINE 300 MG: 150 TABLET, FILM COATED ORAL at 05:49

## 2018-10-27 RX ADMIN — ATORVASTATIN CALCIUM 40 MG: 40 TABLET, FILM COATED ORAL at 22:31

## 2018-10-27 RX ADMIN — METOPROLOL TARTRATE 25 MG: 25 TABLET, FILM COATED ORAL at 05:47

## 2018-10-27 RX ADMIN — OXYCODONE HYDROCHLORIDE 5 MG: 5 TABLET ORAL at 08:23

## 2018-10-27 RX ADMIN — POTASSIUM CHLORIDE 20 MEQ: 1500 TABLET, EXTENDED RELEASE ORAL at 05:51

## 2018-10-27 RX ADMIN — GABAPENTIN 300 MG: 300 CAPSULE ORAL at 17:08

## 2018-10-27 RX ADMIN — OXYCODONE HYDROCHLORIDE 5 MG: 5 TABLET ORAL at 16:10

## 2018-10-27 RX ADMIN — OMEPRAZOLE 20 MG: 20 CAPSULE, DELAYED RELEASE ORAL at 05:50

## 2018-10-27 RX ADMIN — CLONAZEPAM 1 MG: 1 TABLET ORAL at 16:09

## 2018-10-27 ASSESSMENT — ENCOUNTER SYMPTOMS
VOMITING: 0
DIZZINESS: 0
ABDOMINAL PAIN: 0
MEMORY LOSS: 1
SHORTNESS OF BREATH: 0
SORE THROAT: 0
HEADACHES: 0
DEPRESSION: 0
MYALGIAS: 1
CHILLS: 0
BLURRED VISION: 0
WEAKNESS: 1
NAUSEA: 0
NERVOUS/ANXIOUS: 0
EYE DISCHARGE: 0
FOCAL WEAKNESS: 1
BACK PAIN: 1
FEVER: 0
COUGH: 0

## 2018-10-27 ASSESSMENT — PAIN SCALES - GENERAL
PAINLEVEL_OUTOF10: 10
PAINLEVEL_OUTOF10: 7
PAINLEVEL_OUTOF10: 0
PAINLEVEL_OUTOF10: 0
PAINLEVEL_OUTOF10: 4
PAINLEVEL_OUTOF10: 6

## 2018-10-27 NOTE — PROGRESS NOTES
Received report from night nurse at the bedside. Assume care of Pt at 0700. Assessment completed Pt A&O X  4. Pt denies numbness and tingling, Pt complains of pain in back, shoulder and head, medicated per mar and heat pack provided, Assist of 2. Pt in bed, bed in lowest position, call light in place, bed alarm is on, treaded slipper socks on.

## 2018-10-27 NOTE — PROGRESS NOTES
2 RN skin check complete. Scabs seen on right ear, right thumb, and right elbow. DTI on right hip with mepilex in place. Wound on left hip with mepilex in place, Scabs to bilateral ankles, mepilex in place. Redness noted to bilateral heels, blanching, mepilex in place, heels floated, waffle mattress in place.

## 2018-10-27 NOTE — PROGRESS NOTES
Pt AAOX4, LUE strength at 1/5 and generalized weakness noted. Up with x2 assist to chair. Denies pain. Condom catheter in place. Q2 turns in place. Soft touch call light in reach. Discharge expected for tomorrow.

## 2018-10-27 NOTE — PROGRESS NOTES
2 RN skin check complete. Scabs to right ear, right thumb, right elbow noted. DTI on right hip with mepilex in place. Wound on left hip open to air. Scabs to bilateral ankles, mepilex in place.

## 2018-10-27 NOTE — PROGRESS NOTES
Hospital Medicine Daily Progress Note    Date of Service  10/27/2018    Chief Complaint  63 y.o. male admitted 10/8/2018 with Stroke    Hospital Course  Admitted with stroke, rhabdomyolysis, acute kidney injury, known history of chronic atrial fibrillation, Eliquis was held for scheduled right ankle surgery    Interval Problem Update  Patient still with memory deficits and does not remember who I am  His friend brought in HIV medications  Today complains of generalized aches and pains all over  Vitals well-controlled  No other acute events overnight    Consultants/Specialty  Neurology signed off  Critical care signed off    Code Status  Full    Disposition  Awaiting accepting skilled nursing facility    Review of Systems  Review of Systems   Constitutional: Positive for malaise/fatigue. Negative for chills and fever.   HENT: Negative for hearing loss and sore throat.    Eyes: Negative for blurred vision and discharge.   Respiratory: Negative for cough and shortness of breath.    Cardiovascular: Negative for chest pain.   Gastrointestinal: Negative for abdominal pain, nausea and vomiting.   Genitourinary: Negative for dysuria and frequency.   Musculoskeletal: Positive for back pain, joint pain and myalgias.        Generalized pain all over   Skin: Negative for itching and rash.   Neurological: Positive for focal weakness and weakness. Negative for dizziness and headaches.   Psychiatric/Behavioral: Positive for memory loss. Negative for depression. The patient is not nervous/anxious.         Physical Exam  Temp:  [36.2 °C (97.1 °F)-36.9 °C (98.5 °F)] 36.9 °C (98.5 °F)  Pulse:  [65-80] 80  Resp:  [15-16] 15  BP: (103-130)/(62-83) 127/62    Physical Exam   Constitutional: No distress.   Appears chronically ill   HENT:   Head: Normocephalic and atraumatic.   Mouth/Throat: No oropharyngeal exudate.   Eyes: Pupils are equal, round, and reactive to light. Conjunctivae and EOM are normal.   Neck: Normal range of motion. Neck  supple. No thyromegaly present.   Cardiovascular: Normal rate and regular rhythm.    No murmur heard.  Pulmonary/Chest: Effort normal and breath sounds normal. No respiratory distress.   Abdominal: Soft. Bowel sounds are normal. He exhibits no distension.   Musculoskeletal: He exhibits no edema or tenderness.   Neurological: He is alert. He is disoriented. No cranial nerve deficit.   Shallow left nasolabial fold  RUE 4+.5, RLE 3-4/5, LUE 0-1/5, LLE 3/5  Oriented to person and place; disoriented to time and situation   Skin: Skin is warm and dry. He is not diaphoretic. No erythema.   Nursing note and vitals reviewed.      Fluids    Intake/Output Summary (Last 24 hours) at 10/27/18 1310  Last data filed at 10/26/18 1755   Gross per 24 hour   Intake                0 ml   Output              700 ml   Net             -700 ml       Laboratory  Recent Labs      10/25/18   0409   WBC  7.2   RBC  3.39*   HEMOGLOBIN  10.9*   HEMATOCRIT  33.3*   MCV  98.2*   MCH  32.2   MCHC  32.7*   RDW  46.5   PLATELETCT  465*   MPV  8.4*     Recent Labs      10/25/18   0409   SODIUM  134*   POTASSIUM  3.8   CHLORIDE  99   CO2  27   GLUCOSE  95   BUN  15   CREATININE  0.86   CALCIUM  10.1                   Imaging  DX-CHEST-PORTABLE (1 VIEW)   Final Result         1.  Hazy right infrahilar density, decreased since prior, appearance compatible with improving infiltrate.   2.  Atherosclerosis      US-EXTREMITY VENOUS UPPER UNILAT LEFT   Final Result      DX-ELBOW-COMPLETE 3+ LEFT   Final Result      No evidence of fracture or dislocation.      DX-CHEST-LIMITED (1 VIEW)   Final Result         Patchy bibasilar opacities, likely atelectasis.      US-CAROTID DOPPLER BILAT   Final Result      EC-ECHOCARDIOGRAM COMPLETE W/O CONT   Final Result      MR-BRAIN-W/O   Final Result      1.  Punctate infarcts in the left posterior frontal and right posterior frontal cortices.   2.  Scattered small sized acute infarcts in the left parietal cortex and left  parietal deep white matter.   3.  Chronic bilateral cerebellar infarcts.   4.  Chronic left centrum semiovale lacunar infarct.   5.  Moderate diffuse cerebral substance loss.   6.  Mild microangiopathic ischemic change versus demyelination or gliosis.      CT-HEAD W/O   Final Result      1.  Cerebral atrophy.      2.  White matter lucencies most consistent with small vessel ischemic change versus demyelination or gliosis.      3.  Otherwise, Head CT without contrast with no acute findings. No evidence of acute cerebral  hemorrhage or mass lesion.      DX-CHEST-PORTABLE (1 VIEW)   Final Result      Lung base atelectasis.           Assessment/Plan  * Cerebrovascular accident (CVA) due to embolism (HCC)- (present on admission)   Assessment & Plan    Xarelto  PT/OT, SLP  Continue Lipitor  Patient had a friend bring in all of his HIV medications and he has a full 90-day supply; now awaiting bed availability at skilled nursing facilities        Dysphagia- (present on admission)   Assessment & Plan    Dysphagia 3  Speech therapy following alongside        Chronic deep vein thrombosis (DVT) of brachial vein (HCC)- (present on admission)   Assessment & Plan    Xarelto        Chronic atrial fibrillation (HCC)- (present on admission)   Assessment & Plan    Xarelto, Metoprolol          Essential hypertension- (present on admission)   Assessment & Plan    Well-controlled  Continue metoprolol        Protein-calorie malnutrition, severe (HCC)- (present on admission)   Assessment & Plan    Nutrition consult        Multiple wounds of skin- (present on admission)   Assessment & Plan    Wound care        HIV (human immunodeficiency virus infection) (HCC)- (present on admission)   Assessment & Plan    Continue regimen  Social work assisting in arranging medications upon discharge        No significant change in assessment or plan; still awaiting placement    VTE prophylaxis: Xarelto

## 2018-10-27 NOTE — CARE PLAN
Problem: Mobility  Goal: Risk for activity intolerance will decrease  Pt goal is to get out of bed up to chair today.     Problem: Skin Integrity  Goal: Risk for impaired skin integrity will decrease  Outcome: PROGRESSING AS EXPECTED  Q 2 turns are in place.

## 2018-10-27 NOTE — DISCHARGE PLANNING
Anticipated Discharge Disposition: SNF     Action: Pt has 90 days worth of HIV medications per attending. This writer asked [facility] to follow up with SNFs if they can now accept the pt.      Barriers to Discharge:  Accepting SNF      Plan: Wait for an accepting SNF

## 2018-10-28 VITALS
OXYGEN SATURATION: 92 % | SYSTOLIC BLOOD PRESSURE: 108 MMHG | WEIGHT: 136.02 LBS | HEIGHT: 72 IN | BODY MASS INDEX: 18.42 KG/M2 | TEMPERATURE: 98.3 F | HEART RATE: 74 BPM | DIASTOLIC BLOOD PRESSURE: 67 MMHG | RESPIRATION RATE: 16 BRPM

## 2018-10-28 PROCEDURE — 700102 HCHG RX REV CODE 250 W/ 637 OVERRIDE(OP): Performed by: INTERNAL MEDICINE

## 2018-10-28 PROCEDURE — A9270 NON-COVERED ITEM OR SERVICE: HCPCS | Performed by: FAMILY MEDICINE

## 2018-10-28 PROCEDURE — A9270 NON-COVERED ITEM OR SERVICE: HCPCS | Performed by: INTERNAL MEDICINE

## 2018-10-28 PROCEDURE — 99239 HOSP IP/OBS DSCHRG MGMT >30: CPT | Performed by: HOSPITALIST

## 2018-10-28 PROCEDURE — 700102 HCHG RX REV CODE 250 W/ 637 OVERRIDE(OP): Performed by: HOSPITALIST

## 2018-10-28 PROCEDURE — A9270 NON-COVERED ITEM OR SERVICE: HCPCS | Performed by: HOSPITALIST

## 2018-10-28 PROCEDURE — 700102 HCHG RX REV CODE 250 W/ 637 OVERRIDE(OP): Performed by: FAMILY MEDICINE

## 2018-10-28 RX ORDER — ATORVASTATIN CALCIUM 40 MG/1
40 TABLET, FILM COATED ORAL
Qty: 30 TAB
Start: 2018-10-28

## 2018-10-28 RX ORDER — ALPRAZOLAM 1 MG/1
1 TABLET ORAL 3 TIMES DAILY PRN
Qty: 15 TAB | Refills: 0 | Status: SHIPPED | OUTPATIENT
Start: 2018-10-28 | End: 2018-11-07

## 2018-10-28 RX ORDER — TRAMADOL HYDROCHLORIDE 50 MG/1
50 TABLET ORAL EVERY 8 HOURS PRN
Qty: 15 TAB | Refills: 0 | Status: SHIPPED | OUTPATIENT
Start: 2018-10-28 | End: 2018-11-07

## 2018-10-28 RX ORDER — GABAPENTIN 300 MG/1
300 CAPSULE ORAL 2 TIMES DAILY
Qty: 90 CAP
Start: 2018-10-28

## 2018-10-28 RX ORDER — FLUOXETINE HYDROCHLORIDE 20 MG/1
20 CAPSULE ORAL DAILY
Qty: 30 CAP
Start: 2018-10-29

## 2018-10-28 RX ORDER — ACETAMINOPHEN 325 MG/1
650 TABLET ORAL EVERY 6 HOURS PRN
Qty: 30 TAB | Refills: 0
Start: 2018-10-28 | End: 2019-04-03

## 2018-10-28 RX ORDER — ONDANSETRON 4 MG/1
4 TABLET, ORALLY DISINTEGRATING ORAL EVERY 4 HOURS PRN
Qty: 10 TAB | Refills: 0
Start: 2018-10-28 | End: 2019-04-03

## 2018-10-28 RX ORDER — OXYCODONE HYDROCHLORIDE 5 MG/1
5-10 TABLET ORAL EVERY 4 HOURS PRN
Qty: 15 TAB | Refills: 0 | Status: SHIPPED | OUTPATIENT
Start: 2018-10-28 | End: 2018-11-07

## 2018-10-28 RX ADMIN — FLUOXETINE HYDROCHLORIDE 20 MG: 20 CAPSULE ORAL at 06:19

## 2018-10-28 RX ADMIN — ZOLPIDEM TARTRATE 10 MG: 5 TABLET ORAL at 02:50

## 2018-10-28 RX ADMIN — OMEPRAZOLE 20 MG: 20 CAPSULE, DELAYED RELEASE ORAL at 06:19

## 2018-10-28 RX ADMIN — CLONAZEPAM 1 MG: 1 TABLET ORAL at 06:18

## 2018-10-28 RX ADMIN — GABAPENTIN 300 MG: 300 CAPSULE ORAL at 06:18

## 2018-10-28 RX ADMIN — LAMIVUDINE 300 MG: 150 TABLET, FILM COATED ORAL at 06:17

## 2018-10-28 RX ADMIN — POTASSIUM CHLORIDE 20 MEQ: 1500 TABLET, EXTENDED RELEASE ORAL at 06:18

## 2018-10-28 RX ADMIN — OXYCODONE HYDROCHLORIDE 5 MG: 5 TABLET ORAL at 06:18

## 2018-10-28 RX ADMIN — MICONAZOLE NITRATE: 20 CREAM TOPICAL at 06:19

## 2018-10-28 RX ADMIN — DOLUTEGRAVIR SODIUM 50 MG: 50 TABLET, FILM COATED ORAL at 06:29

## 2018-10-28 RX ADMIN — ABACAVIR 600 MG: 300 TABLET, FILM COATED ORAL at 06:16

## 2018-10-28 RX ADMIN — METOPROLOL TARTRATE 25 MG: 25 TABLET, FILM COATED ORAL at 06:18

## 2018-10-28 RX ADMIN — OXYCODONE HYDROCHLORIDE 5 MG: 5 TABLET ORAL at 11:57

## 2018-10-28 ASSESSMENT — PAIN SCALES - GENERAL
PAINLEVEL_OUTOF10: 8
PAINLEVEL_OUTOF10: 0
PAINLEVEL_OUTOF10: 4
PAINLEVEL_OUTOF10: 0

## 2018-10-28 NOTE — PROGRESS NOTES
Bedside RN at [facility] received telephone report, all questions answered. Reviewed discharge instructions with patient all questions answered. Patient received prescription for pain medication and HIV own medications at discharge. All medications given to Estephanie Villa with [facility] who picked patient up.

## 2018-10-28 NOTE — PROGRESS NOTES
Pt resting in bed. Denies needs at this time. Turned every two hours, tolerates well. Pillows used for support. Bed alarm on. Call light within reach. Frequent rounding by staff.

## 2018-10-28 NOTE — PROGRESS NOTES
Assume bedside report and care at 0700. Patient alert and oriented x 4. Patient call light within reach, bed locked and lower position, bed alarm on. Pain generalized repositioned Q2H and medicated per MAR,  SCDs refused. Vital signs stable, no signs and symptom of infection noted on assessment. Plan of care discussed. All questions answered at this time.

## 2018-10-28 NOTE — PROGRESS NOTES
2 RN Skin assessment completed with Brisa and handoff  RN Carter. Skin is generally dry and flaky. Bilateral ears are red, scab noted over right ear lobe. Also has scabs, bruising and redness over bilateral arms and elbows. Left hip has scabs and redness over left hip, mipilex applied for protection. Pressure ulcer noted over right trochanter, xeroform dressing and mepilex in place. Scabs noted over right lateral foot and inner malleolus of left foot, heels are red and flaky, mepilex in place. Sacrum/buttocks is red and blanchable, mepilex in place. Back has some redness and excoriation.

## 2018-10-28 NOTE — DISCHARGE PLANNING
Anticipated Discharge Disposition:   Alejandro from [facility] confirmed that they have accepted pt.     Action:   Alejandro confirmed that they have accepted pt and that they can  at 130pm.   Charge nurse aware.   Dr. Connor buckley.   Brother, Mikaela Bolton, aware of dc today to [facility].   Confirmed with CN that no longer has a rectal tube and pt can be transported on a wheelchair in van.     Barriers to Discharge:   none    Plan:   Dc today.

## 2018-10-28 NOTE — PROGRESS NOTES
Spoke with pharmcist patient received PPSV23 April this year and Pneumococcal vaccination is not due again till April next year.

## 2018-10-28 NOTE — PROGRESS NOTES
2 RN Skin assessment completed with KYE Arenas. Skin is generally dry and flaky. Bilateral ears are red, scab noted over right ear lobe. Also has scabs, bruising and redness over bilateral arms and elbows. Left hip has scabs and redness over left hip, mipilex applied for protection. Pressure ulcer noted over right trochanter, cleaned with normal saline, xeroform dressing and mepilex applied. Scabs noted over right lateral foot and inner malleolus of left foot, heels are red and flaky, mepilex applied. Sacrum/buttocks is red and blanchable, mepilex applied. Back has some excoriation. Turned. Will monitor.

## 2018-10-28 NOTE — DISCHARGE INSTRUCTIONS
"Discharge Instructions    Discharged to other by medical transportation with escort. Discharged via wheelchair, hospital escort: Yes.  Special equipment needed: Not Applicable    Be sure to schedule a follow-up appointment with your primary care doctor or any specialists as instructed.     Discharge Plan:   Diet Plan: Discussed  Activity Level: Discussed  Smoking Cessation Offered: Patient Refused  Confirmed Follow up Appointment: Appointment Scheduled  Confirmed Symptoms Management: Discussed  Medication Reconciliation Updated: Yes  Influenza Vaccine Indication: Not indicated: Previously immunized this influenza season and > 8 years of age  Influenza Vaccine Given - only chart on this line when given: Influenza Vaccine Given (See MAR)    I understand that a diet low in cholesterol, fat, and sodium is recommended for good health. Unless I have been given specific instructions below for another diet, I accept this instruction as my diet prescription.   Other diet:Regualr dysphagia 3 mechanical soft thin liquids 1 to 1 supervision by nursing    Special Instructions:     Stroke/CVA/TIA/Hemorrhagic Ischemia Discharge Instructions  You have had a stroke. Your risk factors have been identified as follows:  Age - Over 55  Gender - Men are at a higher risk than women  High blood pressure  Heart disease  Atrial Fibrillation  Previous TIAs or \"mini strokes\"  Smoking  High Cholesterol and lipids  Overweight  Alcohol or drug abuse  It is important that you reduce your risk factors to avoid another stroke in the future. Here are some general guidelines to follow:  · Eat healthy - avoid food high in fat.  · Get regular exercise.  · Maintain a healthy weight.  · Avoid smoking.  · Avoid alcohol and illegal drug use.  · Take your medications as directed.  For more information regarding risk factors, refer to pages 17-19 in your Stroke Patient Education Guide. Stroke Education Guide was given to patient.    Warning signs of a stroke " include (which can also be found on page 3 of your Stroke Patient Education Guide):  · Sudden numbness of weakness of the face, arm or leg (especially on one side of the body).  · Sudden confusion, trouble speaking or understanding.  · Sudden trouble seeing in one or both eyes.  · Sudden trouble walking, dizziness, loss of balance or coordination.  · Sudden severe headache with no known cause.  It is very important to get treatment quickly when a stroke occurs. If you experience any of the above warning signs, call [phone] immediately.     Some patients who have had a stroke will be going home on a blood thinner medication called Warfarin (Coumadin).  This medication requires very close monitoring and follow up.  This follow up can be provided by either your Primary Care Physician or by [facility] Out[facility].  The Outpatient Anticoagulation Service is located at the [facility] at [facility] ([facility]).  If you do not know when your follow up appointment is scheduled, call [phone] to verify your appointment time.      · Is patient discharged on Warfarin / Coumadin?   No     Ischemic Stroke  An ischemic stroke is the sudden death of brain tissue. Blood carries oxygen to all areas of the body. This type of stroke happens when your blood does not flow to your brain like normal. Your brain cannot get the oxygen it needs. This is an emergency. It must be treated right away.  Symptoms of a stroke usually happen all of a sudden. You may notice them when you wake up. They can include:  · Weakness or loss of feeling in your face, arm, or leg. This often happens on one side of the body.  · Trouble walking.  · Trouble moving your arms or legs.  · Loss of balance or coordination.  · Feeling confused.  · Trouble talking or understanding what people are saying.  · Slurred speech.  · Trouble seeing.  · Seeing two of one object (double  vision).  · Feeling dizzy.  · Feeling sick to your stomach (nauseous) and throwing up (vomiting).  · A very bad headache for no reason.  Get help as soon as any of these problems start. This is important. Some treatments work better if they are given right away. These include:  · Aspirin.  · Medicines to control blood pressure.  · A shot (injection) of medicine to break up the blood clot.  · Treatments given in the blood vessel (artery) to take out the clot or break it up.  Other treatments may include:  · Oxygen.  · Fluids given through an IV tube.  · Medicines to thin out your blood.  · Procedures to help your blood flow better.  What increases the risk?  Certain things may make you more likely to have a stroke. Some of these are things that you can change, such as:  · Being very overweight (obesity).  · Smoking.  · Taking birth control pills.  · Not being active.  · Drinking too much alcohol.  · Using drugs.  Other risk factors include:  · High blood pressure.  · High cholesterol.  · Diabetes.  · Heart disease.  · Being , , , or .  · Being over age 60.  · Family history of stroke.  · Having had blood clots, stroke, or warning stroke (transient ischemic attack, TIA) in the past.  · Sickle cell disease.  · Being a woman with a history of high blood pressure in pregnancy (preeclampsia).  · Migraine headache.  · Sleep apnea.  · Having an irregular heartbeat (atrial fibrillation).  · Long-term (chronic) diseases that cause soreness and swelling (inflammation).  · Disorders that affect how your blood clots.  Follow these instructions at home:  Medicines  · Take over-the-counter and prescription medicines only as told by your doctor.  · If you were told to take aspirin or another medicine to thin your blood, take it exactly as told by your doctor.  ¨ Taking too much of the medicine can cause bleeding.  ¨ If you do not take enough, it may not work as well.  · Know the  side effects of your medicines. If you are taking a blood thinner, make sure you:  ¨ Hold pressure over any cuts for longer than usual.  ¨ Tell your dentist and other doctors that you take this medicine.  ¨ Avoid activities that may cause damage or injury to your body.  Eating and drinking  · Follow instructions from your doctor about what you cannot eat or drink.  · Eat healthy foods.  · If you have trouble with swallowing, do these things to avoid choking:  ¨ Take small bites when eating.  ¨ Eat foods that are soft or pureed.  Safety  · Follow instructions from your health care team about physical activity.  · Use a walker or cane as told by your doctor.  · Keep your home safe so you do not fall. This may include:  ¨ Having experts look at your home to make sure it is safe.  ¨ Putting grab bars in the bedroom and bathroom.  ¨ Using raised toilets.  ¨ Putting a seat in the shower.  General instructions  · Do not use any tobacco products.  ¨ Examples of these are cigarettes, chewing tobacco, and e-cigarettes.  ¨ If you need help quitting, ask your doctor.  · Limit how much alcohol you drink. This means no more than 1 drink a day for nonpregnant women and 2 drinks a day for men. One drink equals 12 oz of beer, 5 oz of wine, or 1½ oz of hard liquor.  · If you need help to stop using drugs or alcohol, ask your doctor to refer you to a program or specialist.  · Stay active. Exercise as told by your doctor.  · Keep all follow-up visits as told by your doctor. This is important.  Get help right away if:  · You suddenly:  ¨ Have weakness or loss of feeling in your face, arm, or leg.  ¨ Feel confused.  ¨ Have trouble talking or understanding what people are saying.  ¨ Have trouble seeing.  ¨ Have trouble walking.  ¨ Have trouble moving your arms or legs.  ¨ Feel dizzy.  ¨ Lose your balance or coordination.  ¨ Have a very bad headache and you do not know why.  · You pass out (lose consciousness) or almost pass out.  · You  have jerky movements that you cannot control (seizure).  These symptoms may be an emergency. Do not wait to see if the symptoms will go away. Get medical help right away. Call your local emergency services (911 in the [place] Do not drive yourself to the hospital.   This information is not intended to replace advice given to you by your health care provider. Make sure you discuss any questions you have with your health care provider.  Document Released: 12/06/2012 Document Revised: 05/30/2017 Document Reviewed: 03/15/2017  [employer] Interactive Patient Education © 2017 [employer]    Stroke Prevention  Some health problems and behaviors may make it more likely for you to have a stroke. Below are ways to lessen your risk of having a stroke.  · Be active for at least 30 minutes on most or all days.  · Do not smoke. Try not to be around others who smoke.  · Do not drink too much alcohol.  ¨ Do not have more than 2 drinks a day if you are a man.  ¨ Do not have more than 1 drink a day if you are a woman and are not pregnant.  · Eat healthy foods, such as fruits and vegetables. If you were put on a specific diet, follow the diet as told.  · Keep your cholesterol levels under control through diet and medicines. Look for foods that are low in saturated fat, trans fat, cholesterol, and are high in fiber.  · If you have diabetes, follow all diet plans and take your medicine as told.  · Ask your doctor if you need treatment to lower your blood pressure. If you have high blood pressure (hypertension), follow all diet plans and take your medicine as told by your doctor.  · If you are 18-39 years old, have your blood pressure checked every 3-5 years. If you are age 40 or older, have your blood pressure checked every year.  · Keep a healthy weight. Eat foods that are low in calories, salt, saturated fat, trans fat, and cholesterol.  · Do not take drugs.  · Avoid birth control pills, if this applies. Talk to your doctor about the  risks of taking birth control pills.  · Talk to your doctor if you have sleep problems (sleep apnea).  · Take all medicine as told by your doctor.  ¨ You may be told to take aspirin or blood thinner medicine. Take this medicine as told by your doctor.  ¨ Understand your medicine instructions.  · Make sure any other conditions you have are being taken care of.  Get help right away if:  · You suddenly lose feeling (you feel numb) or have weakness in your face, arm, or leg.  · Your face or eyelid hangs down to one side.  · You suddenly feel confused.  · You have trouble talking (aphasia) or understanding what people are saying.  · You suddenly have trouble seeing in one or both eyes.  · You suddenly have trouble walking.  · You are dizzy.  · You lose your balance or your movements are clumsy (uncoordinated).  · You suddenly have a very bad headache and you do not know the cause.  · You have new chest pain.  · Your heart feels like it is fluttering or skipping a beat (irregular heartbeat).  Do not wait to see if the symptoms above go away. Get help right away. Call your local emergency services (911 in [place] Do not drive yourself to the hospital.   This information is not intended to replace advice given to you by your health care provider. Make sure you discuss any questions you have with your health care provider.  Document Released: 06/18/2013 Document Revised: 05/25/2017 Document Reviewed: 06/20/2014  [employer] Interactive Patient Education © 2017 [employer]    Depression / Suicide Risk    Acute Kidney Injury, Adult  Acute kidney injury (TENZIN) occurs when there is sudden (acute) damage to the kidneys. A small amount of kidney damage may not cause problems, but a large amount of damage may make it difficult or impossible for the kidneys to work the way they should. TENZIN may develop into long-lasting (chronic) kidney disease. Early detection and treatment of TENZIN may prevent kidney damage from becoming permanent or  getting worse.  What are the causes?  Common causes of this condition include:  · A problem with blood flow to the kidneys. This may be caused by:  ¨ Blood loss.  ¨ Heart and blood vessel (cardiovascular) disease.  ¨ Severe burns.  ¨ Liver disease.  · Direct damage to the kidneys. This may be caused by:  ¨ Some medicines.  ¨ A kidney infection.  ¨ Poisoning.  ¨ Being around or in contact with poisonous (toxic) substances.  ¨ A surgical wound.  ¨ A hard, direct force to the kidney area.  · A sudden blockage of urine flow. This may be caused by:  ¨ Cancer.  ¨ Kidney stones.  ¨ Enlarged prostate in males.  What are the signs or symptoms?  Symptoms develop slowly and may not be obvious until the kidney damage becomes severe. It is possible to have TENZIN for years without showing any symptoms. Symptoms of this condition can include:  · Swelling (edema) of the face, legs, ankles, or feet.  · Numbness, tingling, or loss of feeling (sensation) in the hands or feet.  · Tiredness (lethargy).  · Nausea or vomiting.  · Confusion or trouble concentrating.  · Problems with urination, such as:  ¨ Painful or burning feeling during urination.  ¨ Decreased urine production.  ¨ Frequent urination, especially at night.  ¨ Bloody urine.  · Muscle twitches and cramps, especially in the legs.  · Shortness of breath.  · Weakness.  · Constant itchiness.  · Loss of appetite.  · Metallic taste in the mouth.  · Trouble sleeping.  · Pale lining of the eyelids and surface of the eye (conjunctiva).  How is this diagnosed?  This condition may be diagnosed with various tests. Tests may include:  · Blood tests.  · Urine tests.  · Imaging tests.  · A test in which a sample of tissue is removed from the kidneys to be looked at under a microscope (kidney biopsy).  How is this treated?  Treatment of TENZIN varies depending on the cause and severity of the kidney damage. In mild cases, treatment may not be needed. The kidneys may heal on their own.  If TENZIN is  more severe, your health care provider will treat the cause of the kidney damage, help the kidneys heal, and prevent problems from occurring. Severe cases may require a procedure to remove toxic wastes from the body (dialysis) or surgery to repair kidney damage. Surgery may involve:  · Repair of a torn kidney.  · Removal of a urine flow obstruction.  Follow these instructions at home:  · Follow your prescribed diet.  · Take over-the-counter and prescription medicines only as told by your health care provider.  ¨ Do not take any new medicines unless approved by your health care provider. Many medicines can worsen your kidney damage.  ¨ Do not take any vitamin and mineral supplements unless approved by your health care provider. Many nutritional supplements can worsen your kidney damage.  ¨ The dose of some medicines that you take may need to be adjusted.  · Do not use any tobacco products, such as cigarettes, chewing tobacco, and e-cigarettes. If you need help quitting, ask your health care provider.  · Keep all follow-up visits as told by your health care provider. This is important.  · Keep track of your blood pressure. Report changes in your blood pressure as told by your health care provider.  · Achieve and maintain a healthy weight. If you need help with this, ask your health care provider.  · Start or continue an exercise plan. Try to exercise at least 30 minutes a day, 5 days a week.  · Stay current with immunizations as told by your health care provider.  Where to find more information:  · American Association of Kidney Patients: www.aakp.org  · National Kidney Foundation: www.kidney.org  · American Kidney Fund: www.akfinc.org  · [facility]: www.lifeoptions.org and www.kidneyschool.org  Contact a health care provider if:  · Your symptoms get worse.  · You develop new symptoms.  Get help right away if:  · You develop symptoms of end-stage kidney disease, which  include:  ¨ Headaches.  ¨ Abnormally dark or light skin.  ¨ Numbness in the hands or feet.  ¨ Easy bruising.  ¨ Frequent hiccups.  ¨ Chest pain.  ¨ Shortness of breath.  ¨ End of menstruation in women.  · You have a fever.  · You have decreased urine production.  · You have pain or bleeding when you urinate.  This information is not intended to replace advice given to you by your health care provider. Make sure you discuss any questions you have with your health care provider.  Document Released: 07/02/2012 Document Revised: 07/27/2017 Document Reviewed: 08/16/2013  [employer] Interactive Patient Education © 2017 [employer]    As you are discharged from this [facility] facility, it is important to learn how to keep safe from harming yourself.    Recognize the warning signs:  · Abrupt changes in personality, positive or negative- including increase in energy   · Giving away possessions  · Change in eating patterns- significant weight changes-  positive or negative  · Change in sleeping patterns- unable to sleep or sleeping all the time   · Unwillingness or inability to communicate  · Depression  · Unusual sadness, discouragement and loneliness  · Talk of wanting to die  · Neglect of personal appearance   · Rebelliousness- reckless behavior  · Withdrawal from people/activities they love  · Confusion- inability to concentrate     If you or a loved one observes any of these behaviors or has concerns about self-harm, here's what you can do:  · Talk about it- your feelings and reasons for harming yourself  · Remove any means that you might use to hurt yourself (examples: pills, rope, extension cords, firearm)  · Get professional help from the community (Mental Health, Substance Abuse, psychological counseling)  · Do not be alone:Call your Safe Contact- someone whom you trust who will be there for you.  · Call your local CRISIS HOTLINE 719-8999 or 851-928-9600  · Call your local Children's Mobile Crisis Response Team  [facility] (911) 142-1604 or www.J[employer]gaM[employer].com.PulpWorks  · Call the toll free National Suicide Prevention Hotlines   · National Suicide Prevention Lifeline 665-838-LVVZ (1621)  · National Hope Line Network 800-SUICIDE (878-0749)

## 2018-10-28 NOTE — DISCHARGE PLANNING
Anticipated Discharge Disposition:   SNF    Action:   BUTCH asked CM about dispo. Upon chart review, pt has 2 accepting SNF and pt has his HIV medications. Asked [facility] to follow up on SNF.     Addendum: [facility] wants to make sure pt has 90 days supply of Triumeq. CM called [facility]. BUTCH Rose will check.    Addendum: @ 3879  BUTCH Rose notified CM that pt has 76 pills. CM notified Alejandro at [facility]. He will check with his supervisor if ok.     Barriers to Discharge:   Pending acceptance by SNF    Plan:   Wait for [facility] update.  Update [facility].

## 2018-10-28 NOTE — CARE PLAN
Problem: Discharge Barriers/Planning  Goal: Patient's continuum of care needs will be met  Outcome: PROGRESSING AS EXPECTED  Discussed barrier for discharge and they have been resolved so patient will be scheduled for discharge at 1330.     Problem: Pain Management  Goal: Pain level will decrease to patient's comfort goal  Outcome: PROGRESSING AS EXPECTED  Medicated for pain management as well as repositioning, patient refusing foot boots, feet floated on pillows.     Problem: Skin Integrity  Goal: Risk for impaired skin integrity will decrease  Outcome: PROGRESSING AS EXPECTED  Q2 turns in place, mepilex and orly cream used to help protect skin.

## 2018-10-28 NOTE — CARE PLAN
Problem: Pain Management  Goal: Pain level will decrease to patient's comfort goal  Outcome: PROGRESSING SLOWER THAN EXPECTED  Rest, repositioning every two hours.    Problem: Skin Integrity  Goal: Risk for impaired skin integrity will decrease    Intervention: Assess risk factors for impaired skin integrity and/or pressure ulcers  Turns every two hours, pillows for support.

## 2018-10-28 NOTE — DISCHARGE PLANNING
Agency/Facility Name: [facility]  Plan or Request: Call placed to [facility], left message for return call.

## 2018-10-28 NOTE — DISCHARGE SUMMARY
Discharge Summary    CHIEF COMPLAINT ON ADMISSION  Chief Complaint   Patient presents with   • Facial Droop   • Extremity Weakness       Reason for Admission  Stroke     CODE STATUS  Full Code    HPI & HOSPITAL COURSE  This is a 63 y.o. male here with left-sided weakness dehydration and rhabdomyolysis.  Patient has a history of chronic atrial fibrillation and a history of HIV.  He presented after he was found down at home.  Apparently he was down for about 2 days.  When he presented to the hospital he is found to have left-sided weakness as well as a left facial droop.  CPK levels are greater than 1600.    He was admitted to the neurology unit.  He was treated with IV fluids for his rhabdomyolysis and nephrology was consulted.  We monitored his CPK levels and those resolved.  His rhabdomyolysis was completely treated.  His renal function normalizes well with IV fluids.    He was on Xarelto for chronic DVT and atrial fibrillation.  But this had been discontinued because of preparation for a left ankle surgery.  He had not been taking this prior to arrival.    During the hospitalization he had slurred speech and left-sided weakness.  The slowly improved but was still persistent at the time of discharge.  He participated with physical therapy occupational therapy as well as speech therapy.    MRI of his brain revealed bilateral acute stroke in the left posterior frontal and right posterior frontal cortices.  There are also scattered small size acute infarct in the left parietal cortex and left parietal deep white matter.  He is also found to have multiple other strokes as well as moderate diffuse cerebral substance loss.  Carotid ultrasound revealed mild bilateral cervical internal carotid arterial stenosis.  Echocardiogram did not reveal any significant findings.    He was evaluated by neurology.  Neurology recommended statins.  He was restarted on his Xarelto.  When he is going to go through with surgery he will need  to bridge with Lovenox.    His condition slowly improved throughout the hospitalization.  By the time of discharge he still has some left-sided weakness and requires some physical therapy and occupational therapy as well as assistance.  His speech has improved.  His facial droop seems to have normalized.  Patient does have memory deficits.  I am not sure what his baseline mental status is but he is quite forgetful.  He does follow commands and answers appropriately.    His discharge was held for a few days because we had to range his HIV medications.  Prior to being accepted to skilled nursing facility he needed approximately 90 days of his antiretrovirals.  Patient informed us that he does have a 90-day supply at home and social work assisted in having a friend bring his medications.  These were verified by pharmacy.  He has been accepted to skilled nursing facility.  He will be transferred in stable but guarded condition       Therefore, he is discharged in guarded and stable condition to skilled nursing facility.    The patient met 2-midnight criteria for an inpatient stay at the time of discharge.      FOLLOW UP ITEMS POST DISCHARGE  PCP as needed    DISCHARGE DIAGNOSES  Principal Problem:    Cerebrovascular accident (CVA) due to embolism (HCC) POA: Yes  Active Problems:    Essential hypertension POA: Yes    Chronic atrial fibrillation (HCC) POA: Yes    Chronic deep vein thrombosis (DVT) of brachial vein (HCC) POA: Yes    Dysphagia POA: Yes    HIV (human immunodeficiency virus infection) (HCC) POA: Yes    Multiple wounds of skin POA: Yes    Protein-calorie malnutrition, severe (HCC) POA: Yes  Resolved Problems:    TENZIN (acute kidney injury) (HCC) POA: Yes    Rhabdomyolysis POA: Yes    Leukocytosis POA: Yes    Elevated liver function tests POA: Yes    Elevated troponin POA: Yes    Hypokalemia POA: No    Macrocytic anemia POA: Yes      FOLLOW UP  No future appointments.  Lenora Markham M.D.  [address] [facility]  [address]  [phone]            MEDICATIONS ON DISCHARGE     Medication List      START taking these medications      Instructions   acetaminophen 325 MG Tabs  Commonly known as:  TYLENOL   Take 2 Tabs by mouth every 6 hours as needed (Mild Pain; (Pain scale 1-3); Temp greater than 100.5 F).  Dose:  650 mg     atorvastatin 40 MG Tabs  Commonly known as:  LIPITOR   Take 1 Tab by mouth every bedtime.  Dose:  40 mg     FLUoxetine 20 MG Caps  Commonly known as:  PROZAC   Take 1 Cap by mouth every day.  Dose:  20 mg     ondansetron 4 MG Tbdp  Commonly known as:  ZOFRAN ODT   Take 1 Tab by mouth every four hours as needed (give PO if IV route is unavailable. May give per feeding tube.).  Dose:  4 mg     oxyCODONE immediate-release 5 MG Tabs  Commonly known as:  ROXICODONE   Take 1-2 Tabs by mouth every four hours as needed for up to 10 days.  Dose:  5-10 mg     tramadol 50 MG Tabs  Commonly known as:  ULTRAM   Take 1 Tab by mouth every 8 hours as needed for up to 10 days.  Dose:  50 mg        CHANGE how you take these medications      Instructions   gabapentin 300 MG Caps  What changed:  when to take this  Commonly known as:  NEURONTIN   Take 1 Cap by mouth 2 Times a Day.  Dose:  300 mg        CONTINUE taking these medications      Instructions   ALPRAZolam 1 MG Tabs  Commonly known as:  XANAX   Take 1 Tab by mouth 3 times a day as needed for Anxiety for up to 10 days.  Dose:  1 mg     AMBIEN 10 MG Tabs  Generic drug:  zolpidem   Take 10 mg by mouth every bedtime.  Dose:  10 mg     clonazePAM 1 MG Tabs  Commonly known as:  KLONOPIN   Take 1 mg by mouth 2 times a day.  Dose:  1 mg     hydrOXYzine pamoate 50 MG Caps  Commonly known as:  VISTARIL   Take  mg by mouth 3 times a day as needed (n/v).  Dose:   mg     loperamide 2 MG Caps  Commonly known as:  IMODIUM   Take 1 Cap by mouth 3 times a day as needed for Diarrhea.  Dose:  2 mg     metoprolol 25 MG Tabs  Commonly known as:  LOPRESSOR   Take 25 mg by  mouth 2 times a day.  Dose:  25 mg     omeprazole 20 MG delayed-release capsule  Commonly known as:  PRILOSEC   Take 20 mg by mouth every day.  Dose:  20 mg     rivaroxaban 20 MG Tabs tablet  Commonly known as:  XARELTO   Take 1 Tab by mouth with dinner.  Dose:  20 mg     TRIUMEQ 600- MG Tabs  Generic drug:  Abacavir-Dolutegravir-Lamivud   Take 1 Tab by mouth every day.  Dose:  1 Tab            Allergies  Allergies   Allergen Reactions   • Sulfa Drugs Itching     Rxn - 1970's         DIET  Orders Placed This Encounter   Procedures   • Diet Order Regular (Crush meds in applesauce/pudding please)     Standing Status:   Standing     Number of Occurrences:   1     Order Specific Question:   Diet:     Answer:   Regular [1]     Comments:   Crush meds in applesauce/pudding please     Order Specific Question:   Texture/Fiber modifications:     Answer:   Dysphagia 3(Mechanical Soft)specify fluid consistency(question 6) [3]     Order Specific Question:   Consistency/Fluid modifications:     Answer:   Thin Liquids [3]     Order Specific Question:   Miscellaneous modifications:     Answer:   SLP - 1:1 Supervision by Nursing [21]       ACTIVITY  As tolerated.  Weight bearing as tolerated    LINES, DRAINS, AND WOUNDS  This is an automated list. Peripheral IVs will be removed prior to discharge.  Peripheral IV 10/15/18 20 G Right Forearm (Active)   Site Assessment Clean;Dry;Intact 10/27/2018  9:30 PM   Dressing Type Transparent 10/27/2018  9:30 PM   Line Status Saline locked 10/27/2018  9:30 PM   Dressing Status Clean;Dry;Intact 10/27/2018  9:30 PM   Dressing Intervention Initial dressing 10/27/2018  9:30 PM   Infiltration Grading ([facility]) 0 10/27/2018  9:30 PM   Phlebitis Scale ([facility] Only) 0 10/27/2018  9:30 PM     Condom Urinary Catheter (Active)   Collection Container Standard drainage bag 10/22/2018  6:26 PM   Output (mL) 600 mL 10/28/2018  4:00 AM      Moisture Associated Skin Damage Other (comment) (Active)        Wound POA Abrasion Knee Right (Active)       Peripheral IV 10/15/18 20 G Right Forearm (Active)   Site Assessment Clean;Dry;Intact 10/27/2018  9:30 PM   Dressing Type Transparent 10/27/2018  9:30 PM   Line Status Saline locked 10/27/2018  9:30 PM   Dressing Status Clean;Dry;Intact 10/27/2018  9:30 PM   Dressing Intervention Initial dressing 10/27/2018  9:30 PM   Infiltration Grading ([facility]) 0 10/27/2018  9:30 PM   Phlebitis Scale ([facility] Only) 0 10/27/2018  9:30 PM               MENTAL STATUS ON TRANSFER  Level of Consciousness: Alert  Orientation : Oriented x 4  Speech: Speech Clear    CONSULTATIONS  Neurology  Pulmonary  Nephrology  Infectious disease    PROCEDURES  None    LABORATORY  Lab Results   Component Value Date    SODIUM 134 (L) 10/25/2018    POTASSIUM 3.8 10/25/2018    CHLORIDE 99 10/25/2018    CO2 27 10/25/2018    GLUCOSE 95 10/25/2018    BUN 15 10/25/2018    CREATININE 0.86 10/25/2018        Lab Results   Component Value Date    WBC 7.2 10/25/2018    HEMOGLOBIN 10.9 (L) 10/25/2018    HEMATOCRIT 33.3 (L) 10/25/2018    PLATELETCT 465 (H) 10/25/2018        Total time of the discharge process exceeds 48 minutes.

## 2018-11-21 ENCOUNTER — PATIENT OUTREACH (OUTPATIENT)
Dept: HEALTH INFORMATION MANAGEMENT | Facility: OTHER | Age: 63
End: 2018-11-21

## 2018-11-21 ASSESSMENT — PAIN SCALES - GENERAL: PAINLEVEL: 8=MODERATE-SEVERE PAIN

## 2018-11-22 ASSESSMENT — ENCOUNTER SYMPTOMS
OCCASIONAL FEELINGS OF UNSTEADINESS: 0
DEPRESSION: 1
LOSS OF SENSATION IN FEET: 0

## 2018-11-22 ASSESSMENT — PATIENT HEALTH QUESTIONNAIRE - PHQ9
SUM OF ALL RESPONSES TO PHQ QUESTIONS 1-9: 14
CLINICAL INTERPRETATION OF PHQ2 SCORE: 4
5. POOR APPETITE OR OVEREATING: 2 - MORE THAN HALF THE DAYS
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
## 2018-12-19 ENCOUNTER — PATIENT OUTREACH (OUTPATIENT)
Dept: HEALTH INFORMATION MANAGEMENT | Facility: OTHER | Age: 63
End: 2018-12-19

## 2018-12-20 NOTE — PROGRESS NOTES
Transitional Care Navigator:    Met briefly with patient regarding TCN role and follow up support. Pt has made good progress with functional mobility.  He continues to have a wound vac to  R hip; scan pending to r/o osteo. Possible candidate for transition to acute rehab.    TCN ill continue to follow.

## 2019-01-23 ENCOUNTER — PATIENT OUTREACH (OUTPATIENT)
Dept: HEALTH INFORMATION MANAGEMENT | Facility: OTHER | Age: 64
End: 2019-01-23

## 2019-01-23 NOTE — PROGRESS NOTES
Transitional Care Navigator:    Situation    Pt discharged from [facility] on 1/17/19.  This is the first follow up phone call.   Background       Pt had been hospitalized at [facility] since 10/28/18 after a CVA.  Lives alone in the community.  PMH includes previous CVA, a-fib, HIV, etoh dependence, pneumonia, malnutrition, sepsis, DVT, and substantial wound care needs.   Assessment    Pt made excellent progress while in SNF, reaching a MOD I level of functional mobility.  His wound vac was discontinued while in SNF.  He is being followed by [facility] for continued therapies nad wound monitoring and care.   Recommendation The patient was with his physical therapist when TCN called.   Attempt f/u 1/24/19.     TCN will continue to follow.

## 2019-01-31 ENCOUNTER — PATIENT OUTREACH (OUTPATIENT)
Dept: HEALTH INFORMATION MANAGEMENT | Facility: OTHER | Age: 64
End: 2019-01-31

## 2019-02-11 ENCOUNTER — PATIENT OUTREACH (OUTPATIENT)
Dept: HEALTH INFORMATION MANAGEMENT | Facility: OTHER | Age: 64
End: 2019-02-11

## 2019-02-11 NOTE — PROGRESS NOTES
"Situation    Transitional Care Navigator:     30 day post SNF follow up. Mr. Bolton states that he is doing very well; his mobility has improved to a modified independent level, and he has 1-2 more weeks of home therapies. He states that his wound is now \"pea sized\", and improving.   Background       Pt was receiving therapies and wound care at [facility] from 10/28/18-1/17/19   Assessment    This patient has made good progress in improving his mobility.   Recommendation TCN will continue to follow this patient through the 30 day post d/c period.       "

## 2019-02-13 ENCOUNTER — HOSPITAL ENCOUNTER (OUTPATIENT)
Dept: LAB | Facility: MEDICAL CENTER | Age: 64
End: 2019-02-13
Attending: FAMILY MEDICINE
Payer: MEDICARE

## 2019-02-13 PROCEDURE — 87086 URINE CULTURE/COLONY COUNT: CPT

## 2019-02-16 LAB
BACTERIA UR CULT: NORMAL
SIGNIFICANT IND 70042: NORMAL
SITE SITE: NORMAL
SOURCE SOURCE: NORMAL

## 2019-02-18 ENCOUNTER — APPOINTMENT (OUTPATIENT)
Dept: PHYSICAL THERAPY | Facility: REHABILITATION | Age: 64
End: 2019-02-18
Attending: FAMILY MEDICINE
Payer: MEDICARE

## 2019-02-19 ENCOUNTER — HOSPITAL ENCOUNTER (OUTPATIENT)
Dept: LAB | Facility: MEDICAL CENTER | Age: 64
End: 2019-02-19
Attending: INTERNAL MEDICINE
Payer: MEDICARE

## 2019-02-19 LAB
ALBUMIN SERPL BCP-MCNC: 4.4 G/DL (ref 3.2–4.9)
ALBUMIN/GLOB SERPL: 1.8 G/DL
ALP SERPL-CCNC: 66 U/L (ref 30–99)
ALT SERPL-CCNC: 10 U/L (ref 2–50)
ANION GAP SERPL CALC-SCNC: 5 MMOL/L (ref 0–11.9)
AST SERPL-CCNC: 14 U/L (ref 12–45)
BILIRUB SERPL-MCNC: 0.4 MG/DL (ref 0.1–1.5)
BUN SERPL-MCNC: 18 MG/DL (ref 8–22)
CALCIUM SERPL-MCNC: 9.8 MG/DL (ref 8.5–10.5)
CHLORIDE SERPL-SCNC: 106 MMOL/L (ref 96–112)
CO2 SERPL-SCNC: 29 MMOL/L (ref 20–33)
CREAT SERPL-MCNC: 1.09 MG/DL (ref 0.5–1.4)
GLOBULIN SER CALC-MCNC: 2.5 G/DL (ref 1.9–3.5)
GLUCOSE SERPL-MCNC: 70 MG/DL (ref 65–99)
POTASSIUM SERPL-SCNC: 4.7 MMOL/L (ref 3.6–5.5)
PROT SERPL-MCNC: 6.9 G/DL (ref 6–8.2)
SODIUM SERPL-SCNC: 140 MMOL/L (ref 135–145)

## 2019-02-19 PROCEDURE — 80053 COMPREHEN METABOLIC PANEL: CPT

## 2019-02-19 PROCEDURE — 86361 T CELL ABSOLUTE COUNT: CPT

## 2019-02-19 PROCEDURE — 87536 HIV-1 QUANT&REVRSE TRNSCRPJ: CPT

## 2019-02-22 LAB
HIV1 RNA # SERPL NAA+PROBE: ABNORMAL CPY/ML
HIV1 RNA SERPL NAA+PROBE-LOG#: <1.47 LOG CPY/ML
HIV1 RNA SERPL QL NAA+PROBE: DETECTED

## 2019-02-23 LAB
CD3+CD4+ CELLS # BLD: 511 CELLS/UL (ref 490–1600)
CD3+CD4+ CELLS NFR BLD: 34 % (ref 35–68)
IMMUNODEFICIENCY MARKERS SPEC-IMP: ABNORMAL

## 2019-02-25 ENCOUNTER — PATIENT OUTREACH (OUTPATIENT)
Dept: HEALTH INFORMATION MANAGEMENT | Facility: OTHER | Age: 64
End: 2019-02-25

## 2019-02-25 NOTE — PROGRESS NOTES
Transitional Care Navigator:    Final call to patient for 30-day post SNF discharge f/u.  Message left.    TCN will close.

## 2019-03-06 ENCOUNTER — OCCUPATIONAL THERAPY (OUTPATIENT)
Dept: OCCUPATIONAL THERAPY | Facility: REHABILITATION | Age: 64
End: 2019-03-06
Attending: FAMILY MEDICINE
Payer: MEDICARE

## 2019-03-06 DIAGNOSIS — R53.1 WEAKNESS: ICD-10-CM

## 2019-03-06 PROCEDURE — 97110 THERAPEUTIC EXERCISES: CPT

## 2019-03-06 PROCEDURE — 97166 OT EVAL MOD COMPLEX 45 MIN: CPT

## 2019-03-06 ASSESSMENT — ENCOUNTER SYMPTOMS
EXACERBATED BY: DEPENDENT POSITIONING
PAIN SCALE: 1
PAIN TIMING: CONSTANT

## 2019-03-07 SDOH — ECONOMIC STABILITY: GENERAL: QUALITY OF LIFE: GOOD

## 2019-03-07 NOTE — OP THERAPY EVALUATION
Outpatient Occupational Therapy  INITIAL NEUROLOGICAL EVALUATION    [facility] Occupational [address]  [address]  [place] [address]  Phone:  [phone]  Fax:  [phone]    Date of Evaluation: 2019    Patient: Sukumar Bolton  YOB: 1955  MRN: [id number]     Referring Provider: Lenora Markham M.D.  [address] [place] [address]   Referring Diagnosis Weakness [R53.1]     Time Calculation  Start time: 430  Stop time: 530 Time Calculation (min): 60 minutes     Occupational Therapy Occurrence Codes    Date of onset of impairment:  10/6/18   Date of occupational therapy care plan established or reviewed:  3/6/19   Date of occupational therapy treatment started:  3/6/19          Chief Complaint: Extremity Weakness (LUE) and Hand Weakness (right)    Visit Diagnoses     ICD-10-CM   1. Weakness R53.1       Subjective:   History of Present Illness:     Date of onset:  10/6/2018    Mechanism of injury:  S/p left posterior frontal and right posterior frontal CVA's on 10/6/18 with residual left UE hemiparesis and right hand weakness affecting his ability to perform ADLs, IADLs, and recreational activities  Quality of life:  Good  Prior level of function:  Independent ADLs, IADLs, driving, swimming, weight lifting.    Pain:     Current pain ratin    Location:  Left shoulder to digits    Quality: paresthesias, digits hypersensitive at times.    Pain timing:  Constant    Aggravating factors:  Dependent positioning    Progression:  Unchanged  Social Support:     Patient lives at: mobile home.    Lives with:  Alone  Hand dominance:  Right  Treatments:     Previous treatment:  Physical therapy, occupational therapy and speech therapy    Treatment Comments:  [facility] from 10/30/18-19  Activities of Daily Living:     Patient reported ADL status: Modified independent ADLs with rocker knife, dycem, and compensatory techniques.  Modified independent IADLs with  compensatory techniques, decreased incorporation of LUE during activities which require bilateral integration.  Patient Goals:     Patient goals for therapy:  Increased strength, independence with ADLs/IADLs, increased motion, decreased edema and decreased pain    Other patient goals:  Regain functionality      Past Medical History:   Diagnosis Date   • Atrial fibrillation (HCC) 2018   • Cataract     sx 1999   • HIV (human immunodeficiency virus infection) ([facility])    • Hypertension    • Kidney disease      Past Surgical History:   Procedure Laterality Date   • IRRIGATION & DEBRIDEMENT ORTHO Right 6/4/2018    Procedure: IRRIGATION & DEBRIDEMENT ORTHO- ANKLE;  Surgeon: Ulices Berry M.D.;  [place] [facility]  Service: Orthopedics   • FIBULA ORIF Right 5/21/2018    Procedure: FIBULA ORIF-FOR NON UNION REPAIR WITH BONE GRAFT;  Surgeon: Ulices Berry M.D.;  [place] [facility]  Service: Orthopedics   • ANKLE ARTHROSCOPY Right 5/21/2018    Procedure: ANKLE ARTHROSCOPY;  Surgeon: Ulices Berry M.D.;  [place] [facility]  Service: Orthopedics   • OTHER ORTHOPEDIC SURGERY  2008    bilateral shoulder sx   • OTHER ORTHOPEDIC SURGERY  2003    left ankle sx     Social History   Substance Use Topics   • Smoking status: Former Smoker     Years: 2.00     Types: Cigarettes     Quit date: 2/14/2018   • Smokeless tobacco: Never Used      Comment: 1 cigarette per day for 2-3 years   • Alcohol use 0.0 oz/week      Comment: 4 vodka drinks per day     Family and Occupational History     Social History   • Marital status:      Spouse name: N/A   • Number of children: N/A   • Years of education: N/A       Objective:   Active Range of Motion:   Upper extremity (left):     Shoulder flexion: Below functional limits (35 degrees)    Shoulder abduction: Below functional limits (30 degrees)    Elbow flexion: Within functional limits    Elbow extension: Within functional limits    Forearm  pronation: Within functional limits    Forearm supination: Below functional limits (1/2 range)    Wrist flexion: Below functional limits    Wrist extension: Within functional limits    Fingers flexion: Within functional limits    Fingers extension: Within functional limits    Fingers abduction: Within functional limits    Fingers adduction: Within functional limits  Upper extremity (right):     All right upper extremity active range of motion: All within functional limits      Passive Range of Motion:   Upper extremity (left):     All left upper extremity passive range of motion: All within functional limits    PROM Left Shoulder Flexion: 150 degrees.    PROM Left Shoulder Abduction: 90 degrees limited by pain.    PROM Left Fingers Flexion: left IF AROM flexion to 60 degrees at MCP and PIP joints, PROM WNL.  Upper extremity (right):     All right upper extremity passive range of motion: All within functional limits    Passive Range of Motion Comments:  Right RF 60 degree reducible flexion contracture at PIP joint, right SF with 55 degree reducible contracture at PIP joint.  RH with 1+ edema most prominent in IF/MF.  Left winged scapula.      Strength:     Right upper extremity strength within functional limits.    Upper extremity strength (left):     Shoulder flexion: 2-    Shoulder abduction: 2-    Elbow flexion: 2    Elbow extension: 2    Forearm pronation: 2    Forearm supination: 2-    Wrist flexion: 3    Wrist extension: 3    Fingers flexion: 3-    Fingers extension: 3    Fingers abduction: 3-    Fingers adduction: 3-    , Prehension, Pinch:  /Prehension (left):      strength: Impaired (6lbs)    Opposition: Impaired (mild tremors, slow speed)  /Prehension (right):      strength: Impaired (40lbs)    Opposition: Impaired (unable to oppose to SF)    Tone, Sensation and Coordination:   Tone:     Left upper extremity muscle tone: Normal    Right upper extremity muscle tone: Normal    Sensation    Upper extremity (left):     Light touch: Impaired    Proprioception: Intact   Upper extremity (right):     Light touch: Impaired    Proprioception: Intact     Sensation comments:   LH hypersensitivity, RH numbness in SF    Coordination   Upper extremity (left):     Fine motor: Impaired    Gross motor: Impaired    Finger to finger: Impaired    Finger touch to nose: Impaired  Upper extremity (right):     Fine motor: Within functional limits    Gross motor: Within functional limits    Finger to finger: Impaired (unable to oppose to SF)    Finger touch to nose: Within functional limits    Cognition:     Orientation: normal to time, normal to place and normal to person    Direction following: three step    Short term memory: intact (able to recall 3/3 items after 2 minutes)    Long term memory: intact    Attention span: intact    Sequencing: intact    Problem solving: intact    Judgement and safety awareness: intact    Hearing: intact    Behavior: mildly anxious affect    Vision/Perception:     Visual tracking: intact    Convergence: intact    Visual attentions: intact    Visual acuity: intact (reports left eye is blurry r/t cataract)    Visual scanning: intact    R/L neglect: not present    Spatial relations: intact    Vision assistive device(s): reading glasses        Therapeutic Exercises (CPT 14001):     1. BUE    Therapeutic Exercise Summary:  Passive sustained stretch to left shoulder, stretching to contracted digits.      Time-based treatments/modalities:  Therapeutic exercise minutes (CPT 84669): 15 minutes        Assessment, Response and Plan:   Impairments: abnormal ADL function, abnormal coordination, abnormal muscle firing, abnormal or restricted ROM, fine motor function, hypersensitivity, impaired physical strength, lacks appropriate home exercise program, limited ADL's and swelling    Assessment details:  Pt is a 64 y.o male s/p left posterior frontal and right posterior frontal CVAs on 10/6/18 who  presents to outpatient OT functioning below baseline secondary to residual hemiparesis throughout LUE and right hand, sensorimotor dysfunction, soft tissue restrictions including reducible flexion contractures, mild edema, and decreased incorporation of his LUE during activities which require bilateral integration which is affecting his ability to perform ADLs, IADLs, and recreational activities.    Prognosis: good    Goals:   Short Term Goals:   Pt will perform housecleaning activities with moderate difficulty and use of AE/techniques  Pt will demonstrate ability to manage buttons with AE and mild difficulty  Pt will increase his bilateral  strength >= 5lbs to open jars and bottles with minimal difficulty and use of AE  Pt will be independent positioning of digits to prevent further contractures and manage edema  Short term goal timespan:  2-4 weeks    Long Term Goals:   Pt will be independent ADLs, IADLs, and light recreational activities with minimal use of compensatory techniques  Pt will consistently incorporate his LH into daily activities which require bilateral integration  Pt will demonstrate ability to use BUE to place food items in overhead kitchen cabinets with mild difficulty  Pt will be independent HEP for stretching, strengthening, and motor control  Pt will score >= 55/80 on the UEFI  Long term goal timespan:  4-6 weeks    Plan:   Therapy options:  Occupational therapy treatment to continue  Planned therapy interventions:  E Stim Unattended (CPT 71850), E Stim Attended (CPT 52729), Contrast Bath (CPT 79672), Manual Therapy (CPT 98676), Neuromuscular Re-education (CPT 01844), Functional Training, Self Care (CPT 05659), Self Care ADL Training (CPT 04821), Therapeutic Activities (CPT 94966) and Therapeutic Exercise (CPT 43343)  Frequency:  2x week  Duration in weeks:  6  Duration in visits:  12  Discussed with:  Patient      Functional Limitations and Severity Modifiers  OT Functional Assessment  Tool Used: UEFI  OT Functional Assessment Score: 44/80   Current:     Goal:         Referring provider co-signature:  I have reviewed this plan of care and my co-signature certifies the need for services.  Certification Dates:   From 3/6/19     To 5/15/19    Physician Signature: ________________________________ Date: ______________

## 2019-03-12 ENCOUNTER — APPOINTMENT (OUTPATIENT)
Dept: PHYSICAL THERAPY | Facility: REHABILITATION | Age: 64
End: 2019-03-12
Attending: FAMILY MEDICINE
Payer: MEDICARE

## 2019-03-14 ENCOUNTER — APPOINTMENT (OUTPATIENT)
Dept: PHYSICAL THERAPY | Facility: REHABILITATION | Age: 64
End: 2019-03-14
Attending: FAMILY MEDICINE
Payer: MEDICARE

## 2019-03-19 ENCOUNTER — APPOINTMENT (OUTPATIENT)
Dept: PHYSICAL THERAPY | Facility: REHABILITATION | Age: 64
End: 2019-03-19
Attending: FAMILY MEDICINE
Payer: MEDICARE

## 2019-03-21 ENCOUNTER — APPOINTMENT (OUTPATIENT)
Dept: PHYSICAL THERAPY | Facility: REHABILITATION | Age: 64
End: 2019-03-21
Attending: FAMILY MEDICINE
Payer: MEDICARE

## 2019-03-26 ENCOUNTER — APPOINTMENT (OUTPATIENT)
Dept: PHYSICAL THERAPY | Facility: REHABILITATION | Age: 64
End: 2019-03-26
Attending: FAMILY MEDICINE
Payer: MEDICARE

## 2019-03-28 ENCOUNTER — OCCUPATIONAL THERAPY (OUTPATIENT)
Dept: OCCUPATIONAL THERAPY | Facility: REHABILITATION | Age: 64
End: 2019-03-28
Attending: FAMILY MEDICINE
Payer: MEDICARE

## 2019-03-28 DIAGNOSIS — R53.1 WEAKNESS: ICD-10-CM

## 2019-03-28 PROCEDURE — 97112 NEUROMUSCULAR REEDUCATION: CPT | Mod: XU

## 2019-03-28 PROCEDURE — 97760 ORTHOTIC MGMT&TRAING 1ST ENC: CPT

## 2019-03-28 PROCEDURE — 97110 THERAPEUTIC EXERCISES: CPT | Mod: XU

## 2019-03-28 NOTE — OP THERAPY DAILY TREATMENT
"  Outpatient Occupational Therapy  DAILY TREATMENT     [facility] Occupational Therapy [address].  [address]  [place] [address]  Phone:  [phone]  Fax:  [phone]    Date: 03/28/2019    Patient: Sukumar Bolton  YOB: 1955  MRN: [id number]     Time Calculation  Start time: 0400  Stop time: 0500 Time Calculation (min): 60 minutes     Chief Complaint: Hand Weakness (right) and Extremity Weakness (left UE)    Visit #: 2    SUBJECTIVE: \"I feel good, just tired\"    OBJECTIVE:  Current objective measures:   LUE strength:   Shoulder 2-/5  Elbow flexion/extension: 2+/5   strength: Left: 7lbs, Right: 40lbs  Right RF 60 degree reducible flexion contracture at PIP joint, right SF with 55 degree reducible contracture at PIP joint.        Therapeutic Exercises (CPT 30081):     1. LUE    Therapeutic Exercise Summary:  Elbow flexion/extension table top g.e plane with 2lb weight x 30 reps, 3lb weight 30 reps.  Passive sustained stretch of right RF into extension x 2 minutes, SF self-stretch x 2 minutes table top.  Isometric shoulder exercises for ff/abduction/ER x 10 reps, 10 second hold.  Issued written HEP.    Therapeutic Treatments and Modalities:    1. Orthotic Training (CPT 16775)    2. Neuromuscular Re-education (CPT 68417)    Therapeutic Treatments and Modalities Summary: Seated level table top WB through left forearm/elbow x 10 seconds x 10 reps, seated with left elbow extended for WB through LUE x 10 seconds x 10 reps.   Fabricated orthoplast extension splint for right SF for passive sustained stretch and prevention of further contractures.  Pt educated on wear schedule and skin inspection with good understanding.    Time-based treatments/modalities:  Therapeutic exercise minutes (CPT 51277): 30 minutes  Neuromusc re-ed minutes (CPT 52262): 15 minutes  Orthotics training initial, minutes (CPT 94998): 15 minutes      ASSESSMENT:   Response to treatment:  Pt making good progress " today with an increase in strength of his LUE through WB and exercises described above along with improved soft tissue mobility of contracted digits 4-5 of RH.  Fabrication of extension splint for right SF was reported as comfortable with no areas of pressure noted.  Modifications to be made PRN.  Issued written HEP with good understanding.      PLAN/RECOMMENDATIONS:   Plan for treatment: therapy treatment to continue next visit.  Planned interventions for next visit: continue with current treatment, manual therapy (CPT 57840), neuromuscular re-education (CPT 64204), orthotic training (CPT 64013), therapeutic activities (CPT 28066) and therapeutic exercise (CPT 50884)

## 2019-04-01 ENCOUNTER — OCCUPATIONAL THERAPY (OUTPATIENT)
Dept: OCCUPATIONAL THERAPY | Facility: REHABILITATION | Age: 64
End: 2019-04-01
Attending: FAMILY MEDICINE
Payer: MEDICARE

## 2019-04-01 ENCOUNTER — HOSPITAL ENCOUNTER (OUTPATIENT)
Dept: LAB | Facility: MEDICAL CENTER | Age: 64
End: 2019-04-01
Attending: FAMILY MEDICINE
Payer: MEDICARE

## 2019-04-01 DIAGNOSIS — R53.1 WEAKNESS: ICD-10-CM

## 2019-04-01 LAB
ALBUMIN SERPL BCP-MCNC: 4.2 G/DL (ref 3.2–4.9)
ALBUMIN/GLOB SERPL: 1.8 G/DL
ALP SERPL-CCNC: 64 U/L (ref 30–99)
ALT SERPL-CCNC: 10 U/L (ref 2–50)
ANION GAP SERPL CALC-SCNC: 7 MMOL/L (ref 0–11.9)
AST SERPL-CCNC: 13 U/L (ref 12–45)
BASOPHILS # BLD AUTO: 0.4 % (ref 0–1.8)
BASOPHILS # BLD: 0.02 K/UL (ref 0–0.12)
BILIRUB SERPL-MCNC: 0.6 MG/DL (ref 0.1–1.5)
BUN SERPL-MCNC: 15 MG/DL (ref 8–22)
CALCIUM SERPL-MCNC: 9.2 MG/DL (ref 8.5–10.5)
CHLORIDE SERPL-SCNC: 106 MMOL/L (ref 96–112)
CHOLEST SERPL-MCNC: 108 MG/DL (ref 100–199)
CO2 SERPL-SCNC: 28 MMOL/L (ref 20–33)
CREAT SERPL-MCNC: 1.16 MG/DL (ref 0.5–1.4)
EOSINOPHIL # BLD AUTO: 0.03 K/UL (ref 0–0.51)
EOSINOPHIL NFR BLD: 0.6 % (ref 0–6.9)
ERYTHROCYTE [DISTWIDTH] IN BLOOD BY AUTOMATED COUNT: 47.9 FL (ref 35.9–50)
GLOBULIN SER CALC-MCNC: 2.4 G/DL (ref 1.9–3.5)
GLUCOSE SERPL-MCNC: 90 MG/DL (ref 65–99)
HCT VFR BLD AUTO: 37.8 % (ref 42–52)
HDLC SERPL-MCNC: 41 MG/DL
HGB BLD-MCNC: 11.8 G/DL (ref 14–18)
IMM GRANULOCYTES # BLD AUTO: 0.01 K/UL (ref 0–0.11)
IMM GRANULOCYTES NFR BLD AUTO: 0.2 % (ref 0–0.9)
LDLC SERPL CALC-MCNC: 52 MG/DL
LYMPHOCYTES # BLD AUTO: 1.63 K/UL (ref 1–4.8)
LYMPHOCYTES NFR BLD: 31.2 % (ref 22–41)
MCH RBC QN AUTO: 30.9 PG (ref 27–33)
MCHC RBC AUTO-ENTMCNC: 31.2 G/DL (ref 33.7–35.3)
MCV RBC AUTO: 99 FL (ref 81.4–97.8)
MONOCYTES # BLD AUTO: 0.61 K/UL (ref 0–0.85)
MONOCYTES NFR BLD AUTO: 11.7 % (ref 0–13.4)
NEUTROPHILS # BLD AUTO: 2.93 K/UL (ref 1.82–7.42)
NEUTROPHILS NFR BLD: 55.9 % (ref 44–72)
NRBC # BLD AUTO: 0 K/UL
NRBC BLD-RTO: 0 /100 WBC
PLATELET # BLD AUTO: 288 K/UL (ref 164–446)
PMV BLD AUTO: 9.6 FL (ref 9–12.9)
POTASSIUM SERPL-SCNC: 3.9 MMOL/L (ref 3.6–5.5)
PROT SERPL-MCNC: 6.6 G/DL (ref 6–8.2)
RBC # BLD AUTO: 3.82 M/UL (ref 4.7–6.1)
SODIUM SERPL-SCNC: 141 MMOL/L (ref 135–145)
TRIGL SERPL-MCNC: 74 MG/DL (ref 0–149)
WBC # BLD AUTO: 5.2 K/UL (ref 4.8–10.8)

## 2019-04-01 PROCEDURE — 83036 HEMOGLOBIN GLYCOSYLATED A1C: CPT

## 2019-04-01 PROCEDURE — 80053 COMPREHEN METABOLIC PANEL: CPT

## 2019-04-01 PROCEDURE — 97110 THERAPEUTIC EXERCISES: CPT

## 2019-04-01 PROCEDURE — 80061 LIPID PANEL: CPT

## 2019-04-01 PROCEDURE — 85025 COMPLETE CBC W/AUTO DIFF WBC: CPT

## 2019-04-01 NOTE — OP THERAPY DAILY TREATMENT
"  Outpatient Occupational Therapy  DAILY TREATMENT     [facility] Occupational Therapy [address]  [address]  [place] [address]  Phone:  [phone]  Fax:  [phone]    Date: 04/01/2019    Patient: Sukumar Bolton  YOB: 1955  MRN: [id number]     Time Calculation  Start time: 0230  Stop time: 0330 Time Calculation (min): 60 minutes     Chief Complaint: Extremity Weakness (left) and Hand Weakness (right)    Visit #: 3    SUBJECTIVE: \"I'm having trouble getting the finger splint on\"    OBJECTIVE:  Current objective measures:   LUE strength:   Shoulder 2-/5  Elbow flexion: 2+/5, elbow extension 3/5   strength: Left:12lbs; Right: 45lbs  Right RF 60 degree reducible flexion contracture at PIP joint, right SF with 55 degree reducible contracture at PIP joint.        Therapeutic Exercises (CPT 94076):     1. Bilateral hands/UE    Therapeutic Exercise Summary:  Issued and instructed on light resistance theraputty exercises focused on bilateral , pinch, rolling a log, in-hand manipulation, picking out coins, pulling apart and folding in intrinsic plus position, and GMC ther ex punching holes with black plastic tube (right hand supporting left arm during elbow flexion/extension component).  Problem solved donning right SF extension splint, able to don after instruction.  Supine on mat for active elbow extension a.g with shoulder at 90 degrees of ff x 30 reps with and without proximal support, slow and controlled during eccentric contractions.  Passive sustained stretch of left forearm into full supination.  Supine for AA elbow flexion- manual assist to maintain supinated position, assist to initiate flexion from extended position, strong focus on eccentric control.  Instructed on self stretching into left supination.      Time-based treatments/modalities:  Therapeutic exercise minutes (CPT 98444): 60 minutes      ASSESSMENT:   Response to treatment:  Pt making steady progress " today in his neuromuscular recovery focused on BUE strength and motor control for his daily routine.  Issued updated written HEP for exercises described above with good understanding.    PLAN/RECOMMENDATIONS:   Plan for treatment: therapy treatment to continue next visit.  Planned interventions for next visit: continue with current treatment, neuromuscular re-education (CPT 08490), therapeutic activities (CPT 18605) and therapeutic exercise (CPT 28667)

## 2019-04-02 LAB
EST. AVERAGE GLUCOSE BLD GHB EST-MCNC: 114 MG/DL
HBA1C MFR BLD: 5.6 % (ref 0–5.6)

## 2019-04-03 ENCOUNTER — OFFICE VISIT (OUTPATIENT)
Dept: CARDIOLOGY | Facility: MEDICAL CENTER | Age: 64
End: 2019-04-03
Payer: MEDICARE

## 2019-04-03 ENCOUNTER — OCCUPATIONAL THERAPY (OUTPATIENT)
Dept: OCCUPATIONAL THERAPY | Facility: REHABILITATION | Age: 64
End: 2019-04-03
Attending: FAMILY MEDICINE
Payer: MEDICARE

## 2019-04-03 ENCOUNTER — TELEPHONE (OUTPATIENT)
Dept: CARDIOLOGY | Facility: MEDICAL CENTER | Age: 64
End: 2019-04-03

## 2019-04-03 VITALS
BODY MASS INDEX: 18.96 KG/M2 | HEIGHT: 72 IN | OXYGEN SATURATION: 96 % | WEIGHT: 140 LBS | SYSTOLIC BLOOD PRESSURE: 122 MMHG | DIASTOLIC BLOOD PRESSURE: 70 MMHG | HEART RATE: 80 BPM

## 2019-04-03 DIAGNOSIS — F10.20 UNCOMPLICATED ALCOHOL DEPENDENCE (HCC): ICD-10-CM

## 2019-04-03 DIAGNOSIS — Z01.810 PREOP CARDIOVASCULAR EXAM: ICD-10-CM

## 2019-04-03 DIAGNOSIS — I48.0 PAF (PAROXYSMAL ATRIAL FIBRILLATION) (HCC): ICD-10-CM

## 2019-04-03 DIAGNOSIS — R53.1 WEAKNESS: ICD-10-CM

## 2019-04-03 DIAGNOSIS — I63.49 CEREBROVASCULAR ACCIDENT (CVA) DUE TO EMBOLISM OF OTHER CEREBRAL ARTERY (HCC): ICD-10-CM

## 2019-04-03 DIAGNOSIS — I95.1 ORTHOSTATIC HYPOTENSION: ICD-10-CM

## 2019-04-03 PROCEDURE — 99214 OFFICE O/P EST MOD 30 MIN: CPT | Performed by: NURSE PRACTITIONER

## 2019-04-03 PROCEDURE — 97110 THERAPEUTIC EXERCISES: CPT

## 2019-04-03 PROCEDURE — 93000 ELECTROCARDIOGRAM COMPLETE: CPT | Performed by: INTERNAL MEDICINE

## 2019-04-03 RX ORDER — RISPERIDONE 2 MG/1
2 TABLET ORAL
Refills: 3 | COMMUNITY
Start: 2019-03-18

## 2019-04-03 RX ORDER — TAMSULOSIN HYDROCHLORIDE 0.4 MG/1
0.4 CAPSULE ORAL DAILY
Refills: 1 | COMMUNITY
Start: 2019-02-13

## 2019-04-03 ASSESSMENT — ENCOUNTER SYMPTOMS
ORTHOPNEA: 0
WEAKNESS: 0
CLAUDICATION: 0
COUGH: 0
PND: 0
SHORTNESS OF BREATH: 0
MYALGIAS: 0
DIZZINESS: 1
PALPITATIONS: 0
ABDOMINAL PAIN: 0

## 2019-04-03 NOTE — OP THERAPY DAILY TREATMENT
Outpatient Occupational Therapy  DAILY TREATMENT     [facility] Occupational [address].  [address]  [place] [address]  Phone:  [phone]  Fax:  [phone]    Date: 04/03/2019    Patient: Sukumar Bolton  YOB: 1955  MRN: [id number]     Time Calculation  Start time: 0100  Stop time: 0130 Time Calculation (min): 30 minutes     Chief Complaint: Extremity Weakness (left) and Hand Weakness (right)    Visit #: 4    SUBJECTIVE:    OBJECTIVE:  Current objective measures:   LUE strength:   Shoulder 2-/5  Elbow flexion: 2+/5, elbow extension 3/5   strength: Left:12lbs; Right: 45lbs        Therapeutic Exercises (CPT 68775):     1. Left elbow    Therapeutic Exercise Summary:  Table top left elbow flexion/extension g.e plane with 7lb weight x 30 reps, light isometrics for left biceps with RH positioning left forearm in supination, brachioradialis with forearm in neutral, and brachialis in pronation, 10 reps 10 second hold.        Time-based treatments/modalities:  Therapeutic exercise minutes (CPT 69330): 30 minutes      ASSESSMENT:   Response to treatment:  Pt making steady progress with the strength of his left elbow flexors for his ADLs and IADLs through graded exercises.  Issued updated written HEP with good understanding.    PLAN/RECOMMENDATIONS:   Plan for treatment: therapy treatment to continue next visit.  Planned interventions for next visit: continue with current treatment, neuromuscular re-education (CPT 64378), therapeutic activities (CPT 65343) and therapeutic exercise (CPT 41522)

## 2019-04-03 NOTE — LETTER
[facility]   [address], [place] [address]  [place] [address]  Phone: [phone]  Fax: [phone]              Sukumar Bolton  1955    Encounter Date: 4/3/2019    MILEY Toth          PROGRESS NOTE:  Chief Complaint   Patient presents with   • Preoperative CV Eval     right foot surgery        Subjective:   Sukumar Bolton is a 64 y.o. male who presents today for preop cardiovascular evaluation.  He is in need of a surgery where they are going to put a plate in his right foot.  His surgeon is Dr. Berry.  Surgery is not yet scheduled pending this evaluation.    Patient has a history of paroxysmal atrial fibrillation.  He suffered a stroke back in October 2018 where he has had some minor left-sided weakness as a residual.  Per the records he has a history of DVT therefore he will need lifelong anticoagulation and bridging with Lovenox for procedures.    He has low blood pressure and feels lightheaded with position change.  He has had syncopal episodes in the past.  He is HIV positive on antiretrovirals.    He denies any chest tightness, heaviness or.  No palpitations or shortness of breath.    Past Medical History:   Diagnosis Date   • Atrial fibrillation (HCC) 2018   • Cataract     sx 1999   • HIV (human immunodeficiency virus infection) (HCC)    • Hypertension    • Kidney disease    • Paroxysmal atrial fibrillation (HCC)    • Stroke (HCC) 10/2018    multiple small infarcts. with left sided weakness.     Past Surgical History:   Procedure Laterality Date   • IRRIGATION & DEBRIDEMENT ORTHO Right 6/4/2018    Procedure: IRRIGATION & DEBRIDEMENT ORTHO- ANKLE;  Surgeon: Ulices Berry M.D.;  Location: [facility];  Service: Orthopedics   • FIBULA ORIF Right 5/21/2018    Procedure: FIBULA ORIF-FOR NON UNION REPAIR WITH BONE GRAFT;  Surgeon: Ulices Berry M.D.;  Location: [facility];  Service: Orthopedics   • ANKLE  ARTHROSCOPY Right 5/21/2018    Procedure: ANKLE ARTHROSCOPY;  Surgeon: Ulices Berry M.D.;  Location: SUR[facility];  Service: Orthopedics   • OTHER ORTHOPEDIC SURGERY  2008    bilateral shoulder sx   • OTHER ORTHOPEDIC SURGERY  2003    left ankle sx     Family History   Problem Relation Age of Onset   • Alzheimer's Disease Mother    • Heart Disease Father         pacemaker for heart block   • Hypertension Father    • Heart Disease Sister         ?   • Breast Cancer Sister         with double Mastectomy   • Hypertension Brother      Social History     Social History   • Marital status:      Spouse name: N/A   • Number of children: N/A   • Years of education: N/A     Occupational History   • Not on file.     Social History Main Topics   • Smoking status: Former Smoker     Years: 2.00     Types: Cigarettes     Quit date: 2/14/2018   • Smokeless tobacco: Never Used   • Alcohol use No      Comment: off Alc for 6 mo.   • Drug use: No      Comment: marijuana, last  use 5/2018   • Sexual activity: Not on file     Other Topics Concern   • Not on file     Social History Narrative   • No narrative on file     Allergies   Allergen Reactions   • Sulfa Drugs Itching and Rash     Rxn - 1970's       Outpatient Encounter Prescriptions as of 4/3/2019   Medication Sig Dispense Refill   • tamsulosin (FLOMAX) 0.4 MG capsule Take 0.4 mg by mouth every day.  1   • risperiDONE (RISPERDAL) 2 MG Tab Take 2 mg by mouth.  3   • FLUoxetine (PROZAC) 20 MG Cap Take 1 Cap by mouth every day. 30 Cap    • atorvastatin (LIPITOR) 40 MG Tab Take 1 Tab by mouth every bedtime. 30 Tab    • gabapentin (NEURONTIN) 300 MG Cap Take 1 Cap by mouth 2 Times a Day. 90 Cap    • clonazePAM (KLONOPIN) 1 MG Tab Take 1 mg by mouth 2 times a day.     • zolpidem (AMBIEN) 10 MG Tab Take 10 mg by mouth every bedtime.     • loperamide (IMODIUM) 2 MG Cap Take 1 Cap by mouth 3 times a day as needed for Diarrhea. 30 Cap 0   • rivaroxaban (XARELTO) 20 MG  Tab tablet Take 1 Tab by mouth with dinner. 30 Tab 0   • Abacavir-Dolutegravir-Lamivud (TRIUMEQ) 600- MG Tab Take 1 Tab by mouth every day.     • omeprazole (PRILOSEC) 20 MG delayed-release capsule Take 20 mg by mouth every day.     • [DISCONTINUED] ondansetron (ZOFRAN ODT) 4 MG TABLET DISPERSIBLE Take 1 Tab by mouth every four hours as needed (give PO if IV route is unavailable. May give per feeding tube.). 10 Tab 0   • [DISCONTINUED] acetaminophen (TYLENOL) 325 MG Tab Take 2 Tabs by mouth every 6 hours as needed (Mild Pain; (Pain scale 1-3); Temp greater than 100.5 F). 30 Tab 0   • [DISCONTINUED] hydrOXYzine pamoate (VISTARIL) 50 MG Cap Take  mg by mouth 3 times a day as needed (n/v).     • metoprolol (LOPRESSOR) 25 MG Tab Take 25 mg by mouth 2 times a day.       No facility-administered encounter medications on file as of 4/3/2019.      Review of Systems   Constitutional: Positive for malaise/fatigue (sleeps alot).   Respiratory: Negative for cough and shortness of breath.    Cardiovascular: Negative for chest pain, palpitations, orthopnea, claudication, leg swelling and PND.   Gastrointestinal: Negative for abdominal pain.   Musculoskeletal: Positive for joint pain (right foot pain.). Negative for myalgias.   Neurological: Positive for dizziness (position change.). Negative for weakness.        Objective:   /70 (BP Location: Right arm, Patient Position: Sitting)   Pulse 80   Ht 1.829 m (6')   Wt 63.5 kg (140 lb)   SpO2 96%   BMI 18.99 kg/m²      Physical Exam   Constitutional: He is oriented to person, place, and time. He appears well-developed and well-nourished.   Thin male in no acute distress.  Muscle wasting present.   HENT:   Head: Normocephalic.   Eyes: EOM are normal.   Neck: Normal range of motion. No JVD present.   Cardiovascular: Normal rate and regular rhythm.  Exam reveals no friction rub.    Murmur heard.   Systolic murmur is present with a grade of 1/6   Pulmonary/Chest:  Effort normal.   Abdominal: Soft. Bowel sounds are normal.   Musculoskeletal: He exhibits no edema.   Neurological: He is alert and oriented to person, place, and time.   Skin: Skin is warm and dry.   Psychiatric: He has a normal mood and affect.     Results for ETTA LUZ (MRN [id number])    Ref. Range 4/1/2019 12:30   WBC Latest Ref Range: 4.8 - 10.8 K/uL 5.2   RBC Latest Ref Range: 4.70 - 6.10 M/uL 3.82 (L)   Hemoglobin Latest Ref Range: 14.0 - 18.0 g/dL 11.8 (L)   Hematocrit Latest Ref Range: 42.0 - 52.0 % 37.8 (L)   MCV Latest Ref Range: 81.4 - 97.8 fL 99.0 (H)   MCH Latest Ref Range: 27.0 - 33.0 pg 30.9   MCHC Latest Ref Range: 33.7 - 35.3 g/dL 31.2 (L)   RDW Latest Ref Range: 35.9 - 50.0 fL 47.9   Platelet Count Latest Ref Range: 164 - 446 K/uL 288   MPV Latest Ref Range: 9.0 - 12.9 fL 9.6   Neutrophils-Polys Latest Ref Range: 44.00 - 72.00 % 55.90   Neutrophils (Absolute) Latest Ref Range: 1.82 - 7.42 K/uL 2.93   Lymphocytes Latest Ref Range: 22.00 - 41.00 % 31.20   Lymphs (Absolute) Latest Ref Range: 1.00 - 4.80 K/uL 1.63   Monocytes Latest Ref Range: 0.00 - 13.40 % 11.70   Monos (Absolute) Latest Ref Range: 0.00 - 0.85 K/uL 0.61   Eosinophils Latest Ref Range: 0.00 - 6.90 % 0.60   Eos (Absolute) Latest Ref Range: 0.00 - 0.51 K/uL 0.03   Basophils Latest Ref Range: 0.00 - 1.80 % 0.40   Baso (Absolute) Latest Ref Range: 0.00 - 0.12 K/uL 0.02   Immature Granulocytes Latest Ref Range: 0.00 - 0.90 % 0.20   Immature Granulocytes (abs) Latest Ref Range: 0.00 - 0.11 K/uL 0.01   Nucleated RBC Latest Units: /100 WBC 0.00   NRBC (Absolute) Latest Units: K/uL 0.00   Sodium Latest Ref Range: 135 - 145 mmol/L 141   Potassium Latest Ref Range: 3.6 - 5.5 mmol/L 3.9   Chloride Latest Ref Range: 96 - 112 mmol/L 106   Co2 Latest Ref Range: 20 - 33 mmol/L 28   Anion Gap Latest Ref Range: 0.0 - 11.9  7.0   Glucose Latest Ref Range: 65 - 99 mg/dL 90   Bun Latest Ref Range: 8 - 22 mg/dL 15   Creatinine Latest Ref Range:  0.50 - 1.40 mg/dL 1.16   GFR If  Latest Ref Range: >60 mL/min/1.73 m 2 >60   GFR If Non  Latest Ref Range: >60 mL/min/1.73 m 2 >60   Calcium Latest Ref Range: 8.5 - 10.5 mg/dL 9.2   AST(SGOT) Latest Ref Range: 12 - 45 U/L 13   ALT(SGPT) Latest Ref Range: 2 - 50 U/L 10   Alkaline Phosphatase Latest Ref Range: 30 - 99 U/L 64   Total Bilirubin Latest Ref Range: 0.1 - 1.5 mg/dL 0.6   Albumin Latest Ref Range: 3.2 - 4.9 g/dL 4.2   Total Protein Latest Ref Range: 6.0 - 8.2 g/dL 6.6   Globulin Latest Ref Range: 1.9 - 3.5 g/dL 2.4   A-G Ratio Latest Units: g/dL 1.8   Glycohemoglobin Latest Ref Range: 0.0 - 5.6 % 5.6   Estim. Avg Glu Latest Units: mg/dL 114   Cholesterol,Tot Latest Ref Range: 100 - 199 mg/dL 108   Triglycerides Latest Ref Range: 0 - 149 mg/dL 74   HDL Latest Ref Range: >=40 mg/dL 41   LDL Latest Ref Range: <100 mg/dL 52       October 10, 2018: Transthoracic Echo Report  CONCLUSIONS  Prior study done on 04/06/18; compared to the report of the study done   - there has been no significant change.   No obvious intracardiac thrombus noted.  Normal left ventricular systolic function.  Thickened mitral valve leaflets.  Left ventricular ejection fraction is visually estimated to be 65%.  Grade I diastolic dysfunction.  No significant valve abnormalities.   Unable to estimate pulmonary artery pressure due to an inadequate   tricuspid regurgitant jet.    Today: EKG is normal sinus rhythm rate of 68.  EKG was reviewed with Dr Seymour.    Assessment:     1. Preop cardiovascular exam  EKG   2. PAF (paroxysmal atrial fibrillation) (HCC)     3. Cerebrovascular accident (CVA) due to embolism of other cerebral artery (HCC)     4. Orthostatic hypotension     5. Uncomplicated alcohol dependence (HCC)         Medical Decision Making:  Today's Assessment / Status / Plan:     Preop cardiovascular exam: Patient appears stable and tells me that he is in his usual state of health.  He denies any  episodes of palpitations or known atrial fibrillation.  He has discontinued alcohol completely.  I consulted with  Dr Seymour, who feels that the patient may undergo his planned surgery of a moderate cardiovascular risk.    Paroxysmal atrial fibrillation: The patient is in a regular rhythm today.  He will continue on Xarelto and will need bridging with Lovenox.  This will be done through the anticoagulation clinic.    CVA: Occurred in October 2018.  Some left-sided weakness.    Hypertension: His blood pressure is low and he has some orthostasis.  I have encouraged him to stay hydrated and eat some salt in order to keep his blood pressure up.    Alcohol dependence: He has been off alcohol for 6 months.  He does not intend to drink alcohol.    He may undergo his surgery with Dr. Berry.  Patient will follow-up in 6 months with Dr Butch Martínez.  Sooner if problems.    Collaborating Provider:  Dr Seymour.    Please note that this dictation was created using voice recognition software. I have made every reasonable attempt to correct obvious errors, but it is possible there are errors of grammar and possibly content that I did not discover before finalizing the note.                No Recipients

## 2019-04-03 NOTE — TELEPHONE ENCOUNTER
Pt is seen by APN for [facility]V.  Pt requesting cardiac clearance for upcoming Right Foot Surgery with Dr. Berry.  Per APN and ADD-MD, patient may proceed with moderate risk and will need Lovenox bridging to be managed by [facility]agulation [facility].    Letter printed with APN and ADD-MD recommendations.      Attempted to contact Tommie Perkins MA, [phone], unable to reach.  Left voicemail to return this call with appropriate fax number to sent clearance letter.    Letter copied and placed into scanning for reference.  Original letter mailed to patient's mailing address.    Will await for Tommie's response.

## 2019-04-03 NOTE — PROGRESS NOTES
Chief Complaint   Patient presents with   • Preoperative CV Eval     right foot surgery        Subjective:   Sukumar Bolton is a 64 y.o. male who presents today for preop cardiovascular evaluation.  He is in need of a surgery where they are going to put a plate in his right foot.  His surgeon is Dr. Berry.  Surgery is not yet scheduled pending this evaluation.    Patient has a history of paroxysmal atrial fibrillation.  He suffered a stroke back in October 2018 where he has had some minor left-sided weakness as a residual.  Per the records he has a history of DVT therefore he will need lifelong anticoagulation and bridging with Lovenox for procedures.    He has low blood pressure and feels lightheaded with position change.  He has had syncopal episodes in the past.  He is HIV positive on antiretrovirals.    He denies any chest tightness, heaviness or.  No palpitations or shortness of breath.    Past Medical History:   Diagnosis Date   • Atrial fibrillation ([facility]) 2018   • Cataract     sx 1999   • HIV (human immunodeficiency virus infection) ([facility])    • Hypertension    • Kidney disease    • Paroxysmal atrial fibrillation ([facility])    • Stroke ([facility]) 10/2018    multiple small infarcts. with left sided weakness.     Past Surgical History:   Procedure Laterality Date   • IRRIGATION & DEBRIDEMENT ORTHO Right 6/4/2018    Procedure: IRRIGATION & DEBRIDEMENT ORTHO- ANKLE;  Surgeon: Ulices Berry M.D.;  Location: SURGERY [facility];  Service: Orthopedics   • FIBULA ORIF Right 5/21/2018    Procedure: FIBULA ORIF-FOR NON UNION REPAIR WITH BONE GRAFT;  Surgeon: Ulices Berry M.D.;  Location: SURGERY [facility];  Service: Orthopedics   • ANKLE ARTHROSCOPY Right 5/21/2018    Procedure: ANKLE ARTHROSCOPY;  Surgeon: Ulices Berry M.D.;  Location: [facility];  Service: Orthopedics   • OTHER ORTHOPEDIC SURGERY  2008    bilateral shoulder sx   • OTHER ORTHOPEDIC SURGERY  2003    left ankle sx     Family  History   Problem Relation Age of Onset   • Alzheimer's Disease Mother    • Heart Disease Father         pacemaker for heart block   • Hypertension Father    • Heart Disease Sister         ?   • Breast Cancer Sister         with double Mastectomy   • Hypertension Brother      Social History     Social History   • Marital status:      Spouse name: N/A   • Number of children: N/A   • Years of education: N/A     Occupational History   • Not on file.     Social History Main Topics   • Smoking status: Former Smoker     Years: 2.00     Types: Cigarettes     Quit date: 2/14/2018   • Smokeless tobacco: Never Used   • Alcohol use No      Comment: off Alc for 6 mo.   • Drug use: No      Comment: marijuana, last  use 5/2018   • Sexual activity: Not on file     Other Topics Concern   • Not on file     Social History Narrative   • No narrative on file     Allergies   Allergen Reactions   • Sulfa Drugs Itching and Rash     Rxn - 1970's       Outpatient Encounter Prescriptions as of 4/3/2019   Medication Sig Dispense Refill   • tamsulosin (FLOMAX) 0.4 MG capsule Take 0.4 mg by mouth every day.  1   • risperiDONE (RISPERDAL) 2 MG Tab Take 2 mg by mouth.  3   • FLUoxetine (PROZAC) 20 MG Cap Take 1 Cap by mouth every day. 30 Cap    • atorvastatin (LIPITOR) 40 MG Tab Take 1 Tab by mouth every bedtime. 30 Tab    • gabapentin (NEURONTIN) 300 MG Cap Take 1 Cap by mouth 2 Times a Day. 90 Cap    • clonazePAM (KLONOPIN) 1 MG Tab Take 1 mg by mouth 2 times a day.     • zolpidem (AMBIEN) 10 MG Tab Take 10 mg by mouth every bedtime.     • loperamide (IMODIUM) 2 MG Cap Take 1 Cap by mouth 3 times a day as needed for Diarrhea. 30 Cap 0   • rivaroxaban (XARELTO) 20 MG Tab tablet Take 1 Tab by mouth with dinner. 30 Tab 0   • Abacavir-Dolutegravir-Lamivud (TRIUMEQ) 600- MG Tab Take 1 Tab by mouth every day.     • omeprazole (PRILOSEC) 20 MG delayed-release capsule Take 20 mg by mouth every day.     • [DISCONTINUED] ondansetron (ZOFRAN  ODT) 4 MG TABLET DISPERSIBLE Take 1 Tab by mouth every four hours as needed (give PO if IV route is unavailable. May give per feeding tube.). 10 Tab 0   • [DISCONTINUED] acetaminophen (TYLENOL) 325 MG Tab Take 2 Tabs by mouth every 6 hours as needed (Mild Pain; (Pain scale 1-3); Temp greater than 100.5 F). 30 Tab 0   • [DISCONTINUED] hydrOXYzine pamoate (VISTARIL) 50 MG Cap Take  mg by mouth 3 times a day as needed (n/v).     • metoprolol (LOPRESSOR) 25 MG Tab Take 25 mg by mouth 2 times a day.       No facility-administered encounter medications on file as of 4/3/2019.      Review of Systems   Constitutional: Positive for malaise/fatigue (sleeps alot).   Respiratory: Negative for cough and shortness of breath.    Cardiovascular: Negative for chest pain, palpitations, orthopnea, claudication, leg swelling and PND.   Gastrointestinal: Negative for abdominal pain.   Musculoskeletal: Positive for joint pain (right foot pain.). Negative for myalgias.   Neurological: Positive for dizziness (position change.). Negative for weakness.        Objective:   /70 (BP Location: Right arm, Patient Position: Sitting)   Pulse 80   Ht 1.829 m (6')   Wt 63.5 kg (140 lb)   SpO2 96%   BMI 18.99 kg/m²     Physical Exam   Constitutional: He is oriented to person, place, and time. He appears well-developed and well-nourished.   Thin male in no acute distress.  Muscle wasting present.   HENT:   Head: Normocephalic.   Eyes: EOM are normal.   Neck: Normal range of motion. No JVD present.   Cardiovascular: Normal rate and regular rhythm.  Exam reveals no friction rub.    Murmur heard.   Systolic murmur is present with a grade of 1/6   Pulmonary/Chest: Effort normal.   Abdominal: Soft. Bowel sounds are normal.   Musculoskeletal: He exhibits no edema.   Neurological: He is alert and oriented to person, place, and time.   Skin: Skin is warm and dry.   Psychiatric: He has a normal mood and affect.     Results for ETTA LUZ  NAKUL (MRN [id number])    Ref. Range 4/1/2019 12:30   WBC Latest Ref Range: 4.8 - 10.8 K/uL 5.2   RBC Latest Ref Range: 4.70 - 6.10 M/uL 3.82 (L)   Hemoglobin Latest Ref Range: 14.0 - 18.0 g/dL 11.8 (L)   Hematocrit Latest Ref Range: 42.0 - 52.0 % 37.8 (L)   MCV Latest Ref Range: 81.4 - 97.8 fL 99.0 (H)   MCH Latest Ref Range: 27.0 - 33.0 pg 30.9   MCHC Latest Ref Range: 33.7 - 35.3 g/dL 31.2 (L)   RDW Latest Ref Range: 35.9 - 50.0 fL 47.9   Platelet Count Latest Ref Range: 164 - 446 K/uL 288   MPV Latest Ref Range: 9.0 - 12.9 fL 9.6   Neutrophils-Polys Latest Ref Range: 44.00 - 72.00 % 55.90   Neutrophils (Absolute) Latest Ref Range: 1.82 - 7.42 K/uL 2.93   Lymphocytes Latest Ref Range: 22.00 - 41.00 % 31.20   Lymphs (Absolute) Latest Ref Range: 1.00 - 4.80 K/uL 1.63   Monocytes Latest Ref Range: 0.00 - 13.40 % 11.70   Monos (Absolute) Latest Ref Range: 0.00 - 0.85 K/uL 0.61   Eosinophils Latest Ref Range: 0.00 - 6.90 % 0.60   Eos (Absolute) Latest Ref Range: 0.00 - 0.51 K/uL 0.03   Basophils Latest Ref Range: 0.00 - 1.80 % 0.40   Baso (Absolute) Latest Ref Range: 0.00 - 0.12 K/uL 0.02   Immature Granulocytes Latest Ref Range: 0.00 - 0.90 % 0.20   Immature Granulocytes (abs) Latest Ref Range: 0.00 - 0.11 K/uL 0.01   Nucleated RBC Latest Units: /100 WBC 0.00   NRBC (Absolute) Latest Units: K/uL 0.00   Sodium Latest Ref Range: 135 - 145 mmol/L 141   Potassium Latest Ref Range: 3.6 - 5.5 mmol/L 3.9   Chloride Latest Ref Range: 96 - 112 mmol/L 106   Co2 Latest Ref Range: 20 - 33 mmol/L 28   Anion Gap Latest Ref Range: 0.0 - 11.9  7.0   Glucose Latest Ref Range: 65 - 99 mg/dL 90   Bun Latest Ref Range: 8 - 22 mg/dL 15   Creatinine Latest Ref Range: 0.50 - 1.40 mg/dL 1.16   GFR If  Latest Ref Range: >60 mL/min/1.73 m 2 >60   GFR If Non  Latest Ref Range: >60 mL/min/1.73 m 2 >60   Calcium Latest Ref Range: 8.5 - 10.5 mg/dL 9.2   AST(SGOT) Latest Ref Range: 12 - 45 U/L 13   ALT(SGPT) Latest Ref  Range: 2 - 50 U/L 10   Alkaline Phosphatase Latest Ref Range: 30 - 99 U/L 64   Total Bilirubin Latest Ref Range: 0.1 - 1.5 mg/dL 0.6   Albumin Latest Ref Range: 3.2 - 4.9 g/dL 4.2   Total Protein Latest Ref Range: 6.0 - 8.2 g/dL 6.6   Globulin Latest Ref Range: 1.9 - 3.5 g/dL 2.4   A-G Ratio Latest Units: g/dL 1.8   Glycohemoglobin Latest Ref Range: 0.0 - 5.6 % 5.6   Estim. Avg Glu Latest Units: mg/dL 114   Cholesterol,Tot Latest Ref Range: 100 - 199 mg/dL 108   Triglycerides Latest Ref Range: 0 - 149 mg/dL 74   HDL Latest Ref Range: >=40 mg/dL 41   LDL Latest Ref Range: <100 mg/dL 52       October 10, 2018: Transthoracic Echo Report  CONCLUSIONS  Prior study done on 04/06/18; compared to the report of the study done   - there has been no significant change.   No obvious intracardiac thrombus noted.  Normal left ventricular systolic function.  Thickened mitral valve leaflets.  Left ventricular ejection fraction is visually estimated to be 65%.  Grade I diastolic dysfunction.  No significant valve abnormalities.   Unable to estimate pulmonary artery pressure due to an inadequate   tricuspid regurgitant jet.    Today: EKG is normal sinus rhythm rate of 68.  EKG was reviewed with Dr Seymour.    Assessment:     1. Preop cardiovascular exam  EKG   2. PAF (paroxysmal atrial fibrillation) ([facility])     3. Cerebrovascular accident (CVA) due to embolism of other cerebral artery ([facility])     4. Orthostatic hypotension     5. Uncomplicated alcohol dependence ([facility])         Medical Decision Making:  Today's Assessment / Status / Plan:     Preop cardiovascular exam: Patient appears stable and tells me that he is in his usual state of health.  He denies any episodes of palpitations or known atrial fibrillation.  He has discontinued alcohol completely.  I consulted with  Dr Seymour, who feels that the patient may undergo his planned surgery of a moderate cardiovascular risk.    Paroxysmal atrial fibrillation: The patient is in a  regular rhythm today.  He will continue on Xarelto and will need bridging with Lovenox.  This will be done through the anticoagulation clinic.    CVA: Occurred in October 2018.  Some left-sided weakness.    Hypertension: His blood pressure is low and he has some orthostasis.  I have encouraged him to stay hydrated and eat some salt in order to keep his blood pressure up.    Alcohol dependence: He has been off alcohol for 6 months.  He does not intend to drink alcohol.    He may undergo his surgery with Dr. Berry.  Patient will follow-up in 6 months with Dr Butch Martínez.  Sooner if problems.    Collaborating Provider:  Dr Seymour.    Please note that this dictation was created using voice recognition software. I have made every reasonable attempt to correct obvious errors, but it is possible there are errors of grammar and possibly content that I did not discover before finalizing the note.

## 2019-04-03 NOTE — LETTER
[facility]   [address], [place] [address]  [place] [address]  Phone: [phone]  Fax: [phone]              Sukumar Bolton  1955    Encounter Date: 4/3/2019    MILEY Toth          PROGRESS NOTE:  Chief Complaint   Patient presents with   • Preoperative CV Eval     right foot surgery        Subjective:   Sukumar Bolton is a 64 y.o. male who presents today for preop cardiovascular evaluation.  He is in need of a surgery where they are going to put a plate in his right foot.  His surgeon is Dr. Berry.  Surgery is not yet scheduled pending this evaluation.    Patient has a history of paroxysmal atrial fibrillation.  He suffered a stroke back in October 2018 where he has had some minor left-sided weakness as a residual.  Per the records he has a history of DVT therefore he will need lifelong anticoagulation and bridging with Lovenox for procedures.    He has low blood pressure and feels lightheaded with position change.  He has had syncopal episodes in the past.  He is HIV positive on antiretrovirals.    He denies any chest tightness, heaviness or.  No palpitations or shortness of breath.    Past Medical History:   Diagnosis Date   • Atrial fibrillation (H[facility]) 2018   • Cataract     sx 1999   • HIV (human immunodeficiency virus infection) (HCC)    • Hypertension    • Kidney disease    • Paroxysmal atrial fibrillation (HCC)    • Stroke (H[facility]) 10/2018    multiple small infarcts. with left sided weakness.     Past Surgical History:   Procedure Laterality Date   • IRRIGATION & DEBRIDEMENT ORTHO Right 6/4/2018    Procedure: IRRIGATION & DEBRIDEMENT ORTHO- ANKLE;  Surgeon: Ulices Berry M.D.;  Location: [facility];  Service: Orthopedics   • FIBULA ORIF Right 5/21/2018    Procedure: FIBULA ORIF-FOR NON UNION REPAIR WITH BONE GRAFT;  Surgeon: Ulices Berry M.D.;  Location: [facility];  Service: Orthopedics   • ANKLE  ARTHROSCOPY Right 5/21/2018    Procedure: ANKLE ARTHROSCOPY;  Surgeon: Ulices Berry M.D.;  Location: SURGERY [facility];  Service: Orthopedics   • OTHER ORTHOPEDIC SURGERY  2008    bilateral shoulder sx   • OTHER ORTHOPEDIC SURGERY  2003    left ankle sx     Family History   Problem Relation Age of Onset   • Alzheimer's Disease Mother    • Heart Disease Father         pacemaker for heart block   • Hypertension Father    • Heart Disease Sister         ?   • Breast Cancer Sister         with double Mastectomy   • Hypertension Brother      Social History     Social History   • Marital status:      Spouse name: N/A   • Number of children: N/A   • Years of education: N/A     Occupational History   • Not on file.     Social History Main Topics   • Smoking status: Former Smoker     Years: 2.00     Types: Cigarettes     Quit date: 2/14/2018   • Smokeless tobacco: Never Used   • Alcohol use No      Comment: off Alc for 6 mo.   • Drug use: No      Comment: marijuana, last  use 5/2018   • Sexual activity: Not on file     Other Topics Concern   • Not on file     Social History Narrative   • No narrative on file     Allergies   Allergen Reactions   • Sulfa Drugs Itching and Rash     Rxn - 1970's       Outpatient Encounter Prescriptions as of 4/3/2019   Medication Sig Dispense Refill   • tamsulosin (FLOMAX) 0.4 MG capsule Take 0.4 mg by mouth every day.  1   • risperiDONE (RISPERDAL) 2 MG Tab Take 2 mg by mouth.  3   • FLUoxetine (PROZAC) 20 MG Cap Take 1 Cap by mouth every day. 30 Cap    • atorvastatin (LIPITOR) 40 MG Tab Take 1 Tab by mouth every bedtime. 30 Tab    • gabapentin (NEURONTIN) 300 MG Cap Take 1 Cap by mouth 2 Times a Day. 90 Cap    • clonazePAM (KLONOPIN) 1 MG Tab Take 1 mg by mouth 2 times a day.     • zolpidem (AMBIEN) 10 MG Tab Take 10 mg by mouth every bedtime.     • loperamide (IMODIUM) 2 MG Cap Take 1 Cap by mouth 3 times a day as needed for Diarrhea. 30 Cap 0   • rivaroxaban (XARELTO) 20 MG  Tab tablet Take 1 Tab by mouth with dinner. 30 Tab 0   • Abacavir-Dolutegravir-Lamivud (TRIUMEQ) 600- MG Tab Take 1 Tab by mouth every day.     • omeprazole (PRILOSEC) 20 MG delayed-release capsule Take 20 mg by mouth every day.     • [DISCONTINUED] ondansetron (ZOFRAN ODT) 4 MG TABLET DISPERSIBLE Take 1 Tab by mouth every four hours as needed (give PO if IV route is unavailable. May give per feeding tube.). 10 Tab 0   • [DISCONTINUED] acetaminophen (TYLENOL) 325 MG Tab Take 2 Tabs by mouth every 6 hours as needed (Mild Pain; (Pain scale 1-3); Temp greater than 100.5 F). 30 Tab 0   • [DISCONTINUED] hydrOXYzine pamoate (VISTARIL) 50 MG Cap Take  mg by mouth 3 times a day as needed (n/v).     • metoprolol (LOPRESSOR) 25 MG Tab Take 25 mg by mouth 2 times a day.       No facility-administered encounter medications on file as of 4/3/2019.      Review of Systems   Constitutional: Positive for malaise/fatigue (sleeps alot).   Respiratory: Negative for cough and shortness of breath.    Cardiovascular: Negative for chest pain, palpitations, orthopnea, claudication, leg swelling and PND.   Gastrointestinal: Negative for abdominal pain.   Musculoskeletal: Positive for joint pain (right foot pain.). Negative for myalgias.   Neurological: Positive for dizziness (position change.). Negative for weakness.        Objective:   /70 (BP Location: Right arm, Patient Position: Sitting)   Pulse 80   Ht 1.829 m (6')   Wt 63.5 kg (140 lb)   SpO2 96%   BMI 18.99 kg/m²      Physical Exam   Constitutional: He is oriented to person, place, and time. He appears well-developed and well-nourished.   Thin male in no acute distress.  Muscle wasting present.   HENT:   Head: Normocephalic.   Eyes: EOM are normal.   Neck: Normal range of motion. No JVD present.   Cardiovascular: Normal rate and regular rhythm.  Exam reveals no friction rub.    Murmur heard.   Systolic murmur is present with a grade of 1/6   Pulmonary/Chest:  Effort normal.   Abdominal: Soft. Bowel sounds are normal.   Musculoskeletal: He exhibits no edema.   Neurological: He is alert and oriented to person, place, and time.   Skin: Skin is warm and dry.   Psychiatric: He has a normal mood and affect.     Results for ETTA LUZ (MRN [id number])    Ref. Range 4/1/2019 12:30   WBC Latest Ref Range: 4.8 - 10.8 K/uL 5.2   RBC Latest Ref Range: 4.70 - 6.10 M/uL 3.82 (L)   Hemoglobin Latest Ref Range: 14.0 - 18.0 g/dL 11.8 (L)   Hematocrit Latest Ref Range: 42.0 - 52.0 % 37.8 (L)   MCV Latest Ref Range: 81.4 - 97.8 fL 99.0 (H)   MCH Latest Ref Range: 27.0 - 33.0 pg 30.9   MCHC Latest Ref Range: 33.7 - 35.3 g/dL 31.2 (L)   RDW Latest Ref Range: 35.9 - 50.0 fL 47.9   Platelet Count Latest Ref Range: 164 - 446 K/uL 288   MPV Latest Ref Range: 9.0 - 12.9 fL 9.6   Neutrophils-Polys Latest Ref Range: 44.00 - 72.00 % 55.90   Neutrophils (Absolute) Latest Ref Range: 1.82 - 7.42 K/uL 2.93   Lymphocytes Latest Ref Range: 22.00 - 41.00 % 31.20   Lymphs (Absolute) Latest Ref Range: 1.00 - 4.80 K/uL 1.63   Monocytes Latest Ref Range: 0.00 - 13.40 % 11.70   Monos (Absolute) Latest Ref Range: 0.00 - 0.85 K/uL 0.61   Eosinophils Latest Ref Range: 0.00 - 6.90 % 0.60   Eos (Absolute) Latest Ref Range: 0.00 - 0.51 K/uL 0.03   Basophils Latest Ref Range: 0.00 - 1.80 % 0.40   Baso (Absolute) Latest Ref Range: 0.00 - 0.12 K/uL 0.02   Immature Granulocytes Latest Ref Range: 0.00 - 0.90 % 0.20   Immature Granulocytes (abs) Latest Ref Range: 0.00 - 0.11 K/uL 0.01   Nucleated RBC Latest Units: /100 WBC 0.00   NRBC (Absolute) Latest Units: K/uL 0.00   Sodium Latest Ref Range: 135 - 145 mmol/L 141   Potassium Latest Ref Range: 3.6 - 5.5 mmol/L 3.9   Chloride Latest Ref Range: 96 - 112 mmol/L 106   Co2 Latest Ref Range: 20 - 33 mmol/L 28   Anion Gap Latest Ref Range: 0.0 - 11.9  7.0   Glucose Latest Ref Range: 65 - 99 mg/dL 90   Bun Latest Ref Range: 8 - 22 mg/dL 15   Creatinine Latest Ref Range:  0.50 - 1.40 mg/dL 1.16   GFR If  Latest Ref Range: >60 mL/min/1.73 m 2 >60   GFR If Non  Latest Ref Range: >60 mL/min/1.73 m 2 >60   Calcium Latest Ref Range: 8.5 - 10.5 mg/dL 9.2   AST(SGOT) Latest Ref Range: 12 - 45 U/L 13   ALT(SGPT) Latest Ref Range: 2 - 50 U/L 10   Alkaline Phosphatase Latest Ref Range: 30 - 99 U/L 64   Total Bilirubin Latest Ref Range: 0.1 - 1.5 mg/dL 0.6   Albumin Latest Ref Range: 3.2 - 4.9 g/dL 4.2   Total Protein Latest Ref Range: 6.0 - 8.2 g/dL 6.6   Globulin Latest Ref Range: 1.9 - 3.5 g/dL 2.4   A-G Ratio Latest Units: g/dL 1.8   Glycohemoglobin Latest Ref Range: 0.0 - 5.6 % 5.6   Estim. Avg Glu Latest Units: mg/dL 114   Cholesterol,Tot Latest Ref Range: 100 - 199 mg/dL 108   Triglycerides Latest Ref Range: 0 - 149 mg/dL 74   HDL Latest Ref Range: >=40 mg/dL 41   LDL Latest Ref Range: <100 mg/dL 52       October 10, 2018: Transthoracic Echo Report  CONCLUSIONS  Prior study done on 04/06/18; compared to the report of the study done   - there has been no significant change.   No obvious intracardiac thrombus noted.  Normal left ventricular systolic function.  Thickened mitral valve leaflets.  Left ventricular ejection fraction is visually estimated to be 65%.  Grade I diastolic dysfunction.  No significant valve abnormalities.   Unable to estimate pulmonary artery pressure due to an inadequate   tricuspid regurgitant jet.    Today: EKG is normal sinus rhythm rate of 68.  EKG was reviewed with Dr Seymour.    Assessment:     1. Preop cardiovascular exam  EKG   2. PAF (paroxysmal atrial fibrillation) (HCC)     3. Cerebrovascular accident (CVA) due to embolism of other cerebral artery (HCC)     4. Orthostatic hypotension     5. Uncomplicated alcohol dependence (HCC)         Medical Decision Making:  Today's Assessment / Status / Plan:     Preop cardiovascular exam: Patient appears stable and tells me that he is in his usual state of health.  He denies any  episodes of palpitations or known atrial fibrillation.  He has discontinued alcohol completely.  I consulted with  Dr Seymour, who feels that the patient may undergo his planned surgery of a moderate cardiovascular risk.    Paroxysmal atrial fibrillation: The patient is in a regular rhythm today.  He will continue on Xarelto and will need bridging with Lovenox.  This will be done through the anticoagulation clinic.    CVA: Occurred in October 2018.  Some left-sided weakness.    Hypertension: His blood pressure is low and he has some orthostasis.  I have encouraged him to stay hydrated and eat some salt in order to keep his blood pressure up.    Alcohol dependence: He has been off alcohol for 6 months.  He does not intend to drink alcohol.    He may undergo his surgery with Dr. Berry.  Patient will follow-up in 6 months with Dr Butch Martínez.  Sooner if problems.    Collaborating Provider:  Dr Seymour.    Please note that this dictation was created using voice recognition software. I have made every reasonable attempt to correct obvious errors, but it is possible there are errors of grammar and possibly content that I did not discover before finalizing the note.                No Recipients

## 2019-04-04 LAB — EKG IMPRESSION: NORMAL

## 2019-04-04 NOTE — TELEPHONE ENCOUNTER
Received fax from [facility], F:[phone] P:[phone], regarding patient's right foot surgery.    Clearance letter fax to, [phone], completed status.    Fax and clearance letter sent to scanning for reference.

## 2019-04-09 ENCOUNTER — OCCUPATIONAL THERAPY (OUTPATIENT)
Dept: OCCUPATIONAL THERAPY | Facility: REHABILITATION | Age: 64
End: 2019-04-09
Attending: FAMILY MEDICINE
Payer: MEDICARE

## 2019-04-09 DIAGNOSIS — R53.1 WEAKNESS: ICD-10-CM

## 2019-04-09 PROCEDURE — 97110 THERAPEUTIC EXERCISES: CPT

## 2019-04-09 NOTE — OP THERAPY DAILY TREATMENT
"  Outpatient Occupational Therapy  DAILY TREATMENT     [facility] Occupational [address]  [address]  [place] [address]  Phone:  [phone]  Fax:  [phone]    Date: 04/09/2019    Patient: Sukumar Bolton  YOB: 1955  MRN: [id number]     Time Calculation  Start time: 0130  Stop time: 0200 Time Calculation (min): 30 minutes     Chief Complaint: Extremity Weakness (left) and Hand Weakness (right)    Visit #: 5    SUBJECTIVE: \"My left hand is sore from the exercises\"    OBJECTIVE:  Current objective measures:    strength: Left:14lbs; Right: 37lbs        Therapeutic Exercises (CPT 34090):     1. BH    Therapeutic Exercise Summary:  Bilateral hand exercises for gross grasp of 2 washcloths in hot water, relax 2 minutes in cold, return to 2 minutes in hot with a positive response in flexibility.  Hand exercise using dry washcloth table top for gross flexion/extension, chair push-ups x 10 reps with 10 second hold.  Standing WB through BUE x10 reps with 10 second hold.      Time-based treatments/modalities:  Therapeutic exercise minutes (CPT 32198): 30 minutes        ASSESSMENT:   Response to treatment:  Pt making progress with his bilateral hand flexibility and function in response to graded exercises described above.  HEP updated with good understanding.    PLAN/RECOMMENDATIONS:   Plan for treatment: therapy treatment to continue next visit.  Planned interventions for next visit: continue with current treatment, neuromuscular re-education (CPT 84216), therapeutic activities (CPT 66984) and therapeutic exercise (CPT 46697)      "

## 2019-04-11 ENCOUNTER — OCCUPATIONAL THERAPY (OUTPATIENT)
Dept: OCCUPATIONAL THERAPY | Facility: REHABILITATION | Age: 64
End: 2019-04-11
Attending: FAMILY MEDICINE
Payer: MEDICARE

## 2019-04-11 DIAGNOSIS — R53.1 WEAKNESS: ICD-10-CM

## 2019-04-11 PROCEDURE — 97032 APPL MODALITY 1+ESTIM EA 15: CPT

## 2019-04-11 PROCEDURE — 97112 NEUROMUSCULAR REEDUCATION: CPT

## 2019-04-11 PROCEDURE — 97110 THERAPEUTIC EXERCISES: CPT

## 2019-04-11 NOTE — OP THERAPY DAILY TREATMENT
"  Outpatient Occupational Therapy  DAILY TREATMENT     [facility] Occupational [address].  [address]  [place] [address]  Phone:  [phone]  Fax:  [phone]    Date: 04/11/2019    Patient: Sukumar Bolton  YOB: 1955  MRN: [id number]     Time Calculation  Start time: 0430  Stop time: 0530 Time Calculation (min): 60 minutes     Chief Complaint: Extremity Weakness (left) and Hand Weakness (right)    Visit #: 6    SUBJECTIVE: \"I am having surgery on my right foot on 6/29/19\"    OBJECTIVE:  Current objective measures:   LUE strength:   Shoulder 2-/5  Elbow flexion: 2+/5, elbow extension 3/5   strength: Left:14lbs; Right: 35lbs        Therapeutic Exercises (CPT 60008):     1. Bilateral hands    Therapeutic Exercise Summary:  Intrinsic hand strengthening using tongue depressor for rotation bilateral directions.   Left hand with significant more difficulty during manipulation.  Gross abduction/adduction x 20 reps      Therapeutic Treatments and Modalities:    1. E Stim Attended (CPT 21634)    2. Neuromuscular Re-education (CPT 86359)    Therapeutic Treatments and Modalities Summary: NMES to left elbow flexors (biceps brachii and brachioradialis) 45-70 Hz with variations in pad placement with minimal response.  Prone-on-elbows x 10 reps with 10 second hold, quadruped position x 10 reps with 10 second hold, occasional lateral left arm support to maintain extended position.     Time-based treatments/modalities:  Therapeutic exercise minutes (CPT 10622): 15 minutes  Neuromusc re-ed minutes (CPT 80731): 30 minutes  E-stim attended minutes (CPT 70436): 15 minutes     ASSESSMENT:   Response to treatment: Pt making fair progress today in his neuromuscular recovery in response to facilitory techniques including WB and trial of NMES to elbow flexors.  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  HEP updated with good " understanding.    PLAN/RECOMMENDATIONS:   Plan for treatment: therapy treatment to continue next visit.  Planned interventions for next visit: continue with current treatment, neuromuscular re-education (CPT 15266) and therapeutic exercise (CPT 40858)

## 2019-04-16 ENCOUNTER — OCCUPATIONAL THERAPY (OUTPATIENT)
Dept: OCCUPATIONAL THERAPY | Facility: REHABILITATION | Age: 64
End: 2019-04-16
Attending: FAMILY MEDICINE
Payer: MEDICARE

## 2019-04-16 DIAGNOSIS — R53.1 WEAKNESS: ICD-10-CM

## 2019-04-16 PROCEDURE — 97112 NEUROMUSCULAR REEDUCATION: CPT

## 2019-04-16 PROCEDURE — 97110 THERAPEUTIC EXERCISES: CPT

## 2019-04-16 PROCEDURE — 97530 THERAPEUTIC ACTIVITIES: CPT

## 2019-04-18 ENCOUNTER — APPOINTMENT (OUTPATIENT)
Dept: OCCUPATIONAL THERAPY | Facility: REHABILITATION | Age: 64
End: 2019-04-18
Attending: FAMILY MEDICINE
Payer: MEDICARE

## 2019-04-22 ENCOUNTER — OCCUPATIONAL THERAPY (OUTPATIENT)
Dept: OCCUPATIONAL THERAPY | Facility: REHABILITATION | Age: 64
End: 2019-04-22
Attending: FAMILY MEDICINE
Payer: MEDICARE

## 2019-04-22 DIAGNOSIS — R53.1 WEAKNESS: ICD-10-CM

## 2019-04-22 PROCEDURE — 97110 THERAPEUTIC EXERCISES: CPT

## 2019-04-23 ENCOUNTER — APPOINTMENT (OUTPATIENT)
Dept: OCCUPATIONAL THERAPY | Facility: REHABILITATION | Age: 64
End: 2019-04-23
Attending: FAMILY MEDICINE
Payer: MEDICARE

## 2019-04-25 ENCOUNTER — OCCUPATIONAL THERAPY (OUTPATIENT)
Dept: OCCUPATIONAL THERAPY | Facility: REHABILITATION | Age: 64
End: 2019-04-25
Attending: FAMILY MEDICINE
Payer: MEDICARE

## 2019-04-25 DIAGNOSIS — R53.1 WEAKNESS: ICD-10-CM

## 2019-04-25 PROCEDURE — 97763 ORTHC/PROSTC MGMT SBSQ ENC: CPT

## 2019-04-25 NOTE — OP THERAPY DAILY TREATMENT
"  Outpatient Occupational Therapy  DAILY TREATMENT     [facility] Occupational Therapy [address].  [address]  [place] [address]  Phone:  [phone]  Fax:  [phone]    Date: 04/25/2019    Patient: Sukumar Bolton  YOB: 1955  MRN: [id number]     Time Calculation  Start time: 0200  Stop time: 0230 Time Calculation (min): 30 minutes     Chief Complaint: Extremity Weakness (left) and Finger Contracture (right SF)    Visit #: 9    SUBJECTIVE: \"My left elbow is stronger\"    OBJECTIVE:  Current objective measures:   Right SF 60 degree flexion contracture at PIP joint, able to passively reduce to 20 degrees.        Therapeutic Treatments and Modalities:    1. Orthotic Training (CPT 67239)    Therapeutic Treatments and Modalities Summary: Right SF extension splint modified via trimming proximal end, adding t-foam, and re-strapping with two 1/2 inch velfoam straps with improved fit and comfort.  Splint remained on digit with movement of arm as this was the primary difficulty he had experienced.  Pt able to independently doff.  Problem solved various techniques to don splint with fair success.  Instructed to wear 2 hours on/2 hours off during the day and educated on the importance of skin inspection with good understanding.  Pt performed elbow flexion/extension exercises during modifications using 7lbs weight table top multiple reps.      Time-based treatments/modalities:  Orthotic/Prosthetic subsq visit, minutes (CPT 49550): 30 minutes        ASSESSMENT:   Response to treatment:  Modifications to right SF extension splint were successful regarding stability during movement along with improved comfort to provide a passive sustained stretch for a 60 degree reducible flexion contracture.  Pt verbalized good understanding of wear and care schedule along with continuation of daily stretching.  Pt to continue to problem solve donning device as it remains challenging due to LH " weakness.    PLAN/RECOMMENDATIONS:   Plan for treatment: therapy treatment to continue next visit.  Planned interventions for next visit: continue with current treatment, neuromuscular re-education (CPT 11597), orthotic training (CPT 04227), therapeutic activities (CPT 31106) and therapeutic exercise (CPT 99507)

## 2019-04-30 ENCOUNTER — OCCUPATIONAL THERAPY (OUTPATIENT)
Dept: OCCUPATIONAL THERAPY | Facility: REHABILITATION | Age: 64
End: 2019-04-30
Attending: FAMILY MEDICINE
Payer: MEDICARE

## 2019-04-30 DIAGNOSIS — R53.1 WEAKNESS: ICD-10-CM

## 2019-04-30 PROCEDURE — 97110 THERAPEUTIC EXERCISES: CPT

## 2019-04-30 PROCEDURE — 97530 THERAPEUTIC ACTIVITIES: CPT

## 2019-04-30 NOTE — OP THERAPY DAILY TREATMENT
"  Outpatient Occupational Therapy  DAILY TREATMENT     [facility] Occupational Therapy [address]  [address]  [place] [address]  Phone:  [phone]  Fax:  [phone]    Date: 04/30/2019    Patient: Sukumar Bolton  YOB: 1955  MRN: [id number]     Time Calculation  Start time: 0130  Stop time: 0230 Time Calculation (min): 60 minutes     Chief Complaint: Extremity Weakness (left)    Visit #: 10    SUBJECTIVE: \"My left elbow is getting stronger\"    OBJECTIVE:  Current objective measures:   LUE strength:   Shoulder 3-/5  Elbow flexion: 2+/5, elbow extension 3/5   strength: Left: 19lbs; Right: 40lbs        Therapeutic Exercises (CPT 40420):     1. LUE    Therapeutic Exercise Summary:  Isometric elbow flexion stabilizing hand on table vs bar multiple reps.  Supine holding ball with BH to raise overhead using slow and controlled movements with moderate pressure on ball to facilitate co-contraction x 20 reps.  Stretching of right SF into extension with palm up over knee using LH.      Therapeutic Treatments and Modalities:    1. Therapeutic Activities (CPT 39736)    Therapeutic Treatments and Modalities Summary: Valpar panels in standing to remove 2 long screws with LH and replacing in seated position in right lower space x 5 long screws.   Lateral weight bearing onto LUE combining shoulder IR/ER with 1lb weight.  A/P scooting on mat x 10 reps.      Time-based treatments/modalities:  Therapeutic exercise minutes (CPT 29278): 30 minutes  Therapeutic activity minutes (CPT 07253): 30 minutes        ASSESSMENT:   Response to treatment: Pt continues to make slow but steady progress with his LUE neuromuscular recovery with an increase in strength of elbow flexors and co-contraction during WB on mat vs pressure with BH on ball.  HEP updated with good understanding.    PLAN/RECOMMENDATIONS:   Plan for treatment: therapy treatment to continue next visit.  Planned interventions for next " visit: continue with current treatment, neuromuscular re-education (CPT 35423), therapeutic activities (CPT 47028) and therapeutic exercise (CPT 63836)

## 2019-05-02 ENCOUNTER — OCCUPATIONAL THERAPY (OUTPATIENT)
Dept: OCCUPATIONAL THERAPY | Facility: REHABILITATION | Age: 64
End: 2019-05-02
Attending: FAMILY MEDICINE
Payer: MEDICARE

## 2019-05-02 DIAGNOSIS — R53.1 WEAKNESS: ICD-10-CM

## 2019-05-02 PROCEDURE — 97140 MANUAL THERAPY 1/> REGIONS: CPT

## 2019-05-02 PROCEDURE — 97110 THERAPEUTIC EXERCISES: CPT

## 2019-05-02 NOTE — OP THERAPY DAILY TREATMENT
"  Outpatient Occupational Therapy  DAILY TREATMENT     [facility] Occupational [address]  [address]  [place] [address]  Phone:  [phone]  Fax:  [phone]    Date: 05/02/2019    Patient: Sukumar Bolton  YOB: 1955  MRN: [id number]     Time Calculation  Start time: 0100  Stop time: 0200 Time Calculation (min): 60 minutes     Chief Complaint: Extremity Weakness (left)    Visit #: 11    SUBJECTIVE: \"I was very tired after the last visit\"    OBJECTIVE:  Current objective measures:   LUE strength:   Shoulder 3-/5  Elbow flexion:  biceps brachii/brachioradialis: 2+/5, brachialis 3/5  elbow extension 3+/5        Therapeutic Exercises (CPT 02406):     1. LUE    Therapeutic Exercise Summary:  Left shoulder AROM ff in supine x 12 reps, bilateral shoulder ff holding 3lb dowel, horizontal abduction/adduction, circumduction, and rotation x 20 reps.  Orange theraband for isometric left elbow flexion (biceps brachii/brachioradialis separately), assist to maintain forearm in supination for biceps brachii using RH, 10 reps of 10 second hold.  Theraband for left elbow extension x 15, bilateral shoulder horizontal abduction/adduction x 5, and ER x 5 reps.     Therapeutic Treatments and Modalities:    1. Manual Therapy (CPT 43908)    Therapeutic Treatments and Modalities Summary: Left shoulder joint mobilization via posterior glides followed by passive sustained stretch incorporating PNF techniques to maximum range, approximately 160 degrees of ff.    Time-based treatments/modalities:  Therapeutic exercise minutes (CPT 87491): 45 minutes  Manual therapy joint mobilization minutes (CPT 98797): 15 minutes      ASSESSMENT:   Response to treatment:  Pt continues to make progress with his LUE strength, AROM and soft tissue mobility in his shoulder and elbow in response to graded stretches and exercises.  Written HEP updated with good understanding.    PLAN/RECOMMENDATIONS:   Plan for " treatment: therapy treatment to continue next visit.  Planned interventions for next visit: continue with current treatment, neuromuscular re-education (CPT 30625), therapeutic activities (CPT 86908) and therapeutic exercise (CPT 20115)

## 2019-05-07 ENCOUNTER — OCCUPATIONAL THERAPY (OUTPATIENT)
Dept: OCCUPATIONAL THERAPY | Facility: REHABILITATION | Age: 64
End: 2019-05-07
Attending: FAMILY MEDICINE
Payer: MEDICARE

## 2019-05-07 DIAGNOSIS — R53.1 WEAKNESS: ICD-10-CM

## 2019-05-07 PROCEDURE — 97110 THERAPEUTIC EXERCISES: CPT

## 2019-05-07 NOTE — OP THERAPY DAILY TREATMENT
"  Outpatient Occupational Therapy  DAILY TREATMENT     [facility] Occupational [address].  [address]  [place] [address]  Phone:  [phone]  Fax:  [phone]    Date: 05/07/2019    Patient: Sukumar Bolton  YOB: 1955  MRN: [id number]     Time Calculation  Start time: 0100  Stop time: 0130 Time Calculation (min): 30 minutes     Chief Complaint: Extremity Weakness (left)    Visit #: 12    SUBJECTIVE: \"I'm still doing my exercises\"    OBJECTIVE:  Current objective measures:   Shoulder strength: Right: 4/5, Left: 3-/5  Bilateral winged scapulas        Therapeutic Exercises (CPT 55695):     1. Bilateral shoulders    Therapeutic Exercise Summary:  Supine for serratus anterior strengthening via sustained protraction x 5 second hold x 15 reps.  1/2 foam roller exercises for stretch to thoracic spine, protraction with elbows extended, alternating ff, alternating horizontal abd/add, bilateral abduction, bilateral ff, and horizontal abd/add with elbows flexed at 90 degrees/ER.      Time-based treatments/modalities:  Therapeutic exercise minutes (CPT 75521): 30 minutes        ASSESSMENT:   Response to treatment:  Pt making good progress with his bilateral shoulder strength, stability, and flexibility in response to graded stretches in supine on the 1/2 foam roller. Issued written HEP with good understanding.    PLAN/RECOMMENDATIONS:   Plan for treatment: therapy treatment to continue next visit.  Planned interventions for next visit: continue with current treatment, manual therapy (CPT 46105), neuromuscular re-education (CPT 95911), therapeutic activities (CPT 12717) and therapeutic exercise (CPT 95106)      "

## 2019-05-09 ENCOUNTER — OCCUPATIONAL THERAPY (OUTPATIENT)
Dept: OCCUPATIONAL THERAPY | Facility: REHABILITATION | Age: 64
End: 2019-05-09
Attending: FAMILY MEDICINE
Payer: MEDICARE

## 2019-05-09 DIAGNOSIS — R53.1 WEAKNESS: ICD-10-CM

## 2019-05-09 PROCEDURE — 97110 THERAPEUTIC EXERCISES: CPT

## 2019-05-09 NOTE — OP THERAPY DAILY TREATMENT
"  Outpatient Occupational Therapy  DAILY TREATMENT     [facility] Occupational [address]  [address]  [place] [address]  Phone:  [phone]  Fax:  [phone]    Date: 05/09/2019    Patient: Sukumar Bolton  YOB: 1955  MRN: [id number]     Time Calculation  Start time: 0100  Stop time: 0200 Time Calculation (min): 60 minutes     Chief Complaint: Extremity Weakness (left)    Visit #: 13    SUBJECTIVE: \"How much of my hand will get better?\"    OBJECTIVE:  Current objective measures:   Shoulder strength: Right: 4/5, Left: 3-/5   strength: Left: 18lbs; Right: 42lbs  Elbow flexion: 3-/5        Therapeutic Exercises (CPT 01211):     1. BUE    Therapeutic Exercise Summary:  Passive stretch left forearm into full supination for gross flexion to sequential isolated digit extension, table top holding 3lb weight for supination/pronation with focus on stretch at end range during supination. Elbow flexion with focus on control during eccentric contractions combined with supination (passively moved into full flexion).  Concentric biceps contractions with and without high speed vibration.  Left serratus anterior protraction with resistance 5 second hold x 10 reps, light resistance applied x 10, active protraction with movements to write alphabet bilateral arms, prone-on-elbows 10 reps.  LH manipulation of flipping tongue depressor forward backwards.      Time-based treatments/modalities:  Therapeutic exercise minutes (CPT 98086): 60 minutes      ASSESSMENT:   Response to treatment:  Pt making steady progress with his LUE neuromuscular recovery with an increase in proximal muscle stability and strength along with increased strength of elbow flexors through exercises described above.  Updated written HEP with good understanding.    PLAN/RECOMMENDATIONS:   Plan for treatment: therapy treatment to continue next visit.  Planned interventions for next visit: continue with current " treatment, neuromuscular re-education (CPT 68259), therapeutic activities (CPT 94840) and therapeutic exercise (CPT 36288)

## 2019-05-13 ENCOUNTER — OCCUPATIONAL THERAPY (OUTPATIENT)
Dept: OCCUPATIONAL THERAPY | Facility: REHABILITATION | Age: 64
End: 2019-05-13
Attending: FAMILY MEDICINE
Payer: MEDICARE

## 2019-05-13 DIAGNOSIS — R53.1 WEAKNESS: ICD-10-CM

## 2019-05-13 PROCEDURE — 97530 THERAPEUTIC ACTIVITIES: CPT

## 2019-05-13 NOTE — OP THERAPY DAILY TREATMENT
"  Outpatient Occupational Therapy  DAILY TREATMENT     [facility] Occupational [address].  [address]  [place] [address]  Phone:  [phone]  Fax:  [phone]    Date: 05/13/2019    Patient: Sukumar Bolton  YOB: 1955  MRN: [id number]     Time Calculation  Start time: 0430  Stop time: 0530 Time Calculation (min): 60 minutes     Chief Complaint: Extremity Weakness (left)    Visit #: 14    SUBJECTIVE:  \"My hand feels stiff\"    OBJECTIVE:  Current objective measures:   Left hand abduction/adduction strength: 3/5        Therapeutic Treatments and Modalities:    1. Therapeutic Activities (CPT 41182)    Therapeutic Treatments and Modalities Summary: Passive stretch to right SF into extension. Trialed Oval 8 finger splint for right SF, indentations noted after 30 minutes of wearing at 2 areas.  Attempted to modify splint with heat gun but areas of pressure persisted.  Continue with current orthoplast splint.  Performed placement of discs in numerical order using  from table to [place] manipulation board incorporating elbow flexion.  Grasp of each disc, extend wrist, and manipulate disc around in LH to replace on other side.   all discs to place in bucket on high table.  Tendon gliding exercises LH 5 cycles    Time-based treatments/modalities:  Therapeutic activity minutes (CPT 91456): 60 minutes      ASSESSMENT:   Response to treatment:  Pt making progress with his LUE strength and motor control in response to graded activities and exercises.  Right SF flexion contracture persists.  Will continue to problem solve pt's ability to don splint as this remains highly challenging.  HEP updated with good understanding.    PLAN/RECOMMENDATIONS:   Plan for treatment: therapy treatment to continue next visit.  Planned interventions for next visit: continue with current treatment, orthotic training (CPT 24778), therapeutic activities (CPT 42118) and therapeutic exercise (CPT " 67297)

## 2019-05-16 ENCOUNTER — OCCUPATIONAL THERAPY (OUTPATIENT)
Dept: OCCUPATIONAL THERAPY | Facility: REHABILITATION | Age: 64
End: 2019-05-16
Attending: FAMILY MEDICINE
Payer: MEDICARE

## 2019-05-16 ENCOUNTER — HOSPITAL ENCOUNTER (OUTPATIENT)
Dept: LAB | Facility: MEDICAL CENTER | Age: 64
End: 2019-05-16
Attending: FAMILY MEDICINE
Payer: MEDICARE

## 2019-05-16 DIAGNOSIS — R53.1 WEAKNESS: ICD-10-CM

## 2019-05-16 PROCEDURE — 82746 ASSAY OF FOLIC ACID SERUM: CPT

## 2019-05-16 PROCEDURE — 83540 ASSAY OF IRON: CPT

## 2019-05-16 PROCEDURE — 97032 APPL MODALITY 1+ESTIM EA 15: CPT

## 2019-05-16 PROCEDURE — 82607 VITAMIN B-12: CPT

## 2019-05-16 PROCEDURE — 82728 ASSAY OF FERRITIN: CPT

## 2019-05-16 PROCEDURE — 85025 COMPLETE CBC W/AUTO DIFF WBC: CPT

## 2019-05-16 PROCEDURE — 97112 NEUROMUSCULAR REEDUCATION: CPT

## 2019-05-16 PROCEDURE — 83550 IRON BINDING TEST: CPT

## 2019-05-16 NOTE — OP THERAPY DAILY TREATMENT
"  Outpatient Occupational Therapy  DAILY TREATMENT     [facility] Occupational [address].  [address]  [place] [address]  Phone:  [phone]  Fax:  [phone]    Date: 05/16/2019    Patient: Sukumar Bolton  YOB: 1955  MRN: [id number]     Time Calculation  Start time: 0830  Stop time: 0930 Time Calculation (min): 60 minutes     Chief Complaint: Extremity Weakness (left)    Visit #: 15    SUBJECTIVE: \"I'm doing my exercises\"    OBJECTIVE:  Current objective measures:    strength: Left: 17lbs; Right: 37lbs  Left elbow flexion: 3/5  Left shoulder 3-/5        Therapeutic Treatments and Modalities:    1. Neuromuscular Re-education (CPT 37385)    2. E Stim Attended (CPT 83472)    Therapeutic Treatments and Modalities Summary: NMES to left elbow flexors (biceps brachii and brachioradialis) 50-60 Hz with variations in pad placement with observable contractions but without actual movement.  Prone-on-elbows x 5 reps with 10 second hold, prone with RUE ff for increased co-contraction through LUE, high kneeling with swiss ball rolling forward to end range with BH, return to initial position for alternating front arm raises x 10 reps.  Small ball held between BH with elbows extended for ff to 90 degrees incorporating moderate inward pressure on ball 3 second holds x 15 reps.  Standing at wall with rolled up towel for ff/ext with left shoulder x 20 reps.  Fabricated right SF extension strap to be worn as an alternative to the orthoplast splint.  Pt able to don/doff with less difficulty.  Rotation of 1lb ball around back x 15 reps each direction.    Time-based treatments/modalities:  Neuromusc re-ed minutes (CPT 29665): 45 minutes  E-stim attended minutes (CPT 15318): 15 minutes     ASSESSMENT:   Response to treatment:  Pt making progress with his neuromuscular recovery through graded challenges described above to facilitate an increase in activation of LUE most notable with elbow " flexion and co-contraction during all WB activities. Fabrication of right SF extension strap as an alternative to provide a passive sustained stretch was able to be donned/doffed  Independently.  HEP updated with good understanding.    PLAN/RECOMMENDATIONS:   Plan for treatment: therapy treatment to continue next visit.  Planned interventions for next visit: continue with current treatment, neuromuscular re-education (CPT 23377), orthotic training (CPT 30204), therapeutic activities (CPT 85744) and therapeutic exercise (CPT 90308)

## 2019-05-17 LAB
BASOPHILS # BLD AUTO: 0.3 % (ref 0–1.8)
BASOPHILS # BLD: 0.02 K/UL (ref 0–0.12)
EOSINOPHIL # BLD AUTO: 0.02 K/UL (ref 0–0.51)
EOSINOPHIL NFR BLD: 0.3 % (ref 0–6.9)
ERYTHROCYTE [DISTWIDTH] IN BLOOD BY AUTOMATED COUNT: 47.3 FL (ref 35.9–50)
FERRITIN SERPL-MCNC: 11.1 NG/ML (ref 22–322)
FOLATE SERPL-MCNC: >23.6 NG/ML
HCT VFR BLD AUTO: 37.6 % (ref 42–52)
HGB BLD-MCNC: 11.6 G/DL (ref 14–18)
IMM GRANULOCYTES # BLD AUTO: 0.02 K/UL (ref 0–0.11)
IMM GRANULOCYTES NFR BLD AUTO: 0.3 % (ref 0–0.9)
IRON SATN MFR SERPL: 7 % (ref 15–55)
IRON SERPL-MCNC: 31 UG/DL (ref 50–180)
LYMPHOCYTES # BLD AUTO: 1.98 K/UL (ref 1–4.8)
LYMPHOCYTES NFR BLD: 31 % (ref 22–41)
MCH RBC QN AUTO: 29.6 PG (ref 27–33)
MCHC RBC AUTO-ENTMCNC: 30.9 G/DL (ref 33.7–35.3)
MCV RBC AUTO: 95.9 FL (ref 81.4–97.8)
MONOCYTES # BLD AUTO: 0.81 K/UL (ref 0–0.85)
MONOCYTES NFR BLD AUTO: 12.7 % (ref 0–13.4)
NEUTROPHILS # BLD AUTO: 3.54 K/UL (ref 1.82–7.42)
NEUTROPHILS NFR BLD: 55.4 % (ref 44–72)
NRBC # BLD AUTO: 0 K/UL
NRBC BLD-RTO: 0 /100 WBC
PLATELET # BLD AUTO: 282 K/UL (ref 164–446)
PMV BLD AUTO: 9.5 FL (ref 9–12.9)
RBC # BLD AUTO: 3.92 M/UL (ref 4.7–6.1)
TIBC SERPL-MCNC: 423 UG/DL (ref 250–450)
VIT B12 SERPL-MCNC: 585 PG/ML (ref 211–911)
WBC # BLD AUTO: 6.4 K/UL (ref 4.8–10.8)

## 2019-05-21 ENCOUNTER — OCCUPATIONAL THERAPY (OUTPATIENT)
Dept: OCCUPATIONAL THERAPY | Facility: REHABILITATION | Age: 64
End: 2019-05-21
Attending: FAMILY MEDICINE
Payer: MEDICARE

## 2019-05-21 DIAGNOSIS — R53.1 WEAKNESS: ICD-10-CM

## 2019-05-21 PROCEDURE — 97110 THERAPEUTIC EXERCISES: CPT

## 2019-05-21 NOTE — OP THERAPY DAILY TREATMENT
"  Outpatient Occupational Therapy  DAILY TREATMENT     [facility] Occupational Therapy [address].  [address]  [place] [address]  Phone:  [phone]  Fax:  [phone]    Date: 05/21/2019    Patient: Sukumar Bolton  YOB: 1955  MRN: [id number]     Time Calculation  Start time: 0300  Stop time: 0400 Time Calculation (min): 60 minutes     Chief Complaint: Extremity Weakness (left)    Visit #: 16    SUBJECTIVE: \"I feel like I have made some good progress with you\"    OBJECTIVE:  Current objective measures:   LUE AROM WNL except supination 50 degrees, wrist flexion 30 degrees, and shoulder ff/abd 140 degrees.  PROM WNL  LUE strength: elbow flexion 3+/5, shoulder ff 4-/5, shoulder abduction 3+/5, all other muscle groups 4/5   RUE: AROM WNL except SF with 60 degrees reducible flexion contracture to 20 degrees and RF with 35 degree reducible flexion contracture to full extension.  RUE strength 5/5 except digit extension/abduction 3-/5   strength: Left: 22lbs; Right: 37lbs  Motor control:  Left opposition WFL with decreased speed, Right: functional opposition to RF  [employer]-Gt: right SF with diminished protective sensation, right palmar hand with mild hypersensitivity  Edema: not present  ADLs/IADLs/recreational activities: Modified independent, able to manage buttons  Positioning: right SF extension splint/strap: both comfortable. Mod I to doff but unable to don.  HEP:  Tendon gliding, stretching, theraputty, strengthening g.e biceps with weight on table, manipulation of tongue depressor, isometric biceps, supination/pronation with 3lb weight table top, serratus anterior supine position, prone-on-elbows, quaduped, WB, 1/2 foam roller, long flexor stretches, and orange theraband exercises.  Independent with written hand-outs.    OT Functional Assessment Tool Used: UEFI  OT Functional Assessment Score: 51/80     Therapeutic Exercises (CPT 21287):     1. BUE    Therapeutic " Exercise Summary:  Re-tested all areas noted above.  Reviewed and performed examples of current HEP and added a few new exercises with good understanding.      Time-based treatments/modalities:  Therapeutic exercise minutes (CPT 90192): 60 minutes      ASSESSMENT:   Response to treatment: Pt has actively participated in skilled OT program and has made good progress to meet the majority of the goals set in his initial POC.  Pt has made steady gains in his BUE neuromuscular recovery in the areas of strength, sensorimotor function, and ability to incorporate BH into activities which require bilateral integration.  While soft tissue restrictions persist in his right hand with flexion contractures most notable in his SF, pt is independent with his HEP for stretching and strengthening.  Pt is independent with his ADLs and IADLs using compensatory techniques.  Pt is able to doff his right SF extension splint but remains unable to don the splint despite problem solving.  He is able to tolerate wearing it for several hours once someone assists in placement.  Pt is moving to [place] in June.  Anticipate pt will continue with outpatient OT in a few months if there is a significant improvement in his strength.    PLAN/RECOMMENDATIONS:   Plan for treatment: discharge patient due to accomplished goals.  Planned interventions for next visit: D/C to HEP.  D/C OT.

## 2019-05-22 NOTE — OP THERAPY DISCHARGE SUMMARY
Outpatient Occupational Therapy  DISCHARGE SUMMARY NOTE    [facility] Occupational Therapy [facility]  [address]  [address]  [place] [address]  Phone:  [phone]  Fax:  [phone]    Date of Visit: 05/21/2019    Patient: Sukumar Bolton  YOB: 1955  MRN: [id number]     Referring Provider: Lenora Markham M.D.  [address] [place] [address]   Referring Diagnosis: Weakness [R53.1]     Occupational Therapy Occurrence Codes    Date of onset of impairment:  10/6/18   Date of occupational therapy care plan established or reviewed:  3/6/19   Date of occupational therapy treatment started:  3/6/19          Functional Limitations and Severity Modifiers  OT Functional Assessment Tool Used: UEFI  OT Functional Assessment Score: 51/80   Goal:     Discharge:         Your patient is being discharged from Occupational Therapy with the following comments:   · Goals met    Comments:  Pt has actively participated in skilled OT program and has made good progress to meet the majority of the goals set in his initial POC.  Pt has made steady gains in his BUE neuromuscular recovery in the areas of strength, sensorimotor function, and ability to incorporate BH into activities which require bilateral integration.  While soft tissue restrictions persist in his right hand with flexion contractures most notable in his SF, pt is independent with his HEP for stretching and strengthening.  Pt is independent with his ADLs and IADLs using compensatory techniques.  Pt is able to doff his right SF extension splint but remains unable to don the splint despite problem solving.  He is able to tolerate wearing it for several hours once someone assists in placement.  Pt is moving to [place] in June.  Anticipate pt will continue with outpatient OT in a few months if there is a significant improvement in his strength.     Limitations Remaining:  See daily note from 5/21/19 for details    Recommendations:  D/C to HEP.  D/C  OT.    Paco Carr MS,OTR/L    Date: 5/22/2019

## 2019-05-23 ENCOUNTER — APPOINTMENT (OUTPATIENT)
Dept: OCCUPATIONAL THERAPY | Facility: REHABILITATION | Age: 64
End: 2019-05-23
Attending: FAMILY MEDICINE
Payer: MEDICARE

## 2019-05-30 ENCOUNTER — APPOINTMENT (OUTPATIENT)
Dept: OCCUPATIONAL THERAPY | Facility: REHABILITATION | Age: 64
End: 2019-05-30
Attending: FAMILY MEDICINE
Payer: MEDICARE

## 2019-09-12 NOTE — OP REPORT
DATE OF SERVICE:  06/04/2018    PREOPERATIVE DIAGNOSES:  Right postoperative wound dehiscence with hematoma.    POSTOPERATIVE DIAGNOSES:  Right postoperative wound dehiscence with hematoma.    PROCEDURES PERFORMED:  1.  Right irrigation and debridement of hematoma of his ankle with delayed   primary closure of his wound approximately 8 cm.  2.  Placement of negative pressure incisional dressing.    SURGEON:  Ulices Berry MD    FIRST ASSISTANT:  Kelly Dhillon.    ANESTHESIA:  General endotracheal.    ESTIMATED BLOOD LOSS:  None.    COMPLICATIONS:  None.    POSTOPERATIVE PLAN:  1.  Nonweightbearing.  2.  Follow up in 2 weeks.    INDICATIONS:  The patient is a 63-year-old male.  He presented to the office   postoperatively with a wound dehiscence with underlying hematoma.  Options   were discussed with him including operative and nonoperative.  He elected to   undergo operative intervention.  The above procedure was discussed and all   questions were answered.  Risks of surgery explained, which include but not   limited to wound problems, infection, nerve injury, vascular injury, need for   further surgery.  He understands and accepts these risks and agrees to   proceed.  His site was marked by myself prior to receiving psychotropic   medicines.    PROCEDURE IN DETAIL:  The patient was brought to the operating room and   underwent general endotracheal anesthesia without complications.  The right   lower extremity was prepped and draped in standard fashion.  Positive site   verification confirmed his right lower extremity as well as procedure and   confirmation that he received preoperative antibiotics.  Esmarch was used to   exsanguinate his foot and ankle and leg tourniquet was inflated to 250 mmHg.    His hematoma was evacuated from his open wound relatively easily.  Thorough   irrigation was performed with 3 liters.  At that time, his skin could be   mobilized and he had delayed primary closure of an 8 cm  wound with a   combination of 3-0 Vicryl and 3-0 nylon.  A negative pressure incisional   dressing was placed, which had positive seal.  He was placed into a posterior   splint.  He awoke from general anesthesia without complications and was   transferred to the recovery room in good condition.    Utilization of Kelly Dhillon was necessary for patient positioning,   holding, retracting, wound closure, dressing and splint placement.  She was   present throughout the entire procedure.       ____________________________________     MD BARBARA STOCK / TAYLOR    DD:  06/04/2018 16:55:36  DT:  06/04/2018 17:16:40    D#:  [id number]  Job#:  [id number]    cc: [facility]   12-Sep-2019

## 2020-06-03 NOTE — DISCHARGE PLANNING
Faxed SBAR and placing patient into transport     Jane Carnes RN  06/03/20 3864 Spoke with Anjelica at [facility] advised patient has 90 day of HIV meds, per Anjelica patient will also need 90 day of Tivicay as well . RN EDGAR Gerber will call and speak with Anjelica about medications.

## 2021-03-03 DIAGNOSIS — Z23 NEED FOR VACCINATION: ICD-10-CM

## 2021-06-01 NOTE — OP THERAPY DAILY TREATMENT
"  Outpatient Occupational Therapy  DAILY TREATMENT     [facility] [address]  [address]  [place] [address]  Phone:  [phone]  Fax:  [phone]    Date: 04/16/2019    Patient: Sukumar Bolton  YOB: 1955  MRN: [id number]     Time Calculation  Start time: 0400  Stop time: 0500 Time Calculation (min): 60 minutes     Chief Complaint: Extremity Weakness (left arm) and Hand Weakness (right)    Visit #: 7    SUBJECTIVE: \"I'm having trouble picking out coins with my left hand but I was able to move the tongue depressor better\"    OBJECTIVE:  Current objective measures:   LUE strength:   Shoulder 2-/5  Elbow flexion: 2+/5, elbow extension 3/5   strength: Left:14lbs; Right: 36lbs        Therapeutic Exercises (CPT 82386):     1. Bilateral hands, left elbow    Therapeutic Exercise Summary:  High speed vibration to biceps, volar forearm, and hand including gross grasp.  Left elbow flexion exercises with hand roll performed for flexion to left/right shoulders via active assist during flexion component and light resistance applied during movement towards extension, with and without high speed vibration and tactile input.  Bilateral hand sequential digit flexion/extension \"drumming/counting\" and \"ok/shoot\" x 10 reps each    Therapeutic Treatments and Modalities:    1. Therapeutic Activities (CPT 81604)    2. Neuromuscular Re-education (CPT 97184)    Therapeutic Treatments and Modalities Summary: Velcro board using BH to roll medium sized dowel forward/backward multiple reps.  WB in standing position with dorsal right hand support to maintain digit extension with 10 second hold, pt repeated using LH to provide RH support for WB.  Repeated WB with hands positioned in ulnar deviation x 5 and forearms supinated x 5 reps.    Time-based treatments/modalities:  Therapeutic exercise minutes (CPT 33325): 30 minutes  Therapeutic activity minutes (CPT 63369): 15 " minutes  Neuromusc re-ed minutes (CPT 39565): 15 minutes      ASSESSMENT:   Response to treatment: Pt making slow but steady progress with his BUE neuromusucular recovery in response to facilitory techniques described above for activities which require bilateral integration.  Issued updated written HEP with good understanding.    PLAN/RECOMMENDATIONS:   Plan for treatment: therapy treatment to continue next visit.  Planned interventions for next visit: continue with current treatment, neuromuscular re-education (CPT 57965), orthotic training (CPT 96610), therapeutic activities (CPT 94232) and therapeutic exercise (CPT 82652)       Yes

## 2022-04-16 NOTE — PROGRESS NOTES
2 RN skin check complete:  Scabs on right ear.   Redness on left ear, blanching.   Rash on back.   DTI on right hip, slough present., mepilex in place.   Redness on left hip , blanching, mepilex in place.   DTI on right lateral foot, dressing in place.   Blister on right medial foot, dressing in place.   Scab on left medial malleolus, dressing in place.   Redness on left heel, blanchable.      no

## 2023-05-16 NOTE — ASSESSMENT & PLAN NOTE
Dysphagia 3  Speech therapy following alongside   Imiquimod Counseling:  I discussed with the patient the risks of imiquimod including but not limited to erythema, scaling, itching, weeping, crusting, and pain.  Patient understands that the inflammatory response to imiquimod is variable from person to person and was educated regarded proper titration schedule.  If flu-like symptoms develop, patient knows to discontinue the medication and contact us.

## 2023-11-15 NOTE — ASSESSMENT & PLAN NOTE
Medicare Wellness  Personal Prevention Plan of Service     Date of Office Visit:    Encounter Provider:  COURTNEY Larios  Place of Service:  [facility]  Patient Name: Bebeto Love  :  1955    As part of the Medicare Wellness portion of your visit today, we are providing you with this personalized preventive plan of services (PPPS). This plan is based upon recommendations of the [place] Preventive Services Task Force (USPSTF) and the Advisory Committee on Immunization Practices (ACIP).    This lists the preventive care services that should be considered, and provides dates of when you are due. Items listed as completed are up-to-date and do not require any further intervention.    Health Maintenance   Topic Date Due    HEPATITIS C SCREENING  Never done    ZOSTER VACCINE (2 of 2) 2019    Pneumococcal Vaccine 65+ (2 - PCV) 2021    ANNUAL WELLNESS VISIT  2023    BMI FOLLOWUP  2023    INFLUENZA VACCINE  2023    COVID-19 Vaccine (4 - - season) 2023    LIPID PANEL  10/24/2024    COLORECTAL CANCER SCREENING  2028    TDAP/TD VACCINES (2 - Td or Tdap) 2032       No orders of the defined types were placed in this encounter.      No follow-ups on file.           Likely secondary to Rhabdomyolysis, TENZIN

## 2024-10-03 NOTE — PROGRESS NOTES
Discharging pt to [facility] per physician order.  Discharged with medical transport via wheelchair.  Demonstrated understanding of discharge instructions, follow up appointments, home medications, prescriptions, and making appointment for surgery.  Ambulating with stand by assistance, pain well controlled, tolerating oral medications, oxygen saturation >90%, and tolerating diet.  Verbalized understanding of discharge instructions.  All questions answered, belongings with patient at the time of discharge.     ED Attending Physician Note        As is customary, I have evaluated the patient along with the resident/ NP, performing my own history, physical exam and directing the medical decision making and disposition. I agree with the note provided by the resident/NP except that noted below. For procedures performed on patient I was present for key portions of the procedure performed by resident/NP    Patient : Rachelle Olvera Age: 8 year old Sex: female   MRN: [id number] Encounter Date: 10/2/2024      I have fully participated in the care of this patient.  I have reviewed and agree with all pertinent clinical information including history, physical exam, labs, radiographic studies , the  MDM and the final plan of care.  I have also reviewed and agree with the medications, allergies and past medical history sections for this patient.    History     Chief Complaint   Patient presents with    Foot Pain       HPI:  8-year-old immunized female with a PMH of autism, anxiety, and ADHD, presents to the ED with right foot pain following a soccer injury. 3 weeks ago, pt reports ankle pain beginning during a soccer game that persisted throughout the next day. Minimal swelling noted by pt's mom initially after injury. Pt was then seen at  with XR showing no fractures, and was then diagnosed with a sprain and given a boot. Per mom, pt was able to ambulate without pain when pt was wearing the boot; however, since the boot was weaned off (last week) with intermittent ibuprofen use, pt's right leg pain has worsened, resulting in inability to bear weight. Pt additionally reports right foot is cold to the touch and per mom, pt's foot felt \"ice-cold.\" Mom reports mild cyanosis and paleness to right foot compared to left foot. Patient did not go to school for the past 2 days due to her inability to bear weight; yesterday, she went to school with crutches. Per mom, pt has been cycling ibuprofen and tylenol with no improvement of pain (last  tylenol 1600 and last ibuprofen at 1800). Per mom, no repeat XR performed after initial UC  visit. Per mom, reason for today's visit is PCP recommendation due to onset of cold feeling in foot. Currently, pt rates her right foot pain to be 7/10 in severity. Patient has no fevers, chills, N/V/D, abdominal pain, flank pain, facial droop, changes in behavior, or other symptoms. Pt has no hx of prior broken bones.     No Known Allergies    Current Discharge Medication List        Prior to Admission Medications    Details   dexmethylphenidate (FOCALIN XR) 10 MG 24 hr capsule Take 1 capsule by mouth daily.  Qty: 30 capsule, Refills: 0      FLUoxetine (PROzac) 20 MG capsule Take 1 capsule by mouth daily.  Qty: 30 capsule, Refills: 1             Current Discharge Medication List        PMH: healthy  PSH: none  Family Hx: no history of bleeding disorder    Past Medical History:   Diagnosis Date    Anxiety disorder        No past surgical history on file.    No family history on file.    Social History     Tobacco Use    Smoking status: Never     Passive exposure: Never    Smokeless tobacco: Never       Review of Systems   Constitutional:  Negative for activity change, chills and fever.   HENT:  Negative for ear discharge, facial swelling, nosebleeds and voice change.    Eyes:  Negative for photophobia and visual disturbance.   Cardiovascular:  Negative for chest pain.   Gastrointestinal:  Negative for abdominal distention and anal bleeding.   Endocrine: Negative for polyuria.   Genitourinary:  Negative for difficulty urinating and hematuria.   Musculoskeletal:  Negative for back pain and joint swelling.        Right lower extremity pain and change in temperature   Allergic/Immunologic: Negative for environmental allergies.   Neurological:  Negative for seizures and numbness.   Hematological:  Does not bruise/bleed easily.   Psychiatric/Behavioral:  Negative for confusion.        Physical Exam     ED Triage Vitals   ED Triage  Vitals Group      Temp 10/02/24 1934 98.9 °F (37.2 °C)      Heart Rate 10/02/24 1934 115      Resp 10/02/24 1934 20      BP 10/02/24 1934 116/72      SpO2 10/02/24 1934 97 %      EtCO2 mmHg --       Height --       Weight 10/02/24 1927 98 lb 8.7 oz (44.7 kg)      Weight Scale Used --       BMI (Calculated) --       IBW/kg (Calculated) --        Physical Exam  Vitals and nursing note reviewed.   Constitutional:       General: She is active.      Appearance: Normal appearance.   HENT:      Head: Normocephalic and atraumatic.      Right Ear: Tympanic membrane, ear canal and external ear normal.      Left Ear: Tympanic membrane, ear canal and external ear normal.      Nose: Nose normal.      Mouth/Throat:      Mouth: Mucous membranes are moist.      Pharynx: Oropharynx is clear.      Neck: Normal range of motion. No rigidity.   Eyes:      Extraocular Movements: Extraocular movements intact.      Conjunctiva/sclera: Conjunctivae normal.      Pupils: Pupils are equal, round, and reactive to light.   Cardiovascular:      Rate and Rhythm: Normal rate and regular rhythm.      Heart sounds: No murmur heard.     No friction rub. No gallop.   Pulmonary:      Effort: No respiratory distress or retractions.      Breath sounds: No stridor. No wheezing.   Abdominal:      General: Abdomen is flat. Bowel sounds are normal. There is no distension.      Palpations: Abdomen is soft.      Tenderness: There is no abdominal tenderness. There is no guarding or rebound.   Musculoskeletal:         General: No swelling or deformity. Normal range of motion.      Comments: No point tenderness to right ankle or right knee. No obvious swelling of joints on RLE. Full ROM of right knee intact. Right foot does feel cooler than left foot. At this time, we have DP pulse on right leg that is dopplerable; DP on left leg is 2+.    Skin:     General: Skin is warm.      Capillary Refill: Capillary refill takes 2 to 3 seconds.      Coloration: Skin is not  cyanotic.      Findings: No petechiae.   Neurological:      General: No focal deficit present.      Mental Status: She is alert.      Cranial Nerves: No cranial nerve deficit.      Sensory: No sensory deficit.      Motor: No weakness.      Gait: Gait normal.   Psychiatric:         Mood and Affect: Mood normal.         Behavior: Behavior normal.         ED Course     Procedures    Lab Results     No results found for this visit on 10/02/24.    EKG Results         Radiology Results     Imaging Results              XR ANKLE MIN 3 VIEWS RIGHT (Final result)  Result time 10/03/24 00:53:59      Final result                   Impression:    Normal exam    This document is electronically signed by Mehnaz Pickett ([facility] pediatric radiologist), on 10/03/2024 00:53 AM CDT.                  Narrative:    REPORT-ID:[id number]    EXAM:   XR Ankle Min 3 Views    HISTORY:  asses for healing fracture  asses for healing fracture    TECHNIQUE: 3 views right ankle    COMPARISON: None    FINDINGS:  Bones: No fracture or focal osseous lesion.  Joints: Normal alignment.  Soft tissues: Normal appearance  Foreign body: No radiopaque foreign body evident  Other: None                                       XR KNEE 4 OR MORE VIEWS RIGHT (Final result)  Result time 10/03/24 00:54:40   Procedure changed from XR KNEE 3 VIEWS RIGHT     Final result                   Impression:    Normal exam    This document is electronically signed by Mehnaz Pickett ([facility] pediatric radiologist), on 10/03/2024 00:54 AM CDT.                  Narrative:    REPORT-ID:[id number]    EXAM:   XR Knee Complete 4 Views or More    HISTORY:  assess for fracture or arthritic changes  assess for fracture or arthritic changes    TECHNIQUE: 4 views right knee    COMPARISON: None    FINDINGS:  Bones: No fracture or focal osseous lesion.  Joints: Normal alignment.  Soft tissues: Normal appearance  Foreign body: No radiopaque foreign  body evident  Other: None                                       US VASC LOWER EXTREMITY ARTERIES DUPLEX RIGHT (Final result)  Result time 10/02/24 22:05:44      Final result                   Impression:    Normal right lower extremity arterial duplex study.    Electronically Signed by: CURT PEÑALOZA MD   Signed on: 10/2/2024 10:05 PM   Workstation ID: [id number]               Narrative:    EXAM: US VASC LOWER EXTREMITY ARTERIES DUPLEX RIGHT    CLINICAL INDICATION: PAIN; cold limb    COMPARISON: None.    TECHNIQUE: Noninvasive right lower extremity arterial study was performed  with a duplex scanner. Real-time grayscale images, color-flow images and  spectral waveforms were obtained. This exam was performed by JOSE L Slater.    FINDINGS:  The right lower extremity arteries are patent with normal velocity  triphasic flow. There is no flow turbulence or focal elevated velocities to  suspect stenotic lesions. There are no occlusions.    The peak systolic velocities in the distal right common femoral artery are  147 cm/s, proximal profunda femoris 82, proximal, mid, and distal segments  of the right superficial femoral artery 109, 104, and 93 cm/s, and the  proximal and distal right popliteal artery 46 and 41 cm/s. The peak  systolic velocities in the proximal and distal segments of the posterior  tibial artery are 26 and 37 cm/s, peroneal artery 36 and 24 cm/s, and the  anterior tibial artery 40 and 24 cm/s.                                       US VASC LOWER EXTREMITY VENOUS DUPLEX RIGHT (Final result)  Result time 10/02/24 21:47:37      Final result                   Impression:    No acute deep venous thrombosis within the right lower extremity.        Electronically Signed by: CURT PEÑALOZA MD   Signed on: 10/2/2024 9:47 PM   Workstation ID: [id number]               Narrative:    EXAM: US VASC LOWER EXTREMITY VENOUS DUPLEX RIGHT     CLINICAL INDICATION: Right ankle/foot pain. Cold on exam with faint  distal  pulses.    COMPARISON: None.    PROCEDURE:  Routine protocol for real-time high resolution duplex imaging  of the deep and superficial venous systems of the right lower extremity and  the contralateral left common femoral vein is performed.  Grayscale, color  Doppler, and spectral waveform analysis is performed.  This exam was  performed by  Bryon SMITH RVT.    FINDINGS:  The right common femoral, proximal profunda femoris, femoral vein in the  right thigh, right popliteal, posterior tibial, and peroneal veins as well  as the contralateral left common femoral vein are patent and compressible.       The right greater saphenous vein in the proximal thigh is patent.                                      ED Medication Orders (From admission, onward)      None            ED Course as of 10/03/24 0058   Wed Oct 02, 2024   2100 DAWSON = 1.2 [AB]   2157 [facility] LOWER EXTREMITY ARTERIES DUPLEX RIGHT [NJ]   2215 [facility] LOWER EXTREMITY VENOUS DUPLEX RIGHT [NJ]      ED Course User Index  [AB] Jessica Nichols PA-C  [[place]] Courtney Leung MD       Medical Decision Making      Clinical Impression     No diagnosis found.    Disposition        There is no disposition no dispo time  There is no comment    MDM    History provided by parent/caregiver given minor status of patient   Brief Review of most recent ED visitations, discharge summaries from prior yeat to date of encounter      Patient presents with temperature differences between both lower extremities with a decrease in flow regards to the DP artery. However, RLE DAWSON was calculated to be at 1.2 which is within the normal range. Right foot remains pink, and cap refill appears to be maintained. At this time, due to patient's right leg pain worsening and because patient has become non-ambulatory, we will likely admit patient for possible MRI as well as will obtain ortho consult, venous and arterial US, and XR of right knee and right ankle to rule out any periosteal fracture.    Chief Complaint   Patient presents with    Foot Pain       Ddx: Arterial/ Venous thrombosis vs. Complex regional pain syndrome vs. Hairline fracture.   Disposition home: please refer to ED course      Number of Diagnoses or Management Options  ED Diagnosis    None         Amount and/or Complexity of Data Reviewed  Clinical lab tests: ordered and reviewed  Tests in the radiology section of CPT®: if ordered for patient, reviewed and ordered  Tests in the medicine section of CPT®: if ordered for patient - ordered and reviewed  Discussion of test results with the performing providers: yes  Decide to obtain previous medical records or to obtain history from someone other than the patient: yes  Obtain history from someone other than the patient: yes  Review and summarize past medical records: yes  Independent visualization of images, tracings, or specimens: yes    Risk of Complications, Morbidity, and/or Mortality  Presenting problems: moderate  Diagnostic procedures: moderate  Management options: moderate    Patient Progress  Patient progress: improved      DIAGNOSIS:   ED Diagnosis    None           Dr. Courtney Leung MD.  10/3/2024 12:58 AM         At time of discharge patient able to p.o., vital signs stable for age.  Discussed strict return criteria and the necessity for close pediatrician follow-up    During bedside patient evaluation PPE donned: eye protection, mask, gloves    Charting performed by ED Scribe Kath Lafleur for Dr. Leung.   The documentation recorded by the scribe accurately and completely reflects the service(s) I personally performed and the decisions made by me.

## 2024-12-03 NOTE — DISCHARGE PLANNING
Received Choice form at 8586  Agency/Facility Name: [facility]  Referral sent per Choice form @ 0149      14:30

## (undated) DEVICE — MASK ANESTHESIA ADULT  - (100/CA)

## (undated) DEVICE — KIT ROOM DECONTAMINATION

## (undated) DEVICE — ELECTRODE DUAL RETURN W/ CORD - (50/PK)

## (undated) DEVICE — KIT ANESTHESIA W/CIRCUIT & 3/LT BAG W/FILTER (20EA/CA)

## (undated) DEVICE — SYRINGE 30 ML LL (56/BX)

## (undated) DEVICE — PROTECTOR ULNA NERVE - (36PR/CA)

## (undated) DEVICE — GLOVE BIOGEL ECLIPSE  PF LATEX SIZE 6.5 (50PR/BX)

## (undated) DEVICE — TRAY SRGPRP PVP IOD WT PRP - (20/CA)

## (undated) DEVICE — DRAPE LARGE 3 QUARTER - (20/CA)

## (undated) DEVICE — BLADE SURGICAL #15 - (50/BX 3BX/CA)

## (undated) DEVICE — DRAPE C-ARM LARGE 41IN X 74 IN - (10/BX 2BX/CA)

## (undated) DEVICE — SPEAR EYE SPNG 3ANG MLBL HNDL - (10/ST18ST/PK 180/PK 1PK/SP)

## (undated) DEVICE — WATER IRRIG. STER. 1500 ML - (9/CA)

## (undated) DEVICE — LACTATED RINGERS INJ 1000 ML - (14EA/CA 60CA/PF)

## (undated) DEVICE — TUBING CLEARLINK DUO-VENT - C-FLO (48EA/CA)

## (undated) DEVICE — POUCH FLUID COLLECTION INVISISHIELD - (10/BX)

## (undated) DEVICE — BLADE SURGICAL CLIPPER - (50EA/CA)

## (undated) DEVICE — GLOVE SZ 6.5 BIOGEL PI MICRO - PF LF (50PR/BX)

## (undated) DEVICE — Device

## (undated) DEVICE — SHAVER 3.5 AGGRESSIVE + FORMLA (5EA/SP)

## (undated) DEVICE — SODIUM CHL. IRRIGATION 0.9% 3000ML (4EA/CA 65CA/PF)

## (undated) DEVICE — CHLORAPREP 26 ML APPLICATOR - ORANGE TINT(25/CA)

## (undated) DEVICE — PACK LOWER EXTREMITY - (2/CA)

## (undated) DEVICE — SUCTION INSTRUMENT YANKAUER BULBOUS TIP W/O VENT (50EA/CA)

## (undated) DEVICE — SET LEADWIRE 5 LEAD BEDSIDE DISPOSABLE ECG (1SET OF 5/EA)

## (undated) DEVICE — STOCKINET BIAS 6 IN STERILE - (20/CA)

## (undated) DEVICE — GLOVE BIOGEL ECLIPSE PF LATEX SIZE 8.0  (50PR/BX)

## (undated) DEVICE — GLOVE SZ 7 BIOGEL PI MICRO - PF LF (50PR/BX 4BX/CA)

## (undated) DEVICE — SWAB ANAEROBIC SPEC.COLLECTOR - (25/PK 4PK/CA 100EA/CA)

## (undated) DEVICE — SUTURE GENERAL

## (undated) DEVICE — SCRUB SOLUTION EXIDINE 4% 40Z - 48/CS CHLORAHEXADINE GLUCONATE

## (undated) DEVICE — CANISTER SUCTION 3000ML MECHANICAL FILTER AUTO SHUTOFF MEDI-VAC NONSTERILE LF DISP  (40EA/CA)

## (undated) DEVICE — WRAP CO-FLEX 4IN X 5YD STERIL - SELF-ADHERENT (18/CA)

## (undated) DEVICE — PADDING CAST 6 IN STERILE - 6 X 4 YDS (24/CA)

## (undated) DEVICE — ELECTRODE 850 FOAM ADHESIVE - HYDROGEL RADIOTRNSPRNT (50/PK)

## (undated) DEVICE — HEAD HOLDER JUNIOR/ADULT

## (undated) DEVICE — TUBE, CULTURE AEROBIC

## (undated) DEVICE — SODIUM CHL IRRIGATION 0.9% 1000ML (12EA/CA)

## (undated) DEVICE — SUTURE 3-0 ETHILON PS-1 (36PK/BX)

## (undated) DEVICE — SET EXTENSION WITH 2 PORTS (48EA/CA) ***PART #2C8610 IS A SUBSTITUTE*****

## (undated) DEVICE — SOD. CHL. INJ. 0.9% 1000 ML - (14EA/CA 60CA/PF)

## (undated) DEVICE — SLEEVE, VASO, THIGH, MED

## (undated) DEVICE — SET IRRIGATION CYSTOSCOPY TUBE L80 IN (20EA/CA)

## (undated) DEVICE — SUTURE 3-0 VICRYL PLUS SH - 8X 18 INCH (12/BX)

## (undated) DEVICE — TUBING PATIENT W/CONNECTOR REDEUCE (1EA)

## (undated) DEVICE — GLOVE BIOGEL PI INDICATOR SZ 6.5 SURGICAL PF LF - (50/BX 4BX/CA)

## (undated) DEVICE — PADDING CAST 4 IN X 4 YDS - SOF-ROLL (12RL/BG 6BG/CT)

## (undated) DEVICE — TUBE CONNECT SUCTION CLEAR 120 X 1/4" (50EA/CA)"

## (undated) DEVICE — NEPTUNE 4 PORT MANIFOLD - (20/PK)

## (undated) DEVICE — GLOVE BIOGEL PI INDICATOR SZ 7.0 SURGICAL PF LF - (50/BX 4BX/CA)

## (undated) DEVICE — GLOVE BIOGEL PI INDICATOR SZ 8.0 SURGICAL PF LF -(50/BX 4BX/CA)

## (undated) DEVICE — GOWN SURGEONS X-LARGE - DISP. (30/CA)

## (undated) DEVICE — SPONGE GAUZESTER 4 X 4 4PLY - (128PK/CA)

## (undated) DEVICE — DRESSING ABDOMINAL PAD STERILE 8 X 10" (360EA/CA)"

## (undated) DEVICE — DRESSING 3X8 ADAPTIC GAUZE - NON-ADHERING (36/PK 6PK/BX)

## (undated) DEVICE — WRAP COBAN SELF-ADHERENT 6 IN X  5YDS STERILE TAN (12/CA)

## (undated) DEVICE — BANDAGE ELASTIC 6 HONEYCOMB - 6X5YD LF (20/CA)"

## (undated) DEVICE — TUBING PUMP WITH CONNECTOR REDEUCE (1EA)

## (undated) DEVICE — GLOVE BIOGEL INDICATOR SZ 6.5 SURGICAL PF LTX - (50PR/BX 4BX/CA)

## (undated) DEVICE — SENSOR SPO2 NEO LNCS ADHESIVE (20/BX) SEE USER NOTES

## (undated) DEVICE — GLOVE BIOGEL SZ 7.5 SURGICAL PF LTX - (50PR/BX 4BX/CA)

## (undated) DEVICE — MASK, LARYNGEAL AIRWAY #4

## (undated) DEVICE — GOWN WARMING STANDARD FLEX - (30/CA)

## (undated) DEVICE — SPLINT PLASTER 5 IN X 30 IN - (50EA/BX 6BX/CA)

## (undated) DEVICE — GLOVE BIOGEL INDICATOR SZ 7.5 SURGICAL PF LTX - (50PR/BX 4BX/CA)

## (undated) DEVICE — DISPOSABLE WOUND VAC PICO 10 X 20 CM - WOUND CARE (3/CA)

## (undated) DEVICE — BANDAGE ELASTIC 6 IN X 5 YDS - LATEX FREE (10/BX)

## (undated) DEVICE — DRESSING 3X3 ADAPTIC GAUZE - (50EA/CT)

## (undated) DEVICE — BANDAGE ELASTIC 4 HONEYCOMB - 4"X5YD LF (20/CA)"